# Patient Record
Sex: FEMALE | Race: WHITE | NOT HISPANIC OR LATINO | Employment: OTHER | URBAN - METROPOLITAN AREA
[De-identification: names, ages, dates, MRNs, and addresses within clinical notes are randomized per-mention and may not be internally consistent; named-entity substitution may affect disease eponyms.]

---

## 2017-06-26 ENCOUNTER — OFFICE VISIT (OUTPATIENT)
Dept: FAMILY MEDICINE | Facility: CLINIC | Age: 57
End: 2017-06-26
Payer: COMMERCIAL

## 2017-06-26 VITALS
WEIGHT: 166 LBS | DIASTOLIC BLOOD PRESSURE: 93 MMHG | HEIGHT: 68 IN | OXYGEN SATURATION: 99 % | BODY MASS INDEX: 25.16 KG/M2 | HEART RATE: 72 BPM | SYSTOLIC BLOOD PRESSURE: 130 MMHG

## 2017-06-26 DIAGNOSIS — R42 LIGHTHEADEDNESS: Primary | ICD-10-CM

## 2017-06-26 DIAGNOSIS — Z13.6 CARDIOVASCULAR SCREENING; LDL GOAL LESS THAN 130: ICD-10-CM

## 2017-06-26 DIAGNOSIS — Z23 NEED FOR VACCINATION: ICD-10-CM

## 2017-06-26 PROCEDURE — 99204 OFFICE O/P NEW MOD 45 MIN: CPT | Mod: 25 | Performed by: NURSE PRACTITIONER

## 2017-06-26 PROCEDURE — 90471 IMMUNIZATION ADMIN: CPT | Performed by: NURSE PRACTITIONER

## 2017-06-26 PROCEDURE — 90715 TDAP VACCINE 7 YRS/> IM: CPT | Performed by: NURSE PRACTITIONER

## 2017-06-26 NOTE — PATIENT INSTRUCTIONS
1. Schedule physical and fasting blood work.           Thank you for choosing [facility].  You may be receiving a survey in the mail from [employer] regarding your visit today.  Please take a few minutes to complete and return the survey to let us know how we are doing.      If you have questions or concerns, please contact us via [employer] or you can contact your care team at [phone].    Our Clinic hours are:  Monday 6:40 am  to 7:00 pm  Tuesday -Friday 6:40 am to 5:00 pm    The [place] outpatient lab hours are:  Monday - Friday 6:10 am to 4:45 pm  Saturdays 7:00 am to 11:00 am  Appointments are required, call [phone]    If you have clinical questions after hours or would like to schedule an appointment,  call the clinic at [phone].

## 2017-06-26 NOTE — PROGRESS NOTES
"  SUBJECTIVE:                                                    Sheila Schilling is a 56 year old female who presents to clinic today for the following health issues:    Patient would like to est. Care- medical chart was reviewed with patient.       ED/UC Followup:    Facility:  [facility]  Date of visit: 6/23/17  Reason for visit: light headed ? HTN  Current Status: stable, had an episode of lightheaded last night   Chart was reviewed via Care Everywhere.   Patient went to ER after feeling dizzy and lightheaded at a ball game- she got out and \"got some air\" and felt better.   Workup including CT brain, EKG, troponin,, chest x-ray , CBC and BMP normal except for HGB at 16.5 and Glucose at 139.   Patient was given IV fluids and discharge home.     Last night she was sitting watching TV and bad brief episode of feeling lightheaded that lasted 10 minutes. No palpitations, shortness of breath or chest pain.   No dizziness. No nausea or vomiting. No weakness in extremities. No memory changes, no vision changes.         -------------------------------------    Problem list and histories reviewed & adjusted, as indicated.  Additional history: as documented    Patient Active Problem List   Diagnosis     CARDIOVASCULAR SCREENING; LDL GOAL LESS THAN 160     Past Surgical History:   Procedure Laterality Date     COLONOSCOPY  7/28/2011    Procedure:COLONOSCOPY; Surgeon:IMKE LYLE; Location: OR     SURGICAL HISTORY OF -   07/15/2002    laparoscopic L salpingo-oophorectomy - ectopic     SURGICAL HISTORY OF -   7/15/2002    SAB       Social History   Substance Use Topics     Smoking status: Never Smoker     Smokeless tobacco: Not on file     Alcohol use Yes      Comment: rare     Family History   Problem Relation Age of Onset     C.A.D. Father      Hypertension Father      DIABETES Maternal Grandmother      C.A.D. Maternal Grandmother      C.A.D. Maternal Grandfather      CANCER Paternal Grandmother      " "liver     Hypertension Mother      Lipids Mother      Cancer - colorectal Other            Reviewed and updated as needed this visit by clinical staff       Reviewed and updated as needed this visit by Provider         ROS:  Constitutional, HEENT, cardiovascular, pulmonary, GI, , musculoskeletal, neuro, skin, endocrine and psych systems are negative, except as otherwise noted.    OBJECTIVE:     BP (!) 130/93 (BP Location: Left arm, Patient Position: Chair, Cuff Size: Adult Regular)  Pulse 72  Ht 5' 8\" (1.727 m)  Wt 166 lb (75.3 kg)  SpO2 99%  BMI 25.24 kg/m2  Body mass index is 25.24 kg/(m^2).  GENERAL: healthy, alert and no distress  NECK: no adenopathy, no asymmetry, masses, or scars and thyroid normal to palpation  RESP: lungs clear to auscultation - no rales, rhonchi or wheezes  CV: regular rate and rhythm, normal S1 S2, no S3 or S4, no murmur, click or rub, no peripheral edema and peripheral pulses strong  MS: no gross musculoskeletal defects noted, no edema  NEURO: Negative Romberg. Normal strength of upper and lower extremities. PEARLA.     Diagnostic Test Results:  none     ASSESSMENT/PLAN:       1. Lightheadedness  Unknown etiology-  Patient presented to [facility] ER- unremarkable workup   ? Dehydration vs cardiac  - if symptoms continue consider Holter monitor, ultrasound of Carotid arteries   - TSH with free T4 reflex; Future    2. CARDIOVASCULAR SCREENING; LDL GOAL LESS THAN 130    - Lipid Profile with reflex to direct LDL; Future    3. Need for vaccination    - TDAP VACCINE (ADACEL) [77454.002]  - 1st  Administration  [23186]      > 25 min spent in direct face to face time with this patient, greater than 50% in counseling and coordination of care in chart review and discussing above symptoms.         SINCERE Donovan [facility]  "

## 2017-06-26 NOTE — MR AVS SNAPSHOT
After Visit Summary   6/26/2017    Sheila Schilling    MRN: [id number]           Patient Information     Date Of Birth          1960        Visit Information        Provider Department      6/26/2017 8:20 AM Claudia Mohan APRN CNP [facility]        Today's Diagnoses     Lightheadedness    -  1    CARDIOVASCULAR SCREENING; LDL GOAL LESS THAN 130          Care Instructions    1. Schedule physical and fasting blood work.           Thank you for choosing [facility].  You may be receiving a survey in the mail from Jose Thakkar regarding your visit today.  Please take a few minutes to complete and return the survey to let us know how we are doing.      If you have questions or concerns, please contact us via [employer] or you can contact your care team at [phone].    Our Clinic hours are:  Monday 6:40 am  to 7:00 pm  Tuesday -Friday 6:40 am to 5:00 pm    The [place] outpatient lab hours are:  Monday - Friday 6:10 am to 4:45 pm  Saturdays 7:00 am to 11:00 am  Appointments are required, call [phone]    If you have clinical questions after hours or would like to schedule an appointment,  call the clinic at [phone].          Follow-ups after your visit        Future tests that were ordered for you today     Open Future Orders        Priority Expected Expires Ordered    Lipid Profile with reflex to direct LDL Routine 6/27/2017 6/26/2018 6/26/2017            Who to contact     If you have questions or need follow up information about today's clinic visit or your schedule please contact [facility] directly at [phone].  Normal or non-critical lab and imaging results will be communicated to you by MyChart, letter or phone within 4 business days after the clinic has received the results. If you do not hear from us within 7 days, please contact the clinic through [employer] or phone. If you have a critical or abnormal lab result, we will notify you by phone as  "soon as possible.  Submit refill requests through [employer] or call your pharmacy and they will forward the refill request to us. Please allow 3 business days for your refill to be completed.          Additional Information About Your Visit        [employer] Information     [employer] gives you secure access to your electronic health record. If you see a primary care provider, you can also send messages to your care team and make appointments. If you have questions, please call your primary care clinic.  If you do not have a primary care provider, please call [phone] and they will assist you.        Care EveryWhere ID     This is your Care EveryWhere ID. This could be used by other organizations to access your [place] medical records  [id number]        Your Vitals Were     Pulse Height Pulse Oximetry BMI (Body Mass Index)          72 5' 8\" (1.727 m) 99% 25.24 kg/m2         Blood Pressure from Last 3 Encounters:   06/26/17 (!) 130/93   07/28/11 137/93   04/13/11 117/72    Weight from Last 3 Encounters:   06/26/17 166 lb (75.3 kg)   07/28/11 145 lb (65.8 kg)   04/13/11 149 lb 6.4 oz (67.8 kg)               Primary Care Provider Office Phone # Fax #    SINCERE Donovan [facility] [phone]       [facility] [address] [address]        Equal Access to Services     LEAH MALHOTRA AH: Hadii aad ku hadasho Soomaali, waaxda luqadaha, qaybta kaalmada adeegyada, collins collins . So [facility] [phone].    ATENCIÓN: Si habla español, tiene a ferraro disposición servicios gratuitos de asistencia lingüística. Llame al [phone].    We comply with applicable federal civil rights laws and [place] laws. We do not discriminate on the basis of race, color, national origin, age, disability sex, sexual orientation or gender identity.            Thank you!     Thank you for choosing [facility]  for your care. Our goal is always to provide you with excellent care. " Hearing back from our patients is one way we can continue to improve our services. Please take a few minutes to complete the written survey that you may receive in the mail after your visit with us. Thank you!             Your Updated Medication List - Protect others around you: Learn how to safely use, store and throw away your medicines at www.disposemymeds.org.          This list is accurate as of: 6/26/17  8:48 AM.  Always use your most recent med list.                   Brand Name Dispense Instructions for use Diagnosis    CALCIUM + D PO      Take 1 tablet by mouth.        MULTI COMPLETE/IRON Tabs      Take 1 tablet by mouth.        NO ACTIVE MEDICATIONS      .        omega-3 fatty acids 1200 MG capsule      Take 1 capsule by mouth daily.

## 2017-06-26 NOTE — NURSING NOTE
Prior to injection verified patient identity using patient's name and date of birth.  Per orders of  R Marques, injection of Adacel given by Ameena Kulkarni. Patient instructed to remain in clinic for 20 minutes afterwards, and to report any adverse reaction to me immediately.

## 2017-06-26 NOTE — NURSING NOTE
"Initial BP (!) 130/93 (BP Location: Left arm, Patient Position: Chair, Cuff Size: Adult Regular)  Pulse 72  Ht 5' 8\" (1.727 m)  Wt 166 lb (75.3 kg)  SpO2 99%  BMI 25.24 kg/m2 Estimated body mass index is 25.24 kg/(m^2) as calculated from the following:    Height as of this encounter: 5' 8\" (1.727 m).    Weight as of this encounter: 166 lb (75.3 kg). .    Ameena Kulkarni    "

## 2017-07-01 ENCOUNTER — HEALTH MAINTENANCE LETTER (OUTPATIENT)
Age: 57
End: 2017-07-01

## 2017-07-20 ENCOUNTER — OFFICE VISIT (OUTPATIENT)
Dept: FAMILY MEDICINE | Facility: CLINIC | Age: 57
End: 2017-07-20
Payer: COMMERCIAL

## 2017-07-20 ENCOUNTER — HOSPITAL ENCOUNTER (OUTPATIENT)
Dept: CARDIOLOGY | Facility: CLINIC | Age: 57
Discharge: HOME | End: 2017-07-20
Attending: NURSE PRACTITIONER | Admitting: NURSE PRACTITIONER
Payer: COMMERCIAL

## 2017-07-20 VITALS
HEART RATE: 70 BPM | TEMPERATURE: 97.6 F | DIASTOLIC BLOOD PRESSURE: 78 MMHG | BODY MASS INDEX: 25.31 KG/M2 | HEIGHT: 68 IN | WEIGHT: 167 LBS | SYSTOLIC BLOOD PRESSURE: 123 MMHG

## 2017-07-20 DIAGNOSIS — R00.2 PALPITATIONS: ICD-10-CM

## 2017-07-20 DIAGNOSIS — Z13.6 CARDIOVASCULAR SCREENING; LDL GOAL LESS THAN 130: ICD-10-CM

## 2017-07-20 DIAGNOSIS — Z13.1 SCREENING FOR DIABETES MELLITUS: ICD-10-CM

## 2017-07-20 DIAGNOSIS — Z82.49 FAMILY HISTORY OF ISCHEMIC HEART DISEASE: ICD-10-CM

## 2017-07-20 DIAGNOSIS — Z01.419 PAP SMEAR, LOW-RISK: Primary | ICD-10-CM

## 2017-07-20 DIAGNOSIS — Z11.59 ENCOUNTER FOR HEPATITIS C SCREENING TEST FOR LOW RISK PATIENT: ICD-10-CM

## 2017-07-20 LAB
ANION GAP SERPL CALCULATED.3IONS-SCNC: 4 MMOL/L (ref 3–14)
BUN SERPL-MCNC: 13 MG/DL (ref 7–30)
CALCIUM SERPL-MCNC: 9.3 MG/DL (ref 8.5–10.1)
CHLORIDE SERPL-SCNC: 106 MMOL/L (ref 94–109)
CHOLEST SERPL-MCNC: 199 MG/DL
CO2 SERPL-SCNC: 30 MMOL/L (ref 20–32)
CREAT SERPL-MCNC: 0.72 MG/DL (ref 0.52–1.04)
GFR SERPL CREATININE-BSD FRML MDRD: 84 ML/MIN/1.7M2
GLUCOSE SERPL-MCNC: 89 MG/DL (ref 70–99)
HCV AB SERPL QL IA: NORMAL
HDLC SERPL-MCNC: 75 MG/DL
LDLC SERPL CALC-MCNC: 110 MG/DL
NONHDLC SERPL-MCNC: 124 MG/DL
POTASSIUM SERPL-SCNC: 4.4 MMOL/L (ref 3.4–5.3)
SODIUM SERPL-SCNC: 140 MMOL/L (ref 133–144)
TRIGL SERPL-MCNC: 70 MG/DL
TSH SERPL DL<=0.005 MIU/L-ACNC: 1.98 MU/L (ref 0.4–4)

## 2017-07-20 PROCEDURE — 0296T ZIO PATCH HOLTER: CPT

## 2017-07-20 PROCEDURE — 0298T ZZC EXT ECG > 48HR TO 21 DAY REVIEW AND INTERPRETATN: CPT | Performed by: INTERNAL MEDICINE

## 2017-07-20 PROCEDURE — 36415 COLL VENOUS BLD VENIPUNCTURE: CPT | Performed by: NURSE PRACTITIONER

## 2017-07-20 PROCEDURE — 99213 OFFICE O/P EST LOW 20 MIN: CPT | Mod: 25 | Performed by: NURSE PRACTITIONER

## 2017-07-20 PROCEDURE — 80048 BASIC METABOLIC PNL TOTAL CA: CPT | Performed by: NURSE PRACTITIONER

## 2017-07-20 PROCEDURE — 80061 LIPID PANEL: CPT | Performed by: NURSE PRACTITIONER

## 2017-07-20 PROCEDURE — G0145 SCR C/V CYTO,THINLAYER,RESCR: HCPCS | Performed by: NURSE PRACTITIONER

## 2017-07-20 PROCEDURE — 87624 HPV HI-RISK TYP POOLED RSLT: CPT | Performed by: NURSE PRACTITIONER

## 2017-07-20 PROCEDURE — 84443 ASSAY THYROID STIM HORMONE: CPT | Performed by: NURSE PRACTITIONER

## 2017-07-20 PROCEDURE — 99396 PREV VISIT EST AGE 40-64: CPT | Performed by: NURSE PRACTITIONER

## 2017-07-20 PROCEDURE — 86803 HEPATITIS C AB TEST: CPT | Performed by: NURSE PRACTITIONER

## 2017-07-20 NOTE — PATIENT INSTRUCTIONS
1. Labs today  2. Go to cardiac services to  your heart monitor- 2nd floor      Preventive Health Recommendations  Female Ages 50 - 64    Yearly exam: See your health care provider every year in order to  o Review health changes.   o Discuss preventive care.    o Review your medicines if your doctor has prescribed any.      Get a Pap test every three years (unless you have an abnormal result and your provider advises testing more often).    If you get Pap tests with HPV test, you only need to test every 5 years, unless you have an abnormal result.     You do not need a Pap test if your uterus was removed (hysterectomy) and you have not had cancer.    You should be tested each year for STDs (sexually transmitted diseases) if you're at risk.     Have a mammogram every 1 to 2 years.    Have a colonoscopy at age 50, or have a yearly FIT test (stool test). These exams screen for colon cancer.      Have a cholesterol test every 5 years, or more often if advised.    Have a diabetes test (fasting glucose) every three years. If you are at risk for diabetes, you should have this test more often.     If you are at risk for osteoporosis (brittle bone disease), think about having a bone density scan (DEXA).    Shots: Get a flu shot each year. Get a tetanus shot every 10 years.    Nutrition:     Eat at least 5 servings of fruits and vegetables each day.    Eat whole-grain bread, whole-wheat pasta and brown rice instead of white grains and rice.    Talk to your provider about Calcium and Vitamin D.     Lifestyle    Exercise at least 150 minutes a week (30 minutes a day, 5 days a week). This will help you control your weight and prevent disease.    Limit alcohol to one drink per day.    No smoking.     Wear sunscreen to prevent skin cancer.     See your dentist every six months for an exam and cleaning.    See your eye doctor every 1 to 2 years        Thank you for choosing [facility].  You may be receiving a survey in  the mail from [employer] regarding your visit today.  Please take a few minutes to complete and return the survey to let us know how we are doing.      If you have questions or concerns, please contact us via [employer] or you can contact your care team at [phone].    Our Clinic hours are:  Monday 6:40 am  to 7:00 pm  Tuesday -Friday 6:40 am to 5:00 pm    The [place] outpatient lab hours are:  Monday - Friday 6:10 am to 4:45 pm  Saturdays 7:00 am to 11:00 am  Appointments are required, call [phone]    If you have clinical questions after hours or would like to schedule an appointment,  call the clinic at [phone].        Thank you for choosing [facility].  You may be receiving a survey in the mail from [employer] regarding your visit today.  Please take a few minutes to complete and return the survey to let us know how we are doing.      If you have questions or concerns, please contact us via [employer] or you can contact your care team at [phone].    Our Clinic hours are:  Monday 6:40 am  to 7:00 pm  Tuesday -Friday 6:40 am to 5:00 pm    The [place] outpatient lab hours are:  Monday - Friday 6:10 am to 4:45 pm  Saturdays 7:00 am to 11:00 am  Appointments are required, call [phone]    If you have clinical questions after hours or would like to schedule an appointment,  call the clinic at [phone].

## 2017-07-20 NOTE — PROGRESS NOTES
SUBJECTIVE:   CC: Sheila Schilling is an 56 year old woman who presents for preventive health visit.     Healthy Habits:    Do you get at least three servings of calcium containing foods daily (dairy, green leafy vegetables, etc.)? no, taking calcium and/or vitamin D supplement: yes -     Amount of exercise or daily activities, outside of work: 5 day(s) per week    Problems taking medications regularly No    Medication side effects: No    Have you had an eye exam in the past two years? yes    Do you see a dentist twice per year? yes    Do you have sleep apnea, excessive snoring or daytime drowsiness?no          PROBLEMS TO ADD ON...    Palpitations:     patient previously went to [facility] ER on 6/23/17 for lightheadedness and dizzinesz and I saw patient for follow up on 6/26/17: workup in ER  including CT brain, EKG, troponin,, chest x-ray , CBC and BMP normal except for HGB at 16.5 and Glucose at 139.   Patient was given IV fluids and discharge home.    Today patient tells me that last week while out to dinner having a cocktail she experienced heart racing and felt very uncomfortable. Symptoms later resolved on own. No shortness of breath/chest pressure/lightheadeness or dizziness. Family history of atrial fibrillation.     Today's PHQ-2 Score: No flowsheet data found.      Abuse: Current or Past(Physical, Sexual or Emotional)- No  Do you feel safe in your environment - Yes  Social History   Substance Use Topics     Smoking status: Never Smoker     Smokeless tobacco: Not on file     Alcohol use Yes      Comment: rare     The patient does not drink >3 drinks per day nor >7 drinks per week.    Reviewed orders with patient.  Reviewed health maintenance and updated orders accordingly - Yes  Patient Active Problem List   Diagnosis     CARDIOVASCULAR SCREENING; LDL GOAL LESS THAN 160     Past Surgical History:   Procedure Laterality Date     COLONOSCOPY  7/28/2011    Procedure:COLONOSCOPY; Surgeon:MIKE LYLE  LYNDSEY; Location:[facility] OR     SURGICAL HISTORY OF -   07/15/2002    laparoscopic L salpingo-oophorectomy - ectopic     SURGICAL HISTORY OF -   7/15/2002    SAB       Social History   Substance Use Topics     Smoking status: Never Smoker     Smokeless tobacco: Never Used     Alcohol use Yes      Comment: rare     Family History   Problem Relation Age of Onset     C.A.D. Father      Hypertension Father      DIABETES Maternal Grandmother      C.A.D. Maternal Grandmother      C.A.D. Maternal Grandfather      CANCER Paternal Grandmother      liver     Hypertension Mother      Lipids Mother      Cancer - colorectal Other              Patient over age 50, mutual decision to screen reflected in health maintenance.      Pertinent mammograms are reviewed under the imaging tab.  History of abnormal Pap smear:   Last 3 Pap Results:   PAP (no units)   Date Value   04/07/2010 NIL   02/05/2008 NIL   11/22/2005 NIL       Reviewed and updated as needed this visit by clinical staff         Reviewed and updated as needed this visit by Provider              ROS:  C: NEGATIVE for fever, chills, change in weight  I: NEGATIVE for worrisome rashes, moles or lesions  E: NEGATIVE for vision changes or irritation  ENT: NEGATIVE for ear, mouth and throat problems  R: NEGATIVE for significant cough or SOB  B: NEGATIVE for masses, tenderness or discharge  CV: POSITIVE for palpitations  GI: NEGATIVE for nausea, abdominal pain, heartburn, or change in bowel habits  : NEGATIVE for unusual urinary or vaginal symptoms. No vaginal bleeding.  M: NEGATIVE for significant arthralgias or myalgia  N: NEGATIVE for weakness, dizziness or paresthesias  P: NEGATIVE for changes in mood or affect     OBJECTIVE:   There were no vitals taken for this visit.  EXAM:  GENERAL: healthy, alert and no distress  EYES: Eyes grossly normal to inspection, PERRL and conjunctivae and sclerae normal  HENT: ear canals and TM's normal, nose and mouth without ulcers or  lesions  NECK: no adenopathy, no asymmetry, masses, or scars and thyroid normal to palpation  RESP: lungs clear to auscultation - no rales, rhonchi or wheezes  BREAST: normal without masses, tenderness or nipple discharge and no palpable axillary masses or adenopathy  CV: regular rate and rhythm, normal S1 S2, no S3 or S4, no murmur, click or rub, no peripheral edema and peripheral pulses strong  ABDOMEN: soft, nontender, no hepatosplenomegaly, no masses and bowel sounds normal   (female): normal female external genitalia, normal urethral meatus, vaginal mucosa pink, moist, well rugated, and normal cervix/adnexa/uterus without masses or discharge  MS: no gross musculoskeletal defects noted, no edema  SKIN: no suspicious lesions or rashes  NEURO: Normal strength and tone, mentation intact and speech normal  PSYCH: mentation appears normal, affect normal/bright    ASSESSMENT/PLAN:   1. Pap smear, low-risk    - Pap imaged thin layer screen with HPV - recommended age 30 - 65  - HPV High Risk Types DNA Cervical    2. CARDIOVASCULAR SCREENING; LDL GOAL LESS THAN 130    - LIPID REFLEX TO DIRECT LDL PANEL    3. Family history of ischemic heart disease    - LIPID REFLEX TO DIRECT LDL PANEL    4. Screening for diabetes mellitus    - Basic metabolic panel    5. Encounter for hepatitis C screening test for low risk patient    - Hepatitis C antibody    6. Palpitations  ? Arrhythmia vs thyroid disorder  - previous BMP 1 month ago was normal- r/u electrolyte abnormality.   - Zio Patch Holter; Future  - TSH with free T4 reflex    - follow up to discuss results     COUNSELING:   Reviewed preventive health counseling, as reflected in patient instructions       Regular exercise       Healthy diet/nutrition       Osteoporosis Prevention/Bone Health       reports that she has never smoked. She does not have any smokeless tobacco history on file.    Estimated body mass index is 25.24 kg/(m^2) as calculated from the following:    Height  "as of 6/26/17: 5' 8\" (1.727 m).    Weight as of 6/26/17: 166 lb (75.3 kg).         Counseling Resources:  ATP IV Guidelines  Pooled Cohorts Equation Calculator  Breast Cancer Risk Calculator  FRAX Risk Assessment  ICSI Preventive Guidelines  Dietary Guidelines for Americans, 2010  USDA's MyPlate  ASA Prophylaxis  Lung CA Screening    SINCERE Donovan [facility]  "

## 2017-07-20 NOTE — MR AVS SNAPSHOT
After Visit Summary   7/20/2017    Sheila Schilling    MRN: [id number]           Patient Information     Date Of Birth          1960        Visit Information        Provider Department      7/20/2017 7:40 AM Claudia Mohan APRN [facility]        Today's Diagnoses     Pap smear, low-risk    -  1    CARDIOVASCULAR SCREENING; LDL GOAL LESS THAN 130        Family history of ischemic heart disease        Screening for diabetes mellitus        Encounter for hepatitis C screening test for low risk patient        Palpitations          Care Instructions    1. Labs today  2. Go to cardiac services to  your heart monitor- 2nd floor      Preventive Health Recommendations  Female Ages 50 - 64    Yearly exam: See your health care provider every year in order to  o Review health changes.   o Discuss preventive care.    o Review your medicines if your doctor has prescribed any.      Get a Pap test every three years (unless you have an abnormal result and your provider advises testing more often).    If you get Pap tests with HPV test, you only need to test every 5 years, unless you have an abnormal result.     You do not need a Pap test if your uterus was removed (hysterectomy) and you have not had cancer.    You should be tested each year for STDs (sexually transmitted diseases) if you're at risk.     Have a mammogram every 1 to 2 years.    Have a colonoscopy at age 50, or have a yearly FIT test (stool test). These exams screen for colon cancer.      Have a cholesterol test every 5 years, or more often if advised.    Have a diabetes test (fasting glucose) every three years. If you are at risk for diabetes, you should have this test more often.     If you are at risk for osteoporosis (brittle bone disease), think about having a bone density scan (DEXA).    Shots: Get a flu shot each year. Get a tetanus shot every 10 years.    Nutrition:     Eat at least 5 servings of fruits and  vegetables each day.    Eat whole-grain bread, whole-wheat pasta and brown rice instead of white grains and rice.    Talk to your provider about Calcium and Vitamin D.     Lifestyle    Exercise at least 150 minutes a week (30 minutes a day, 5 days a week). This will help you control your weight and prevent disease.    Limit alcohol to one drink per day.    No smoking.     Wear sunscreen to prevent skin cancer.     See your dentist every six months for an exam and cleaning.    See your eye doctor every 1 to 2 years        Thank you for choosing [facility].  You may be receiving a survey in the mail from [employer] regarding your visit today.  Please take a few minutes to complete and return the survey to let us know how we are doing.      If you have questions or concerns, please contact us via Cloudwords or you can contact your care team at [phone].    Our Clinic hours are:  Monday 6:40 am  to 7:00 pm  Tuesday -Friday 6:40 am to 5:00 pm    The [place] outpatient lab hours are:  Monday - Friday 6:10 am to 4:45 pm  Saturdays 7:00 am to 11:00 am  Appointments are required, call [phone]    If you have clinical questions after hours or would like to schedule an appointment,  call the clinic at [phone].        Thank you for choosing [facility].  You may be receiving a survey in the mail from [employer] regarding your visit today.  Please take a few minutes to complete and return the survey to let us know how we are doing.      If you have questions or concerns, please contact us via Cloudwords or you can contact your care team at [phone].    Our Clinic hours are:  Monday 6:40 am  to 7:00 pm  Tuesday -Friday 6:40 am to 5:00 pm    The [place] outpatient lab hours are:  Monday - Friday 6:10 am to 4:45 pm  Saturdays 7:00 am to 11:00 am  Appointments are required, call [phone]    If you have clinical questions after hours or would like to schedule an appointment,  call the clinic at  "[phone].            Follow-ups after your visit        Future tests that were ordered for you today     Open Future Orders        Priority Expected Expires Ordered    Zio Patch Holter Routine  9/3/2017 7/20/2017            Who to contact     If you have questions or need follow up information about today's clinic visit or your schedule please contact [facility] directly at [phone].  Normal or non-critical lab and imaging results will be communicated to you by [employer], letter or phone within 4 business days after the clinic has received the results. If you do not hear from us within 7 days, please contact the clinic through Andre Phillipet or phone. If you have a critical or abnormal lab result, we will notify you by phone as soon as possible.  Submit refill requests through [employer] or call your pharmacy and they will forward the refill request to us. Please allow 3 business days for your refill to be completed.          Additional Information About Your Visit        [employer] Information     [employer] gives you secure access to your electronic health record. If you see a primary care provider, you can also send messages to your care team and make appointments. If you have questions, please call your primary care clinic.  If you do not have a primary care provider, please call [phone] and they will assist you.        Care EveryWhere ID     This is your Care EveryWhere ID. This could be used by other organizations to access your [facility] medical records  [id number]        Your Vitals Were     Pulse Temperature Height Last Period BMI (Body Mass Index)       70 97.6  F (36.4  C) (Tympanic) 5' 8\" (1.727 m) 03/08/2011 25.39 kg/m2        Blood Pressure from Last 3 Encounters:   07/20/17 123/78   06/26/17 (!) 130/93   07/28/11 137/93    Weight from Last 3 Encounters:   07/20/17 167 lb (75.8 kg)   06/26/17 166 lb (75.3 kg)   07/28/11 145 lb (65.8 kg)              We Performed the Following     Basic " metabolic panel     Hepatitis C antibody     HPV High Risk Types DNA Cervical     LIPID REFLEX TO DIRECT LDL PANEL     Pap imaged thin layer screen with HPV - recommended age 30 - 65     TSH with free T4 reflex        Primary Care Provider Office Phone # Fax #    SINCERE Donovan [phone]       [facility] [address] [facility] [address]        Equal Access to Services     LEAH MALHOTRA : Hadii aad ku hadasho Soomaali, waaxda luqadaha, qaybta kaalmada adeegyada, waxay idiin hayaan adeeg kharash la'amber ah. So [facility] [phone].    ATENCIÓN: Si habla español, tiene a ferraro disposición servicios gratuitos de asistencia lingüística. Llame al [phone].    We comply with applicable federal civil rights laws and [place] laws. We do not discriminate on the basis of race, color, national origin, age, disability sex, sexual orientation or gender identity.            Thank you!     Thank you for choosing [facility]  for your care. Our goal is always to provide you with excellent care. Hearing back from our patients is one way we can continue to improve our services. Please take a few minutes to complete the written survey that you may receive in the mail after your visit with us. Thank you!             Your Updated Medication List - Protect others around you: Learn how to safely use, store and throw away your medicines at www.disposemymeds.org.          This list is accurate as of: 7/20/17  8:24 AM.  Always use your most recent med list.                   Brand Name Dispense Instructions for use Diagnosis    CALCIUM + D PO      Take 1 tablet by mouth.        MULTI COMPLETE/IRON Tabs      Take 1 tablet by mouth.        NO ACTIVE MEDICATIONS      .        omega-3 fatty acids 1200 MG capsule      Take 1 capsule by mouth daily.

## 2017-07-20 NOTE — NURSING NOTE
"Chief Complaint   Patient presents with     Physical     fasting       Initial /78 (BP Location: Left arm, Patient Position: Sitting, Cuff Size: Adult Regular)  Pulse 70  Temp 97.6  F (36.4  C) (Tympanic)  Ht 5' 8\" (1.727 m)  Wt 167 lb (75.8 kg)  LMP 03/08/2011  BMI 25.39 kg/m2 Estimated body mass index is 25.39 kg/(m^2) as calculated from the following:    Height as of this encounter: 5' 8\" (1.727 m).    Weight as of this encounter: 167 lb (75.8 kg).  Medication Reconciliation: complete    "

## 2017-07-24 LAB
COPATH REPORT: NORMAL
PAP: NORMAL

## 2017-07-25 LAB
FINAL DIAGNOSIS: NORMAL
HPV HR 12 DNA CVX QL NAA+PROBE: NEGATIVE
HPV16 DNA SPEC QL NAA+PROBE: NEGATIVE
HPV18 DNA SPEC QL NAA+PROBE: NEGATIVE
SPECIMEN DESCRIPTION: NORMAL

## 2017-08-16 DIAGNOSIS — I48.0 PAROXYSMAL ATRIAL FIBRILLATION (H): Primary | ICD-10-CM

## 2017-08-17 ENCOUNTER — HOSPITAL ENCOUNTER (OUTPATIENT)
Dept: CARDIOLOGY | Facility: CLINIC | Age: 57
Discharge: HOME | End: 2017-08-17
Attending: INTERNAL MEDICINE | Admitting: INTERNAL MEDICINE
Payer: COMMERCIAL

## 2017-08-17 ENCOUNTER — OFFICE VISIT (OUTPATIENT)
Dept: CARDIOLOGY | Facility: CLINIC | Age: 57
End: 2017-08-17
Payer: COMMERCIAL

## 2017-08-17 VITALS
WEIGHT: 165 LBS | BODY MASS INDEX: 25.01 KG/M2 | SYSTOLIC BLOOD PRESSURE: 122 MMHG | HEART RATE: 72 BPM | HEIGHT: 68 IN | DIASTOLIC BLOOD PRESSURE: 72 MMHG

## 2017-08-17 DIAGNOSIS — I48.0 PAROXYSMAL ATRIAL FIBRILLATION (H): ICD-10-CM

## 2017-08-17 DIAGNOSIS — I48.0 PAROXYSMAL ATRIAL FIBRILLATION (H): Primary | ICD-10-CM

## 2017-08-17 PROCEDURE — 99204 OFFICE O/P NEW MOD 45 MIN: CPT | Mod: 25 | Performed by: INTERNAL MEDICINE

## 2017-08-17 PROCEDURE — 93306 TTE W/DOPPLER COMPLETE: CPT

## 2017-08-17 PROCEDURE — 93306 TTE W/DOPPLER COMPLETE: CPT | Mod: 26 | Performed by: INTERNAL MEDICINE

## 2017-08-17 RX ORDER — FLECAINIDE ACETATE 100 MG/1
100 TABLET ORAL 2 TIMES DAILY
Qty: 60 TABLET | Refills: 11 | Status: SHIPPED | OUTPATIENT
Start: 2017-08-17 | End: 2017-11-14

## 2017-08-17 NOTE — PROGRESS NOTES
HPI and Plan:   See dictation    Orders Placed This Encounter   Procedures     Exercise Stress Test (Stress ECG)       Orders Placed This Encounter   Medications     flecainide (TAMBOCOR) 100 MG tablet     Sig: Take 1 tablet (100 mg) by mouth 2 times daily     Dispense:  60 tablet     Refill:  11       There are no discontinued medications.      Encounter Diagnosis   Name Primary?     Paroxysmal atrial fibrillation (H) Yes       CURRENT MEDICATIONS:  Current Outpatient Prescriptions   Medication Sig Dispense Refill     flecainide (TAMBOCOR) 100 MG tablet Take 1 tablet (100 mg) by mouth 2 times daily 60 tablet 11     Calcium-Vitamin D (CALCIUM + D PO) Take 1 tablet by mouth.       Omega-3 Fatty Acids (FISH OIL) 1200 MG capsule Take 1 capsule by mouth daily.       Multiple Vitamins-Minerals (MULTI COMPLETE/IRON) TABS Take 1 tablet by mouth.       NO ACTIVE MEDICATIONS .         ALLERGIES   No Known Allergies    PAST MEDICAL HISTORY:  Past Medical History:   Diagnosis Date     Atrial fibrillation (H)      Paroxysmal atrial flutter (H)        PAST SURGICAL HISTORY:  Past Surgical History:   Procedure Laterality Date     COLONOSCOPY  7/28/2011    Procedure:COLONOSCOPY; Surgeon:MIKE LYLE; Location:[facility] OR     SURGICAL HISTORY OF -   07/15/2002    laparoscopic L salpingo-oophorectomy - ectopic     SURGICAL HISTORY OF -   7/15/2002    SAB       FAMILY HISTORY:  Family History   Problem Relation Age of Onset     C.A.D. Father      Hypertension Father      DIABETES Maternal Grandmother      C.A.D. Maternal Grandmother      C.A.D. Maternal Grandfather      CANCER Paternal Grandmother      liver     Hypertension Mother      Lipids Mother      Cancer - colorectal Other        SOCIAL HISTORY:  Social History     Social History     Marital status:      Spouse name: N/A     Number of children: N/A     Years of education: N/A     Social History Main Topics     Smoking status: Never Smoker     Smokeless tobacco:  "Never Used     Alcohol use Yes      Comment: rare     Drug use: No     Sexual activity: Yes     Birth control/ protection: None     Other Topics Concern     Parent/Sibling W/ Cabg, Mi Or Angioplasty Before 65f 55m? No     Social History Narrative       Review of Systems:  Skin:  Negative       Eyes:  Positive for glasses    ENT:  Negative      Respiratory:  Negative       Cardiovascular:  Negative      Gastroenterology: Negative      Genitourinary:  Negative      Musculoskeletal:  Negative      Neurologic:  Negative      Psychiatric:  Negative      Heme/Lymph/Imm:  Negative      Endocrine:  Negative        Physical Exam:  Vitals: /72  Pulse 72  Ht 1.727 m (5' 8\")  Wt 74.8 kg (165 lb)  LMP 03/08/2011  BMI 25.09 kg/m2    Constitutional:  cooperative, alert and oriented, well developed, well nourished, in no acute distress        Skin:  warm and dry to the touch, no apparent skin lesions or masses noted        Head:  normocephalic, no masses or lesions        Eyes:  pupils equal and round, conjunctivae and lids unremarkable, sclera white, no xanthalasma, EOMS intact, no nystagmus        ENT:  no pallor or cyanosis, dentition good        Neck:  carotid pulses are full and equal bilaterally, JVP normal, no carotid bruit, no thyromegaly        Chest:  normal breath sounds, clear to auscultation, normal A-P diameter, normal symmetry, normal respiratory excursion, no use of accessory muscles          Cardiac: regular rhythm, normal S1/S2, no S3 or S4, apical impulse not displaced, no murmurs, gallops or rubs                  Abdomen:  abdomen soft, non-tender, BS normoactive, no mass, no HSM, no bruits        Vascular: pulses full and equal, no bruits auscultated                                        Extremities and Back:  no deformities, clubbing, cyanosis, erythema observed              Neurological:  affect appropriate, oriented to time, person and place              CC  No referring provider defined for this " encounter.

## 2017-08-17 NOTE — LETTER
8/17/2017    Claudia Mohan, APRN CNP  [address] [facility] [address]    RE: Sheilasukhwinder Schilling       Dear Colleague,    I had the pleasure of seeing Sheila Schilling in the [facility].    Thank you for allowing me to participate in the care of this very delightful patient.  As you know, Sheila is a 56-year-old female, previously healthy, who had been having some palpitations off and on now for these past few months including an episode associated with severe lightheadedness prompting ER visit.  By the time she went to the local ER, her symptoms had subsided.  She subsequently had to wear a ZIO Patch monitor, which demonstrated occasional episodes of atrial fibrillation or atrial flutter with rapid ventricular response lasting a minute or less.  There is also a sinus bradycardia at night when she is sleeping at a rate of about 30 beats per minute, which is not unexpected given her relatively young age and with expectant high vagal tone.  The patient does not seem to have any symptoms suggestive of sleep apnea.  Her thyroid function we checked recently shows appropriate function.      Sheila mentioned that the episodes of dizziness occurred only after with the palpitations, suggesting that that episode was quite longer than what we observed on ZIO Patch and they are likely to be atrial fibrillation as well.  She did have a normal cardiac structure and function on an echocardiogram that I requested prior to this visit.  The left atrial size was in normal range.      It is very likely that she has been having paroxysmal atrial tachyarrhythmias, including mostly atrial fibrillation and a hint of probable atrial flutter, causing her to have the severe bout of lightheadedness in the past after experiencing palpitations.  We discussed at length about the potential cause of atrial fibrillation.  In her case, it seems to be idiopathic.      We discussed potential complications including  tachycardia-induced cardiomyopathy and CVA, which is low for her given her CHADS-VASc score of 1 only, namely from being a female.      Lastly, we discussed treatment options including rate versus rhythm control strategy.  As the patient is very symptomatic with her atrial tachyarrhythmias, I would recommend a rhythm control strategy.  I would have her on flecainide at 100 mg twice a day given that she has normal cardiac structure and function in the absence of any coronary ischemic symptoms.  I went over the side effects of the drug including blurred vision and proarrhythmic potential.  After she is on it for 3-5 days, I would like to obtain a stress EKG.  Given that she had some sinus bradycardia, I am quite hesitant to add a beta blocker at this point in time to combat the potential of flecainide-induced atrial flutter with 1:1 AV conduction.  Should flecainide is ineffective or she could not tolerated it then I would have a low threshold in recommending an ablation. I will have the patient come back to see one of our advanced practice providers at [place] for her convenience in a couple months' time for reassessment, sooner should circumstances dictate it.     Again, thank you for allowing me to participate in the care of your patient.      Sincerely,    Kody Carreon MD     [facility]

## 2017-08-17 NOTE — MR AVS SNAPSHOT
After Visit Summary   8/17/2017    Sheila Schilling    MRN: [id number]           Patient Information     Date Of Birth          1960        Visit Information        Provider Department      8/17/2017 1:30 PM Kody Thornton MD [facility]        Today's Diagnoses     Paroxysmal atrial fibrillation (H)    -  1       Follow-ups after your visit        Your next 10 appointments already scheduled     Aug 17, 2017  1:30 PM CDT   EP NEW with Kody Carreon MD   [facility] ([facility])    [address] [place] [id number] [phone]              Future tests that were ordered for you today     Open Future Orders        Priority Expected Expires Ordered    Exercise Stress Test (Stress ECG) Routine 8/21/2017 8/17/2018 8/17/2017    Echocardiogram Routine 8/23/2017 8/16/2018 8/16/2017            Who to contact     If you have questions or need follow up information about today's clinic visit or your schedule please contact [facility] directly at [phone].  Normal or non-critical lab and imaging results will be communicated to you by [employer], letter or phone within 4 business days after the clinic has received the results. If you do not hear from us within 7 days, please contact the clinic through KarmaHiret or phone. If you have a critical or abnormal lab result, we will notify you by phone as soon as possible.  Submit refill requests through [employer] or call your pharmacy and they will forward the refill request to us. Please allow 3 business days for your refill to be completed.          Additional Information About Your Visit        [employer] Information     [employer] gives you secure access to your electronic health record. If you see a primary care provider, you can also send messages to your care team and make appointments. If you have questions,  "please call your primary care clinic.  If you do not have a primary care provider, please call [phone] and they will assist you.        Care EveryWhere ID     This is your Care EveryWhere ID. This could be used by other organizations to access your [facility] medical records  [id number]        Your Vitals Were     Pulse Height Last Period BMI (Body Mass Index)          72 1.727 m (5' 8\") 03/08/2011 25.09 kg/m2         Blood Pressure from Last 3 Encounters:   08/17/17 122/72   07/20/17 123/78   06/26/17 (!) 130/93    Weight from Last 3 Encounters:   08/17/17 74.8 kg (165 lb)   07/20/17 75.8 kg (167 lb)   06/26/17 75.3 kg (166 lb)                 Today's Medication Changes          These changes are accurate as of: 8/17/17  1:17 PM.  If you have any questions, ask your nurse or doctor.               Start taking these medicines.        Dose/Directions    flecainide 100 MG tablet   Commonly known as:  TAMBOCOR   Used for:  Paroxysmal atrial fibrillation (H)   Started by:  Kody Thornton MD        Dose:  100 mg   Take 1 tablet (100 mg) by mouth 2 times daily   Quantity:  60 tablet   Refills:  11            Where to get your medicines      These medications [id number] [place] - [address]  [address] [place] [address]     Phone:  [phone]     flecainide 100 MG tablet                Primary Care Provider Office Phone # Fax #    Claudia Viky Mohan, APRN [facility] [phone] [address] [facility] [address]        Equal Access to Services     ANURAG MALHOTRA AH: Hadii mateo Rodriguez, waaxda lumelony, qaybta kaalcollins mckinney. So [facility] [phone].    ATENCIÓN: Si habla español, tiene a ferraro disposición servicios gratuitos de asistencia lingüística. Maribel al [phone].    We comply with applicable federal civil rights laws and [place] laws. We do not discriminate on the basis of race, color, national origin, " age, disability sex, sexual orientation or gender identity.            Thank you!     Thank you for choosing [facility]  for your care. Our goal is always to provide you with excellent care. Hearing back from our patients is one way we can continue to improve our services. Please take a few minutes to complete the written survey that you may receive in the mail after your visit with us. Thank you!             Your Updated Medication List - Protect others around you: Learn how to safely use, store and throw away your medicines at www.disposemymeds.org.          This list is accurate as of: 8/17/17  1:17 PM.  Always use your most recent med list.                   Brand Name Dispense Instructions for use Diagnosis    CALCIUM + D PO      Take 1 tablet by mouth.        flecainide 100 MG tablet    TAMBOCOR    60 tablet    Take 1 tablet (100 mg) by mouth 2 times daily    Paroxysmal atrial fibrillation (H)       MULTI COMPLETE/IRON Tabs      Take 1 tablet by mouth.        NO ACTIVE MEDICATIONS      .        omega-3 fatty acids 1200 MG capsule      Take 1 capsule by mouth daily.

## 2017-08-18 NOTE — PROGRESS NOTES
HISTORY OF PRESENT ILLNESS:  Thank you for allowing me to participate in the care of this very delightful patient.  As you know, Sheila is a 56-year-old female, previously healthy, who had been having some palpitations off and on now for these past few months including an episode associated with severe lightheadedness prompting ER visit.  By the time she went to the local ER, her symptoms had subsided.  She subsequently had to wear a ZIO Patch monitor, which demonstrated occasional episodes of atrial fibrillation or atrial flutter with rapid ventricular response lasting a minute or less.  There is also a sinus bradycardia at night when she is sleeping at a rate of about 30 beats per minute, which is not unexpected given her relatively young age and with expectant high vagal tone.  The patient does not seem to have any symptoms suggestive of sleep apnea.  Her thyroid function we checked recently shows appropriate function.      Sheila mentioned that the episodes of dizziness occurred only after with the palpitations, suggesting that that episode was quite longer than what we observed on ZIO Patch and they are likely to be atrial fibrillation as well.  She did have a normal cardiac structure and function on an echocardiogram that I requested prior to this visit.  The left atrial size was in normal range.      It is very likely that she has been having paroxysmal atrial tachyarrhythmias, including mostly atrial fibrillation and a hint of probable atrial flutter, causing her to have the severe bout of lightheadedness in the past after experiencing palpitations.  We discussed at length about the potential cause of atrial fibrillation.  In her case, it seems to be idiopathic.      We discussed potential complications including tachycardia-induced cardiomyopathy and CVA, which is low for her given her CHADS-VASc score of 1 only, namely from being a female.      Lastly, we discussed treatment options including rate versus  rhythm control strategy.  As the patient is very symptomatic with her atrial tachyarrhythmias, I would recommend a rhythm control strategy.  I would have her on flecainide at 100 mg twice a day given that she has normal cardiac structure and function in the absence of any coronary ischemic symptoms.  I went over the side effects of the drug including blurred vision and proarrhythmic potential.  After she is on it for 3-5 days, I would like to obtain a stress EKG.  Given that she had some sinus bradycardia, I am quite hesitant to add a beta blocker at this point in time to combat the potential of flecainide-induced atrial flutter with 1:1 AV conduction.  Should flecainide is ineffective or she could not tolerated it then I would have a low threshold in recommending an ablation. I will have the patient come back to see one of our advanced practice providers at [place] for her convenience in a couple months' time for reassessment, sooner should circumstances dictate it.      cc:   [place] [address]         SNACHEZ BRADSHAW MD             D: 2017 14:47   T: 2017 16:06   MT: HOLDEN      Name:     HUMERA CORDOVA   MRN:      [id number]        Account:      [id number]   :      1960           Service Date: 2017      Document: [id number]

## 2017-08-25 ENCOUNTER — HOSPITAL ENCOUNTER (OUTPATIENT)
Dept: CARDIOLOGY | Facility: CLINIC | Age: 57
Discharge: HOME | End: 2017-08-25
Attending: INTERNAL MEDICINE | Admitting: INTERNAL MEDICINE
Payer: COMMERCIAL

## 2017-08-25 DIAGNOSIS — I48.0 PAROXYSMAL ATRIAL FIBRILLATION (H): ICD-10-CM

## 2017-08-25 PROCEDURE — 93018 CV STRESS TEST I&R ONLY: CPT | Performed by: INTERNAL MEDICINE

## 2017-08-25 PROCEDURE — 93017 CV STRESS TEST TRACING ONLY: CPT

## 2017-08-25 PROCEDURE — 93016 CV STRESS TEST SUPVJ ONLY: CPT | Performed by: INTERNAL MEDICINE

## 2017-08-25 NOTE — PROGRESS NOTES
Patient had a GXT for flecainide therapy. Tracings faxed to [facility] Cardiology Dr. Thornton's team. MIRTA Genao RN

## 2017-09-01 ENCOUNTER — TELEPHONE (OUTPATIENT)
Dept: CARDIOLOGY | Facility: CLINIC | Age: 57
End: 2017-09-01

## 2017-09-01 DIAGNOSIS — Z51.81 ENCOUNTER FOR MONITORING FLECAINIDE THERAPY: Primary | ICD-10-CM

## 2017-09-01 DIAGNOSIS — Z79.899 ENCOUNTER FOR MONITORING FLECAINIDE THERAPY: Primary | ICD-10-CM

## 2017-09-01 NOTE — TELEPHONE ENCOUNTER
Dr. Thornton reviewed patient's recent stress test:   Notes Recorded by Kody Thornton MD on 9/1/2017 at 9:19 AM  Normal stress ecg. Continue with flecainide    Called pt, gave her results and recommendation to continue flecainide. Pt reports she is feeling well.     Entered order in Epic for f/u OV with JACKIE in [place] in 2 months per Dr. Thornton's OV note.

## 2017-11-14 ENCOUNTER — OFFICE VISIT (OUTPATIENT)
Dept: CARDIOLOGY | Facility: CLINIC | Age: 57
End: 2017-11-14
Attending: INTERNAL MEDICINE
Payer: COMMERCIAL

## 2017-11-14 ENCOUNTER — HOSPITAL ENCOUNTER (OUTPATIENT)
Dept: CARDIOLOGY | Facility: CLINIC | Age: 57
Discharge: HOME | End: 2017-11-14
Attending: PHYSICIAN ASSISTANT | Admitting: PHYSICIAN ASSISTANT
Payer: COMMERCIAL

## 2017-11-14 ENCOUNTER — PRE VISIT (OUTPATIENT)
Dept: CARDIOLOGY | Facility: CLINIC | Age: 57
End: 2017-11-14

## 2017-11-14 VITALS
OXYGEN SATURATION: 99 % | SYSTOLIC BLOOD PRESSURE: 132 MMHG | HEART RATE: 69 BPM | WEIGHT: 170.2 LBS | BODY MASS INDEX: 25.88 KG/M2 | DIASTOLIC BLOOD PRESSURE: 79 MMHG

## 2017-11-14 DIAGNOSIS — Z79.899 ENCOUNTER FOR MONITORING FLECAINIDE THERAPY: ICD-10-CM

## 2017-11-14 DIAGNOSIS — Z51.81 ENCOUNTER FOR MONITORING FLECAINIDE THERAPY: ICD-10-CM

## 2017-11-14 DIAGNOSIS — I48.91 ATRIAL FIBRILLATION (H): Primary | ICD-10-CM

## 2017-11-14 DIAGNOSIS — I48.0 PAROXYSMAL ATRIAL FIBRILLATION (H): Primary | ICD-10-CM

## 2017-11-14 DIAGNOSIS — I48.91 ATRIAL FIBRILLATION (H): ICD-10-CM

## 2017-11-14 PROCEDURE — 99213 OFFICE O/P EST LOW 20 MIN: CPT | Performed by: PHYSICIAN ASSISTANT

## 2017-11-14 PROCEDURE — 93010 ELECTROCARDIOGRAM REPORT: CPT | Performed by: PHYSICIAN ASSISTANT

## 2017-11-14 PROCEDURE — 93005 ELECTROCARDIOGRAM TRACING: CPT

## 2017-11-14 RX ORDER — FLECAINIDE ACETATE 100 MG/1
100 TABLET ORAL 2 TIMES DAILY
Qty: 60 TABLET | Refills: 11 | Status: SHIPPED | OUTPATIENT
Start: 2017-11-14 | End: 2018-12-03

## 2017-11-14 NOTE — PROGRESS NOTES
HPI:   I met Sheila today when she came for routine follow-up. She is a pleasant 56-year-old who was diagnosed with paroxysmal atrial fibrillation and saw Dr. Thornton 8/2017. She also had evidence of high vagal tone, with sinus bradycardia noted while sleeping. She was started on flecainide 100 mg twice daily. It was recommended she take low-dose aspirin for her CHADSVASc score of 1 (sex). Follow-up exercise stress test 8/2017 was negative for proarrhythmia. Echocardiogram 8/2017 showed structurally normal heart.    She's done well since 8/2017. She does not think she's had any recurrent arrhythmias. She denies lightheadedness, dizziness or syncope. She continues to work out routinely and has had no issues.    EKG today, which I over read showed sinus rhythm with a heart rate is 72 bpm. QRS duration was 92 ms.    Assessment & Plan:    1. Paroxysmal AFib - well controlled on flecainide   * See Dr. Thornton 1 year with EKG to check QRS   * Contact us if increasing palpitations, dizziness or lightheadedness      Cherelle Romero PA-C, MSPAS      Orders Placed This Encounter   Procedures     Follow-Up with Electrophysiologist     EKG 12-LEAD CLINIC READ ([facility])- performed today     Orders Placed This Encounter   Medications     flecainide (TAMBOCOR) 100 MG tablet     Sig: Take 1 tablet (100 mg) by mouth 2 times daily     Dispense:  60 tablet     Refill:  11     No change - OK to switch to 90 day if insurance prefers     aspirin 81 MG EC tablet     Sig: Take 1 tablet (81 mg) by mouth daily     Medications Discontinued During This Encounter   Medication Reason     NO ACTIVE MEDICATIONS Erroneous Entry     flecainide (TAMBOCOR) 100 MG tablet Reorder         Encounter Diagnoses   Name Primary?     Encounter for monitoring flecainide therapy      Paroxysmal atrial fibrillation (H) Yes       CURRENT MEDICATIONS:  Current Outpatient Prescriptions   Medication Sig Dispense Refill     flecainide (TAMBOCOR) 100 MG tablet Take 1  tablet (100 mg) by mouth 2 times daily 60 tablet 11     aspirin 81 MG EC tablet Take 1 tablet (81 mg) by mouth daily       Calcium-Vitamin D (CALCIUM + D PO) Take 1 tablet by mouth.       Omega-3 Fatty Acids (FISH OIL) 1200 MG capsule Take 1 capsule by mouth daily.       Multiple Vitamins-Minerals (MULTI COMPLETE/IRON) TABS Take 1 tablet by mouth.       [DISCONTINUED] flecainide (TAMBOCOR) 100 MG tablet Take 1 tablet (100 mg) by mouth 2 times daily 60 tablet 11       ALLERGIES   No Known Allergies    PAST MEDICAL HISTORY:  Past Medical History:   Diagnosis Date     Atrial fibrillation (H)      Paroxysmal atrial flutter (H)        PAST SURGICAL HISTORY:  Past Surgical History:   Procedure Laterality Date     COLONOSCOPY  7/28/2011    Procedure:COLONOSCOPY; Surgeon:MIKE LYLE; Location:[facility] OR     SURGICAL HISTORY OF -   07/15/2002    laparoscopic L salpingo-oophorectomy - ectopic     SURGICAL HISTORY OF -   7/15/2002    SAB       FAMILY HISTORY:  Family History   Problem Relation Age of Onset     C.A.D. Father      Hypertension Father      DIABETES Maternal Grandmother      C.A.D. Maternal Grandmother      C.A.D. Maternal Grandfather      CANCER Paternal Grandmother      liver     Hypertension Mother      Lipids Mother      Cancer - colorectal Other        SOCIAL HISTORY:  Social History     Social History     Marital status:      Spouse name: N/A     Number of children: N/A     Years of education: N/A     Social History Main Topics     Smoking status: Never Smoker     Smokeless tobacco: Never Used     Alcohol use Yes      Comment: rare     Drug use: No     Sexual activity: Yes     Birth control/ protection: None     Other Topics Concern     Parent/Sibling W/ Cabg, Mi Or Angioplasty Before 65f 55m? No     Social History Narrative       Review of Systems:  Skin:  Negative     Eyes:  Positive for glasses  ENT:  Negative    Respiratory:  Negative    Cardiovascular:  Negative    Gastroenterology:  Negative    Genitourinary:  Negative    Musculoskeletal:  Negative    Neurologic:  Negative    Psychiatric:  Negative    Heme/Lymph/Imm:  Negative    Endocrine:  Negative      Physical Exam:  Vitals: /79 (BP Location: Right arm, Patient Position: Sitting, Cuff Size: Adult Regular)  Pulse 69  Wt 77.2 kg (170 lb 3.2 oz)  LMP 03/08/2011  SpO2 99%  BMI 25.88 kg/m2    Constitutional:  cooperative, alert and oriented, well developed, well nourished, in no acute distress        Skin:  warm and dry to the touch, no apparent skin lesions or masses noted        Head:  normocephalic, no masses or lesions        Eyes:  pupils equal and round, conjunctivae and lids unremarkable, sclera white, no xanthalasma, EOMS intact, no nystagmus        ENT:  no pallor or cyanosis, dentition good        Neck:  JVP normal;no carotid bruit        Chest:  normal breath sounds, clear to auscultation, normal A-P diameter, normal symmetry, normal respiratory excursion, no use of accessory muscles        Cardiac: regular rhythm, normal S1/S2, no S3 or S4, apical impulse not displaced, no murmurs, gallops or rubs                  Abdomen:  abdomen soft        Vascular: pulses full and equal                                      Extremities and Back:  no deformities, clubbing, cyanosis, erythema observed;no edema        Neurological:  no gross motor deficits          Recent Lab Results:  LIPID RESULTS:  Lab Results   Component Value Date    CHOL 199 07/20/2017    HDL 75 07/20/2017     (H) 07/20/2017    TRIG 70 07/20/2017    CHOLHDLRATIO 2.3 04/13/2011       LIVER ENZYME RESULTS:  No results found for: AST, ALT    CBC RESULTS:  Lab Results   Component Value Date    WBC 7.8 06/07/2006    RBC 5.23 (H) 06/07/2006    HGB 15.5 06/07/2006    HCT 49.0 (H) 06/07/2006    MCV 94 06/07/2006    MCH 29.6 06/07/2006    MCHC 31.6 (L) 06/07/2006    RDW 11.9 06/07/2006     06/07/2006       BMP RESULTS:  Lab Results   Component Value Date    NA  140 07/20/2017    POTASSIUM 4.4 07/20/2017    CHLORIDE 106 07/20/2017    CO2 30 07/20/2017    ANIONGAP 4 07/20/2017    GLC 89 07/20/2017    BUN 13 07/20/2017    CR 0.72 07/20/2017    GFRESTIMATED 84 07/20/2017    GFRESTBLACK >90   GFR Calc   07/20/2017    EDOUARD 9.3 07/20/2017        A1C RESULTS:  Lab Results   Component Value Date    A1C 5.3 11/23/2005       INR RESULTS:  No results found for: INR

## 2017-11-14 NOTE — MR AVS SNAPSHOT
After Visit Summary   11/14/2017    Sheila Schilling    MRN: [id number]           Patient Information     Date Of Birth          1960        Visit Information        Provider Department      11/14/2017 2:30 PM Malia Romero PA-C [facility]        Today's Diagnoses     Paroxysmal atrial fibrillation (H)    -  1    Encounter for monitoring flecainide therapy          Care Instructions    Thank you for your [facility] visit today with Cherelle Romero PA-C.  Your EKG today looked good - normal rhythm and good intervals on this dose of flecainide.    Medication Changes:  No changes today. Continue aspirin and flecainide.     Recommendations:  Call if increasing palpitations OR dizziness/lightheadess.      Follow-up:  See Dr. Thornton with EKG for cardiology follow up in 1 year. CALL if need to be seen sooner! [phone] (Katia/Catarina)    We kindly ask that you call cardiology scheduling at [phone] three months prior to requested revisit date to schedule future cardiology appointments.    Reminder:  1. Please bring in your current medication list or your medication, over the counter supplements and vitamin bottles as we will review these at each office visit.                 [facility]~[address]~[place], [place]~[address]  Questions about your visit today?  Call your Cardiology Clinic RN's-Katia Hardy and/or Catarina Wasserman at [phone].                Follow-ups after your visit        Additional Services     Follow-Up with Electrophysiologist                 Future tests that were ordered for you today     Open Future Orders        Priority Expected Expires Ordered    EKG 12-LEAD CLINIC READ [facility])- performed today Routine 11/14/2018 11/14/2018 11/14/2017    Follow-Up with Electrophysiologist Routine 11/14/2018 11/15/2018 11/14/2017    EKG 12[facility]LEAD [facility]IC [facility] ([facility])- to be scheduled Routine 11/14/2017 11/14/2018 11/14/2017            Who to contact     If you have questions or need follow up information about today's clinic visit or your schedule please contact [facility] directly at [phone].  Normal or non-critical lab and imaging results will be communicated to you by MyChart, letter or phone within 4 business days after the clinic has received the results. If you do not hear from us within 7 days, please contact the clinic through YouFoliohart or phone. If you have a critical or abnormal lab result, we will notify you by phone as soon as possible.  Submit refill requests through Tipser or call your pharmacy and they will forward the refill request to us. Please allow 3 business days for your refill to be completed.          Additional Information About Your Visit        MyChart Information     Tipser gives you secure access to your electronic health record. If you see a primary care provider, you can also send messages to your care team and make appointments. If you have questions, please call your primary care clinic.  If you do not have a primary care provider, please call [phone] and they will assist you.        Care EveryWhere ID     This is your Care EveryWhere ID. This could be used by other organizations to access your [facility] medical records  [id number]        Your Vitals Were     Pulse Last Period Pulse Oximetry BMI (Body Mass Index)          69 03/08/2011 99% 25.88 kg/m2         Blood Pressure from Last 3 Encounters:   11/14/17 132/79   08/17/17 122/72   07/20/17 123/78    Weight from Last 3 Encounters:   11/14/17 77.2 kg (170 lb 3.2 oz)   08/17/17 74.8 kg (165 lb)   07/20/17 75.8 kg (167 lb)              We Performed the Following     Follow-Up with Cardiac Advanced Practice Provider          Where to get your medicines      These medications were sent to [facility] [id number] [facility] [place] - [address]  [address], [place] [address]     Phone:  [phone]     flecainide 100 MG tablet          Primary Care Provider Office Phone # Fax #    [facility] [phone] [address] [address]        Equal Access to Services     LEAH MALHOTRA : Hadii aad ku hadasho Soomaali, waaxda luqadaha, qaybta kaalmada adeegyada, waxay idiin hayaan adeeg kharash la'aan . So [facility] [phone].    ATENCIÓN: Si habla español, tiene a ferraro disposición servicios gratuitos de asistencia lingüística. Llame al [phone].    We comply with applicable federal civil rights laws and [place] laws. We do not discriminate on the basis of race, color, national origin, age, disability, sex, sexual orientation, or gender identity.            Thank you!     Thank you for choosing [facility]  for your care. Our goal is always to provide you with excellent care. Hearing back from our patients is one way we can continue to improve our services. Please take a few minutes to complete the written survey that you may receive in the mail after your visit with us. Thank you!             Your Updated Medication List - Protect others around you: Learn how to safely use, store and throw away your medicines at www.disposemymeds.org.          This list is accurate as of: 11/14/17  3:03 PM.  Always use your most recent med list.                   Brand Name Dispense Instructions for use Diagnosis    CALCIUM + D PO      Take 1 tablet by mouth.        flecainide 100 MG tablet    TAMBOCOR    60 tablet    Take 1 tablet (100 mg) by mouth 2 times daily    Paroxysmal atrial fibrillation (H)       MULTI COMPLETE/IRON Tabs      Take 1 tablet by mouth.        omega-3 fatty acids 1200 MG capsule      Take 1 capsule by mouth daily.

## 2017-11-14 NOTE — LETTER
11/14/2017    Claudia Mohan, APRN CNP  [address] [address]    RE: Sheila Schilling       Dear Colleague,    I had the pleasure of seeing Sheila Schilling in the [facility].    HPI:   I met Sheila today when she came for routine follow-up. She is a pleasant 56-year-old who was diagnosed with paroxysmal atrial fibrillation and saw Dr. Thornton 8/2017. She also had evidence of high vagal tone, with sinus bradycardia noted while sleeping. She was started on flecainide 100 mg twice daily. It was recommended she take low-dose aspirin for her CHADSVASc score of 1 (sex). Follow-up exercise stress test 8/2017 was negative for proarrhythmia. Echocardiogram 8/2017 showed structurally normal heart.    She's done well since 8/2017. She does not think she's had any recurrent arrhythmias. She denies lightheadedness, dizziness or syncope. She continues to work out routinely and has had no issues.    EKG today, which I over read showed sinus rhythm with a heart rate is 72 bpm. QRS duration was 92 ms.    Assessment & Plan:    1. Paroxysmal AFib - well controlled on flecainide   * See Dr. Thornton 1 year with EKG to check QRS   * Contact us if increasing palpitations, dizziness or lightheadedness      Cherelle Romero PA-C, MSPAS      Orders Placed This Encounter   Procedures     Follow-Up with Electrophysiologist     EKG 12-LEAD CLINIC READ ([facility])- performed today     Orders Placed This Encounter   Medications     flecainide (TAMBOCOR) 100 MG tablet     Sig: Take 1 tablet (100 mg) by mouth 2 times daily     Dispense:  60 tablet     Refill:  11     No change - OK to switch to 90 day if insurance prefers     aspirin 81 MG EC tablet     Sig: Take 1 tablet (81 mg) by mouth daily     Medications Discontinued During This Encounter   Medication Reason     NO ACTIVE MEDICATIONS Erroneous Entry     flecainide (TAMBOCOR) 100 MG tablet Reorder         Encounter Diagnoses   Name Primary?      Encounter for monitoring flecainide therapy      Paroxysmal atrial fibrillation (H) Yes       CURRENT MEDICATIONS:  Current Outpatient Prescriptions   Medication Sig Dispense Refill     flecainide (TAMBOCOR) 100 MG tablet Take 1 tablet (100 mg) by mouth 2 times daily 60 tablet 11     aspirin 81 MG EC tablet Take 1 tablet (81 mg) by mouth daily       Calcium-Vitamin D (CALCIUM + D PO) Take 1 tablet by mouth.       Omega-3 Fatty Acids (FISH OIL) 1200 MG capsule Take 1 capsule by mouth daily.       Multiple Vitamins-Minerals (MULTI COMPLETE/IRON) TABS Take 1 tablet by mouth.       [DISCONTINUED] flecainide (TAMBOCOR) 100 MG tablet Take 1 tablet (100 mg) by mouth 2 times daily 60 tablet 11       ALLERGIES   No Known Allergies    PAST MEDICAL HISTORY:  Past Medical History:   Diagnosis Date     Atrial fibrillation (H)      Paroxysmal atrial flutter (H)        PAST SURGICAL HISTORY:  Past Surgical History:   Procedure Laterality Date     COLONOSCOPY  7/28/2011    Procedure:COLONOSCOPY; Surgeon:MIKE LYLE; Location:[facility]     SURGICAL HISTORY OF -   07/15/2002    laparoscopic L salpingo-oophorectomy - ectopic     SURGICAL HISTORY OF -   7/15/2002    SAB       FAMILY HISTORY:  Family History   Problem Relation Age of Onset     C.A.D. Father      Hypertension Father      DIABETES Maternal Grandmother      C.A.D. Maternal Grandmother      C.A.D. Maternal Grandfather      CANCER Paternal Grandmother      liver     Hypertension Mother      Lipids Mother      Cancer - colorectal Other        SOCIAL HISTORY:  Social History     Social History     Marital status:      Spouse name: N/A     Number of children: N/A     Years of education: N/A     Social History Main Topics     Smoking status: Never Smoker     Smokeless tobacco: Never Used     Alcohol use Yes      Comment: rare     Drug use: No     Sexual activity: Yes     Birth control/ protection: None     Other Topics Concern     Parent/Sibling W/ Cabg, Mi Or  Angioplasty Before 65f 55m? No     Social History Narrative       Review of Systems:  Skin:  Negative     Eyes:  Positive for glasses  ENT:  Negative    Respiratory:  Negative    Cardiovascular:  Negative    Gastroenterology: Negative    Genitourinary:  Negative    Musculoskeletal:  Negative    Neurologic:  Negative    Psychiatric:  Negative    Heme/Lymph/Imm:  Negative    Endocrine:  Negative      Physical Exam:  Vitals: /79 (BP Location: Right arm, Patient Position: Sitting, Cuff Size: Adult Regular)  Pulse 69  Wt 77.2 kg (170 lb 3.2 oz)  LMP 03/08/2011  SpO2 99%  BMI 25.88 kg/m2    Constitutional:  cooperative, alert and oriented, well developed, well nourished, in no acute distress        Skin:  warm and dry to the touch, no apparent skin lesions or masses noted        Head:  normocephalic, no masses or lesions        Eyes:  pupils equal and round, conjunctivae and lids unremarkable, sclera white, no xanthalasma, EOMS intact, no nystagmus        ENT:  no pallor or cyanosis, dentition good        Neck:  JVP normal;no carotid bruit        Chest:  normal breath sounds, clear to auscultation, normal A-P diameter, normal symmetry, normal respiratory excursion, no use of accessory muscles        Cardiac: regular rhythm, normal S1/S2, no S3 or S4, apical impulse not displaced, no murmurs, gallops or rubs                  Abdomen:  abdomen soft        Vascular: pulses full and equal                                      Extremities and Back:  no deformities, clubbing, cyanosis, erythema observed;no edema        Neurological:  no gross motor deficits          Recent Lab Results:  LIPID RESULTS:  Lab Results   Component Value Date    CHOL 199 07/20/2017    HDL 75 07/20/2017     (H) 07/20/2017    TRIG 70 07/20/2017    CHOLHDLRATIO 2.3 04/13/2011       LIVER ENZYME RESULTS:  No results found for: AST, ALT    CBC RESULTS:  Lab Results   Component Value Date    WBC 7.8 06/07/2006    RBC 5.23 (H) 06/07/2006     HGB 15.5 06/07/2006    HCT 49.0 (H) 06/07/2006    MCV 94 06/07/2006    MCH 29.6 06/07/2006    MCHC 31.6 (L) 06/07/2006    RDW 11.9 06/07/2006     06/07/2006       BMP RESULTS:  Lab Results   Component Value Date     07/20/2017    POTASSIUM 4.4 07/20/2017    CHLORIDE 106 07/20/2017    CO2 30 07/20/2017    ANIONGAP 4 07/20/2017    GLC 89 07/20/2017    BUN 13 07/20/2017    CR 0.72 07/20/2017    GFRESTIMATED 84 07/20/2017    GFRESTBLACK >90   GFR Calc   07/20/2017    EDOUARD 9.3 07/20/2017        A1C RESULTS:  Lab Results   Component Value Date    A1C 5.3 11/23/2005     INR RESULTS:  No results found for: INR    Thank you for allowing me to participate in the care of your patient.    Sincerely,     Malia Romero PA-C     [facility]

## 2017-11-14 NOTE — PATIENT INSTRUCTIONS
Thank you for your [facility] visit today with Cherelle Romero PA-C.  Your EKG today looked good - normal rhythm and good intervals on this dose of flecainide.    Medication Changes:  No changes today. Continue aspirin and flecainide.     Recommendations:  Call if increasing palpitations OR dizziness/lightheadess.      Follow-up:  See Dr. Thornton with EKG for cardiology follow up in 1 year. CALL if need to be seen sooner! [phone] (Katia/Catarina)    We kindly ask that you call cardiology scheduling at [phone] three months prior to requested revisit date to schedule future cardiology appointments.    Reminder:  1. Please bring in your current medication list or your medication, over the counter supplements and vitamin bottles as we will review these at each office visit.                 [facility]  [facility]~[address]~[place], [place]~[address]  Questions about your visit today?  Call your Cardiology Clinic RN's-Katia Hardy and/or Catarina Wasserman at [phone].

## 2018-10-22 ENCOUNTER — MYC MEDICAL ADVICE (OUTPATIENT)
Dept: FAMILY MEDICINE | Facility: CLINIC | Age: 58
End: 2018-10-22

## 2018-12-03 DIAGNOSIS — I48.0 PAROXYSMAL ATRIAL FIBRILLATION (H): ICD-10-CM

## 2018-12-03 RX ORDER — FLECAINIDE ACETATE 100 MG/1
100 TABLET ORAL 2 TIMES DAILY
Qty: 180 TABLET | Refills: 0 | Status: SHIPPED | OUTPATIENT
Start: 2018-12-03 | End: 2019-03-01

## 2019-01-08 ENCOUNTER — HOSPITAL ENCOUNTER (OUTPATIENT)
Dept: CARDIOLOGY | Facility: CLINIC | Age: 59
Discharge: HOME | End: 2019-01-08
Attending: PHYSICIAN ASSISTANT | Admitting: PHYSICIAN ASSISTANT
Payer: COMMERCIAL

## 2019-01-08 ENCOUNTER — OFFICE VISIT (OUTPATIENT)
Dept: CARDIOLOGY | Facility: CLINIC | Age: 59
End: 2019-01-08
Payer: COMMERCIAL

## 2019-01-08 VITALS
DIASTOLIC BLOOD PRESSURE: 83 MMHG | SYSTOLIC BLOOD PRESSURE: 124 MMHG | BODY MASS INDEX: 25.79 KG/M2 | OXYGEN SATURATION: 98 % | WEIGHT: 169.6 LBS | HEART RATE: 67 BPM

## 2019-01-08 DIAGNOSIS — Z79.899 ENCOUNTER FOR MONITORING FLECAINIDE THERAPY: ICD-10-CM

## 2019-01-08 DIAGNOSIS — Z51.81 ENCOUNTER FOR MONITORING FLECAINIDE THERAPY: ICD-10-CM

## 2019-01-08 DIAGNOSIS — I48.0 PAROXYSMAL ATRIAL FIBRILLATION (H): ICD-10-CM

## 2019-01-08 PROCEDURE — 93005 ELECTROCARDIOGRAM TRACING: CPT

## 2019-01-08 PROCEDURE — 93010 ELECTROCARDIOGRAM REPORT: CPT | Performed by: INTERNAL MEDICINE

## 2019-01-08 PROCEDURE — 99213 OFFICE O/P EST LOW 20 MIN: CPT | Performed by: INTERNAL MEDICINE

## 2019-01-08 NOTE — PROGRESS NOTES
HPI and Plan:   See dictation  [id number]  Orders Placed This Encounter   Procedures     Follow-Up with Cardiac Advanced Practice Provider       No orders of the defined types were placed in this encounter.      Medications Discontinued During This Encounter   Medication Reason     Multiple Vitamins-Minerals (MULTI COMPLETE/IRON) TABS Stopped by Patient     Omega-3 Fatty Acids (FISH OIL) 1200 MG capsule Stopped by Patient         Encounter Diagnosis   Name Primary?     Paroxysmal atrial fibrillation (H)        CURRENT MEDICATIONS:  Current Outpatient Medications   Medication Sig Dispense Refill     aspirin 81 MG EC tablet Take 1 tablet (81 mg) by mouth daily       Calcium-Vitamin D (CALCIUM + D PO) Take 1 tablet by mouth.       flecainide (TAMBOCOR) 100 MG tablet Take 1 tablet (100 mg) by mouth 2 times daily 180 tablet 0       ALLERGIES   No Known Allergies    PAST MEDICAL HISTORY:  Past Medical History:   Diagnosis Date     Atrial fibrillation (H)      Paroxysmal atrial flutter (H)        PAST SURGICAL HISTORY:  Past Surgical History:   Procedure Laterality Date     COLONOSCOPY  7/28/2011    Procedure:COLONOSCOPY; Surgeon:MIKE LYLE; Location:[facility]     SURGICAL HISTORY OF -   07/15/2002    laparoscopic L salpingo-oophorectomy - ectopic     SURGICAL HISTORY OF -   7/15/2002    SAB       FAMILY HISTORY:  Family History   Problem Relation Age of Onset     C.A.D. Father      Hypertension Father      Diabetes Maternal Grandmother      C.A.D. Maternal Grandmother      C.A.D. Maternal Grandfather      Cancer Paternal Grandmother         liver     Hypertension Mother      Lipids Mother      Cancer - colorectal Other        SOCIAL HISTORY:  Social History     Socioeconomic History     Marital status:      Spouse name: None     Number of children: None     Years of education: None     Highest education level: None   Social Needs     Financial resource strain: None     Food insecurity - worry: None     Food  insecurity - inability: None     Transportation needs - medical: None     Transportation needs - non-medical: None   Occupational History     None   Tobacco Use     Smoking status: Never Smoker     Smokeless tobacco: Never Used   Substance and Sexual Activity     Alcohol use: Yes     Comment: rare     Drug use: No     Sexual activity: Yes     Birth control/protection: None   Other Topics Concern     Parent/sibling w/ CABG, MI or angioplasty before 65F 55M? No   Social History Narrative     None       Review of Systems:  Skin:  Negative       Eyes:  Positive for glasses    ENT:  Negative      Respiratory:  Negative       Cardiovascular:  Negative      Gastroenterology: Negative      Genitourinary:  Negative      Musculoskeletal:  Negative      Neurologic:  Negative      Psychiatric:  Negative      Heme/Lymph/Imm:  Negative      Endocrine:  Negative        Physical Exam:  Vitals: /83 (BP Location: Right arm, Patient Position: Sitting, Cuff Size: Adult Regular)   Pulse 67   Wt 76.9 kg (169 lb 9.6 oz)   LMP 03/08/2011   SpO2 98%   BMI 25.79 kg/m      Constitutional:  cooperative, alert and oriented, well developed, well nourished, in no acute distress        Skin:  warm and dry to the touch, no apparent skin lesions or masses noted          Head:  normocephalic, no masses or lesions        Eyes:  pupils equal and round, conjunctivae and lids unremarkable, sclera white, no xanthalasma, EOMS intact, no nystagmus        Lymph:No Cervical lymphadenopathy present     ENT:  no pallor or cyanosis, dentition good        Neck:  carotid pulses are full and equal bilaterally, JVP normal, no carotid bruit        Respiratory:  normal breath sounds, clear to auscultation, normal A-P diameter, normal symmetry, normal respiratory excursion, no use of accessory muscles         Cardiac: regular rhythm, normal S1/S2, no S3 or S4, apical impulse not displaced, no murmurs, gallops or rubs                pulses full and equal, no  bruits auscultated                                        GI:  abdomen soft, non-tender, BS normoactive, no mass, no HSM, no bruits        Extremities and Muscular Skeletal:                 Neurological:  no gross motor deficits        Psych:           CC  Malia Romero, PA-C  [address]  [place] [address]

## 2019-01-08 NOTE — PROGRESS NOTES
Service Date: 2019      Claudia Mohan NP    [facility]   [address] [place] [address]      RE: Sheila Schilling   MRN: [id number]   : 1960      Dear Claudia:       Thank you for allowing me to participate in the care of your very delightful patient.  As you know, Sheila is a 58-year-old female with a history of symptomatic paroxysmal atrial fibrillation and at times noted to have sinus bradycardia as well.  Fortunately, her atrial fibrillation has been well controlled on flecainide at 100 mg twice a day.  She is here for routine annual visit.     Sheila is quite pleased with the result.  Occasionally, she would feel some fluttering but for most part, she is quite pleased by not having atrial fibrillation like she had before.  EKG today demonstrates sinus rhythm with a QRS width that measured 100 milliseconds.  She has been on baby aspirin only, given her CHADS-VASc score of 1.      I congratulated the patient on her mostly asymptomatic status.  I will continue her current dose of flecainide.  I am quite hesitant to give her an AV maycol blocking agent at this point in time, given her known sinus bradycardia in the past.  Will have the patient come back to see Cherelle Romero, one of our advanced practice providers who has seen her in the past, a year from now and see me every other year.      Sincerely,         SANCHEZ BRADSHAW MD             D: 2019   T: 2019   MT: RAD      Name:     SHEILA SCHILLING   MRN:      [id number]        Account:      [id number]   :      1960           Service Date: 2019      Document: [id number]

## 2019-01-08 NOTE — LETTER
1/8/2019    Claudiaandreas Oronat, APRN CNP  [address] [facility] [address]    RE: Sheila Schilling       Dear Colleague,    I had the pleasure of seeing Sheila Schilling in the [facility].    HPI and Plan:   See dictation  [id number]  Orders Placed This Encounter   Procedures     Follow-Up with Cardiac Advanced Practice Provider       No orders of the defined types were placed in this encounter.      Medications Discontinued During This Encounter   Medication Reason     Multiple Vitamins-Minerals (MULTI COMPLETE/IRON) TABS Stopped by Patient     Omega-3 Fatty Acids (FISH OIL) 1200 MG capsule Stopped by Patient         Encounter Diagnosis   Name Primary?     Paroxysmal atrial fibrillation (H)        CURRENT MEDICATIONS:  Current Outpatient Medications   Medication Sig Dispense Refill     aspirin 81 MG EC tablet Take 1 tablet (81 mg) by mouth daily       Calcium-Vitamin D (CALCIUM + D PO) Take 1 tablet by mouth.       flecainide (TAMBOCOR) 100 MG tablet Take 1 tablet (100 mg) by mouth 2 times daily 180 tablet 0       ALLERGIES   No Known Allergies    PAST MEDICAL HISTORY:  Past Medical History:   Diagnosis Date     Atrial fibrillation (H)      Paroxysmal atrial flutter (H)        PAST SURGICAL HISTORY:  Past Surgical History:   Procedure Laterality Date     COLONOSCOPY  7/28/2011    Procedure:COLONOSCOPY; Surgeon:MIKE LYLE; Location[facility]     SURGICAL HISTORY OF -   07/15/2002    laparoscopic L salpingo-oophorectomy - ectopic     SURGICAL HISTORY OF -   7/15/2002    SAB       FAMILY HISTORY:  Family History   Problem Relation Age of Onset     C.A.D. Father      Hypertension Father      Diabetes Maternal Grandmother      C.A.D. Maternal Grandmother      C.A.D. Maternal Grandfather      Cancer Paternal Grandmother         liver     Hypertension Mother      Lipids Mother      Cancer - colorectal Other        SOCIAL HISTORY:  Social History     Socioeconomic History     Marital  status:      Spouse name: None     Number of children: None     Years of education: None     Highest education level: None   Social Needs     Financial resource strain: None     Food insecurity - worry: None     Food insecurity - inability: None     Transportation needs - medical: None     Transportation needs - non-medical: None   Occupational History     None   Tobacco Use     Smoking status: Never Smoker     Smokeless tobacco: Never Used   Substance and Sexual Activity     Alcohol use: Yes     Comment: rare     Drug use: No     Sexual activity: Yes     Birth control/protection: None   Other Topics Concern     Parent/sibling w/ CABG, MI or angioplasty before 65F 55M? No   Social History Narrative     None       Review of Systems:  Skin:  Negative       Eyes:  Positive for glasses    ENT:  Negative      Respiratory:  Negative       Cardiovascular:  Negative      Gastroenterology: Negative      Genitourinary:  Negative      Musculoskeletal:  Negative      Neurologic:  Negative      Psychiatric:  Negative      Heme/Lymph/Imm:  Negative      Endocrine:  Negative        Physical Exam:  Vitals: /83 (BP Location: Right arm, Patient Position: Sitting, Cuff Size: Adult Regular)   Pulse 67   Wt 76.9 kg (169 lb 9.6 oz)   LMP 03/08/2011   SpO2 98%   BMI 25.79 kg/m       Constitutional:  cooperative, alert and oriented, well developed, well nourished, in no acute distress        Skin:  warm and dry to the touch, no apparent skin lesions or masses noted          Head:  normocephalic, no masses or lesions        Eyes:  pupils equal and round, conjunctivae and lids unremarkable, sclera white, no xanthalasma, EOMS intact, no nystagmus        Lymph:No Cervical lymphadenopathy present     ENT:  no pallor or cyanosis, dentition good        Neck:  carotid pulses are full and equal bilaterally, JVP normal, no carotid bruit        Respiratory:  normal breath sounds, clear to auscultation, normal A-P diameter, normal  symmetry, normal respiratory excursion, no use of accessory muscles         Cardiac: regular rhythm, normal S1/S2, no S3 or S4, apical impulse not displaced, no murmurs, gallops or rubs                pulses full and equal, no bruits auscultated                                        GI:  abdomen soft, non-tender, BS normoactive, no mass, no HSM, no bruits        Extremities and Muscular Skeletal:                 Neurological:  no gross motor deficits        Psych:           CC  Malia Romero PA-C  [address]  [place] [address]                Thank you for allowing me to participate in the care of your patient.      Sincerely,     Kody Carreon MD     [facility]    cc:   Malia Romero PA-C  [address]  [place] [address]

## 2019-01-08 NOTE — LETTER
2019      Claudia Mohan, APRN [facility]  [address] [facility] [address]      RE: Sheila Schilling       Dear Colleague,    I had the pleasure of seeing Sheila Schilling in the [facility].    Service Date: 2019      Claudia Mohan, LISA    [facility]   [address] [place] [address]      RE: Sheila Schilling   MRN: [id number]   : 1960      Dear Claudia:       Thank you for allowing me to participate in the care of your very delightful patient.  As you know, Sheila is a 58-year-old female with a history of symptomatic paroxysmal atrial fibrillation and at times noted to have sinus bradycardia as well.  Fortunately, her atrial fibrillation has been well controlled on flecainide at 100 mg twice a day.  She is here for routine annual visit.     Sheila is quite pleased with the result.  Occasionally, she would feel some fluttering but for most part, she is quite pleased by not having atrial fibrillation like she had before.  EKG today demonstrates sinus rhythm with a QRS width that measured 100 milliseconds.  She has been on baby aspirin only, given her CHADS-VASc score of 1.      I congratulated the patient on her mostly asymptomatic status.  I will continue her current dose of flecainide.  I am quite hesitant to give her an AV maycol blocking agent at this point in time, given her known sinus bradycardia in the past.  Will have the patient come back to see Cherelle Romero, one of our advanced practice providers who has seen her in the past, a year from now and see me every other year.      Sincerely,         SANCHEZ BRADSHAW MD             D: 2019   T: 2019   MT: RAD      Name:     SHEILA SCHILLING   MRN:      [id number]        Account:      [id number]   :      1960           Service Date: 2019      Document: [id number]           Outpatient Encounter Medications as of 2019   Medication Sig Dispense Refill     aspirin 81 MG EC tablet  Take 1 tablet (81 mg) by mouth daily       Calcium-Vitamin D (CALCIUM + D PO) Take 1 tablet by mouth.       flecainide (TAMBOCOR) 100 MG tablet Take 1 tablet (100 mg) by mouth 2 times daily 180 tablet 0     [DISCONTINUED] Multiple Vitamins-Minerals (MULTI COMPLETE/IRON) TABS Take 1 tablet by mouth.       [DISCONTINUED] Omega-3 Fatty Acids (FISH OIL) 1200 MG capsule Take 1 capsule by mouth daily.       No facility-administered encounter medications on file as of 1/8/2019.        Again, thank you for allowing me to participate in the care of your patient.      Sincerely,    Kody Carreon MD     [facility]

## 2019-03-01 DIAGNOSIS — I48.0 PAROXYSMAL ATRIAL FIBRILLATION (H): ICD-10-CM

## 2019-03-01 RX ORDER — FLECAINIDE ACETATE 100 MG/1
100 TABLET ORAL 2 TIMES DAILY
Qty: 180 TABLET | Refills: 3 | Status: SHIPPED | OUTPATIENT
Start: 2019-03-01 | End: 2020-02-28

## 2020-02-08 ENCOUNTER — HEALTH MAINTENANCE LETTER (OUTPATIENT)
Age: 60
End: 2020-02-08

## 2020-02-28 DIAGNOSIS — I48.0 PAROXYSMAL ATRIAL FIBRILLATION (H): ICD-10-CM

## 2020-02-28 RX ORDER — FLECAINIDE ACETATE 100 MG/1
100 TABLET ORAL 2 TIMES DAILY
Qty: 180 TABLET | Refills: 0 | Status: SHIPPED | OUTPATIENT
Start: 2020-02-28 | End: 2020-05-15

## 2020-05-15 DIAGNOSIS — I48.0 PAROXYSMAL ATRIAL FIBRILLATION (H): ICD-10-CM

## 2020-05-15 RX ORDER — FLECAINIDE ACETATE 100 MG/1
100 TABLET ORAL 2 TIMES DAILY
Qty: 180 TABLET | Refills: 0 | Status: SHIPPED | OUTPATIENT
Start: 2020-05-15 | End: 2020-08-11

## 2020-08-11 DIAGNOSIS — I48.0 PAROXYSMAL ATRIAL FIBRILLATION (H): ICD-10-CM

## 2020-08-11 RX ORDER — FLECAINIDE ACETATE 100 MG/1
100 TABLET ORAL 2 TIMES DAILY
Qty: 180 TABLET | Refills: 0 | Status: SHIPPED | OUTPATIENT
Start: 2020-08-11 | End: 2020-08-25

## 2020-08-23 ENCOUNTER — NURSE TRIAGE (OUTPATIENT)
Dept: NURSING | Facility: CLINIC | Age: 60
End: 2020-08-23

## 2020-08-23 DIAGNOSIS — Z20.822 EXPOSURE TO COVID-19 VIRUS: Primary | ICD-10-CM

## 2020-08-23 NOTE — TELEPHONE ENCOUNTER
Her  is calling to schedule a covid serology test due to him being covid positive. He just found out on 8/22/20 that he was positive. She is asymptomatic at this time.    Samantha Cohn RN/ [facility]      Additional Information    Health Information question, no triage required and triager able to answer question    Protocols used: INFORMATION ONLY CALL-A-AH

## 2020-08-24 NOTE — PROGRESS NOTES
"Sheila Schilling is a 59 year old female who is being evaluated via a billable video visit.      The patient has been notified of following:     \"This video visit will be conducted via a call between you and your physician/provider. We have found that certain health care needs can be provided without the need for an in-person physical exam.  This service lets us provide the care you need with a video conversation.  If a prescription is necessary we can send it directly to your pharmacy.  If lab work is needed we can place an order for that and you can then stop by our lab to have the test done at a later time.    Video visits are billed at different rates depending on your insurance coverage.  Please reach out to your insurance provider with any questions.    If during the course of the call the physician/provider feels a video visit is not appropriate, you will not be charged for this service.\"    Patient has given verbal consent for Video visit? Yes  How would you like to obtain your AVS? Mail a copy  If you are dropped from the video visit, the video invite should be resent to: Text to cell phone: [phone]  Will anyone else be joining your video visit? No      Janell Blankenship MA 8/25/2020 8:34 AM    CC:  Paroxysmal AFib    VITALS:  NONE taken    BRIEF HPI:  Sheila is a 59 year old yo F who sees Dr. Thornton for h/o:    1. Paroxysmal AFib with high vagal tone/sinus bradycardia - Well controlled on unopposed flecainide. Per Dr. Thornton, avoiding AVN blocking agents due to bradycardia  2. CHADSVASc 1 (sex) - On ASA    Sheila saw Dr. Thornton 1/2019 at which time she was doing really well on flecainide 100 mg BID. Annual follow-up was rec'd.     INTERVAL HISTORY:  From a cardiac perspective really feels like she's doing well. No issues or concerns with recurrent arrhythmias. Tolerating flecainide without issues. No dizziness, lightheadedness. Denies trouble with CP, SOB or edema.    Doesn't check BP/HR at " home    DIAGNOSTICS:  EKG 1/2019 with SR 71 bpm. QRS duration 100 ms  Exercise Stress Test 2/2017 with no proarrhythmia  Echo 8/2017 with EF 6-065%. Nl RV.     REVIEW OF SYSTEMS:  Negative except as noted above     PHYSICAL EXAM:  GEN: NAD. Upright. Normal body habitus  ENT/Mouth: Atraumatic and normocephalic  Eyes: No scleral icterus; normal conjunctivae  Chest/Lungs: No audible wheezing or cough; equal chest wall expansion. Non-labored breathing  Extremities: No cyanosis or clubbing observed  Skin: No visible rashes. Normal skin color  Neuro: Normal Arm motion bilaterally. No tremors. No visible evidence of focal defects  Psychiatric: A/O. Calm demeanor      ASSESSMENT/PLAN:    1. Paroxysmal AFib    Remains on flecainide 100 mg BID (unopposed, due to SB)     PLAN:    Continue meds    Annual follow-up with EKG\         CURRENT MEDICATIONS:  Current Outpatient Medications   Medication Sig Dispense Refill     aspirin 81 MG EC tablet Take 1 tablet (81 mg) by mouth daily       Calcium-Vitamin D (CALCIUM + D PO) Take 1 tablet by mouth.       flecainide (TAMBOCOR) 100 MG tablet Take 1 tablet (100 mg) by mouth 2 times daily 180 tablet 3         ORDERS PLACED:  Orders Placed This Encounter   Procedures     Follow-Up with Cardiac Advanced Practice Provider     EKG [facility] - [facility] Only     Orders Placed This Encounter   Medications     flecainide (TAMBOCOR) 100 MG tablet     Sig: Take 1 tablet (100 mg) by mouth 2 times daily     Dispense:  180 tablet     Refill:  3     Medications Discontinued During This Encounter   Medication Reason     flecainide (TAMBOCOR) 100 MG tablet Reorder         Encounter Diagnosis   Name Primary?     Paroxysmal atrial fibrillation (H)          ALLERGIES   No Known Allergies    PAST MEDICAL HISTORY:  Past Medical History:   Diagnosis Date     Atrial fibrillation (H)      Paroxysmal atrial flutter (H)        PAST SURGICAL HISTORY:  Past Surgical History:   Procedure Laterality Date      COLONOSCOPY  7/28/2011    Procedure:COLONOSCOPY; Surgeon:MIKE LYLE; Location:[facility]     SURGICAL HISTORY OF -   07/15/2002    laparoscopic L salpingo-oophorectomy - ectopic     SURGICAL HISTORY OF -   7/15/2002    SAB       FAMILY HISTORY:  Family History   Problem Relation Age of Onset     C.A.D. Father      Hypertension Father      Diabetes Maternal Grandmother      C.A.D. Maternal Grandmother      C.A.D. Maternal Grandfather      Cancer Paternal Grandmother         liver     Hypertension Mother      Lipids Mother      Cancer - colorectal Other        SOCIAL HISTORY:  Social History     Socioeconomic History     Marital status:      Spouse name: None     Number of children: None     Years of education: None     Highest education level: None   Occupational History     None   Social Needs     Financial resource strain: None     Food insecurity     Worry: None     Inability: None     Transportation needs     Medical: None     Non-medical: None   Tobacco Use     Smoking status: Never Smoker     Smokeless tobacco: Never Used   Substance and Sexual Activity     Alcohol use: Yes     Comment: rare     Drug use: No     Sexual activity: Yes     Birth control/protection: None   Lifestyle     Physical activity     Days per week: None     Minutes per session: None     Stress: None   Relationships     Social connections     Talks on phone: None     Gets together: None     Attends [employer] service: None     Active member of club or organization: None     Attends meetings of clubs or organizations: None     Relationship status: None     Intimate partner violence     Fear of current or ex partner: None     Emotionally abused: None     Physically abused: None     Forced sexual activity: None   Other Topics Concern     Parent/sibling w/ CABG, MI or angioplasty before 65F 55M? No   Social History Narrative     None       Video-Visit Details    Type of service:  Video Visit    Video Start Time: 1234  Video End  Time: 1242  Duration 8 minutes    Originating Location (pt. Location): Home    Distant Location (provider location):  [facility]     Platform used for Video Visit: Enrrique Romero PA-C

## 2020-08-25 ENCOUNTER — VIRTUAL VISIT (OUTPATIENT)
Dept: CARDIOLOGY | Facility: CLINIC | Age: 60
End: 2020-08-25
Payer: COMMERCIAL

## 2020-08-25 DIAGNOSIS — I48.0 PAROXYSMAL ATRIAL FIBRILLATION (H): ICD-10-CM

## 2020-08-25 PROCEDURE — 99212 OFFICE O/P EST SF 10 MIN: CPT | Mod: 95 | Performed by: PHYSICIAN ASSISTANT

## 2020-08-25 RX ORDER — FLECAINIDE ACETATE 100 MG/1
100 TABLET ORAL 2 TIMES DAILY
Qty: 180 TABLET | Refills: 3 | Status: SHIPPED | OUTPATIENT
Start: 2020-08-25 | End: 2021-08-17

## 2020-08-25 NOTE — PATIENT INSTRUCTIONS
Sheila - it was so nice to speak with you today! I'm glad you're doing so well.    1. Reviewed that heart-wise, you're having no issues    PLAN:  1. No changes. I sent Rx for flecainide x 3 months with enough refills for 1 year to the [facility] in [facility]  2. CALL if issues with abnl heart rhythms, dizziness or concerns about your heart. Otherwise, we'll see you in ~1 year with EKG!      [facility] in [phone]

## 2020-08-25 NOTE — LETTER
"8/25/2020    Claudia Mohan, APRN CNP  [address] [facility] [address]    RE: Sheilasukhwinder Schilling       Dear Colleague,    I had the pleasure of seeing Sheila Schilling in the [facility].    Sheila Schilling is a 59 year old female who is being evaluated via a billable video visit.      The patient has been notified of following:     \"This video visit will be conducted via a call between you and your physician/provider. We have found that certain health care needs can be provided without the need for an in-person physical exam.  This service lets us provide the care you need with a video conversation.  If a prescription is necessary we can send it directly to your pharmacy.  If lab work is needed we can place an order for that and you can then stop by our lab to have the test done at a later time.    Video visits are billed at different rates depending on your insurance coverage.  Please reach out to your insurance provider with any questions.    If during the course of the call the physician/provider feels a video visit is not appropriate, you will not be charged for this service.\"    Patient has given verbal consent for Video visit? Yes  How would you like to obtain your AVS? Mail a copy  If you are dropped from the video visit, the video invite should be resent to: Text to cell phone: [phone]  Will anyone else be joining your video visit? No      Janell Blankenship MA 8/25/2020 8:34 AM    CC:  Paroxysmal AFib    VITALS:  NONE taken    BRIEF HPI:  Sheila is a 59 year old yo F who sees Dr. Thornton for h/o:    1. Paroxysmal AFib with high vagal tone/sinus bradycardia - Well controlled on unopposed flecainide. Per Dr. Thornton, avoiding AVN blocking agents due to bradycardia  2. CHADSVASc 1 (sex) - On ASA    Sheila saw Dr. Thornton 1/2019 at which time she was doing really well on flecainide 100 mg BID. Annual follow-up was rec'd.     INTERVAL HISTORY:  From a cardiac perspective really " feels like she's doing well. No issues or concerns with recurrent arrhythmias. Tolerating flecainide without issues. No dizziness, lightheadedness. Denies trouble with CP, SOB or edema.    Doesn't check BP/HR at home    DIAGNOSTICS:  EKG 1/2019 with SR 71 bpm. QRS duration 100 ms  Exercise Stress Test 2/2017 with no proarrhythmia  Echo 8/2017 with EF 6-065%. Nl RV.     REVIEW OF SYSTEMS:  Negative except as noted above     PHYSICAL EXAM:  GEN: NAD. Upright. Normal body habitus  ENT/Mouth: Atraumatic and normocephalic  Eyes: No scleral icterus; normal conjunctivae  Chest/Lungs: No audible wheezing or cough; equal chest wall expansion. Non-labored breathing  Extremities: No cyanosis or clubbing observed  Skin: No visible rashes. Normal skin color  Neuro: Normal Arm motion bilaterally. No tremors. No visible evidence of focal defects  Psychiatric: A/O. Calm demeanor      ASSESSMENT/PLAN:    1. Paroxysmal AFib    Remains on flecainide 100 mg BID (unopposed, due to SB)     PLAN:    Continue meds    Annual follow-up with EKG\         CURRENT MEDICATIONS:  Current Outpatient Medications   Medication Sig Dispense Refill     aspirin 81 MG EC tablet Take 1 tablet (81 mg) by mouth daily       Calcium-Vitamin D (CALCIUM + D PO) Take 1 tablet by mouth.       flecainide (TAMBOCOR) 100 MG tablet Take 1 tablet (100 mg) by mouth 2 times daily 180 tablet 3         ORDERS PLACED:  Orders Placed This Encounter   Procedures     Follow-Up with Cardiac Advanced Practice Provider     EKG [facility] - [facility] Only     Orders Placed This Encounter   Medications     flecainide (TAMBOCOR) 100 MG tablet     Sig: Take 1 tablet (100 mg) by mouth 2 times daily     Dispense:  180 tablet     Refill:  3     Medications Discontinued During This Encounter   Medication Reason     flecainide (TAMBOCOR) 100 MG tablet Reorder         Encounter Diagnosis   Name Primary?     Paroxysmal atrial fibrillation (H)          ALLERGIES   No Known  Allergies    PAST MEDICAL HISTORY:  Past Medical History:   Diagnosis Date     Atrial fibrillation (H)      Paroxysmal atrial flutter (H)        PAST SURGICAL HISTORY:  Past Surgical History:   Procedure Laterality Date     COLONOSCOPY  7/28/2011    Procedure:COLONOSCOPY; Surgeon:MIKE LYLE; Location:[facility]     SURGICAL HISTORY OF -   07/15/2002    laparoscopic L salpingo-oophorectomy - ectopic     SURGICAL HISTORY OF -   7/15/2002    SAB       FAMILY HISTORY:  Family History   Problem Relation Age of Onset     C.A.D. Father      Hypertension Father      Diabetes Maternal Grandmother      C.A.D. Maternal Grandmother      C.A.D. Maternal Grandfather      Cancer Paternal Grandmother         liver     Hypertension Mother      Lipids Mother      Cancer - colorectal Other        SOCIAL HISTORY:  Social History     Socioeconomic History     Marital status:      Spouse name: None     Number of children: None     Years of education: None     Highest education level: None   Occupational History     None   Social Needs     Financial resource strain: None     Food insecurity     Worry: None     Inability: None     Transportation needs     Medical: None     Non-medical: None   Tobacco Use     Smoking status: Never Smoker     Smokeless tobacco: Never Used   Substance and Sexual Activity     Alcohol use: Yes     Comment: rare     Drug use: No     Sexual activity: Yes     Birth control/protection: None   Lifestyle     Physical activity     Days per week: None     Minutes per session: None     Stress: None   Relationships     Social connections     Talks on phone: None     Gets together: None     Attends [employer] service: None     Active member of club or organization: None     Attends meetings of clubs or organizations: None     Relationship status: None     Intimate partner violence     Fear of current or ex partner: None     Emotionally abused: None     Physically abused: None     Forced sexual activity: None    Other Topics Concern     Parent/sibling w/ CABG, MI or angioplasty before 65F 55M? No   Social History Narrative     None       Video-Visit Details    Type of service:  Video Visit    Video Start Time: 1234  Video End Time: 1242  Duration 8 minutes    Originating Location (pt. Location): Home    Distant Location (provider location):  [facility]     Platform used for Video Visit: Enrrique Romero PA-C      Thank you for allowing me to participate in the care of your patient.    Sincerely,     Malia Romero PA-C     [facility]

## 2020-08-26 DIAGNOSIS — Z20.822 EXPOSURE TO COVID-19 VIRUS: ICD-10-CM

## 2020-08-26 PROCEDURE — 36415 COLL VENOUS BLD VENIPUNCTURE: CPT | Performed by: EMERGENCY MEDICINE

## 2020-08-26 PROCEDURE — 86769 SARS-COV-2 COVID-19 ANTIBODY: CPT | Performed by: EMERGENCY MEDICINE

## 2020-08-27 LAB
COVID-19 SPIKE RBD ABY TITER: NORMAL
COVID-19 SPIKE RBD ABY: NEGATIVE

## 2020-11-07 ENCOUNTER — HEALTH MAINTENANCE LETTER (OUTPATIENT)
Age: 60
End: 2020-11-07

## 2021-03-27 ENCOUNTER — HEALTH MAINTENANCE LETTER (OUTPATIENT)
Age: 61
End: 2021-03-27

## 2021-03-29 ENCOUNTER — MYC MEDICAL ADVICE (OUTPATIENT)
Dept: FAMILY MEDICINE | Facility: CLINIC | Age: 61
End: 2021-03-29

## 2021-04-07 ENCOUNTER — IMMUNIZATION (OUTPATIENT)
Dept: NURSING | Facility: CLINIC | Age: 61
End: 2021-04-07
Payer: COMMERCIAL

## 2021-04-07 PROCEDURE — 0001A PR COVID VAC PFIZER DIL RECON 30 MCG/0.3 ML IM: CPT

## 2021-04-07 PROCEDURE — 91300 PR COVID VAC PFIZER DIL RECON 30 MCG/0.3 ML IM: CPT

## 2021-04-28 ENCOUNTER — IMMUNIZATION (OUTPATIENT)
Dept: NURSING | Facility: CLINIC | Age: 61
End: 2021-04-28
Attending: INTERNAL MEDICINE
Payer: COMMERCIAL

## 2021-04-28 PROCEDURE — 0002A PR COVID VAC PFIZER DIL RECON 30 MCG/0.3 ML IM: CPT

## 2021-04-28 PROCEDURE — 91300 PR COVID VAC PFIZER DIL RECON 30 MCG/0.3 ML IM: CPT

## 2021-08-17 DIAGNOSIS — I48.0 PAROXYSMAL ATRIAL FIBRILLATION (H): ICD-10-CM

## 2021-08-17 RX ORDER — FLECAINIDE ACETATE 100 MG/1
100 TABLET ORAL 2 TIMES DAILY
Qty: 180 TABLET | Refills: 0 | Status: SHIPPED | OUTPATIENT
Start: 2021-08-17 | End: 2021-11-17

## 2021-09-05 ENCOUNTER — HEALTH MAINTENANCE LETTER (OUTPATIENT)
Age: 61
End: 2021-09-05

## 2021-11-17 DIAGNOSIS — I48.0 PAROXYSMAL ATRIAL FIBRILLATION (H): ICD-10-CM

## 2021-11-17 RX ORDER — FLECAINIDE ACETATE 100 MG/1
100 TABLET ORAL 2 TIMES DAILY
Qty: 180 TABLET | Refills: 0 | Status: SHIPPED | OUTPATIENT
Start: 2021-11-17 | End: 2022-02-22

## 2022-01-11 ENCOUNTER — OFFICE VISIT (OUTPATIENT)
Dept: CARDIOLOGY | Facility: CLINIC | Age: 62
End: 2022-01-11
Payer: COMMERCIAL

## 2022-01-11 ENCOUNTER — HOSPITAL ENCOUNTER (OUTPATIENT)
Dept: CARDIOLOGY | Facility: CLINIC | Age: 62
End: 2022-01-11
Attending: PHYSICIAN ASSISTANT
Payer: COMMERCIAL

## 2022-01-11 ENCOUNTER — OFFICE VISIT (OUTPATIENT)
Dept: FAMILY MEDICINE | Facility: CLINIC | Age: 62
End: 2022-01-11
Payer: COMMERCIAL

## 2022-01-11 ENCOUNTER — HOSPITAL ENCOUNTER (OUTPATIENT)
Dept: MAMMOGRAPHY | Facility: CLINIC | Age: 62
End: 2022-01-11
Attending: NURSE PRACTITIONER
Payer: COMMERCIAL

## 2022-01-11 VITALS
WEIGHT: 173.9 LBS | DIASTOLIC BLOOD PRESSURE: 70 MMHG | BODY MASS INDEX: 26.44 KG/M2 | TEMPERATURE: 97.9 F | OXYGEN SATURATION: 98 % | HEART RATE: 64 BPM | SYSTOLIC BLOOD PRESSURE: 126 MMHG

## 2022-01-11 VITALS
HEART RATE: 64 BPM | SYSTOLIC BLOOD PRESSURE: 131 MMHG | DIASTOLIC BLOOD PRESSURE: 82 MMHG | BODY MASS INDEX: 26.3 KG/M2 | WEIGHT: 173 LBS

## 2022-01-11 DIAGNOSIS — Z01.419 SMEAR, VAGINAL, AS PART OF ROUTINE GYNECOLOGICAL EXAMINATION: ICD-10-CM

## 2022-01-11 DIAGNOSIS — I48.0 PAROXYSMAL ATRIAL FIBRILLATION (H): ICD-10-CM

## 2022-01-11 DIAGNOSIS — Z11.4 SCREENING FOR HIV (HUMAN IMMUNODEFICIENCY VIRUS): ICD-10-CM

## 2022-01-11 DIAGNOSIS — Z12.4 SCREENING FOR CERVICAL CANCER: ICD-10-CM

## 2022-01-11 DIAGNOSIS — Z12.11 SPECIAL SCREENING FOR MALIGNANT NEOPLASMS, COLON: ICD-10-CM

## 2022-01-11 DIAGNOSIS — Z12.72 SMEAR, VAGINAL, AS PART OF ROUTINE GYNECOLOGICAL EXAMINATION: ICD-10-CM

## 2022-01-11 DIAGNOSIS — Z12.31 VISIT FOR SCREENING MAMMOGRAM: ICD-10-CM

## 2022-01-11 DIAGNOSIS — Z13.1 SCREENING FOR DIABETES MELLITUS: ICD-10-CM

## 2022-01-11 DIAGNOSIS — Z13.6 SCREENING FOR CARDIOVASCULAR CONDITION: Primary | ICD-10-CM

## 2022-01-11 LAB
ANION GAP SERPL CALCULATED.3IONS-SCNC: 4 MMOL/L (ref 3–14)
BUN SERPL-MCNC: 14 MG/DL (ref 7–30)
CALCIUM SERPL-MCNC: 9.1 MG/DL (ref 8.5–10.1)
CHLORIDE BLD-SCNC: 105 MMOL/L (ref 94–109)
CHOLEST SERPL-MCNC: 226 MG/DL
CO2 SERPL-SCNC: 28 MMOL/L (ref 20–32)
CREAT SERPL-MCNC: 0.72 MG/DL (ref 0.52–1.04)
FASTING STATUS PATIENT QL REPORTED: YES
GFR SERPL CREATININE-BSD FRML MDRD: >90 ML/MIN/1.73M2
GLUCOSE BLD-MCNC: 87 MG/DL (ref 70–99)
HDLC SERPL-MCNC: 78 MG/DL
LDLC SERPL CALC-MCNC: 132 MG/DL
NONHDLC SERPL-MCNC: 148 MG/DL
POTASSIUM BLD-SCNC: 3.7 MMOL/L (ref 3.4–5.3)
SODIUM SERPL-SCNC: 137 MMOL/L (ref 133–144)
TRIGL SERPL-MCNC: 78 MG/DL

## 2022-01-11 PROCEDURE — 36415 COLL VENOUS BLD VENIPUNCTURE: CPT | Performed by: NURSE PRACTITIONER

## 2022-01-11 PROCEDURE — 80061 LIPID PANEL: CPT | Performed by: NURSE PRACTITIONER

## 2022-01-11 PROCEDURE — 93005 ELECTROCARDIOGRAM TRACING: CPT

## 2022-01-11 PROCEDURE — 87389 HIV-1 AG W/HIV-1&-2 AB AG IA: CPT | Performed by: NURSE PRACTITIONER

## 2022-01-11 PROCEDURE — 87624 HPV HI-RISK TYP POOLED RSLT: CPT | Performed by: NURSE PRACTITIONER

## 2022-01-11 PROCEDURE — 99386 PREV VISIT NEW AGE 40-64: CPT | Performed by: NURSE PRACTITIONER

## 2022-01-11 PROCEDURE — G0145 SCR C/V CYTO,THINLAYER,RESCR: HCPCS | Performed by: NURSE PRACTITIONER

## 2022-01-11 PROCEDURE — 80048 BASIC METABOLIC PNL TOTAL CA: CPT | Performed by: NURSE PRACTITIONER

## 2022-01-11 PROCEDURE — 93010 ELECTROCARDIOGRAM REPORT: CPT | Performed by: INTERNAL MEDICINE

## 2022-01-11 PROCEDURE — 99213 OFFICE O/P EST LOW 20 MIN: CPT | Performed by: INTERNAL MEDICINE

## 2022-01-11 PROCEDURE — 77067 SCR MAMMO BI INCL CAD: CPT

## 2022-01-11 ASSESSMENT — ENCOUNTER SYMPTOMS
BREAST MASS: 0
SHORTNESS OF BREATH: 0
ABDOMINAL PAIN: 0
HEARTBURN: 0
JOINT SWELLING: 0
PARESTHESIAS: 0
WEAKNESS: 0
HEMATOCHEZIA: 0
ARTHRALGIAS: 0
COUGH: 0
MYALGIAS: 0
DIZZINESS: 0
HEADACHES: 0
CONSTIPATION: 0
NERVOUS/ANXIOUS: 0
CHILLS: 0
SORE THROAT: 0
DIARRHEA: 0
HEMATURIA: 0
PALPITATIONS: 0
NAUSEA: 0
FREQUENCY: 0
FEVER: 0
EYE PAIN: 0
DYSURIA: 0

## 2022-01-11 ASSESSMENT — PAIN SCALES - GENERAL: PAINLEVEL: NO PAIN (0)

## 2022-01-11 NOTE — PROGRESS NOTES
HPI and Plan:   See dictation  [id number]  Today's clinic visit entailed:  The following tests were independently interpreted by me as noted in my documentation: ecg  15 minutes spent on the date of the encounter doing chart review   Provider  Link to MDM Help Grid     The level of medical decision making during this visit was of moderate complexity.      Orders Placed This Encounter   Procedures     Follow-Up with Cardiac Advanced Practice Provider       No orders of the defined types were placed in this encounter.      There are no discontinued medications.      Encounter Diagnosis   Name Primary?     Paroxysmal atrial fibrillation (H)        CURRENT MEDICATIONS:  Current Outpatient Medications   Medication Sig Dispense Refill     aspirin 81 MG EC tablet Take 1 tablet (81 mg) by mouth daily       Calcium-Vitamin D (CALCIUM + D PO) Take 1 tablet by mouth.       flecainide (TAMBOCOR) 100 MG tablet Take 1 tablet (100 mg) by mouth 2 times daily 180 tablet 0       ALLERGIES   No Known Allergies    PAST MEDICAL HISTORY:  Past Medical History:   Diagnosis Date     Atrial fibrillation (H)      Paroxysmal atrial flutter (H)        PAST SURGICAL HISTORY:  Past Surgical History:   Procedure Laterality Date     COLONOSCOPY  7/28/2011    Procedure:COLONOSCOPY; Surgeon:MIKE LYLE; Location:[facility]     SURGICAL HISTORY OF -   07/15/2002    laparoscopic L salpingo-oophorectomy - ectopic     SURGICAL HISTORY OF -   7/15/2002    SAB       FAMILY HISTORY:  Family History   Problem Relation Age of Onset     C.A.D. Father      Hypertension Father      Diabetes Maternal Grandmother      C.A.D. Maternal Grandmother      C.A.D. Maternal Grandfather      Cancer Paternal Grandmother         liver     Hypertension Mother      Lipids Mother      Cancer - colorectal Other        SOCIAL HISTORY:  Social History     Socioeconomic History     Marital status:      Spouse name: None     Number of children: None     Years of  education: None     Highest education level: None   Occupational History     None   Tobacco Use     Smoking status: Never Smoker     Smokeless tobacco: Never Used   Vaping Use     Vaping Use: Never used   Substance and Sexual Activity     Alcohol use: Yes     Comment: rare     Drug use: No     Sexual activity: Yes     Birth control/protection: None   Other Topics Concern     Parent/sibling w/ CABG, MI or angioplasty before 65F 55M? No   Social History Narrative     None     Social Determinants of Health     Financial Resource Strain: Not on file   Food Insecurity: Not on file   Transportation Needs: Not on file   Physical Activity: Not on file   Stress: Not on file   Social Connections: Not on file   Intimate Partner Violence: Not on file   Housing Stability: Not on file       Review of Systems:  Skin:  Negative       Eyes:  Positive for glasses    ENT:  Negative      Respiratory:  Negative       Cardiovascular:  Negative      Gastroenterology: Negative      Genitourinary:  Negative      Musculoskeletal:  Negative      Neurologic:  Negative      Psychiatric:  Negative      Heme/Lymph/Imm:  Negative      Endocrine:  Negative        Physical Exam:  Vitals: /82   Pulse 64   Wt 78.5 kg (173 lb)   LMP 03/08/2011   BMI 26.30 kg/m      Constitutional:  cooperative, alert and oriented, well developed, well nourished, in no acute distress        Skin:  warm and dry to the touch, no apparent skin lesions or masses noted          Head:  normocephalic, no masses or lesions        Eyes:  pupils equal and round, conjunctivae and lids unremarkable, sclera white, no xanthalasma, EOMS intact, no nystagmus        Lymph:No Cervical lymphadenopathy present     ENT:  no pallor or cyanosis        Neck:  carotid pulses are full and equal bilaterally, JVP normal, no carotid bruit        Respiratory:  normal breath sounds, clear to auscultation, normal A-P diameter, normal symmetry, normal respiratory excursion, no use of  accessory muscles tender to palpation       Cardiac: regular rhythm, normal S1/S2, no S3 or S4, apical impulse not displaced, no murmurs, gallops or rubs     no presence of murmur          pulses full and equal, no bruits auscultated                               right femoral bruit (+)        GI:  abdomen soft, non-tender, BS normoactive, no mass, no HSM, no bruits        Extremities and Muscular Skeletal:  no deformities, clubbing, cyanosis, erythema observed              Neurological:  no gross motor deficits        Psych:  Alert and Oriented x 3        CC  Malia Romero, PA-ELVIA  [address]  [place] [address]

## 2022-01-11 NOTE — LETTER
2022       RE: Sheila Schilling   [facility] [address]     Dear Colleague,    Thank you for referring your patient, Sheila Schilling, to the [facility] [place] at [facility]. Please see a copy of my visit note below.    Service Date: 2022    HISTORY OF PRESENT ILLNESS:  Thank you for allowing me to participate in the care of your delightful patient.  As you know, Sheila is a 61-year-old female with a history of symptomatic atrial fibrillation whom I first saw back in 2017.  At that time, I had her on flecainide, and since then she is doing quite well, free of any palpitation or dizziness from having atrial fibrillation.  She is here for routine annual visit.  My last visit with her was 3 years ago.  Over the interim period, the patient has been doing quite well.  Occasionally, she feels some fluttering, especially when she forgets to take her flecainide, but when she is on time with it, she never had any episodes of AFib.    EKG today demonstrates sinus rhythm with normal conduction intervals.    I congratulated the patient on her mostly asymptomatic status, and as long as the flecainide is working for her, we will continue with it.  She does have a CHADS-VASc score of 1, mainly from being a female, and has been on baby aspirin only.  The patient will continue with her flecainide.  I have recommended her to see Cherelle, who has seen her in the past, a year from now and see me a year after that.    Kody Thornton MD        D: 2022   T: 2022   MT: [place]    Name:     SHEILA SCHILLING  MRN:      [id number]        Account:      [id number]   :      1960           Service Date: 2022       Document: [id number]      HPI and Plan:   See dictation  [id number]  Today's clinic visit entailed:  The following tests were independently interpreted by me as noted in my documentation: ecg  15 minutes spent on the  date of the encounter doing chart review   Provider  Link to [facility] Help Grid     The level of medical decision making during this visit was of moderate complexity.      Orders Placed This Encounter   Procedures     Follow-Up with Cardiac Advanced Practice Provider       No orders of the defined types were placed in this encounter.      There are no discontinued medications.      Encounter Diagnosis   Name Primary?     Paroxysmal atrial fibrillation (H)        CURRENT MEDICATIONS:  Current Outpatient Medications   Medication Sig Dispense Refill     aspirin 81 MG EC tablet Take 1 tablet (81 mg) by mouth daily       Calcium-Vitamin D (CALCIUM + D PO) Take 1 tablet by mouth.       flecainide (TAMBOCOR) 100 MG tablet Take 1 tablet (100 mg) by mouth 2 times daily 180 tablet 0       ALLERGIES   No Known Allergies    PAST MEDICAL HISTORY:  Past Medical History:   Diagnosis Date     Atrial fibrillation (H)      Paroxysmal atrial flutter (H)        PAST SURGICAL HISTORY:  Past Surgical History:   Procedure Laterality Date     COLONOSCOPY  7/28/2011    Procedure:COLONOSCOPY; Surgeon:MIKE LYLE; Location:[facility]     SURGICAL HISTORY OF -   07/15/2002    laparoscopic L salpingo-oophorectomy - ectopic     SURGICAL HISTORY OF -   7/15/2002    SAB       FAMILY HISTORY:  Family History   Problem Relation Age of Onset     C.A.D. Father      Hypertension Father      Diabetes Maternal Grandmother      C.A.D. Maternal Grandmother      C.A.D. Maternal Grandfather      Cancer Paternal Grandmother         liver     Hypertension Mother      Lipids Mother      Cancer - colorectal Other        SOCIAL HISTORY:  Social History     Socioeconomic History     Marital status:      Spouse name: None     Number of children: None     Years of education: None     Highest education level: None   Occupational History     None   Tobacco Use     Smoking status: Never Smoker     Smokeless tobacco: Never Used   Vaping Use     Vaping Use:  Never used   Substance and Sexual Activity     Alcohol use: Yes     Comment: rare     Drug use: No     Sexual activity: Yes     Birth control/protection: None   Other Topics Concern     Parent/sibling w/ CABG, MI or angioplasty before 65F 55M? No   Social History Narrative     None     Social Determinants of Health     Financial Resource Strain: Not on file   Food Insecurity: Not on file   Transportation Needs: Not on file   Physical Activity: Not on file   Stress: Not on file   Social Connections: Not on file   Intimate Partner Violence: Not on file   Housing Stability: Not on file       Review of Systems:  Skin:  Negative       Eyes:  Positive for glasses    ENT:  Negative      Respiratory:  Negative       Cardiovascular:  Negative      Gastroenterology: Negative      Genitourinary:  Negative      Musculoskeletal:  Negative      Neurologic:  Negative      Psychiatric:  Negative      Heme/Lymph/Imm:  Negative      Endocrine:  Negative        Physical Exam:  Vitals: /82   Pulse 64   Wt 78.5 kg (173 lb)   LMP 03/08/2011   BMI 26.30 kg/m      Constitutional:  cooperative, alert and oriented, well developed, well nourished, in no acute distress        Skin:  warm and dry to the touch, no apparent skin lesions or masses noted          Head:  normocephalic, no masses or lesions        Eyes:  pupils equal and round, conjunctivae and lids unremarkable, sclera white, no xanthalasma, EOMS intact, no nystagmus        Lymph:No Cervical lymphadenopathy present     ENT:  no pallor or cyanosis        Neck:  carotid pulses are full and equal bilaterally, JVP normal, no carotid bruit        Respiratory:  normal breath sounds, clear to auscultation, normal A-P diameter, normal symmetry, normal respiratory excursion, no use of accessory muscles tender to palpation       Cardiac: regular rhythm, normal S1/S2, no S3 or S4, apical impulse not displaced, no murmurs, gallops or rubs     no presence of murmur          pulses full  and equal, no bruits auscultated                               right femoral bruit (+)        GI:  abdomen soft, non-tender, BS normoactive, no mass, no HSM, no bruits        Extremities and Muscular Skeletal:  no deformities, clubbing, cyanosis, erythema observed              Neurological:  no gross motor deficits        Psych:  Alert and Oriented x 3        CC  Malia Romero, PA-C  [address]  [place] [address]        Kody Carreon MD

## 2022-01-11 NOTE — PROGRESS NOTES
Service Date: 2022    HISTORY OF PRESENT ILLNESS:  Thank you for allowing me to participate in the care of your delightful patient.  As you know, Sheila is a 61-year-old female with a history of symptomatic atrial fibrillation whom I first saw back in 2017.  At that time, I had her on flecainide, and since then she is doing quite well, free of any palpitation or dizziness from having atrial fibrillation.  She is here for routine annual visit.  My last visit with her was 3 years ago.  Over the interim period, the patient has been doing quite well.  Occasionally, she feels some fluttering, especially when she forgets to take her flecainide, but when she is on time with it, she never had any episodes of AFib.    EKG today demonstrates sinus rhythm with normal conduction intervals.    I congratulated the patient on her mostly asymptomatic status, and as long as the flecainide is working for her, we will continue with it.  She does have a CHADS-VASc score of 1, mainly from being a female, and has been on baby aspirin only.  The patient will continue with her flecainide.  I have recommended her to see Cherelle, who has seen her in the past, a year from now and see me a year after that.    Kody Thornton MD        D: 2022   T: 2022   MT: [place]    Name:     SHEILA CORDOVA  MRN:      [id number]        Account:      [id number]   :      1960           Service Date: 2022       Document: [id number]

## 2022-01-11 NOTE — LETTER
January 14, 2022      Sheila Schilling  [address] [facility] [address]        Dear ,    We are writing to inform you of your test results.    Your HIV was negative.     Resulted Orders   Lipid panel reflex to direct LDL Fasting   Result Value Ref Range    Cholesterol 226 (H) <200 mg/dL    Triglycerides 78 <150 mg/dL    Direct Measure HDL 78 >=50 mg/dL    LDL Cholesterol Calculated 132 (H) <=100 mg/dL    Non HDL Cholesterol 148 (H) <130 mg/dL    Patient Fasting > 8hrs? Yes     Narrative    Cholesterol  Desirable:  <200 mg/dL    Triglycerides  Normal:  Less than 150 mg/dL  Borderline High:  150-199 mg/dL  High:  200-499 mg/dL  Very High:  Greater than or equal to 500 mg/dL    Direct Measure HDL  Female:  Greater than or equal to 50 mg/dL   Male:  Greater than or equal to 40 mg/dL    LDL Cholesterol  Desirable:  <100mg/dL  Above Desirable:  100-129 mg/dL   Borderline High:  130-159 mg/dL   High:  160-189 mg/dL   Very High:  >= 190 mg/dL    Non HDL Cholesterol  Desirable:  130 mg/dL  Above Desirable:  130-159 mg/dL  Borderline High:  160-189 mg/dL  High:  190-219 mg/dL  Very High:  Greater than or equal to 220 mg/dL   Basic metabolic panel  (Ca, Cl, CO2, Creat, Gluc, K, Na, BUN)   Result Value Ref Range    Sodium 137 133 - 144 mmol/L    Potassium 3.7 3.4 - 5.3 mmol/L    Chloride 105 94 - 109 mmol/L    Carbon Dioxide (CO2) 28 20 - 32 mmol/L    Anion Gap 4 3 - 14 mmol/L    Urea Nitrogen 14 7 - 30 mg/dL    Creatinine 0.72 0.52 - 1.04 mg/dL    Calcium 9.1 8.5 - 10.1 mg/dL    Glucose 87 70 - 99 mg/dL    GFR Estimate >90 >60 mL/min/1.73m2      Comment:      Effective December 21, 2021 eGFRcr in adults is calculated using the 2021 CKD-EPI creatinine equation which includes age and gender (Lm felix al., NEJM, DOI: 10.1056/CKZBwl7600596)   HIV Antigen Antibody Combo   Result Value Ref Range    HIV Antigen Antibody Combo Nonreactive Nonreactive      Comment:      HIV-1 p24 Ag & HIV-1/HIV-2 Ab Not Detected        If you have any questions or concerns, please call the clinic at the number listed above.       Sincerely,      SINCERE Donovan CNP

## 2022-01-11 NOTE — PROGRESS NOTES
SUBJECTIVE:   CC: Sheila Schilling is an 61 year old woman who presents for preventive health visit.     {  Patient has been advised of split billing requirements and indicates understanding: Yes  Healthy Habits:     Getting at least 3 servings of Calcium per day:  Yes    Bi-annual eye exam:  Yes    Dental care twice a year:  Yes    Sleep apnea or symptoms of sleep apnea:  None    Diet:  Regular (no restrictions)    Frequency of exercise:  4-5 days/week    Duration of exercise:  30-45 minutes    Taking medications regularly:  Yes    Barriers to taking medications:  None    Medication side effects:  None    PHQ-2 Total Score: 0    Additional concerns today:  No              Today's PHQ-2 Score:   PHQ-2 ( 1999 Pf[employer]) 1/11/2022   Q1: Little interest or pleasure in doing things 0   Q2: Feeling down, depressed or hopeless 0   PHQ-2 Score 0   Q1: Little interest or pleasure in doing things Not at all   Q2: Feeling down, depressed or hopeless Not at all   PHQ-2 Score 0       Abuse: Current or Past (Physical, Sexual or Emotional) - No  Do you feel safe in your environment? Yes    Have you ever done Advance Care Planning? (For example, a Health Directive, POLST, or a discussion with a medical provider or your loved ones about your wishes): Yes, patient states has an Advance Care Planning document and will bring a copy to the clinic.    Social History     Tobacco Use     Smoking status: Never Smoker     Smokeless tobacco: Never Used   Substance Use Topics     Alcohol use: Yes     Comment: rare     If you drink alcohol do you typically have >3 drinks per day or >7 drinks per week? No    Alcohol Use 1/11/2022   Prescreen: >3 drinks/day or >7 drinks/week? No   Prescreen: >3 drinks/day or >7 drinks/week? -       Reviewed orders with patient.  Reviewed health maintenance and updated orders accordingly - Yes  BP Readings from Last 3 Encounters:   01/11/22 126/70   01/08/19 124/83   11/14/17 132/79    Wt Readings from Last 3  Encounters:   01/11/22 78.9 kg (173 lb 14.4 oz)   01/08/19 76.9 kg (169 lb 9.6 oz)   11/14/17 77.2 kg (170 lb 3.2 oz)                    Breast Cancer Screening:    FHS-7:   Breast CA Risk Assessment (FHS-7) 1/11/2022 1/11/2022 1/11/2022   Did any of your first-degree relatives have breast or ovarian cancer? No No No   Did any of your relatives have bilateral breast cancer? - - No   Did any man in your family have breast cancer? - - No       Mammogram Screening: Recommended mammography every 1-2 years with patient discussion and risk factor consideration  Pertinent mammograms are reviewed under the imaging tab.    History of abnormal Pap smear:   Last 3 Pap and HPV Results:   PAP / HPV Latest Ref Rng & Units 7/20/2017 4/7/2010 2/5/2008   PAP (Historical) - NIL NIL NIL   HPV16 NEG Negative - -   HPV18 NEG Negative - -   HRHPV NEG Negative - -     PAP / HPV Latest Ref Rng & Units 7/20/2017 4/7/2010 2/5/2008   PAP (Historical) - NIL NIL NIL   HPV16 NEG Negative - -   HPV18 NEG Negative - -   HRHPV NEG Negative - -     Reviewed and updated as needed this visit by clinical staff                Reviewed and updated as needed this visit by Provider                   Review of Systems   Constitutional: Negative for chills and fever.   HENT: Negative for congestion, ear pain, hearing loss and sore throat.    Eyes: Negative for pain and visual disturbance.   Respiratory: Negative for cough and shortness of breath.    Cardiovascular: Negative for chest pain, palpitations and peripheral edema.   Gastrointestinal: Negative for abdominal pain, constipation, diarrhea, heartburn, hematochezia and nausea.   Breasts:  Negative for tenderness, breast mass and discharge.   Genitourinary: Negative for dysuria, frequency, genital sores, hematuria, pelvic pain, urgency, vaginal bleeding and vaginal discharge.   Musculoskeletal: Negative for arthralgias, joint swelling and myalgias.   Skin: Negative for rash.   Neurological: Negative for  dizziness, weakness, headaches and paresthesias.   Psychiatric/Behavioral: Negative for mood changes. The patient is not nervous/anxious.      CONSTITUTIONAL: NEGATIVE for fever, chills, change in weight  INTEGUMENTARY/SKIN: NEGATIVE for worrisome rashes, moles or lesions  EYES: NEGATIVE for vision changes or irritation  ENT: NEGATIVE for ear, mouth and throat problems  RESP: NEGATIVE for significant cough or SOB  BREAST: NEGATIVE for masses, tenderness or discharge  CV: NEGATIVE for chest pain, palpitations or peripheral edema  GI: NEGATIVE for nausea, abdominal pain, heartburn, or change in bowel habits  : NEGATIVE for unusual urinary or vaginal symptoms. No vaginal bleeding.  MUSCULOSKELETAL: NEGATIVE for significant arthralgias or myalgia  NEURO: NEGATIVE for weakness, dizziness or paresthesias  PSYCHIATRIC: NEGATIVE for changes in mood or affect      OBJECTIVE:   LMP 03/08/2011   Physical Exam  GENERAL APPEARANCE: healthy, alert and no distress  EYES: Eyes grossly normal to inspection, PERRL and conjunctivae and sclerae normal  HENT: ear canals and TM's normal, nose and mouth without ulcers or lesions, oropharynx clear and oral mucous membranes moist  NECK: no adenopathy, no asymmetry, masses, or scars and thyroid normal to palpation  RESP: lungs clear to auscultation - no rales, rhonchi or wheezes  BREAST: normal without masses, tenderness or nipple discharge and no palpable axillary masses or adenopathy  CV: regular rate and rhythm, normal S1 S2, no S3 or S4, no murmur, click or rub, no peripheral edema and peripheral pulses strong  ABDOMEN: soft, nontender, no hepatosplenomegaly, no masses and bowel sounds normal   (female): normal female external genitalia, normal urethral meatus, vaginal mucosal atrophy noted, normal cervix, adnexae, and uterus without masses or abnormal discharge  MS: no musculoskeletal defects are noted and gait is age appropriate without ataxia  SKIN: no suspicious lesions or  "rashes  NEURO: Normal strength and tone, sensory exam grossly normal, mentation intact and speech normal  PSYCH: mentation appears normal and affect normal/bright    Diagnostic Test Results:  Labs reviewed in Epic    ASSESSMENT/PLAN:   (Z13.6) Screening for cardiovascular condition  (primary encounter diagnosis)  Comment:   Plan: Lipid panel reflex to direct LDL Fasting            (Z01.419,  Z12.72) Smear, vaginal, as part of routine gynecological examination  Comment:   Plan:     (Z13.1) Screening for diabetes mellitus  Comment:   Plan: Basic metabolic panel  (Ca, Cl, CO2, Creat,         Gluc, K, Na, BUN)            (Z12.4) Screening for cervical cancer  Comment:   Plan: Pap screen with HPV - recommended age 30 - 65         years            (Z12.11) Special screening for malignant neoplasms, colon  Comment:   Plan: Adult Gastro Ref - Procedure Only            (Z11.4) Screening for HIV (human immunodeficiency virus)  Comment:   Plan: HIV Antigen Antibody Combo              Patient has been advised of split billing requirements and indicates understanding: Yes  COUNSELING:  Reviewed preventive health counseling, as reflected in patient instructions       Regular exercise       Healthy diet/nutrition       Osteoporosis prevention/bone health       Consider Hep C screening for all patients one time for ages 18-79 years       HIV screeninx in teen years, 1x in adult years, and at intervals if high risk    Estimated body mass index is 25.79 kg/m  as calculated from the following:    Height as of 17: 1.727 m (5' 8\").    Weight as of 19: 76.9 kg (169 lb 9.6 oz).        She reports that she has never smoked. She has never used smokeless tobacco.      Counseling Resources:  ATP IV Guidelines  Pooled Cohorts Equation Calculator  Breast Cancer Risk Calculator  BRCA-Related Cancer Risk Assessment: FHS-7 Tool  FRAX Risk Assessment  ICSI Preventive Guidelines  Dietary Guidelines for Americans, 2010  USDA's " MyPlate  ASA Prophylaxis  Lung CA Screening    SINCERE Donovan CNP  [facility]

## 2022-01-11 NOTE — LETTER
1/11/2022    Claudia Viky Oronat, APRN CNP  [address] [facility] [address]    RE: Sheila Schilling       Dear Colleague,     I had the pleasure of seeing Sheila Schilling in the [facility].  HPI and Plan:   See dictation  [id number]  Today's clinic visit entailed:  The following tests were independently interpreted by me as noted in my documentation: ecg  15 minutes spent on the date of the encounter doing chart review   Provider  Link to MDM Help Grid     The level of medical decision making during this visit was of moderate complexity.      Orders Placed This Encounter   Procedures     Follow-Up with Cardiac Advanced Practice Provider       No orders of the defined types were placed in this encounter.      There are no discontinued medications.      Encounter Diagnosis   Name Primary?     Paroxysmal atrial fibrillation (H)        CURRENT MEDICATIONS:  Current Outpatient Medications   Medication Sig Dispense Refill     aspirin 81 MG EC tablet Take 1 tablet (81 mg) by mouth daily       Calcium-Vitamin D (CALCIUM + D PO) Take 1 tablet by mouth.       flecainide (TAMBOCOR) 100 MG tablet Take 1 tablet (100 mg) by mouth 2 times daily 180 tablet 0       ALLERGIES   No Known Allergies    PAST MEDICAL HISTORY:  Past Medical History:   Diagnosis Date     Atrial fibrillation (H)      Paroxysmal atrial flutter (H)        PAST SURGICAL HISTORY:  Past Surgical History:   Procedure Laterality Date     COLONOSCOPY  7/28/2011    Procedure:COLONOSCOPY; Surgeon:MIKE LYLE; Location:[facility] OR     SURGICAL HISTORY OF -   07/15/2002    laparoscopic L salpingo-oophorectomy - ectopic     SURGICAL HISTORY OF -   7/15/2002    SAB       FAMILY HISTORY:  Family History   Problem Relation Age of Onset     C.A.D. Father      Hypertension Father      Diabetes Maternal Grandmother      C.A.D. Maternal Grandmother      C.A.D. Maternal Grandfather      Cancer Paternal Grandmother         liver     Hypertension Mother       Lipids Mother      Cancer - colorectal Other        SOCIAL HISTORY:  Social History     Socioeconomic History     Marital status:      Spouse name: None     Number of children: None     Years of education: None     Highest education level: None   Occupational History     None   Tobacco Use     Smoking status: Never Smoker     Smokeless tobacco: Never Used   Vaping Use     Vaping Use: Never used   Substance and Sexual Activity     Alcohol use: Yes     Comment: rare     Drug use: No     Sexual activity: Yes     Birth control/protection: None   Other Topics Concern     Parent/sibling w/ CABG, MI or angioplasty before 65F 55M? No   Social History Narrative     None     Social Determinants of Health     Financial Resource Strain: Not on file   Food Insecurity: Not on file   Transportation Needs: Not on file   Physical Activity: Not on file   Stress: Not on file   Social Connections: Not on file   Intimate Partner Violence: Not on file   Housing Stability: Not on file       Review of Systems:  Skin:  Negative       Eyes:  Positive for glasses    ENT:  Negative      Respiratory:  Negative       Cardiovascular:  Negative      Gastroenterology: Negative      Genitourinary:  Negative      Musculoskeletal:  Negative      Neurologic:  Negative      Psychiatric:  Negative      Heme/Lymph/Imm:  Negative      Endocrine:  Negative        Physical Exam:  Vitals: /82   Pulse 64   Wt 78.5 kg (173 lb)   LMP 03/08/2011   BMI 26.30 kg/m      Constitutional:  cooperative, alert and oriented, well developed, well nourished, in no acute distress        Skin:  warm and dry to the touch, no apparent skin lesions or masses noted          Head:  normocephalic, no masses or lesions        Eyes:  pupils equal and round, conjunctivae and lids unremarkable, sclera white, no xanthalasma, EOMS intact, no nystagmus        Lymph:No Cervical lymphadenopathy present     ENT:  no pallor or cyanosis        Neck:  carotid pulses are full  and equal bilaterally, JVP normal, no carotid bruit        Respiratory:  normal breath sounds, clear to auscultation, normal A-P diameter, normal symmetry, normal respiratory excursion, no use of accessory muscles tender to palpation       Cardiac: regular rhythm, normal S1/S2, no S3 or S4, apical impulse not displaced, no murmurs, gallops or rubs     no presence of murmur          pulses full and equal, no bruits auscultated                               right femoral bruit (+)        GI:  abdomen soft, non-tender, BS normoactive, no mass, no HSM, no bruits        Extremities and Muscular Skeletal:  no deformities, clubbing, cyanosis, erythema observed              Neurological:  no gross motor deficits        Psych:  Alert and Oriented x 3        CC  Malia Romero PA-C  [address]  [place] [id number]                  Thank you for allowing me to participate in the care of your patient.      Sincerely,     Kody Carreon MD     [facility]  cc:   Malia Romero PA-C  [address]  [place] [id number]

## 2022-01-12 LAB — HIV 1+2 AB+HIV1 P24 AG SERPL QL IA: NONREACTIVE

## 2022-01-14 LAB
BKR LAB AP GYN ADEQUACY: NORMAL
BKR LAB AP GYN INTERPRETATION: NORMAL
BKR LAB AP HPV REFLEX: NORMAL
BKR LAB AP PREVIOUS ABNORMAL: NORMAL
PATH REPORT.COMMENTS IMP SPEC: NORMAL
PATH REPORT.COMMENTS IMP SPEC: NORMAL
PATH REPORT.RELEVANT HX SPEC: NORMAL

## 2022-01-18 LAB
HUMAN PAPILLOMA VIRUS 16 DNA: NEGATIVE
HUMAN PAPILLOMA VIRUS 18 DNA: NEGATIVE
HUMAN PAPILLOMA VIRUS FINAL DIAGNOSIS: NORMAL
HUMAN PAPILLOMA VIRUS OTHER HR: NEGATIVE

## 2022-02-22 DIAGNOSIS — I48.0 PAROXYSMAL ATRIAL FIBRILLATION (H): ICD-10-CM

## 2022-02-22 RX ORDER — FLECAINIDE ACETATE 100 MG/1
100 TABLET ORAL 2 TIMES DAILY
Qty: 180 TABLET | Refills: 2 | Status: SHIPPED | OUTPATIENT
Start: 2022-02-22 | End: 2022-11-29

## 2022-04-26 ENCOUNTER — HOSPITAL ENCOUNTER (OUTPATIENT)
Facility: CLINIC | Age: 62
End: 2022-04-26
Attending: SURGERY | Admitting: SURGERY
Payer: COMMERCIAL

## 2022-05-26 DIAGNOSIS — Z11.59 ENCOUNTER FOR SCREENING FOR OTHER VIRAL DISEASES: Primary | ICD-10-CM

## 2022-05-31 ENCOUNTER — LAB (OUTPATIENT)
Dept: LAB | Facility: CLINIC | Age: 62
End: 2022-05-31
Payer: COMMERCIAL

## 2022-05-31 DIAGNOSIS — Z11.59 ENCOUNTER FOR SCREENING FOR OTHER VIRAL DISEASES: ICD-10-CM

## 2022-05-31 PROCEDURE — U0003 INFECTIOUS AGENT DETECTION BY NUCLEIC ACID (DNA OR RNA); SEVERE ACUTE RESPIRATORY SYNDROME CORONAVIRUS 2 (SARS-COV-2) (CORONAVIRUS DISEASE [COVID-19]), AMPLIFIED PROBE TECHNIQUE, MAKING USE OF HIGH THROUGHPUT TECHNOLOGIES AS DESCRIBED BY CMS-2020-01-R: HCPCS

## 2022-05-31 PROCEDURE — U0005 INFEC AGEN DETEC AMPLI PROBE: HCPCS

## 2022-06-01 LAB — SARS-COV-2 RNA RESP QL NAA+PROBE: POSITIVE

## 2022-06-01 RX ORDER — ONDANSETRON 2 MG/ML
4 INJECTION INTRAMUSCULAR; INTRAVENOUS
Status: CANCELLED | OUTPATIENT
Start: 2022-06-01

## 2022-06-01 RX ORDER — SODIUM CHLORIDE, SODIUM LACTATE, POTASSIUM CHLORIDE, CALCIUM CHLORIDE 600; 310; 30; 20 MG/100ML; MG/100ML; MG/100ML; MG/100ML
INJECTION, SOLUTION INTRAVENOUS CONTINUOUS
Status: CANCELLED | OUTPATIENT
Start: 2022-06-01

## 2022-06-01 RX ORDER — LIDOCAINE 40 MG/G
CREAM TOPICAL
Status: CANCELLED | OUTPATIENT
Start: 2022-06-01

## 2022-06-02 ENCOUNTER — TELEPHONE (OUTPATIENT)
Dept: NURSING | Facility: CLINIC | Age: 62
End: 2022-06-02
Payer: COMMERCIAL

## 2022-06-02 ENCOUNTER — ANESTHESIA EVENT (OUTPATIENT)
Dept: SURGERY | Facility: CLINIC | Age: 62
End: 2022-06-02
Payer: COMMERCIAL

## 2022-06-02 ASSESSMENT — ENCOUNTER SYMPTOMS: DYSRHYTHMIAS: 1

## 2022-06-02 NOTE — ANESTHESIA PREPROCEDURE EVALUATION
Anesthesia Pre-Procedure Evaluation    Patient: Sheila Schilling   MRN: [id number] : 1960        Procedure : Procedure(s):  COLONOSCOPY          Past Medical History:   Diagnosis Date     Atrial fibrillation (H)      Paroxysmal atrial flutter (H)       Past Surgical History:   Procedure Laterality Date     COLONOSCOPY  2011    Procedure:COLONOSCOPY; Surgeon:MIKE LYLE; Location:[facility]     SURGICAL HISTORY OF -   07/15/2002    laparoscopic L salpingo-oophorectomy - ectopic     SURGICAL HISTORY OF -   7/15/2002    SAB      No Known Allergies   Social History     Tobacco Use     Smoking status: Never Smoker     Smokeless tobacco: Never Used   Substance Use Topics     Alcohol use: Yes     Comment: rare      Wt Readings from Last 1 Encounters:   22 78.5 kg (173 lb)        Anesthesia Evaluation            ROS/MED HX  ENT/Pulmonary:       Neurologic:       Cardiovascular:     (+) -----dysrhythmias, a-fib and a-flutter, Irregular Heartbeat/Palpitations,     METS/Exercise Tolerance:     Hematologic:       Musculoskeletal:       GI/Hepatic:       Renal/Genitourinary:       Endo:       Psychiatric/Substance Use:       Infectious Disease:       Malignancy:       Other:               OUTSIDE LABS:  CBC:   Lab Results   Component Value Date    WBC 7.8 2006    WBC 7.3 2005    HGB 15.5 2006    HGB 14.6 2005    HCT 49.0 (H) 2006    HCT 43.8 2005     2006     2005     BMP:   Lab Results   Component Value Date     2022     2017    POTASSIUM 3.7 2022    POTASSIUM 4.4 2017    CHLORIDE 105 2022    CHLORIDE 106 2017    CO2 28 2022    CO2 30 2017    BUN 14 2022    BUN 13 2017    CR 0.72 2022    CR 0.72 2017    GLC 87 2022    GLC 89 2017     COAGS: No results found for: PTT, INR, FIBR  POC:   Lab Results   Component Value Date    HCG Negative 2006      HEPATIC: No results found for: ALBUMIN, PROTTOTAL, ALT, AST, GGT, ALKPHOS, BILITOTAL, BILIDIRECT, JUANCARLOS  OTHER:   Lab Results   Component Value Date    A1C 5.3 11/23/2005    EDOUARD 9.1 01/11/2022    TSH 1.98 07/20/2017               Alis Samson CRNA, APRN CRNA

## 2022-06-02 NOTE — TELEPHONE ENCOUNTER
Coronavirus (COVID-19) Notification    Caller Name (Patient, parent, daughter/son, grandparent, etc)  PATIENT    Reason for call  Notify of Positive Coronavirus (COVID-19) lab results, assess symptoms,  review [facility] recommendations    Lab Result    Lab test:  2019-nCoV rRt-PCR or SARS-CoV-2 PCR    Oropharyngeal AND/OR nasopharyngeal swabs is POSITIVE for 2019-nCoV RNA/SARS-COV-2 PCR (COVID-19 virus)      Gather patient reported symptoms   Assessment   Current Symptoms at time of phone call, reported by patient: (if no symptoms, document: No symptoms] CONGESTION   Date of symptom(s) onset (if applicable) PRECEDURE     If at time of call, Patients symptoms have worsened, the Patient should contact 911 or have someone drive them to Emergency Dept promptly:      If Patient calling 911, inform 911 personal that you have tested positive for the Coronavirus (COVID-19).  Place mask on and await 911 to arrive.    If Emergency Dept, If possible, please have another adult drive you to the Emergency Dept but you need to wear mask when in contact with other people.      Treatment Options:   Patient classified as COVID treatment eligible by Epic high risk algorithm:     Review information with Patient    Your result was positive. This means you have COVID-19 (coronavirus).    How can I protect others?    These guidelines are for isolating before returning to work, school or .    If you DO have symptoms    Stay home and away from others     For at least 5 days after your symptoms started, AND    You are fever free for 24 hours (with no medicine that reduces fever), AND    Your other symptoms are better    Wear a mask for 10 full days anytime you are around others    If you DON'T have symptoms    Stay home and away from others for at least 5 days after your positive test    Wear a mask for 10 full days anytime you are around others    There may be different guidelines for healthcare facilities.  Please check with  the specific sites before arriving.    If you have been told by a doctor that you were severely ill with COVID-19 or are immunocompromised, you should isolate for at least 10 days.    You should not go back to work until you meet the guidelines above for ending your home isolation. You don't need to be retested for COVID-19 before going back to work--studies show that you won't spread the virus if it's been at least 10 days since your symptoms started (or 20 days, if you have a weak immune system).    Employers, schools, and daycares: This is an official notice for this person's medical guidelines for returning in-person.  They must meet the above guidelines before going back to work, school or  in person.    You will receive a positive COVID-19 letter via [employer] or the mail soon with additional self-care information (exception, no letters will be sent to presurgical/preprocedure patients).    Would you like me to review some of that information with you now?  No    If you were tested for an upcoming procedure, please contact your provider for next steps.    Anabella Lea

## 2022-06-03 ENCOUNTER — ANESTHESIA (OUTPATIENT)
Dept: SURGERY | Facility: CLINIC | Age: 62
End: 2022-06-03
Payer: COMMERCIAL

## 2022-06-20 ENCOUNTER — OFFICE VISIT (OUTPATIENT)
Dept: DERMATOLOGY | Facility: CLINIC | Age: 62
End: 2022-06-20
Payer: COMMERCIAL

## 2022-06-20 DIAGNOSIS — H02.60 XANTHELASMA: Primary | ICD-10-CM

## 2022-06-20 PROCEDURE — 99203 OFFICE O/P NEW LOW 30 MIN: CPT | Performed by: PHYSICIAN ASSISTANT

## 2022-06-20 ASSESSMENT — PAIN SCALES - GENERAL: PAINLEVEL: NO PAIN (0)

## 2022-06-20 NOTE — PATIENT INSTRUCTIONS
This a xanthelasma or a cholesterol deposit    Schedule with Dr Rosenberg for excision if you want to get rid of it.

## 2022-06-20 NOTE — LETTER
6/20/2022         RE: Sheila Schilling  [address] [facility] [facility] [address]        Dear Colleague,    Thank you for referring your patient, Sheila Schilling, to the [facility]. Please see a copy of my visit note below.    HPI:   Chief complaints: Sheila Schilling is a pleasant 61 year old female who presents for evaluation of a spot beneath the left eyelid. It has been present for over 6 months. The area is bothersome.       PHYSICAL EXAM:    [facility] 03/08/2011   Skin exam performed as follows: Type 2 skin. Mood appropriate  Alert and Oriented X 3. Well developed, well nourished in no distress.  General appearance: Normal  Head including face: Normal  Eyes: conjunctiva and lids: Normal  Mouth: Lips, teeth, gums: Normal  Neck: Normal  Cardiovascular: Exam of peripheral vascular system by observation for swelling, varicosities, edema: Normal  Right upper extremity: Normal  Left upper extremity: Normal  Right lower extremity: Normal  Left lower extremity: Normal  Skin: Scalp and body hair: See below    Yellow papule on the left lower eyelid     ASSESSMENT/PLAN:     1. Xanthelasma   --Lipid panel was checked after lesion appeared - was a little high but was being managed with nutrition and exercise. She is interested in excision and will schedule with Dr Rosenberg for this       Follow-up: PRN  CC:   Scribed By: Megan Tolentino, MS, PAMARCELINA          Again, thank you for allowing me to participate in the care of your patient.        Sincerely,        Megan Tolentino PA-C

## 2022-06-20 NOTE — PROGRESS NOTES
HPI:   Chief complaints: Sheila Schilling is a pleasant 61 year old female who presents for evaluation of a spot beneath the left eyelid. It has been present for over 6 months. The area is bothersome.       PHYSICAL EXAM:    [facility] 03/08/2011   Skin exam performed as follows: Type 2 skin. Mood appropriate  Alert and Oriented X 3. Well developed, well nourished in no distress.  General appearance: Normal  Head including face: Normal  Eyes: conjunctiva and lids: Normal  Mouth: Lips, teeth, gums: Normal  Neck: Normal  Cardiovascular: Exam of peripheral vascular system by observation for swelling, varicosities, edema: Normal  Right upper extremity: Normal  Left upper extremity: Normal  Right lower extremity: Normal  Left lower extremity: Normal  Skin: Scalp and body hair: See below    Yellow papule on the left lower eyelid     ASSESSMENT/PLAN:     1. Xanthelasma   --Lipid panel was checked after lesion appeared - was a little high but was being managed with nutrition and exercise. She is interested in excision and will schedule with Dr Rosenberg for this       Follow-up: PRMARCELL  CC:   Scribed By: Megan Tolentino, MS, PA-C

## 2022-06-20 NOTE — H&P (VIEW-ONLY)
HPI:   Chief complaints: Sheila Schilling is a pleasant 61 year old female who presents for evaluation of a spot beneath the left eyelid. It has been present for over 6 months. The area is bothersome.       PHYSICAL EXAM:    [facility] 03/08/2011   Skin exam performed as follows: Type 2 skin. Mood appropriate  Alert and Oriented X 3. Well developed, well nourished in no distress.  General appearance: Normal  Head including face: Normal  Eyes: conjunctiva and lids: Normal  Mouth: Lips, teeth, gums: Normal  Neck: Normal  Cardiovascular: Exam of peripheral vascular system by observation for swelling, varicosities, edema: Normal  Right upper extremity: Normal  Left upper extremity: Normal  Right lower extremity: Normal  Left lower extremity: Normal  Skin: Scalp and body hair: See below    Yellow papule on the left lower eyelid     ASSESSMENT/PLAN:     1. Xanthelasma   --Lipid panel was checked after lesion appeared - was a little high but was being managed with nutrition and exercise. She is interested in excision and will schedule with Dr Rosenberg for this       Follow-up: PRMARCELL  CC:   Scribed By: Megan Tolentino, MS, PA-C

## 2022-06-22 ENCOUNTER — ANESTHESIA EVENT (OUTPATIENT)
Dept: GASTROENTEROLOGY | Facility: CLINIC | Age: 62
End: 2022-06-22
Payer: COMMERCIAL

## 2022-06-22 ASSESSMENT — ENCOUNTER SYMPTOMS: DYSRHYTHMIAS: 1

## 2022-06-22 NOTE — ANESTHESIA PREPROCEDURE EVALUATION
Anesthesia Pre-Procedure Evaluation    Patient: Sheila Schilling   MRN: [id number] : 1960        Procedure : Procedure(s):  COLONOSCOPY          Past Medical History:   Diagnosis Date     Atrial fibrillation (H)      Paroxysmal atrial flutter (H)       Past Surgical History:   Procedure Laterality Date     COLONOSCOPY  2011    Procedure:COLONOSCOPY; Surgeon:MIKE LYLE; Location:[facility]     SURGICAL HISTORY OF -   07/15/2002    laparoscopic L salpingo-oophorectomy - ectopic     SURGICAL HISTORY OF -   7/15/2002    SAB      No Known Allergies   Social History     Tobacco Use     Smoking status: Never Smoker     Smokeless tobacco: Never Used   Substance Use Topics     Alcohol use: Yes     Comment: rare      Wt Readings from Last 1 Encounters:   22 78.5 kg (173 lb)        Anesthesia Evaluation   Pt has had prior anesthetic. Type: General and MAC.        ROS/MED HX  ENT/Pulmonary:  - neg pulmonary ROS     Neurologic:  - neg neurologic ROS     Cardiovascular:     (+) Dyslipidemia -----dysrhythmias, a-fib and a-flutter, Previous cardiac testing   Echo: Date: Results:    Stress Test: Date: Results:    ECG Reviewed: Date: 22 Results:  Normal Sinus Rhythm  Negative precordial T-waves.     WITHIN NORMAL LIMITS  Cath: Date: Results:      METS/Exercise Tolerance:     Hematologic:  - neg hematologic  ROS     Musculoskeletal:       GI/Hepatic:  - neg GI/hepatic ROS     Renal/Genitourinary:  - neg Renal ROS     Endo:  - neg endo ROS     Psychiatric/Substance Use:  - neg psychiatric ROS     Infectious Disease:  - neg infectious disease ROS     Malignancy:  - neg malignancy ROS     Other:  - neg other ROS          Physical Exam    Airway        Mallampati: II   TM distance: > 3 FB   Neck ROM: full   Mouth opening: > 3 cm    Respiratory Devices and Support         Dental  no notable dental history         Cardiovascular   cardiovascular exam normal          Pulmonary   pulmonary exam normal                 OUTSIDE LABS:  CBC:   Lab Results   Component Value Date    WBC 7.8 06/07/2006    WBC 7.3 11/23/2005    HGB 15.5 06/07/2006    HGB 14.6 11/23/2005    HCT 49.0 (H) 06/07/2006    HCT 43.8 11/23/2005     06/07/2006     11/23/2005     BMP:   Lab Results   Component Value Date     01/11/2022     07/20/2017    POTASSIUM 3.7 01/11/2022    POTASSIUM 4.4 07/20/2017    CHLORIDE 105 01/11/2022    CHLORIDE 106 07/20/2017    CO2 28 01/11/2022    CO2 30 07/20/2017    BUN 14 01/11/2022    BUN 13 07/20/2017    CR 0.72 01/11/2022    CR 0.72 07/20/2017    GLC 87 01/11/2022    GLC 89 07/20/2017     COAGS: No results found for: PTT, INR, FIBR  POC:   Lab Results   Component Value Date    HCG Negative 06/07/2006     HEPATIC: No results found for: ALBUMIN, PROTTOTAL, ALT, AST, GGT, ALKPHOS, BILITOTAL, BILIDIRECT, JUANCARLOS  OTHER:   Lab Results   Component Value Date    A1C 5.3 11/23/2005    EDOUARD 9.1 01/11/2022    TSH 1.98 07/20/2017       Anesthesia Plan    ASA Status:  2      Anesthesia Type: General.   Induction: Intravenous.           Consents    Anesthesia Plan(s) and associated risks, benefits, and realistic alternatives discussed. Questions answered and patient/representative(s) expressed understanding.    - Discussed:     - Discussed with:  Patient         Postoperative Care    Pain management: IV analgesics, Oral pain medications.   PONV prophylaxis: Ondansetron (or other 5HT-3), Dexamethasone or Solumedrol     Comments:                SINCERE Ramos CRNA

## 2022-06-24 ENCOUNTER — HOSPITAL ENCOUNTER (OUTPATIENT)
Facility: CLINIC | Age: 62
Discharge: HOME | End: 2022-06-24
Attending: SURGERY | Admitting: SURGERY
Payer: COMMERCIAL

## 2022-06-24 ENCOUNTER — ANESTHESIA (OUTPATIENT)
Dept: GASTROENTEROLOGY | Facility: CLINIC | Age: 62
End: 2022-06-24
Payer: COMMERCIAL

## 2022-06-24 VITALS
DIASTOLIC BLOOD PRESSURE: 66 MMHG | SYSTOLIC BLOOD PRESSURE: 105 MMHG | BODY MASS INDEX: 26.22 KG/M2 | TEMPERATURE: 98 F | OXYGEN SATURATION: 98 % | HEIGHT: 68 IN | WEIGHT: 173 LBS | RESPIRATION RATE: 16 BRPM | HEART RATE: 67 BPM

## 2022-06-24 LAB — COLONOSCOPY: NORMAL

## 2022-06-24 PROCEDURE — 258N000003 HC RX IP 258 OP 636: Performed by: SURGERY

## 2022-06-24 PROCEDURE — G0121 COLON CA SCRN NOT HI RSK IND: HCPCS | Performed by: SURGERY

## 2022-06-24 PROCEDURE — 250N000011 HC RX IP 250 OP 636: Performed by: NURSE ANESTHETIST, CERTIFIED REGISTERED

## 2022-06-24 PROCEDURE — 45378 DIAGNOSTIC COLONOSCOPY: CPT | Performed by: SURGERY

## 2022-06-24 PROCEDURE — 370N000017 HC ANESTHESIA TECHNICAL FEE, PER MIN: Performed by: SURGERY

## 2022-06-24 PROCEDURE — 250N000009 HC RX 250: Performed by: NURSE ANESTHETIST, CERTIFIED REGISTERED

## 2022-06-24 PROCEDURE — 250N000009 HC RX 250: Performed by: SURGERY

## 2022-06-24 RX ORDER — PROPOFOL 10 MG/ML
INJECTION, EMULSION INTRAVENOUS PRN
Status: DISCONTINUED | OUTPATIENT
Start: 2022-06-24 | End: 2022-06-24

## 2022-06-24 RX ORDER — LIDOCAINE HYDROCHLORIDE 10 MG/ML
INJECTION, SOLUTION EPIDURAL; INFILTRATION; INTRACAUDAL; PERINEURAL PRN
Status: DISCONTINUED | OUTPATIENT
Start: 2022-06-24 | End: 2022-06-24

## 2022-06-24 RX ORDER — LIDOCAINE 40 MG/G
CREAM TOPICAL
Status: DISCONTINUED | OUTPATIENT
Start: 2022-06-24 | End: 2022-06-24 | Stop reason: HOSPADM

## 2022-06-24 RX ORDER — PROPOFOL 10 MG/ML
INJECTION, EMULSION INTRAVENOUS CONTINUOUS PRN
Status: DISCONTINUED | OUTPATIENT
Start: 2022-06-24 | End: 2022-06-24

## 2022-06-24 RX ORDER — SODIUM CHLORIDE, SODIUM LACTATE, POTASSIUM CHLORIDE, CALCIUM CHLORIDE 600; 310; 30; 20 MG/100ML; MG/100ML; MG/100ML; MG/100ML
INJECTION, SOLUTION INTRAVENOUS CONTINUOUS
Status: DISCONTINUED | OUTPATIENT
Start: 2022-06-24 | End: 2022-06-24 | Stop reason: HOSPADM

## 2022-06-24 RX ADMIN — SODIUM CHLORIDE, POTASSIUM CHLORIDE, SODIUM LACTATE AND CALCIUM CHLORIDE: 600; 310; 30; 20 INJECTION, SOLUTION INTRAVENOUS at 09:34

## 2022-06-24 RX ADMIN — PROPOFOL 200 MCG/KG/MIN: 10 INJECTION, EMULSION INTRAVENOUS at 10:25

## 2022-06-24 RX ADMIN — LIDOCAINE HYDROCHLORIDE 0.1 ML: 10 INJECTION, SOLUTION EPIDURAL; INFILTRATION; INTRACAUDAL; PERINEURAL at 09:34

## 2022-06-24 RX ADMIN — LIDOCAINE HYDROCHLORIDE 50 MG: 10 INJECTION, SOLUTION EPIDURAL; INFILTRATION; INTRACAUDAL; PERINEURAL at 10:25

## 2022-06-24 RX ADMIN — PROPOFOL 50 MG: 10 INJECTION, EMULSION INTRAVENOUS at 10:25

## 2022-06-24 NOTE — ANESTHESIA POSTPROCEDURE EVALUATION
Patient: Sheila Schilling    Procedure: Procedure(s):  COLONOSCOPY       Anesthesia Type:  General    Note:  Disposition: Outpatient   Postop Pain Control: Uneventful            Sign Out: Well controlled pain   PONV: No   Neuro/Psych: Uneventful            Sign Out: Acceptable/Baseline neuro status   Airway/Respiratory: Uneventful            Sign Out: Acceptable/Baseline resp. status   CV/Hemodynamics: Uneventful            Sign Out: Acceptable CV status; No obvious hypovolemia; No obvious fluid overload   Other NRE: NONE   DID A NON-ROUTINE EVENT OCCUR? No           Last vitals:  Vitals Value Taken Time   /66 06/24/22 1045   Temp     Pulse 66 06/24/22 1045   Resp     SpO2 98 % 06/24/22 1046   Vitals shown include unvalidated device data.    Electronically Signed By: SINCERE Jaime CRNA  June 24, 2022  10:47 AM

## 2022-06-24 NOTE — ANESTHESIA CARE TRANSFER NOTE
Patient: Sheila Schilling    Procedure: Procedure(s):  COLONOSCOPY       Diagnosis: Special screening for malignant neoplasm of colon [Z12.11]  Diagnosis Additional Information: No value filed.    Anesthesia Type:   General     Note:            Independent Airway: airway patency satisfactory and stable    Vital Signs Stable: post-procedure vital signs reviewed and stable  Report to RN Given: handoff report given  Patient transferred to: Phase II    Handoff Report: Identifed the Patient, Identified the Reponsible Provider, Reviewed the pertinent medical history, Discussed the surgical course, Reviewed Intra-OP anesthesia mangement and issues during anesthesia, Set expectations for post-procedure period and Allowed opportunity for questions and acknowledgement of understanding      Vitals:  Vitals Value Taken Time   BP     Temp     Pulse     Resp     SpO2         Electronically Signed By: SINCERE Jaime CRNA  June 24, 2022  10:43 AM

## 2022-06-24 NOTE — INTERVAL H&P NOTE
"I have reviewed the surgical (or preoperative) H&P that is linked to this encounter, and examined the patient. There are no significant changes    last colonoscopy 2011 (normal) - famhx of colon cancer in multiple distant relatives; not on blood thinner     Clinical Conditions Present on Arrival:  Clinically Significant Risk Factors Present on Admission                   # Overweight: Estimated body mass index is 26.3 kg/m  as calculated from the following:    Height as of this encounter: 1.727 m (5' 8\").    Weight as of this encounter: 78.5 kg (173 lb).       "

## 2022-08-31 ENCOUNTER — OFFICE VISIT (OUTPATIENT)
Dept: DERMATOLOGY | Facility: CLINIC | Age: 62
End: 2022-08-31
Payer: COMMERCIAL

## 2022-08-31 VITALS — DIASTOLIC BLOOD PRESSURE: 83 MMHG | HEART RATE: 58 BPM | OXYGEN SATURATION: 98 % | SYSTOLIC BLOOD PRESSURE: 130 MMHG

## 2022-08-31 DIAGNOSIS — H02.66 XANTHELASMA OF LEFT EYELID: Primary | ICD-10-CM

## 2022-08-31 PROCEDURE — 11442 EXC FACE-MM B9+MARG 1.1-2 CM: CPT | Performed by: DERMATOLOGY

## 2022-08-31 PROCEDURE — 13151 CMPLX RPR E/N/E/L 1.1-2.5 CM: CPT | Performed by: DERMATOLOGY

## 2022-08-31 PROCEDURE — 88305 TISSUE EXAM BY PATHOLOGIST: CPT | Performed by: DERMATOLOGY

## 2022-08-31 ASSESSMENT — PAIN SCALES - GENERAL: PAINLEVEL: NO PAIN (0)

## 2022-08-31 NOTE — NURSING NOTE
"Initial /83   Pulse 58   LMP 03/08/2011   SpO2 98%  Estimated body mass index is 26.3 kg/m  as calculated from the following:    Height as of 6/24/22: 1.727 m (5' 8\").    Weight as of 6/24/22: 78.5 kg (173 lb). .      "

## 2022-08-31 NOTE — PROGRESS NOTES
Sheila Schilling is an extremely pleasant 61 year old year old female patient here today for evaluation and managment of xanthelasma on eyelid.  Patient has no other skin complaints today.  Remainder of the HPI, Meds, PMH, Allergies, FH, and SH was reviewed in chart.      Past Medical History:   Diagnosis Date     Atrial fibrillation (H)      Paroxysmal atrial flutter (H)        Past Surgical History:   Procedure Laterality Date     COLONOSCOPY  7/28/2011    Procedure:COLONOSCOPY; Surgeon:MIKE LYLE; Location[facility]     COLONOSCOPY N/A 6/24/2022    Procedure: COLONOSCOPY;  Surgeon: Karime Jain MD;  Location: [facility]     SURGICAL HISTORY OF -   07/15/2002    laparoscopic L salpingo-oophorectomy - ectopic     SURGICAL HISTORY OF -   7/15/2002    SAB        Family History   Problem Relation Age of Onset     C.A.D. Father      Hypertension Father      Diabetes Maternal Grandmother      C.A.D. Maternal Grandmother      C.A.D. Maternal Grandfather      Cancer Paternal Grandmother         liver     Hypertension Mother      Lipids Mother      Cancer - colorectal Other        Social History     Socioeconomic History     Marital status:      Spouse name: Not on file     Number of children: Not on file     Years of education: Not on file     Highest education level: Not on file   Occupational History     Not on file   Tobacco Use     Smoking status: Never Smoker     Smokeless tobacco: Never Used   Vaping Use     Vaping Use: Never used   Substance and Sexual Activity     Alcohol use: Yes     Comment: rare     Drug use: No     Sexual activity: Yes     Birth control/protection: None   Other Topics Concern     Parent/sibling w/ CABG, MI or angioplasty before 65F 55M? No   Social History Narrative     Not on file     Social Determinants of Health     Financial Resource Strain: Not on file   Food Insecurity: Not on file   Transportation Needs: Not on file   Physical Activity: Not on file   Stress: Not on file    Social Connections: Not on file   Intimate Partner Violence: Not on file   Housing Stability: Not on file       Outpatient Encounter Medications as of 8/31/2022   Medication Sig Dispense Refill     aspirin 81 MG EC tablet Take 1 tablet (81 mg) by mouth daily       Calcium-Vitamin D (CALCIUM + D PO) Take 1 tablet by mouth.       flecainide (TAMBOCOR) 100 MG tablet Take 1 tablet (100 mg) by mouth 2 times daily 180 tablet 2     No facility-administered encounter medications on file as of 8/31/2022.             O:   NAD, WDWN, Alert & Oriented, Mood & Affect wnl, Vitals stable   Here today alone   /83   Pulse 58   LMP 03/08/2011   SpO2 98%    General appearance normal   Vitals stable   Alert, oriented and in no acute distress     L lower eyelid white plaque 1.1cm       Eyes: Conjunctivae/lids:Normal     ENT: Lips, buccal mucosa, tongue: normal    MSK:Normal    Cardiovascular: peripheral edema none    Pulm: Breathing Normal    Neuro/Psych: Orientation:Alert and Orientedx3 ; Mood/Affect:normal       A/P:  1. Xanthelasma   Scar and recurrence discussed with patient   EXCISION OF BENIGN LESION AND COMPLEX: After thorough discussion of PGACAC, consent obtained, anesthesia and prep, the margins of the lesion were identified and an incision was made encompassing the lesion. The incisions were made through the skin and down to and including the subcutaneous tissue. The lesion was removed en bloc and submitted for perm  pathologic review. The wound edges were widely undermined until adequate tissue mobility was obtained. hemostasis was achieved. The wound edges were then closed in a layered fashion with 6 Vicryl and 5.0 Fast gut , being careful not to leave any dead space. Postoperative length was 1.6 cm.   EBL minimal; complications none; wound care routine. The patient was discharged in good condition and will return in one week for wound evaluation.  It was a pleasure speaking to Sheila Schilling today.

## 2022-08-31 NOTE — PATIENT INSTRUCTIONS
Sutured Wound Care     [facility]: [phone]    [facility]: [phone]    Left lower eyelid  No strenuous activity for 48 hours. Resume moderate activity in 48 hours. No heavy exercising until you are seen for follow up in one week.     Take Tylenol as needed for discomfort.                         Do not drink alcoholic beverages for 48 hours.     Keep the pressure bandage in place for 24 hours. If the bandage becomes blood tinged or loose, reinforce it with gauze and tape.        (Refer to the reverse side of this page for management of bleeding).    Remove pressure bandage in 24 hours     Leave the flat bandage in place until your follow up appointment.    Keep the bandage dry. Wash around it carefully.    If the tape becomes soiled or starts to come off, reinforce it with additional paper tape.    Do not smoke for 3 weeks; smoking is detrimental to wound healing.    It is normal to have swelling and bruising around the surgical site. The bruising will fade in approximately 10-14 days. Elevate the area to reduce swelling.    Numbness, itchiness and sensitivity to temperature changes can occur after surgery and may take up to 18 months to normalize.      POSSIBLE COMPLICATIONS    BLEEDING:    Leave the bandage in place.  Use tightly rolled up gauze or a cloth to apply direct pressure over the bandage for 20   minutes.  Reapply pressure for an additional 20 minutes if necessary  Call the office or go to the nearest emergency room if pressure fails to stop the bleeding.  Use additional gauze and tape to maintain pressure once the bleeding has stopped.    PAIN:    Post operative pain should slowly get better, never worse.  A severe increase in pain may indicate a problem. Call the office if this occurs.    In case of emergency phone:Dr Rosenberg [phone]

## 2022-08-31 NOTE — LETTER
8/31/2022         RE: Sheila Schilling  [address] [address] [facility] [address]        Dear Colleague,    Thank you for referring your patient, Sheila Schilling, to the [facility]. Please see a copy of my visit note below.    Sheila Schilling is an extremely pleasant 61 year old year old female patient here today for evaluation and managment of xanthelasma on eyelid.  Patient has no other skin complaints today.  Remainder of the HPI, Meds, PMH, Allergies, FH, and SH was reviewed in chart.      Past Medical History:   Diagnosis Date     Atrial fibrillation (H)      Paroxysmal atrial flutter (H)        Past Surgical History:   Procedure Laterality Date     COLONOSCOPY  7/28/2011    Procedure:COLONOSCOPY; Surgeon:MIKE LYLE; Location: OR     COLONOSCOPY N/A 6/24/2022    Procedure: COLONOSCOPY;  Surgeon: Karime Jain MD;  Location: [facility]     SURGICAL HISTORY OF -   07/15/2002    laparoscopic L salpingo-oophorectomy - ectopic     SURGICAL HISTORY OF -   7/15/2002    SAB        Family History   Problem Relation Age of Onset     C.A.D. Father      Hypertension Father      Diabetes Maternal Grandmother      C.A.D. Maternal Grandmother      C.A.D. Maternal Grandfather      Cancer Paternal Grandmother         liver     Hypertension Mother      Lipids Mother      Cancer - colorectal Other        Social History     Socioeconomic History     Marital status:      Spouse name: Not on file     Number of children: Not on file     Years of education: Not on file     Highest education level: Not on file   Occupational History     Not on file   Tobacco Use     Smoking status: Never Smoker     Smokeless tobacco: Never Used   Vaping Use     Vaping Use: Never used   Substance and Sexual Activity     Alcohol use: Yes     Comment: rare     Drug use: No     Sexual activity: Yes     Birth control/protection: None   Other Topics Concern     Parent/sibling w/ CABG, MI or angioplasty before 65F  55M? No   Social History Narrative     Not on file     Social Determinants of Health     Financial Resource Strain: Not on file   Food Insecurity: Not on file   Transportation Needs: Not on file   Physical Activity: Not on file   Stress: Not on file   Social Connections: Not on file   Intimate Partner Violence: Not on file   Housing Stability: Not on file       Outpatient Encounter Medications as of 8/31/2022   Medication Sig Dispense Refill     aspirin 81 MG EC tablet Take 1 tablet (81 mg) by mouth daily       Calcium-Vitamin D (CALCIUM + D PO) Take 1 tablet by mouth.       flecainide (TAMBOCOR) 100 MG tablet Take 1 tablet (100 mg) by mouth 2 times daily 180 tablet 2     No facility-administered encounter medications on file as of 8/31/2022.             O:   NAD, WDWN, Alert & Oriented, Mood & Affect wnl, Vitals stable   Here today alone   /83   Pulse 58   LMP 03/08/2011   SpO2 98%    General appearance normal   Vitals stable   Alert, oriented and in no acute distress     L lower eyelid white plaque 1.1cm       Eyes: Conjunctivae/lids:Normal     ENT: Lips, buccal mucosa, tongue: normal    MSK:Normal    Cardiovascular: peripheral edema none    Pulm: Breathing Normal    Neuro/Psych: Orientation:Alert and Orientedx3 ; Mood/Affect:normal       A/P:  1. Xanthelasma   Scar and recurrence discussed with patient   EXCISION OF BENIGN LESION AND COMPLEX: After thorough discussion of PGACAC, consent obtained, anesthesia and prep, the margins of the lesion were identified and an incision was made encompassing the lesion. The incisions were made through the skin and down to and including the subcutaneous tissue. The lesion was removed en bloc and submitted for perm  pathologic review. The wound edges were widely undermined until adequate tissue mobility was obtained. hemostasis was achieved. The wound edges were then closed in a layered fashion with 6 Vicryl and 5.0 Fast gut , being careful not to leave any dead space.  Postoperative length was 1.6 cm.   EBL minimal; complications none; wound care routine. The patient was discharged in good condition and will return in one week for wound evaluation.  It was a pleasure speaking to Sheila Schilling today.        Again, thank you for allowing me to participate in the care of your patient.        Sincerely,        Adriel Rosenberg MD

## 2022-09-02 LAB
PATH REPORT.COMMENTS IMP SPEC: NORMAL
PATH REPORT.COMMENTS IMP SPEC: NORMAL
PATH REPORT.FINAL DX SPEC: NORMAL
PATH REPORT.GROSS SPEC: NORMAL
PATH REPORT.MICROSCOPIC SPEC OTHER STN: NORMAL
PATH REPORT.RELEVANT HX SPEC: NORMAL

## 2022-09-07 ENCOUNTER — ALLIED HEALTH/NURSE VISIT (OUTPATIENT)
Dept: DERMATOLOGY | Facility: CLINIC | Age: 62
End: 2022-09-07
Payer: COMMERCIAL

## 2022-09-07 DIAGNOSIS — Z48.01 ENCOUNTER FOR CHANGE OR REMOVAL OF SURGICAL WOUND DRESSING: Primary | ICD-10-CM

## 2022-09-07 PROCEDURE — 99207 PR NO CHARGE NURSE ONLY: CPT

## 2022-09-07 NOTE — PATIENT INSTRUCTIONS
WOUND CARE INSTRUCTIONS  for  ONE WEEK AFTER SURGERY          Leave flat bandage on your skin for one week after today s bandage change.  In one week when you remove the bandage, you may resume your regular skin care routine, including washing with mild soap and water, applying moisturizer, make-up and sunscreen.    If there are any open or bleeding areas at the incision/graft site you should begin to cover the area with a bandage daily as follows:    Clean and dry the area with plain tap water using a Q-tip or sterile gauze pad.  Apply Polysporin or Bacitracin ointment to the open area.  Cover the wound with a band-aid or a sterile non-stick gauze pad and micropore paper tape.         SIGNS OF INFECTION  - If you notice any of these signs of infection, call your doctor right away: expanding redness around the wound.  - Yellow or greenish-colored pus or cloudy wound drainage.    - Red streaking spreading from the wound.  - Increased swelling, tenderness, or pain around the wound.   - Fever.    Please remember that yellow and clear drainage from a wound can be normal and related to normal wound healing.  Isolated drainage from a wound without a combination of the above features does not indicate infection.       *Once the bandages are removed, the scar will be red and firm (especially in the lip/chin area). This is normal and will fade in time. It might take 6-12 months for this to happen.     *Massaging the area will help the scar soften and fade quicker. Begin to massage the area one month after the bandages have been removed. To massage apply pressure directly and firmly over the scar with the fingertips and move in a circular motion. Massage the area for a few minutes several times a day. Continue to massage the site for several months.    *Approximately 6-8 weeks after surgery it is not uncommon to see the formation of  tender pimple-like  bump along the scar. This is normal. As the scar continues to mature and  the stitches underneath the skin begin to dissolve, this might occur. Do not pick or squeeze, this will resolve on it s own. Should one break open producing a small amount of drainage, apply Polysporin or Bacitracin ointment a few times a day until the wound is completely healed.    *Numbness in the surgical area is expected. It might take 12-18 months for the feeling to return to normal. During this time sensations of itchiness, tingling and occasional sharp pains might be noted. These feelings are normal and will subside once the nerves have completely healed.         IN CASE OF EMERGENCY: Dr Rosenberg [phone]     If you were seen in [place] call: [phone]    If you were seen in [place] call: [phone]

## 2022-09-07 NOTE — PROGRESS NOTES
Pt returned to clinic for post surgery 1 week follow up bandage change. Pt has no complaints, denies pain. Bandage removed from left lower lid, area cleansed with normal saline. Site is healing and wound edges approximating well. Reapplied new steri strips and paper tape.    Advised to watch for signs/sx of infection; spreading redness, drainage, odor, fever. Call or report promptly to clinic. Pt given written instructions and informed to rtc as needed. Patient verbalized understanding.       Kellie KIRBY,  CMA

## 2022-10-23 ENCOUNTER — HEALTH MAINTENANCE LETTER (OUTPATIENT)
Age: 62
End: 2022-10-23

## 2022-11-29 DIAGNOSIS — I48.0 PAROXYSMAL ATRIAL FIBRILLATION (H): ICD-10-CM

## 2022-11-29 RX ORDER — FLECAINIDE ACETATE 100 MG/1
100 TABLET ORAL 2 TIMES DAILY
Qty: 180 TABLET | Refills: 0 | Status: SHIPPED | OUTPATIENT
Start: 2022-11-29 | End: 2023-02-28

## 2022-11-29 NOTE — TELEPHONE ENCOUNTER
Received refill request for:  Flecainide    [facility] Cardiology Refill Guideline reviewed.  Medication meets criteria for refill.    Mariajose Lambert RN, BSN  11/29/22 at 12:33 PM

## 2023-02-28 DIAGNOSIS — I48.0 PAROXYSMAL ATRIAL FIBRILLATION (H): ICD-10-CM

## 2023-02-28 RX ORDER — FLECAINIDE ACETATE 100 MG/1
100 TABLET ORAL 2 TIMES DAILY
Qty: 60 TABLET | Refills: 0 | Status: SHIPPED | OUTPATIENT
Start: 2023-02-28 | End: 2023-03-28

## 2023-03-28 DIAGNOSIS — I48.0 PAROXYSMAL ATRIAL FIBRILLATION (H): ICD-10-CM

## 2023-03-28 RX ORDER — FLECAINIDE ACETATE 100 MG/1
100 TABLET ORAL 2 TIMES DAILY
Qty: 60 TABLET | Refills: 0 | Status: SHIPPED | OUTPATIENT
Start: 2023-03-28 | End: 2023-05-04

## 2023-03-28 NOTE — TELEPHONE ENCOUNTER
[facility] Cardiology Refill Guideline reviewed.  Medication meets criteria for refill.      aZmzam Valdes, DIEGON, RN 11:52 AM 03/28/23

## 2023-04-02 ENCOUNTER — HEALTH MAINTENANCE LETTER (OUTPATIENT)
Age: 63
End: 2023-04-02

## 2023-04-07 ENCOUNTER — HOSPITAL ENCOUNTER (OUTPATIENT)
Dept: MAMMOGRAPHY | Facility: CLINIC | Age: 63
Discharge: HOME | End: 2023-04-07
Admitting: NURSE PRACTITIONER
Payer: COMMERCIAL

## 2023-04-07 DIAGNOSIS — Z12.31 VISIT FOR SCREENING MAMMOGRAM: ICD-10-CM

## 2023-04-07 PROCEDURE — 77067 SCR MAMMO BI INCL CAD: CPT

## 2023-05-04 ENCOUNTER — HOSPITAL ENCOUNTER (OUTPATIENT)
Dept: ULTRASOUND IMAGING | Facility: CLINIC | Age: 63
Discharge: HOME | End: 2023-05-04
Attending: FAMILY MEDICINE
Payer: COMMERCIAL

## 2023-05-04 ENCOUNTER — HOSPITAL ENCOUNTER (OUTPATIENT)
Dept: MAMMOGRAPHY | Facility: CLINIC | Age: 63
Discharge: HOME | End: 2023-05-04
Attending: FAMILY MEDICINE
Payer: COMMERCIAL

## 2023-05-04 DIAGNOSIS — I48.0 PAROXYSMAL ATRIAL FIBRILLATION (H): ICD-10-CM

## 2023-05-04 DIAGNOSIS — R92.8 ABNORMAL MAMMOGRAM: ICD-10-CM

## 2023-05-04 PROCEDURE — 77061 BREAST TOMOSYNTHESIS UNI: CPT | Mod: RT

## 2023-05-04 PROCEDURE — 76642 ULTRASOUND BREAST LIMITED: CPT | Mod: RT

## 2023-05-04 RX ORDER — FLECAINIDE ACETATE 100 MG/1
100 TABLET ORAL 2 TIMES DAILY
Qty: 180 TABLET | Refills: 0 | Status: SHIPPED | OUTPATIENT
Start: 2023-05-04 | End: 2023-06-05

## 2023-05-10 ENCOUNTER — HOSPITAL ENCOUNTER (OUTPATIENT)
Dept: ULTRASOUND IMAGING | Facility: CLINIC | Age: 63
Discharge: HOME | End: 2023-05-10
Attending: FAMILY MEDICINE
Payer: COMMERCIAL

## 2023-05-10 ENCOUNTER — HOSPITAL ENCOUNTER (OUTPATIENT)
Dept: MAMMOGRAPHY | Facility: CLINIC | Age: 63
Discharge: HOME | End: 2023-05-10
Attending: FAMILY MEDICINE
Payer: COMMERCIAL

## 2023-05-10 VITALS — SYSTOLIC BLOOD PRESSURE: 131 MMHG | DIASTOLIC BLOOD PRESSURE: 92 MMHG

## 2023-05-10 DIAGNOSIS — N63.0 BREAST MASS: ICD-10-CM

## 2023-05-10 PROCEDURE — A4648 IMPLANTABLE TISSUE MARKER: HCPCS

## 2023-05-10 PROCEDURE — 999N000065 MA POST PROCEDURE RIGHT

## 2023-05-10 PROCEDURE — 88342 IMHCHEM/IMCYTCHM 1ST ANTB: CPT | Mod: 26 | Performed by: STUDENT IN AN ORGANIZED HEALTH CARE EDUCATION/TRAINING PROGRAM

## 2023-05-10 PROCEDURE — 88305 TISSUE EXAM BY PATHOLOGIST: CPT | Mod: TC | Performed by: FAMILY MEDICINE

## 2023-05-10 PROCEDURE — 88305 TISSUE EXAM BY PATHOLOGIST: CPT | Mod: 26 | Performed by: STUDENT IN AN ORGANIZED HEALTH CARE EDUCATION/TRAINING PROGRAM

## 2023-05-10 PROCEDURE — 88360 TUMOR IMMUNOHISTOCHEM/MANUAL: CPT | Mod: 26 | Performed by: STUDENT IN AN ORGANIZED HEALTH CARE EDUCATION/TRAINING PROGRAM

## 2023-05-10 PROCEDURE — 250N000009 HC RX 250: Performed by: RADIOLOGY

## 2023-05-10 PROCEDURE — 272N000717 US BREAST BIOPSY CORE NEEDLE RIGHT

## 2023-05-10 RX ADMIN — LIDOCAINE HYDROCHLORIDE 5 ML: 10 INJECTION, SOLUTION EPIDURAL; INFILTRATION; INTRACAUDAL; PERINEURAL at 08:17

## 2023-05-11 DIAGNOSIS — C50.411 MALIGNANT NEOPLASM OF UPPER-OUTER QUADRANT OF RIGHT FEMALE BREAST, UNSPECIFIED ESTROGEN RECEPTOR STATUS (H): Primary | ICD-10-CM

## 2023-05-11 NOTE — PROGRESS NOTES
Malignant Path:  Pathology report reviewed with our breast radiologist Dr. Jericho Izaguirre, who confirmed the recent breast imaging is concordant with the final surgical pathology results below.    I phoned Ms. Sheila Schilling, confirmed her full name, date of birth, and notified patient of Ultrasound Guided Right Breast Biopsy results showing Invasive Lobular Carcinoma, grade 2.  Estrogen/ Progesterone Receptors  and HER2 are still pending.  Informed patient we will call her once results are available.    Patient states no problems with biopsy site.  Recommended follow up is Surgical Consult, and Medical Oncology Consult.  I will place order for referral to Surgery and Medical Oncology for Consultations and a Cancer Care Team  will be reaching out to patient to get her scheduled.  Patient has a contact phone number if needed.     Questions were answered and she has my phone number if she has further questions.  Patient verbalized understanding and agrees with the plan of care.  Ordering provider- Dr. Maribeth Coleman- Maryjo has been notified of the results, recommendations for follow up, and the plan to get patient scheduled for a medical oncology consultation and surgical consultation.  I will forward this note, along with the pathology results.   Rhiannon Molina RN, BSN  Breast Care Nurse Coordinator  [facility] [place]  [phone]      Sheila Schilling [id number]  F, 1960  Surgical Pathology Report (Final result) [id number]  Authorizing Provider: Maribeth Sibley MD Ordering Provider: Maribeth Sibley MD  Ordering Location: [facility]  Imaging  Collected: 05/10/2023 08:37 AM  Pathologist: Christine Nathan MD Received: 05/10/2023 09:01 AM  .  Specimens  A Breast, Right, 5 cores placed in 30 mL sodium chloride and 60 mL formalin by Dr. Yan, High suspicion  .  .  Final Diagnosis  A. Breast, 11:00, 9 cm from  nipple, core biopsy:  -INVASIVE LOBULAR CARCINOMA, grade 2, see comment.  -Lobular carcinoma in situ (LCIS).  -Breast ancillary testing: Pending, will be reported in an addendum.  Electronically signed by Christine Nathan MD on 5/11/2023 at 1:32 PM

## 2023-05-12 ENCOUNTER — PATIENT OUTREACH (OUTPATIENT)
Dept: ONCOLOGY | Facility: CLINIC | Age: 63
End: 2023-05-12
Payer: COMMERCIAL

## 2023-05-12 NOTE — PROGRESS NOTES
New Patient Oncology Nurse Navigator Note     Referring provider: Maribeth Sibley MD     Referring Clinic/Organization: [facility]     Referred to (specialty:) Medical Oncology and Cancer Surgery      Date Referral Received: May 11, 2023     Evaluation for:  C50.411 (ICD-10-CM) - Malignant neoplasm of upper-outer quadrant of right female breast, unspecified estrogen receptor status (H)    Clinical History (per Nurse review of records provided):      Sheila had bilateral screening mammogram on 4/7/23 and in the upper outer posterior aspect of the right breast  a possible architectural distortion was seen on the CC view that needs further mammographic imaging and possibly ultrasound for further evaluation.  No mammographic evidence for malignancy in the left breast. Diagnostic imaging followed on 5/4 and on right mammogram tomosynthesis, there is a persistent area of architectural distortion in the upper outer quadrant of the right breast, posterior third, best seen on mediolateral tomosynthesis imaging today. Ultrasound evaluation of the right breast was performed. At the 11:00 position, 9 cm from the nipple, there is an ill-defined subtle area of architectural distortion with posterior acoustic shadowing measuring 12 x 10 x 8 mm, corresponding to the location of the architectural distortion on mammography.    5/10/23 -   A. Breast, 11:00, 9 cm from nipple, right breast core biopsy:  -INVASIVE LOBULAR CARCINOMA, grade 2, see comment.  -Lobular carcinoma in situ (LCIS).  -Breast ancillary testing: Pending, will be reported in an addendum.    Markers subsequently reported as follows:  Estrogen Receptor (ER) Status Positive (greater than 10% of cells demonstrate nuclear  positivity)  Progesterone Receptor (PgR) Status Positive  Percentage of Cells with Nuclear Positivity %  HER2 by Immunohistochemistry Negative (Score 1+)    Records Location: See Bookmarked material     Patient resides  in [place], [place].  [place] location preferred.    5/12 10:38 - Telephoned and spoke with Sheila to discuss the sequence of treatment in early stage-breast cancer and assisting in scheduling appropriate consults. ER/IL are still pending on her 5/10 right breast biopsy. For now we will prioritize the surgery consult and will book next available med onc. Sheila verbalized understanding this may change depending upon the ER/IL and HER2 results. Writer received referral, reviewed for appropriate plan, and call transferred to New Patient Scheduling for completion.

## 2023-05-15 ENCOUNTER — TELEPHONE (OUTPATIENT)
Dept: MAMMOGRAPHY | Facility: CLINIC | Age: 63
End: 2023-05-15
Payer: COMMERCIAL

## 2023-05-15 LAB
PATH REPORT.ADDENDUM SPEC: ABNORMAL
PATH REPORT.COMMENTS IMP SPEC: ABNORMAL
PATH REPORT.COMMENTS IMP SPEC: YES
PATH REPORT.FINAL DX SPEC: ABNORMAL
PATH REPORT.GROSS SPEC: ABNORMAL
PATH REPORT.MICROSCOPIC SPEC OTHER STN: ABNORMAL
PATHOLOGY SYNOPTIC REPORT: ABNORMAL
PHOTO IMAGE: ABNORMAL

## 2023-05-18 ENCOUNTER — PATIENT OUTREACH (OUTPATIENT)
Dept: ONCOLOGY | Facility: CLINIC | Age: 63
End: 2023-05-18

## 2023-05-18 ENCOUNTER — LAB (OUTPATIENT)
Dept: LAB | Facility: CLINIC | Age: 63
End: 2023-05-18
Payer: COMMERCIAL

## 2023-05-18 ENCOUNTER — ONCOLOGY VISIT (OUTPATIENT)
Dept: RADIATION THERAPY | Facility: OUTPATIENT CENTER | Age: 63
End: 2023-05-18
Attending: FAMILY MEDICINE
Payer: COMMERCIAL

## 2023-05-18 VITALS
HEART RATE: 71 BPM | BODY MASS INDEX: 26.09 KG/M2 | DIASTOLIC BLOOD PRESSURE: 91 MMHG | RESPIRATION RATE: 16 BRPM | WEIGHT: 171.6 LBS | OXYGEN SATURATION: 100 % | SYSTOLIC BLOOD PRESSURE: 146 MMHG

## 2023-05-18 DIAGNOSIS — C50.411 MALIGNANT NEOPLASM OF UPPER-OUTER QUADRANT OF RIGHT FEMALE BREAST, UNSPECIFIED ESTROGEN RECEPTOR STATUS (H): ICD-10-CM

## 2023-05-18 DIAGNOSIS — C50.911 INVASIVE LOBULAR CARCINOMA OF RIGHT BREAST IN FEMALE (H): ICD-10-CM

## 2023-05-18 DIAGNOSIS — Z13.6 CARDIOVASCULAR SCREENING; LDL GOAL LESS THAN 160: Primary | ICD-10-CM

## 2023-05-18 PROBLEM — R00.2 PALPITATIONS: Status: ACTIVE | Noted: 2017-06-24

## 2023-05-18 LAB
INTERPRETATION: NORMAL
INTERPRETATION: NORMAL
LAB PDF RESULT: NORMAL
LAB PDF RESULT: NORMAL
SIGNIFICANT RESULTS: NORMAL
SIGNIFICANT RESULTS: NORMAL
SPECIMEN DESCRIPTION: NORMAL
SPECIMEN DESCRIPTION: NORMAL
TEST DETAILS, MDL: NORMAL
TEST DETAILS, MDL: NORMAL

## 2023-05-18 PROCEDURE — 36415 COLL VENOUS BLD VENIPUNCTURE: CPT

## 2023-05-18 ASSESSMENT — PAIN SCALES - GENERAL: PAINLEVEL: NO PAIN (0)

## 2023-05-18 NOTE — PROGRESS NOTES
[facility]: Surgical Oncology Cancer Care Short Note                                     Discussion with Patient:                                                          OUTBOUND CALL:      Spoke with Sheila following her visit with Dr. Mcdermott on 5/18/2023. Informed he we will begin looking for a date for her surgery. She is aware she will be scheduled for a contrast enhanced [facility]. She prefers a PAC visit, either in person or video. She prefers her post-op visit at [facility].  Informed Sheila our OR , Maria R, will call her within the next 3 business days to review her plan.     Encouraged Sheila to call with any further questions or concerns.     Lashell Bradley, RNCC  [facility]   Surgical Oncology

## 2023-05-18 NOTE — LETTER
5/18/2023         RE: Sheila Schilling  [address] [id number]        Dear Colleague,    Thank you for referring your patient, Sheila Schilling, to the [facility]. Please see a copy of my visit note below.    HISTORY OF PRESENT ILLNESS:  Sheila Schilling is a 62-year-old woman who presents with a new diagnosis of a right breast cancer.  Last month she underwent a screening mammogram which demonstrated a new asymmetry in the upper outer quadrant of her right breast.  On 05/05 she underwent a diagnostic mammogram and ultrasound.  The diagnostic mammogram demonstrated architectural distortion, and the ultrasound demonstrated a 12 mm mass.  The breasts were noted to be extremely dense.  On 05/10 she underwent a needle biopsy which demonstrated a right-sided invasive lobular cancer, grade 2, ER positive, VT positive, HER-2 negative.  She is now here to talk about treatment options.  She cannot palpate any mass.  She does not have any symptoms of metastatic disease.  She has had no prior history of any breast problems.    PAST MEDICAL HISTORY:  No major medical problems.    PHYSICAL EXAMINATION:  She has some ecchymosis in the upper outer quadrant of the right breast, there is a palpable lump there, but it could very well be a hematoma.  She has no axillary lymphadenopathy.    IMPRESSION:  T1 N0 M0 invasive lobular breast cancer.    PLAN:  I talked to her about the various treatment options including mastectomy with or without reconstruction versus a lumpectomy plus radiation therapy.  I did recommend a sentinel lymph node biopsy.  I told her that endocrine therapy would be recommended, but a decision regarding chemotherapy would not be made until after her surgery.  I told her it is unlikely that she would benefit from chemotherapy.  Because of her extremely dense breasts, I did recommend a contrast-enhanced mammography.  I have also recommended consideration of genetic  testing.  She would like to do that.  She would also like to have breast-conserving surgery.  We will tentatively schedule her for a seed-localized right lumpectomy and sentinel lymph node biopsy at the [place].  All of her questions were asked and answered.    Total time 45 minutes, which included reviewing her imaging, in-person visit and coordinating her care.        Again, thank you for allowing me to participate in the care of your patient.        Sincerely,        Sanjiv Mcdermott MD

## 2023-05-18 NOTE — NURSING NOTE
"Oncology Rooming Note    May 18, 2023 9:20 AM   Sheila Schilling is a 62 year old female who presents for:    Chief Complaint   Patient presents with     Oncology Clinic Visit     New Surgical Oncology Consult with Dr. Mcdermott- R breast invasive lobular ER/PA+ HER2-     Initial Vitals: BP (!) 146/91   Pulse 71   Resp 16   Wt 77.8 kg (171 lb 9.6 oz)   LMP 03/08/2011   SpO2 100%   BMI 26.09 kg/m   Estimated body mass index is 26.09 kg/m  as calculated from the following:    Height as of 6/24/22: 1.727 m (5' 8\").    Weight as of this encounter: 77.8 kg (171 lb 9.6 oz). Body surface area is 1.93 meters squared.  No Pain (0) Comment: Data Unavailable   Patient's last menstrual period was 03/08/2011.  Allergies reviewed: Yes  Medications reviewed: Yes    Medications: Medication refills not needed today.  Pharmacy name entered into [facility]:    [facility] [place], [place] - [address]  [id number] [place], [place] - [address]  [id number] [place], [place] - [address]    Clinical concerns: New Surgical Oncology Consult- R breast Invasive lobular ER/PA+ HER2- Dr. Mcdermott was notified.      Marisa Lund RN              "

## 2023-05-18 NOTE — PROGRESS NOTES
HISTORY OF PRESENT ILLNESS:  Sheila Schilling is a 62-year-old woman who presents with a new diagnosis of a right breast cancer.  Last month she underwent a screening mammogram which demonstrated a new asymmetry in the upper outer quadrant of her right breast.  On 05/05 she underwent a diagnostic mammogram and ultrasound.  The diagnostic mammogram demonstrated architectural distortion, and the ultrasound demonstrated a 12 mm mass.  The breasts were noted to be extremely dense.  On 05/10 she underwent a needle biopsy which demonstrated a right-sided invasive lobular cancer, grade 2, ER positive, [place] positive, HER-2 negative.  She is now here to talk about treatment options.  She cannot palpate any mass.  She does not have any symptoms of metastatic disease.  She has had no prior history of any breast problems.    PAST MEDICAL HISTORY:  No major medical problems.    PHYSICAL EXAMINATION:  She has some ecchymosis in the upper outer quadrant of the right breast, there is a palpable lump there, but it could very well be a hematoma.  She has no axillary lymphadenopathy.    IMPRESSION:  T1 N0 M0 invasive lobular breast cancer.    PLAN:  I talked to her about the various treatment options including mastectomy with or without reconstruction versus a lumpectomy plus radiation therapy.  I did recommend a sentinel lymph node biopsy.  I told her that endocrine therapy would be recommended, but a decision regarding chemotherapy would not be made until after her surgery.  I told her it is unlikely that she would benefit from chemotherapy.  Because of her extremely dense breasts, I did recommend a contrast-enhanced mammography.  I have also recommended consideration of genetic testing.  She would like to do that.  She would also like to have breast-conserving surgery.  We will tentatively schedule her for a seed-localized right lumpectomy and sentinel lymph node biopsy at the [place].  All of her questions were asked and  answered.    Total time 45 minutes, which included reviewing her imaging, in-person visit and coordinating her care.

## 2023-05-19 ENCOUNTER — TELEPHONE (OUTPATIENT)
Dept: GENERAL RADIOLOGY | Facility: CLINIC | Age: 63
End: 2023-05-19
Payer: COMMERCIAL

## 2023-05-19 ENCOUNTER — PREP FOR PROCEDURE (OUTPATIENT)
Dept: ONCOLOGY | Facility: CLINIC | Age: 63
End: 2023-05-19
Payer: COMMERCIAL

## 2023-05-19 DIAGNOSIS — C50.911 INVASIVE LOBULAR CARCINOMA OF RIGHT BREAST IN FEMALE (H): Primary | ICD-10-CM

## 2023-05-19 NOTE — PROGRESS NOTES
Noting patient is scheduled for lumpectomy with Dr. Mcdermott on 5/30 and medical oncology consult is not scheduled until 7/11, telephoned and left voice message for patient requesting call back to discuss moving this up.

## 2023-05-19 NOTE — TELEPHONE ENCOUNTER
Called pt and let her know that Dr. Mcdermott ordered a contrast mammogram and tag placement of right breast prior to her surgery on 5/30.  Explained contrast mammogram and tag placement to her.  Answered all questions.    She will be at [facility] ([address] [place] [address]) on Wed. May 24 at 1:00 (arrive at 12:45).  Explained parking ramp and check-in to her.

## 2023-05-22 ENCOUNTER — TELEPHONE (OUTPATIENT)
Dept: ONCOLOGY | Facility: CLINIC | Age: 63
End: 2023-05-22
Payer: COMMERCIAL

## 2023-05-22 LAB — INTERPRETATION: NORMAL

## 2023-05-22 NOTE — TELEPHONE ENCOUNTER
RN Care Coordinator: Lashell Bradley RN     Surgery is scheduled with Dr. Sanjiv Mcdermott  Date: 5/30/2023   [facility]  Scheduled per next available date    Nuc Med injection scheduled to be delivered to the OR for surgeon to inject.    H&P to be completed by PAC clinic on 5/24/2023 at 10:30am     COVID-19 test: n/a    Post-op: 6/15/2023 at 10:30am, in person visit    Patient will receive surgery arrival and start time from PAC.     Spoke with the patient and was able to confirm all scheduled information.     The surgery packet was provided by the CC    Surgery and appointment overview was sent via US mail and via [employer]    ______________    Maria R Coleman on 5/22/2023 at 11:21 AM  P: [phone]

## 2023-05-22 NOTE — TELEPHONE ENCOUNTER
FUTURE VISIT INFORMATION      SURGERY INFORMATION:    Date: 23    [facility]    Surgeon:  Sanjiv Mcdermott MD    Anesthesia Type:  general    Procedure: RIGHT BREAST LUMPECTOMY, LOCALIZED USING RADIOFREQUENCY IDENTIFICATION RIGHT SENTINEL LYMPH NODE BIOPSY    RECORDS REQUESTED FROM:       Primary Care Provider: [facility]    Pertinent Medical History: Palpitations    Most recent EKG+ Tracin23    Most recent ECHO: 17    Most recent Cardiac Stress Test: 82/17

## 2023-05-24 ENCOUNTER — ANCILLARY PROCEDURE (OUTPATIENT)
Dept: MAMMOGRAPHY | Facility: CLINIC | Age: 63
End: 2023-05-24
Attending: SURGERY
Payer: COMMERCIAL

## 2023-05-24 ENCOUNTER — OFFICE VISIT (OUTPATIENT)
Dept: SURGERY | Facility: CLINIC | Age: 63
End: 2023-05-24
Payer: COMMERCIAL

## 2023-05-24 ENCOUNTER — PRE VISIT (OUTPATIENT)
Dept: SURGERY | Facility: CLINIC | Age: 63
End: 2023-05-24

## 2023-05-24 ENCOUNTER — LAB (OUTPATIENT)
Dept: LAB | Facility: CLINIC | Age: 63
End: 2023-05-24
Payer: COMMERCIAL

## 2023-05-24 VITALS
RESPIRATION RATE: 16 BRPM | HEART RATE: 70 BPM | BODY MASS INDEX: 26.22 KG/M2 | TEMPERATURE: 98.1 F | WEIGHT: 173 LBS | DIASTOLIC BLOOD PRESSURE: 85 MMHG | OXYGEN SATURATION: 97 % | HEIGHT: 68 IN | SYSTOLIC BLOOD PRESSURE: 136 MMHG

## 2023-05-24 DIAGNOSIS — C50.911 MALIGNANT NEOPLASM OF RIGHT BREAST (H): ICD-10-CM

## 2023-05-24 DIAGNOSIS — Z01.818 PREOP EXAMINATION: ICD-10-CM

## 2023-05-24 DIAGNOSIS — C50.911 INVASIVE LOBULAR CARCINOMA OF RIGHT BREAST IN FEMALE (H): ICD-10-CM

## 2023-05-24 DIAGNOSIS — Z01.818 PREOP EXAMINATION: Primary | ICD-10-CM

## 2023-05-24 LAB
ERYTHROCYTE [DISTWIDTH] IN BLOOD BY AUTOMATED COUNT: 12.5 % (ref 10–15)
HCT VFR BLD AUTO: 46.4 % (ref 35–47)
HGB BLD-MCNC: 15.3 G/DL (ref 11.7–15.7)
MCH RBC QN AUTO: 30.7 PG (ref 26.5–33)
MCHC RBC AUTO-ENTMCNC: 33 G/DL (ref 31.5–36.5)
MCV RBC AUTO: 93 FL (ref 78–100)
PLATELET # BLD AUTO: 283 10E3/UL (ref 150–450)
RBC # BLD AUTO: 4.99 10E6/UL (ref 3.8–5.2)
WBC # BLD AUTO: 5.5 10E3/UL (ref 4–11)

## 2023-05-24 PROCEDURE — G0279 TOMOSYNTHESIS, MAMMO: HCPCS | Performed by: RADIOLOGY

## 2023-05-24 PROCEDURE — 77066 DX MAMMO INCL CAD BI: CPT | Performed by: RADIOLOGY

## 2023-05-24 PROCEDURE — 99203 OFFICE O/P NEW LOW 30 MIN: CPT

## 2023-05-24 PROCEDURE — 36415 COLL VENOUS BLD VENIPUNCTURE: CPT | Performed by: PATHOLOGY

## 2023-05-24 PROCEDURE — 85027 COMPLETE CBC AUTOMATED: CPT | Performed by: PATHOLOGY

## 2023-05-24 PROCEDURE — 76642 ULTRASOUND BREAST LIMITED: CPT | Mod: RT | Performed by: RADIOLOGY

## 2023-05-24 RX ORDER — CEFAZOLIN SODIUM 2 G/50ML
2 SOLUTION INTRAVENOUS
Status: CANCELLED | OUTPATIENT
Start: 2023-05-24

## 2023-05-24 RX ORDER — IOPAMIDOL 755 MG/ML
125 INJECTION, SOLUTION INTRAVASCULAR ONCE
Status: COMPLETED | OUTPATIENT
Start: 2023-05-24 | End: 2023-05-24

## 2023-05-24 RX ORDER — CEFAZOLIN SODIUM 2 G/50ML
2 SOLUTION INTRAVENOUS SEE ADMIN INSTRUCTIONS
Status: CANCELLED | OUTPATIENT
Start: 2023-05-24

## 2023-05-24 RX ADMIN — IOPAMIDOL 100 ML: 755 INJECTION, SOLUTION INTRAVASCULAR at 13:14

## 2023-05-24 ASSESSMENT — PAIN SCALES - GENERAL: PAINLEVEL: NO PAIN (0)

## 2023-05-24 ASSESSMENT — LIFESTYLE VARIABLES: TOBACCO_USE: 0

## 2023-05-24 ASSESSMENT — COPD QUESTIONNAIRES: COPD: 0

## 2023-05-24 ASSESSMENT — ENCOUNTER SYMPTOMS
DYSRHYTHMIAS: 1
ORTHOPNEA: 0

## 2023-05-24 NOTE — H&P
Pre-Operative H & P     CC:  Preoperative exam to assess for increased cardiopulmonary risk while undergoing surgery and anesthesia.    Date of Encounter: 5/24/2023  Primary Care Physician:  System, Provider Not In     Reason for visit:   Encounter Diagnoses   Name Primary?     Preop examination Yes     Invasive lobular carcinoma of right breast in female (H)        HPI  Sheila Schillign is a 62 year old female who presents for pre-operative H & P in preparation for  Procedure Information     Case: [id number] Date/Time: 05/30/23 1250    Procedures:       RIGHT BREAST LUMPECTOMY, LOCALIZED USING RADIOFREQUENCY IDENTIFICATION (Right: Breast)      RIGHT SENTINEL LYMPH NODE BIOPSY (Right: Shoulder)    Anesthesia type: General    Diagnosis: Invasive lobular carcinoma of right breast in female (H) [C50.911]    Pre-op diagnosis: Invasive lobular carcinoma of right breast in female (H) [C50.911]    Location: [place] [address] / [facility]    Providers: Sanjiv Mcdermott MD          Sheila Schilling is a 63 y/o female with a PMH significant for paroxysmal A-fib who has a new diagnosis of right breast cancer detected on a screening mammogram last month. On 5/10 she underwent a needle biopsy which demonstrated a right-sided invasive lobular cancer, grade 2, ER positive, SD positive, HER-2 negative.  She consulted with Dr. Mcdermott on 5/18/2023 and the above surgery has been recommended for further management.     History is obtained from the patient and chart review    Hx of abnormal bleeding or anti-platelet use: Asprin 81mg    Menstrual history: Patient's last menstrual period was 03/08/2011.:      Past Medical History  Past Medical History:   Diagnosis Date     Atrial fibrillation (H)      Palpitations 6/24/2017     Paroxysmal atrial flutter (H)        Past Surgical History  Past Surgical History:   Procedure Laterality Date     COLONOSCOPY  7/28/2011    Procedure:COLONOSCOPY;  Surgeon:MIKE LYLE; Location:[facility]     COLONOSCOPY N/A 6/24/2022    Procedure: COLONOSCOPY;  Surgeon: Karime Jain MD;  Location: [facility]     SURGICAL HISTORY OF -   07/15/2002    laparoscopic L salpingo-oophorectomy - ectopic     SURGICAL HISTORY OF -   7/15/2002    SAB       Prior to Admission Medications  Current Outpatient Medications   Medication Sig Dispense Refill     aspirin 81 MG EC tablet Take 1 tablet (81 mg) by mouth daily       Calcium-Vitamin D (CALCIUM + D PO) Take 1 tablet by mouth daily       flecainide (TAMBOCOR) 100 MG tablet Take 1 tablet (100 mg) by mouth 2 times daily 180 tablet 0       Allergies  No Known Allergies    Social History  Social History     Socioeconomic History     Marital status:      Spouse name: Not on file     Number of children: Not on file     Years of education: Not on file     Highest education level: Not on file   Occupational History     Not on file   Tobacco Use     Smoking status: Never     Smokeless tobacco: Never   Vaping Use     Vaping status: Never Used   Substance and Sexual Activity     Alcohol use: Not Currently     Drug use: Never     Sexual activity: Yes     Birth control/protection: None   Other Topics Concern     Parent/sibling w/ CABG, MI or angioplasty before 65F 55M? No   Social History Narrative     Not on file     Social Determinants of Health     Financial Resource Strain: Not on file   Food Insecurity: Not on file   Transportation Needs: Not on file   Physical Activity: Not on file   Stress: Not on file   Social Connections: Not on file   Intimate Partner Violence: Not on file   Housing Stability: Not on file       Family History  Family History   Problem Relation Age of Onset     Hypertension Mother      Lipids Mother      C.A.D. Father      Hypertension Father      Diabetes Maternal Grandmother      C.A.D. Maternal Grandmother      C.A.D. Maternal Grandfather      Cancer Paternal Grandmother         liver     Cancer - colorectal  "Other      Anesthesia Reaction No family hx of      Bleeding Disorder No family hx of      Venous thrombosis No family hx of        Review of Systems  The complete review of systems is negative other than noted in the HPI or here.   Anesthesia Evaluation   Pt has had prior anesthetic. Type: MAC.    No history of anesthetic complications       ROS/MED HX  ENT/Pulmonary:  - neg pulmonary ROS  (-) tobacco use, asthma, COPD, sleep apnea, MAYELIN risk factors and recent URI   Neurologic:  - neg neurologic ROS     Cardiovascular:     (+) -----dysrhythmias, a-fib and a-flutter, Previous cardiac testing   Echo: Date: 8/16/17 Results:  Interpretation Summary     The visual ejection fraction is estimated at 60-65%.  Normal left ventricular diastolic function  The right ventricle is normal in structure, function and size.  Stress Test: Date: 8/17/17 Results:  Noted to be normal per outside report  ECG Reviewed: Date: 1/11/22 Results:  NSR  Cath:  Date: Results:   (-) hypertension, CAD, orthopnea/PND and dyslipidemiaPulmonary hypertension: 8/16/17.   METS/Exercise Tolerance: >4 METS Comment: - Walking 2-3 miles per day without exertional symptoms  - Gardening    Hematologic:  - neg hematologic  ROS     Musculoskeletal:  - neg musculoskeletal ROS     GI/Hepatic:  - neg GI/hepatic ROS     Renal/Genitourinary:  - neg Renal ROS     Endo:  - neg endo ROS     Psychiatric/Substance Use:  - neg psychiatric ROS     Infectious Disease:  - neg infectious disease ROS     Malignancy:   (+) Malignancy, History of Breast.Breast CA Active status post.        Other:            /85 (BP Location: Right arm, Patient Position: Sitting, Cuff Size: Adult Regular)   Pulse 70   Temp 98.1  F (36.7  C) (Oral)   Resp 16   Ht 1.727 m (5' 8\")   Wt 78.5 kg (173 lb)   LMP 03/08/2011   SpO2 97%   Breastfeeding No   BMI 26.30 kg/m      Physical Exam   Constitutional: Awake, alert, cooperative, no apparent distress, and appears stated age.  Eyes: " Pupils equal, round and reactive to light, extra ocular muscles intact, sclera clear, conjunctiva normal.  HENT: Normocephalic, oral pharynx with moist mucus membranes, good dentition. No goiter appreciated.   Respiratory: Clear to auscultation bilaterally, no crackles or wheezing.  Cardiovascular: Regular rate and rhythm, normal S1 and S2, and no murmur noted.  Carotids +2, no bruits. No edema. Palpable pulses to radial  DP and PT arteries.   GI: Normal bowel sounds, soft, non-distended, non-tender, no masses palpated, no hepatosplenomegaly.   Lymph/Hematologic: No cervical lymphadenopathy and no supraclavicular lymphadenopathy.  Genitourinary:  Deferred.   Skin: Warm and dry.  No rashes at anticipated surgical site.   Musculoskeletal: Full ROM of neck. There is no redness, warmth, or swelling of the joints. Gross motor strength is normal.    Neurologic: Awake, alert, oriented to name, place and time. Cranial nerves II-XII are grossly intact. Gait is normal.   Neuropsychiatric: Calm, cooperative. Normal affect.     Prior Labs/Diagnostic Studies   All labs and imaging personally reviewed   Component      Latest Ref Rng 1/11/2022  2:01 PM   Sodium      133 - 144 mmol/L 137    Potassium      3.4 - 5.3 mmol/L 3.7    Chloride      94 - 109 mmol/L 105    Carbon Dioxide      20 - 32 mmol/L 28    Anion Gap      3 - 14 mmol/L 4    Urea Nitrogen      7 - 30 mg/dL 14    Creatinine      0.52 - 1.04 mg/dL 0.72    Calcium      8.5 - 10.1 mg/dL 9.1    Glucose      70 - 99 mg/dL 87    GFR Estimate      >60 mL/min/1.73m2 >90      Component      Latest Ref Rng 5/24/2023  11:18 AM   WBC      4.0 - 11.0 10e3/uL 5.5    RBC Count      3.80 - 5.20 10e6/uL 4.99    Hemoglobin      11.7 - 15.7 g/dL 15.3    Hematocrit      35.0 - 47.0 % 46.4    MCV      78 - 100 fL 93    MCH      26.5 - 33.0 pg 30.7    MCHC      31.5 - 36.5 g/dL 33.0    RDW      10.0 - 15.0 % 12.5    Platelet Count      150 - 450 10e3/uL 283          EKG/ stress test - if  "available please see in ROS above     The patient's records and results personally reviewed by this provider.     Outside records reviewed from: Care Everywhere    LAB/DIAGNOSTIC STUDIES TODAY:  CBC    Assessment      Sheila Schilling is a 62 year old female seen as a PAC referral for risk assessment and optimization for anesthesia.    Plan/Recommendations  Pt will be optimized for the proposed procedure.  See below for details on the assessment, risk, and preoperative recommendations    NEUROLOGY  - No history of TIA, CVA or seizure  -Post Op delirium risk factors:  No risk identified    ENT  - No current airway concerns.  Will need to be reassessed day of surgery.  Mallampati: I  TM: > 3    CARDIAC  - No history of CAD and Hypertension  - Paroxysmal Afib managed on flecainide and aspirin 81mg. Has had no recurrence of A. Fib since starting medication. Follows with cardiology, last on 1/11/22. Last EKG on 1/11/22 showing NSR.   Continue aspirin and flecainide on DOS per Dr. Das.   [id number] score:  1  - METS (Metabolic Equivalents) > 4.   Patient performs 4 or more METS exercise without symptoms            Total Score: 0      RCRI-Very low risk: Class 1 0.4% complication rate            Total Score: 0        PULMONARY  MAYELIN Low Risk            Total Score: 1    MAYELIN: Over 50 ys old      - Denies asthma or inhaler use  - Tobacco History      History   Smoking Status     Never   Smokeless Tobacco     Never       GI  PONV High Risk  Total Score: 3           1 AN PONV: Pt is Female    1 AN PONV: Patient is not a current smoker    1 AN PONV: Intended Post Op Opioids        /RENAL  - Baseline Creatinine  0.72    ENDOCRINE    - BMI: Estimated body mass index is 26.3 kg/m  as calculated from the following:    Height as of this encounter: 1.727 m (5' 8\").    Weight as of this encounter: 78.5 kg (173 lb).  Overweight (BMI 25.0-29.9)  - No history of Diabetes Mellitus    HEME  VTE Medium Risk 1.8%            Total " Score: 6    VTE: Greater than 59 yrs old    VTE: Current cancer      - Platelet disfunction second to Aspirin (Sherita, many others)      Different anesthesia methods/types have been discussed with the patient, but they are aware that the final plan will be decided by the assigned anesthesia provider on the date of service.    Patient was discussed with Dr. Das    The patient is optimized for their procedure. AVS with information on surgery time/arrival time, meds and NPO status given by nursing staff. No further diagnostic testing indicated.      On the day of service:     Prep time:  5 minutes  Visit time: 15 minutes  Documentation time: 15 minutes  ------------------------------------------  Total time: 35 minutes      SINCERE Don CNP  Preoperative Assessment Center  [facility]  Phone: [phone]  Fax: [phone]

## 2023-05-24 NOTE — PATIENT INSTRUCTIONS
Preparing for Your Surgery      Name:  Sheila Schilling   MRN:  [id number]   :  1960   Today's Date:  2023       Arriving for surgery:  Surgery date:  23  Arrival time:  11:20 am      Please come to:     [facility] [address] [address]     Parking is available in front of the Clinics and Surgery Center building from 5:30AM to 8:00PM.  -  Proceed to the 5th floor to check into the Ambulatory Surgery Center.              >> There will be patient concierges on the 1st and 5th floor, for assistance or an escort, if you would like.              >> Please call [phone] with any questions.    What can I eat or drink?  -  You may eat and drink normally up to 8 hours prior to arrival time.   -  You may have clear liquids until 2 hours prior to arrival time.     Examples of clear liquids:  Water  Clear broth  Juices (apple, white grape, white cranberry  and cider) without pulp  Noncarbonated, powder based beverages  (lemonade and Sandoval-Aid)  Sodas (Sprite, 7-Up, ginger ale and seltzer)  Coffee or tea (without milk or cream)  Gatorade    -  No Alcohol for at least 24 hours before surgery.     Which medicines can I take?    Hold Aspirin for 7 days before surgery.   Hold Multivitamins for 7 days before surgery.  Hold Supplements for 7 days before surgery.  Hold Ibuprofen (Advil, Motrin) for 1 day(s) before surgery--unless otherwise directed by surgeon.  Hold Naproxen (Aleve) for 4 days before surgery.    -  DO NOT take these medications the day of surgery:  Vitamin C + D.    -  PLEASE TAKE these medications the day of surgery:  Tylenol if needed; take tambocor.    How do I prepare myself?  - Please take 2 showers (one the night prior to surgery and one the morning of surgery) using Scrubcare or Hibiclens soap.    Use this soap only from the neck to your toes.     Leave the soap on your skin for one minute--then rinse  thoroughly.      You may use your own shampoo and conditioner. No other hair products.   - Please remove all jewelry and body piercings.  - No lotions, deodorants or fragrance.  - No makeup or fingernail polish.   - Bring your ID and insurance card.    -If you have a Deep Brain Stimulator, Spinal Cord Stimulator, or any Neuro Stimulator device---you must bring the remote control to the hospital.      ALL PATIENTS GOING HOME THE SAME DAY OF SURGERY ARE REQUIRED TO HAVE A RESPONSIBLE ADULT TO DRIVE AND BE IN ATTENDANCE WITH THEM FOR 24 HOURS FOLLOWING SURGERY.    Covid testing policy as of 12/06/2022  Your surgeon will notify and schedule you for a COVID test if one is needed before surgery--please direct any questions or COVID symptoms to your surgeon      Questions or Concerns:    - For any questions regarding the day of surgery or your hospital stay, please contact the Pre Admission Nursing Office at [phone].       - If you have health changes between today and your surgery, please call your surgeon.       - For questions after surgery, please call your surgeons office.           Current Visitor Guidelines     Visiting hours: 8 a.m. to 8:30 p.m.     Patients confirmed or suspected to have symptoms of COVID 19 or flu:     No visitors allowed for adult patients.   Children (under age 18) can have 1 named visitor.     People who are sick or showing symptoms of COVID 19 or flu:    Are not allowed to visit patients--we can only make exceptions in special situations.       Please follow these guidelines for your visit:          Please maintain social distance          Masking is optional--however at times you may be asked to wear a mask for the safety of yourself and others     Clean your hands with alcohol hand . Do this when you arrive at and leave the building and patient room,    And again after you touch your mask or anything in the room.     Go directly to and from the room you are visiting.     Stay  in the patient s room during your visit. Limit going to other places in the hospital as much as possible     Leave bags and jackets at home or in the car.     For everyone s health, please don t come and go during your visit. That includes for smoking   during your visit.

## 2023-05-25 ENCOUNTER — PATIENT OUTREACH (OUTPATIENT)
Dept: ONCOLOGY | Facility: CLINIC | Age: 63
End: 2023-05-25
Payer: COMMERCIAL

## 2023-05-25 DIAGNOSIS — C50.911 MALIGNANT NEOPLASM OF RIGHT BREAST (H): Primary | ICD-10-CM

## 2023-05-25 NOTE — PROGRESS NOTES
Surgical Oncology RN Care Coordination Note:     Received message from Dr. Mcdermott to cancel surgery scheduled for 5/30 and proceed with referral to plastic surgery to discuss reconstruction. New plan is for skin sparing right mastectomy with sentinel node biopsy with reconstruction. Message sent to scheduling teams to cancel procedure, NM delivery and arrange for consult with Dr. Galvan.     Shanita Coleman, RN, BSN  Care Coordinator

## 2023-05-25 NOTE — TELEPHONE ENCOUNTER
FUTURE VISIT INFORMATION      FUTURE VISIT INFORMATION:    Date: 5/31/2023    Time: Noon    Location: [facility]-PLASTIC  REFERRAL INFORMATION:    Referring provider: Dr. Sanjiv Mcdermott    Referring providers clinic: [facility]    Reason for visit/diagnosis: Consult for breast reconstruction    RECORDS REQUESTED FROM:       Clinic name Comments Records Status Imaging Status   [facility] 5/18/23 - ONC OV with Dr. Mcdermott [facility] - Imaging 5/24/23 - US Breast  5/24/23 - Mammogram Epic PACs

## 2023-05-30 ENCOUNTER — HOSPITAL ENCOUNTER (OUTPATIENT)
Facility: AMBULATORY SURGERY CENTER | Age: 63
Discharge: HOME | End: 2023-05-30
Attending: SURGERY
Payer: COMMERCIAL

## 2023-05-30 ENCOUNTER — PREP FOR PROCEDURE (OUTPATIENT)
Dept: ONCOLOGY | Facility: CLINIC | Age: 63
End: 2023-05-30
Payer: COMMERCIAL

## 2023-05-30 DIAGNOSIS — C50.911 INVASIVE LOBULAR CARCINOMA OF RIGHT BREAST IN FEMALE (H): Primary | ICD-10-CM

## 2023-05-30 DIAGNOSIS — C50.911 INVASIVE LOBULAR CARCINOMA OF RIGHT BREAST IN FEMALE (H): ICD-10-CM

## 2023-05-31 ENCOUNTER — OFFICE VISIT (OUTPATIENT)
Dept: PLASTIC SURGERY | Facility: CLINIC | Age: 63
End: 2023-05-31
Attending: SURGERY
Payer: COMMERCIAL

## 2023-05-31 ENCOUNTER — PRE VISIT (OUTPATIENT)
Dept: PLASTIC SURGERY | Facility: CLINIC | Age: 63
End: 2023-05-31

## 2023-05-31 VITALS
HEART RATE: 75 BPM | OXYGEN SATURATION: 98 % | HEIGHT: 68 IN | DIASTOLIC BLOOD PRESSURE: 81 MMHG | SYSTOLIC BLOOD PRESSURE: 152 MMHG | BODY MASS INDEX: 26.3 KG/M2

## 2023-05-31 DIAGNOSIS — Z90.10 ACQUIRED ABSENCE OF BREAST, UNSPECIFIED LATERALITY: Primary | ICD-10-CM

## 2023-05-31 PROCEDURE — 99205 OFFICE O/P NEW HI 60 MIN: CPT | Performed by: STUDENT IN AN ORGANIZED HEALTH CARE EDUCATION/TRAINING PROGRAM

## 2023-05-31 ASSESSMENT — PAIN SCALES - GENERAL: PAINLEVEL: NO PAIN (0)

## 2023-05-31 NOTE — PROGRESS NOTES
Plastic and Reconstructive Surgery Note  5/31/2023    HPI:  62 year old female who presents 3 weeks after diagnosis of right breast cancer. She had a screening mammogram that demonstrated upper outer quadrant mass, she had needle biopsy on 5/10 which demonstrated right invasive lobular cancer, grade 2, ER/NC positive. She cannot palpate the mass.   She has met with Dr. Mcdermott, who has referred her to Dr. Galvan for discussion of reconstruction.   At this time, she is motivated and interested in breast reconstruction.  No plans for chemo.  No known plans for radiation, but patient has follow up with Dr. Mcdermott to discuss this item.    She is awaiting her genetic results.   At this time, she is unsure if she will proceed with lumpectomy or mastectomy. However, she does want breast reconstruction if she under goes mastectomy.     Medications:  Current Outpatient Medications   Medication     aspirin 81 MG EC tablet     Calcium-Vitamin D (CALCIUM + D PO)     flecainide (TAMBOCOR) 100 MG tablet     No current facility-administered medications for this visit.     Facility-Administered Medications Ordered in Other Visits   Medication     lidocaine 1 % 10 mL    No blood thinning medications.     Allergies:   No Known Allergies    No antibiotic allergies    Medical History:   Past Medical History:   Diagnosis Date     Atrial fibrillation (H)      Palpitations 6/24/2017     Paroxysmal atrial flutter (H)      No History of DM  No history of bleeding or clotting problems     Surgical History:  Past Surgical History:   Procedure Laterality Date     COLONOSCOPY  7/28/2011    Procedure:COLONOSCOPY; Surgeon:MIKE LYLE; Location: OR     COLONOSCOPY N/A 6/24/2022    Procedure: COLONOSCOPY;  Surgeon: Karime Jain MD;  Location: [facility]     SURGICAL HISTORY OF -   07/15/2002    laparoscopic L salpingo-oophorectomy - ectopic     SURGICAL HISTORY OF -   7/15/2002    SAB     No prior breast or chest surgery  She did have  "biopsy related to diagnosis     Family History:  I have reviewed this patient's family history and updated it with pertinent information if needed.  Family History   Problem Relation Age of Onset     Hypertension Mother      Lipids Mother      C.A.D. Father      Hypertension Father      Diabetes Maternal Grandmother      C.A.D. Maternal Grandmother      C.A.D. Maternal Grandfather      Cancer Paternal Grandmother         liver     Cancer - colorectal Other      Anesthesia Reaction No family hx of      Bleeding Disorder No family hx of      Venous thrombosis No family hx of        No family history of breast cancer.     Social History:  Social History     Tobacco Use     Smoking status: Never     Smokeless tobacco: Never   Vaping Use     Vaping status: Never Used   Substance Use Topics     Alcohol use: Not Currently     Drug use: Never     Lives in [place] [facility]  and Daughter     ROS:  +right breast cancer    Physical Exam:   Vitals: BP (!) 152/81   Pulse 75   Ht 1.727 m (5' 8\")   LMP 03/08/2011   SpO2 98%   BMI 26.30 kg/m    Gen: well appearing, NAD  Chest: bilateral breasts with grade 2 ptosis. Dense, fibrous breast tissue to bilateral breasts without palpable masses. No nipple discharge. No axillary lymphadenopathy   BBW: 14.5-150cm to R and L brast   Abd: no palpable hernia or masses. Adipose tissue to lower abdomen.     Assessment:   62 year old female with right breast invasive lobular carinoma    Plan:  Discussed surgical options for reconstruction including oncoplastic reduction, mastectomy with tissue expander placement and then implant or autologous reconstruction.   Patient would like to review the options and determine what is best.  She is unsure if radiation will be needed, and she would like to discuss with Dr. Mcdermott, as this could dictate her decision making.   At this time, it is best to follow up with phone visit when radiation plans are better understood.     Tissue expansion " was explained including placement (pre pec vs. Sub pectoral), the process of expansion and then exchange for implant or autologous tissue.     Patient under stands process and that reconstruction will require 9-12 months.     We will have patient reach out to schedule phone visit following her discussion with Dr. Mcdermott.

## 2023-05-31 NOTE — LETTER
5/31/2023       RE: Sheila Schilling  [address] [facility] [address]       Dear Colleague,    Thank you for referring your patient, Sheila Schilling, to the [facility] PLASTIC AND RECONSTRUCTIVE SURGERY CLINIC [place] at [facility]. Please see a copy of my visit note below.    Plastic and Reconstructive Surgery Note  5/31/2023    HPI:  62 year old female who presents 3 weeks after diagnosis of right breast cancer. She had a screening mammogram that demonstrated upper outer quadrant mass, she had needle biopsy on 5/10 which demonstrated right invasive lobular cancer, grade 2, ER/SC positive. She cannot palpate the mass.   She has met with Dr. Mcdermott, who has referred her to Dr. Galvan for discussion of reconstruction.   At this time, she is motivated and interested in breast reconstruction.  No plans for chemo.  No known plans for radiation, but patient has follow up with Dr. Mcdermott to discuss this item.    She is awaiting her genetic results.   At this time, she is unsure if she will proceed with lumpectomy or mastectomy. However, she does want breast reconstruction if she under goes mastectomy.     Medications:  Current Outpatient Medications   Medication    aspirin 81 MG EC tablet    Calcium-Vitamin D (CALCIUM + D PO)    flecainide (TAMBOCOR) 100 MG tablet     No current facility-administered medications for this visit.     Facility-Administered Medications Ordered in Other Visits   Medication    lidocaine 1 % 10 mL    No blood thinning medications.     Allergies:   No Known Allergies    No antibiotic allergies    Medical History:   Past Medical History:   Diagnosis Date    Atrial fibrillation (H)     Palpitations 6/24/2017    Paroxysmal atrial flutter (H)      No History of DM  No history of bleeding or clotting problems     Surgical History:  Past Surgical History:   Procedure Laterality Date    COLONOSCOPY  7/28/2011     "Procedure:COLONOSCOPY; Surgeon:MIKE LYLE; Location:[facility]    COLONOSCOPY N/A 6/24/2022    Procedure: COLONOSCOPY;  Surgeon: Karime Jain MD;  Location: [facility]    SURGICAL HISTORY OF -   07/15/2002    laparoscopic L salpingo-oophorectomy - ectopic    SURGICAL HISTORY OF -   7/15/2002    SAB     No prior breast or chest surgery  She did have biopsy related to diagnosis     Family History:  I have reviewed this patient's family history and updated it with pertinent information if needed.  Family History   Problem Relation Age of Onset    Hypertension Mother     Lipids Mother     C.A.D. Father     Hypertension Father     Diabetes Maternal Grandmother     C.A.D. Maternal Grandmother     C.A.D. Maternal Grandfather     Cancer Paternal Grandmother         liver    Cancer - colorectal Other     Anesthesia Reaction No family hx of     Bleeding Disorder No family hx of     Venous thrombosis No family hx of        No family history of breast cancer.     Social History:  Social History     Tobacco Use    Smoking status: Never    Smokeless tobacco: Never   Vaping Use    Vaping status: Never Used   Substance Use Topics    Alcohol use: Not Currently    Drug use: Never     Lives in [facility]  and Daughter     ROS:  +right breast cancer    Physical Exam:   Vitals: BP (!) 152/81   Pulse 75   Ht 1.727 m (5' 8\")   LMP 03/08/2011   SpO2 98%   BMI 26.30 kg/m    Gen: well appearing, NAD  Chest: bilateral breasts with grade 2 ptosis. Dense, fibrous breast tissue to bilateral breasts without palpable masses. No nipple discharge. No axillary lymphadenopathy   BBW: 14.5-150cm to R and L brast   Abd: no palpable hernia or masses. Adipose tissue to lower abdomen.     Assessment:   62 year old female with right breast invasive lobular carinoma    Plan:  Discussed surgical options for reconstruction including oncoplastic reduction, mastectomy with tissue expander placement and then implant or autologous " reconstruction.   Patient would like to review the options and determine what is best.  She is unsure if radiation will be needed, and she would like to discuss with Dr. Mcdermott, as this could dictate her decision making.   At this time, it is best to follow up with phone visit when radiation plans are better understood.     Tissue expansion was explained including placement (pre pec vs. Sub pectoral), the process of expansion and then exchange for implant or autologous tissue.     Patient under stands process and that reconstruction will require 9-12 months.     We will have patient reach out to schedule phone visit following her discussion with Dr. Mcdermott.     Attestation signed by Qasim Galvan MD at 5/31/2023  2:16 PM:  Attending Attestation:    62F hx recent R breast cancer diagnosis with plans for either lumpectomy versus mastectomy/ies presenting for breast reconstruction consultation.  The plan has not been yet finalized regarding lumpectomy versus mastectomies.  There is no definitive plan for chemotherapy or radiation.  Patient is motivated for reconstruction.  Patient has no preference for implant versus autologous reconstruction at this stage.    On exam, bilateral grade 2 ptotic breasts with 14.5-15 cm base width.  Lower abdomen with skin excess and lipodystrophy that may be adequate for bilateral breast reconstruction.  No abdominal hernia or bulge.    Since patient is motivated for breast reconstruction, in the setting of no known plans for radiation, my recommendation would be immediate breast tissue expander placement with possible use of biologic mesh like AlloDerm and SPY angiography.  If patient develops a preference for implant versus autologous reconstruction, then letting me know is helpful because that may change the tissue expander pocket location.  Explained that for implant reconstruction my preference is partial subpectoral pocket, whereas for autologous or flap reconstruction my  preference is prepectoral.    In general, patient understands that breast reconstruction is completely optional.  Additionally, breast reconstruction is not completed usually in a single surgery and may be at least a 9-12-month process.  Patient understands the rationale for using biologic or other mesh to help recreate a breast footprint, angiography to assess the quality of the skin after mastectomy SPY, and the use of a tissue expander to help expand the native skin that will be then used for a completion reconstruction.      Once patient has a another discussion with Dr. Mcdermott and his team,  we will be able to better plan for the type of reconstruction that the patient is seeking.  All questions were answered.    A total of 60 minutes was devoted to review of chart, direct face-to-face patient counseling and documentation during this encounter, exclusive of any procedure performed.          Again, thank you for allowing me to participate in the care of your patient.      Sincerely,    Qasim Galvan MD

## 2023-06-01 ENCOUNTER — OFFICE VISIT (OUTPATIENT)
Dept: RADIATION THERAPY | Facility: OUTPATIENT CENTER | Age: 63
End: 2023-06-01
Payer: COMMERCIAL

## 2023-06-01 ENCOUNTER — PREP FOR PROCEDURE (OUTPATIENT)
Dept: PLASTIC SURGERY | Facility: CLINIC | Age: 63
End: 2023-06-01
Payer: COMMERCIAL

## 2023-06-01 VITALS
HEART RATE: 66 BPM | RESPIRATION RATE: 16 BRPM | OXYGEN SATURATION: 100 % | WEIGHT: 173 LBS | SYSTOLIC BLOOD PRESSURE: 159 MMHG | DIASTOLIC BLOOD PRESSURE: 91 MMHG | BODY MASS INDEX: 26.3 KG/M2

## 2023-06-01 DIAGNOSIS — C50.411 MALIGNANT NEOPLASM OF UPPER-OUTER QUADRANT OF RIGHT FEMALE BREAST, UNSPECIFIED ESTROGEN RECEPTOR STATUS (H): Primary | ICD-10-CM

## 2023-06-01 ASSESSMENT — PAIN SCALES - GENERAL: PAINLEVEL: NO PAIN (0)

## 2023-06-01 NOTE — PROGRESS NOTES
The patient is here for a follow-up visit.  Since I am seeing her last she underwent a contrast-enhanced mammogram.  This demonstrated non-mass-like enhancement measuring over 6 cm.  I discussed the findings with our breast radiologist who was confident that this was all breast cancer.  I called the patient last weekend and informed her of the results and had her see plastic surgery yesterday.  She is now here to talk to me about the details of the mastectomy and reconstruction.    I informed her that I would recommend a mastectomy and a sentinel lymph node biopsy.  If she does not have a lymph node metastasis, she will probably not need radiation therapy.  We discussed the risks and complications of mastectomy and reconstruction and she wishes to proceed    Total time: 20 minutes which included reviewing her imaging abdomen in person visit.

## 2023-06-01 NOTE — LETTER
6/1/2023         RE: Sheila Schilling  [address] [facility] [address]        Dear Colleague,    Thank you for referring your patient, Sheila Schilling, to the [facility] RADIATION THERAPY [facility]INIC. Please see a copy of my visit note below.    The patient is here for a follow-up visit.  Since I am seeing her last she underwent a contrast-enhanced mammogram.  This demonstrated non-mass-like enhancement measuring over 6 cm.  I discussed the findings with our breast radiologist who was confident that this was all breast cancer.  I called the patient last weekend and informed her of the results and had her see plastic surgery yesterday.  She is now here to talk to me about the details of the mastectomy and reconstruction.    I informed her that I would recommend a mastectomy and a sentinel lymph node biopsy.  If she does not have a lymph node metastasis, she will probably not need radiation therapy.  We discussed the risks and complications of mastectomy and reconstruction and she wishes to proceed    Total time: 20 minutes which included reviewing her imaging abdomen in person visit.      Again, thank you for allowing me to participate in the care of your patient.        Sincerely,        Sanjiv Mcdermott MD

## 2023-06-01 NOTE — NURSING NOTE
"Oncology Rooming Note    June 1, 2023 10:17 AM   Sheila Schilling is a 62 year old female who presents for:    Chief Complaint   Patient presents with     Oncology Clinic Visit     Return visit with Dr. Mcdermott- review surgery plan     Initial Vitals: BP (!) 159/91   Pulse 66   Resp 16   Wt 78.5 kg (173 lb)   LMP 03/08/2011   SpO2 100%   BMI 26.30 kg/m   Estimated body mass index is 26.3 kg/m  as calculated from the following:    Height as of 5/31/23: 1.727 m (5' 8\").    Weight as of this encounter: 78.5 kg (173 lb). Body surface area is 1.94 meters squared.  No Pain (0) Comment: Data Unavailable   Patient's last menstrual period was 03/08/2011.  Allergies reviewed: Yes  Medications reviewed: Yes    Medications: Medication refills not needed today.  Pharmacy name entered into [employer]:    [facility] - [place], [place] - [address] [id number] - [place], [place] - [address]    Clinical concerns: Return appointment to review surgery plan, ARUN drain teaching done today. Dr. Mcdermott was notified.      Marisa Lund RN              "

## 2023-06-03 NOTE — PROGRESS NOTES
"[facility]INIC    I had the pleasure of seeing Sheila when she came for follow up of atrial fibrillation.  This 62 year old sees Dr. Thornton for her history of:    1. Paroxysmal AFib with high vagal tone/sinus bradycardia - Well controlled on unopposed flecainide. Per Dr. Thornton, avoiding AVN blocking agents due to bradycardia  2. CHADSVASc 1 (sex) - On ASA  3. Breast cancer - diagnosed with invasive lobular carcinoma of breast noted on screening mammogram.        Our Last Visit:  I had a virtual visit with Sheila 8/2020 at which time she was doing well without recurrent atrial fibrillation on flecainide.  No changes were made.    Interval History:  She saw Dr. Thornton 1/2022 at which time she was doing well, only noting \"fluttering\" when she would forget her flecainide.  EKG looked good, and annual follow-up recommended.    She was diagnosed with R invasive lobular carcinoma of the breast on screening mammogram, and underwent lumpectomy 5/2023.  Plan is for R mastectomy with LN dissection on 6/19.    Today's Visit:  Sheila really feels good. She's only noted palpitations when she's rarely forgotten her medications but otherwise denies concerns.  No lightheadedness, dizziness.  No syncope.    She does not check her BP at home, but has been checking it frequently at doctors visits.  She notes that it is occasionally elevated, but this typically is related to when she is anxious about a diagnosis or upcoming procedure.  Indeed, I rechecked this, and BP looks much better.    Denies edema, orthopnea, PND.  No chest pain, pressure, tightness.    VITALS:  Vitals: /68 (BP Location: Left arm, Patient Position: Sitting, Cuff Size: Adult Regular)   Pulse 61   Resp 15   Wt 77.5 kg (170 lb 12.8 oz)   LMP 03/08/2011   SpO2 100%   BMI 25.97 kg/m      Diagnostic Testing:  EKG today, which I overread, showed SR 65 bpm.  QRS duration 102 ms on flecainide 100 mg BID  Exercise Stress Test 2/2017 with no " "proarrhythmia  Echo 8/2017 with EF 60-65%. Nl RV.       Plan:  1. See us back in 1 year with EKG    Assessment/Plan:    1. Paroxysmal AFib    Remains on flecainide therapy.  Proarrhythmia stress test done 2017 and was normal.    EKG today continues with NSR, and she does not think she has had any recurrent atrial fibrillation, with only \"fluttering\" noted when she occasionally forgets the flecainide dose.  This is rare.    Note QRS duration is at the upper limits of normal based on her baseline      Last echo 2017 with normal LVEF 60 to 65%    [id number] 1 (sex) and remains on ASA only    PLAN:    Continue current medications.  Flecainide refilled.  She plans to take this them throughout her perioperative.  But understands she may have recurrent atrial fibrillation     Annual follow-up with EKG        Cherelle Romero PA-C, MSPAS      Orders Placed This Encounter   Procedures     Follow-Up with Cardiology JACKIE     EKG 12-lead complete w/read - Clinics (performed today)     Orders Placed This Encounter   Medications     flecainide (TAMBOCOR) 100 MG tablet     Sig: Take 1 tablet (100 mg) by mouth 2 times daily     Dispense:  180 tablet     Refill:  3     Keep on file until pt is due for refills     Medications Discontinued During This Encounter   Medication Reason     flecainide (TAMBOCOR) 100 MG tablet Reorder (No AVS / No eCancel)         Encounter Diagnosis   Name Primary?     Paroxysmal atrial fibrillation (H) Yes       CURRENT MEDICATIONS:  Current Outpatient Medications   Medication Sig Dispense Refill     aspirin 81 MG EC tablet Take 1 tablet (81 mg) by mouth daily       Calcium-Vitamin D (CALCIUM + D PO) Take 1 tablet by mouth daily       flecainide (TAMBOCOR) 100 MG tablet Take 1 tablet (100 mg) by mouth 2 times daily 180 tablet 3       ALLERGIES   No Known Allergies      Review of Systems:  Skin:        Eyes:       ENT:       Respiratory:  Negative    Cardiovascular:    palpitations  Gastroenterology:    "   Genitourinary:       Musculoskeletal:       Neurologic:       Psychiatric:       Heme/Lymph/Imm:       Endocrine:         Physical Exam:  Vitals: /68 (BP Location: Left arm, Patient Position: Sitting, Cuff Size: Adult Regular)   Pulse 61   Resp 15   Wt 77.5 kg (170 lb 12.8 oz)   LMP 03/08/2011   SpO2 100%   BMI 25.97 kg/m      Constitutional:  cooperative, alert and oriented, well developed, well nourished, in no acute distress        Skin:  warm and dry to the touch, no apparent skin lesions or masses noted        Head:  normocephalic, no masses or lesions        Eyes:  pupils equal and round;conjunctivae and lids unremarkable;sclera white        ENT:  no pallor or cyanosis        Neck:  no carotid bruit;JVP normal        Chest:  normal breath sounds, clear to auscultation, normal A-P diameter, normal symmetry, normal respiratory excursion, no use of accessory muscles tender to palpation      Cardiac: regular rhythm, normal S1/S2, no S3 or S4, apical impulse not displaced, no murmurs, gallops or rubs     no presence of murmur            Abdomen:  abdomen soft, non-tender, BS normoactive, no mass, no HSM, no bruits        Vascular: pulses full and equal, no bruits auscultated                               right femoral bruit (+)      Extremities and Back:  no deformities, clubbing, cyanosis, erythema observed        Neurological:  no gross motor deficits            PAST MEDICAL HISTORY:  Past Medical History:   Diagnosis Date     Atrial fibrillation (H)      Palpitations 6/24/2017     Paroxysmal atrial flutter (H)        PAST SURGICAL HISTORY:  Past Surgical History:   Procedure Laterality Date     COLONOSCOPY  7/28/2011    Procedure:COLONOSCOPY; Surgeon:MIKE LYLE; Location: OR     COLONOSCOPY N/A 6/24/2022    Procedure: COLONOSCOPY;  Surgeon: Karime Jain MD;  Location: [facility]     SURGICAL HISTORY OF -   07/15/2002    laparoscopic L salpingo-oophorectomy - ectopic     SURGICAL HISTORY  OF -   7/15/2002    SAB       FAMILY HISTORY:  Family History   Problem Relation Age of Onset     Hypertension Mother      Lipids Mother      C.A.D. Father      Hypertension Father      Diabetes Maternal Grandmother      ML Maternal Grandmother      ML Maternal Grandfather      Cancer Paternal Grandmother         liver     Cancer - colorectal Other      Anesthesia Reaction No family hx of      Bleeding Disorder No family hx of      Venous thrombosis No family hx of        SOCIAL HISTORY:  Social History     Socioeconomic History     Marital status:      Spouse name: None     Number of children: None     Years of education: None     Highest education level: None   Tobacco Use     Smoking status: Never     Smokeless tobacco: Never   Vaping Use     Vaping status: Never Used   Substance and Sexual Activity     Alcohol use: Not Currently     Drug use: Never     Sexual activity: Yes     Birth control/protection: None   Other Topics Concern     Parent/sibling w/ CABG, MI or angioplasty before 65F 55M? No

## 2023-06-05 ENCOUNTER — OFFICE VISIT (OUTPATIENT)
Dept: CARDIOLOGY | Facility: CLINIC | Age: 63
End: 2023-06-05
Payer: COMMERCIAL

## 2023-06-05 VITALS
BODY MASS INDEX: 25.97 KG/M2 | SYSTOLIC BLOOD PRESSURE: 128 MMHG | RESPIRATION RATE: 15 BRPM | WEIGHT: 170.8 LBS | HEART RATE: 61 BPM | OXYGEN SATURATION: 100 % | DIASTOLIC BLOOD PRESSURE: 68 MMHG

## 2023-06-05 DIAGNOSIS — I48.0 PAROXYSMAL ATRIAL FIBRILLATION (H): Primary | ICD-10-CM

## 2023-06-05 PROCEDURE — 99214 OFFICE O/P EST MOD 30 MIN: CPT | Performed by: PHYSICIAN ASSISTANT

## 2023-06-05 PROCEDURE — 93000 ELECTROCARDIOGRAM COMPLETE: CPT | Performed by: PHYSICIAN ASSISTANT

## 2023-06-05 RX ORDER — FLECAINIDE ACETATE 100 MG/1
100 TABLET ORAL 2 TIMES DAILY
Qty: 180 TABLET | Refills: 3 | Status: SHIPPED | OUTPATIENT
Start: 2023-06-05 | End: 2023-08-21

## 2023-06-05 NOTE — LETTER
"6/5/2023    Provider Not In System       RE: Sheila HOOD Shakir       Dear Colleague,     I had the pleasure of seeing Sheila Schilling in the [facility].  [facility]    I had the pleasure of seeing Sheila when she came for follow up of atrial fibrillation.  This 62 year old sees Dr. Thornton for her history of:    1. Paroxysmal AFib with high vagal tone/sinus bradycardia - Well controlled on unopposed flecainide. Per Dr. Thornton, avoiding AVN blocking agents due to bradycardia  2. CHADSVASc 1 (sex) - On ASA  3. Breast cancer - diagnosed with invasive lobular carcinoma of breast noted on screening mammogram.        Our Last Visit:  I had a virtual visit with Sheila 8/2020 at which time she was doing well without recurrent atrial fibrillation on flecainide.  No changes were made.    Interval History:  She saw Dr. Thornton 1/2022 at which time she was doing well, only noting \"fluttering\" when she would forget her flecainide.  EKG looked good, and annual follow-up recommended.    She was diagnosed with R invasive lobular carcinoma of the breast on screening mammogram, and underwent lumpectomy 5/2023.  Plan is for R mastectomy with LN dissection on 6/19.    Today's Visit:  Sheila really feels good. She's only noted palpitations when she's rarely forgotten her medications but otherwise denies concerns.  No lightheadedness, dizziness.  No syncope.    She does not check her BP at home, but has been checking it frequently at doctors visits.  She notes that it is occasionally elevated, but this typically is related to when she is anxious about a diagnosis or upcoming procedure.  Indeed, I rechecked this, and BP looks much better.    Denies edema, orthopnea, PND.  No chest pain, pressure, tightness.    VITALS:  Vitals: /68 (BP Location: Left arm, Patient Position: Sitting, Cuff Size: Adult Regular)   Pulse 61   Resp 15   Wt 77.5 kg (170 lb 12.8 oz)   LMP 03/08/2011   SpO2 100%   BMI 25.97 " "kg/m      Diagnostic Testing:  EKG today, which I overread, showed SR 65 bpm.  QRS duration 102 ms on flecainide 100 mg BID  Exercise Stress Test 2/2017 with no proarrhythmia  Echo 8/2017 with EF 60-65%. Nl RV.       Plan:  1. See us back in 1 year with EKG    Assessment/Plan:    Paroxysmal AFib  Remains on flecainide therapy.  Proarrhythmia stress test done 2017 and was normal.  EKG today continues with NSR, and she does not think she has had any recurrent atrial fibrillation, with only \"fluttering\" noted when she occasionally forgets the flecainide dose.  This is rare.  Note QRS duration is at the upper limits of normal based on her baseline    Last echo 2017 with normal LVEF 60 to 65%  [id number] 1 (sex) and remains on ASA only    PLAN:  Continue current medications.  Flecainide refilled.  She plans to take this them throughout her perioperative.  But understands she may have recurrent atrial fibrillation   Annual follow-up with EKG        Cherelle Romero PA-C, MSPAS      Orders Placed This Encounter   Procedures    Follow-Up with Cardiology JACKIE    EKG 12-lead complete w/read - Clinics (performed today)     Orders Placed This Encounter   Medications    flecainide (TAMBOCOR) 100 MG tablet     Sig: Take 1 tablet (100 mg) by mouth 2 times daily     Dispense:  180 tablet     Refill:  3     Keep on file until pt is due for refills     Medications Discontinued During This Encounter   Medication Reason    flecainide (TAMBOCOR) 100 MG tablet Reorder (No AVS / No eCancel)         Encounter Diagnosis   Name Primary?    Paroxysmal atrial fibrillation (H) Yes       CURRENT MEDICATIONS:  Current Outpatient Medications   Medication Sig Dispense Refill    aspirin 81 MG EC tablet Take 1 tablet (81 mg) by mouth daily      Calcium-Vitamin D (CALCIUM + D PO) Take 1 tablet by mouth daily      flecainide (TAMBOCOR) 100 MG tablet Take 1 tablet (100 mg) by mouth 2 times daily 180 tablet 3       ALLERGIES   No Known Allergies      Review " of Systems:  Skin:        Eyes:       ENT:       Respiratory:  Negative    Cardiovascular:    palpitations  Gastroenterology:      Genitourinary:       Musculoskeletal:       Neurologic:       Psychiatric:       Heme/Lymph/Imm:       Endocrine:         Physical Exam:  Vitals: /68 (BP Location: Left arm, Patient Position: Sitting, Cuff Size: Adult Regular)   Pulse 61   Resp 15   Wt 77.5 kg (170 lb 12.8 oz)   LMP 03/08/2011   SpO2 100%   BMI 25.97 kg/m      Constitutional:  cooperative, alert and oriented, well developed, well nourished, in no acute distress        Skin:  warm and dry to the touch, no apparent skin lesions or masses noted        Head:  normocephalic, no masses or lesions        Eyes:  pupils equal and round;conjunctivae and lids unremarkable;sclera white        ENT:  no pallor or cyanosis        Neck:  no carotid bruit;JVP normal        Chest:  normal breath sounds, clear to auscultation, normal A-P diameter, normal symmetry, normal respiratory excursion, no use of accessory muscles tender to palpation      Cardiac: regular rhythm, normal S1/S2, no S3 or S4, apical impulse not displaced, no murmurs, gallops or rubs     no presence of murmur            Abdomen:  abdomen soft, non-tender, BS normoactive, no mass, no HSM, no bruits        Vascular: pulses full and equal, no bruits auscultated                               right femoral bruit (+)      Extremities and Back:  no deformities, clubbing, cyanosis, erythema observed        Neurological:  no gross motor deficits           PAST MEDICAL HISTORY:  Past Medical History:   Diagnosis Date    Atrial fibrillation (H)     Palpitations 6/24/2017    Paroxysmal atrial flutter (H)        PAST SURGICAL HISTORY:  Past Surgical History:   Procedure Laterality Date    COLONOSCOPY  7/28/2011    Procedure:COLONOSCOPY; Surgeon:MIKE LYLE; Location: OR    COLONOSCOPY N/A 6/24/2022    Procedure: COLONOSCOPY;  Surgeon: Karime Jain MD;   Location: [facility]    SURGICAL HISTORY OF -   07/15/2002    laparoscopic L salpingo-oophorectomy - ectopic    SURGICAL HISTORY OF -   7/15/2002    SAB       FAMILY HISTORY:  Family History   Problem Relation Age of Onset    Hypertension Mother     Lipids Mother     C.A.D. Father     Hypertension Father     Diabetes Maternal Grandmother     C.A.D. Maternal Grandmother     C.A.D. Maternal Grandfather     Cancer Paternal Grandmother         liver    Cancer - colorectal Other     Anesthesia Reaction No family hx of     Bleeding Disorder No family hx of     Venous thrombosis No family hx of        SOCIAL HISTORY:  Social History     Socioeconomic History    Marital status:      Spouse name: None    Number of children: None    Years of education: None    Highest education level: None   Tobacco Use    Smoking status: Never    Smokeless tobacco: Never   Vaping Use    Vaping status: Never Used   Substance and Sexual Activity    Alcohol use: Not Currently    Drug use: Never    Sexual activity: Yes     Birth control/protection: None   Other Topics Concern    Parent/sibling w/ CABG, MI or angioplasty before 65F 55M? No       Thank you for allowing me to participate in the care of your patient.    Sincerely,   GENTRY Faust [facility]

## 2023-06-05 NOTE — PATIENT INSTRUCTIONS
Sheila - it was nice to see you today!     At your visit today we reviewed:    EKG today looked OK!  Normal rhythm and acceptable QRS interval on flecainide   BPs understandably have been elevated     Medication Changes:    No changes needed    Recommendations:    Start checking BP to make sure BPs come down    Follow-up:    See us for cardiology follow up in 1 year but CALL Cardiology nurses Kathy & Catarina @ [phone] for any issues, questions or concerns in the interim.      To schedule a future appointment, we kindly ask that you call cardiology scheduling at [phone] three months prior to requested visit date.    Important Phone Numbers for [facility]):    [facility] Kathy Elmore: [phone]  Cardiology [phone]  [facility] [phone]  [facility] [phone]  [facility]: [phone]

## 2023-06-06 ENCOUNTER — TELEPHONE (OUTPATIENT)
Dept: ONCOLOGY | Facility: CLINIC | Age: 63
End: 2023-06-06
Payer: COMMERCIAL

## 2023-06-06 NOTE — TELEPHONE ENCOUNTER
RN Care Coordinator: Lashell Bradley RN     Surgery is scheduled with Dr. Sanjiv Mcdermott & Dr. Qasim Galvan  Date: 6/19/2023   Location: [facility].  Scheduled per next available date    Nuc Med injection scheduled to be delivered to the OR for surgeon to inject.    H&P to be completed by already done per case request     COVID-19 test: n/a    Post-op: 6/29/2023 at 10:30am , in person visit   Post-op: 6/30/2023 at 10:40am with Plastics PA    Patient will receive a phone call from pre-admission nurses 1-2 days prior to surgery with arrival and start time.     Spoke with the patient and was able to confirm all scheduled information.     The surgery packet was provided by the CC    Surgery and appointment overview was sent via US mail and via [employer]    ______________    Maria R Coleman on 6/6/2023 at 10:48 AM  P: [phone]

## 2023-06-07 ENCOUNTER — VIRTUAL VISIT (OUTPATIENT)
Dept: ONCOLOGY | Facility: CLINIC | Age: 63
End: 2023-06-07
Attending: SURGERY
Payer: COMMERCIAL

## 2023-06-07 DIAGNOSIS — C50.911 INVASIVE LOBULAR CARCINOMA OF RIGHT BREAST IN FEMALE (H): Primary | ICD-10-CM

## 2023-06-07 PROCEDURE — 96040 HC GENETIC COUNSELING, EACH 30 MINUTES: CPT | Mod: GT,95 | Performed by: GENETIC COUNSELOR, MS

## 2023-06-07 NOTE — NURSING NOTE
Is the patient currently in the state of [place]? YES    Visit mode:VIDEO    If the visit is dropped, the patient can be reconnected by: VIDEO VISIT: Text to cell phone: [phone]    Will anyone else be joining the visit? NO      How would you like to obtain your AVS? MyChart    Are changes needed to the allergy or medication list? NO    Reason for visit: Video Visit    NERI MONTERROSO

## 2023-06-07 NOTE — PROGRESS NOTES
Virtual Visit Details    Type of service:  Video Visit   Joined the call at 6/7/2023, 1:06:28 pm.  Left the call at 6/7/2023, 1:36:27 pm.    Originating Location (pt. Location): Home  Distant Location (provider location):  Off-site  Platform used for Video Visit: [employer]       6/7/2023    Referring Provider: Sanjiv Mcdermott MD    Presenting Information:   I met with Sheila Schilling today for genetic counseling at the [facility] (virtual visit) to discuss her recent breast cancer diagnosis.  She is here today to review this history, cancer screening recommendations, and available genetic testing options.    Personal History:  Sheila is a 62 year old female. She was diagnosed with invasive lobular breast cancer at age 62.  Surgery is scheduled 6/19/2023.    Genetic testing has been ordered by her physician as part of treatment, including the 11 gene Breast Actionable Panel through the [facility].  Results are pending insurance approval.  Genes selected: DAVIN, BRCA1, BRCA2, CDH1, CHEK2, NBN, NF1, PALB2, PTEN, STK11, TP53    Family History: (Please see scanned pedigree for detailed family history information)    Sheila has a 21 year old daughter    She has a identical twin sister who has had skin cancer, and a 58 year old sister who has not had cancer.    One maternal aunt was diagnosed with colon cancer in her 50's-60's and is now in her 70's.      She reports no additional known family history of cancer on either side of her family    Her ethnicity is [place] and [place]. There is no known Ashkenazi Roman Catholic ancestry on either side of her family.     Discussion:    Sheila was recently diagnosed with breast cancer, and has surgery scheduled 6/19/2023.  According to the American Society of Breast Surgeons Consensus Guideline on Genetic Testing for Hereditary Breast Cancer, genetic testing should be available to all patients who have been diagnosed with breast cancer.  The guidelines  state that testing should include BRCA1, BRCA2, and PALB2, and that additional testing may be warranted based on personal and/or family history.     We reviewed the features of sporadic, familial, and hereditary cancers. Individuals may develop breast cancer due to environmental factors (such as exposures and lifestyle), aging, genetic predisposition, or a combination of these factors. The vast majority of breast cancer diagnoses are considered sporadic, and not primarily due to an inherited risk factor. Approximately 5-10% of cancer diagnoses are thought to be caused by inherited risk factors.       Genetic testing has been ordered by her physician as part of treatment, including the 11 gene Breast Actionable Panel through the [facility].  Results are pending insurance approval.  Genes selected: DAVIN, BRCA1, BRCA2, CDH1, CHEK2, NBN, NF1, PALB2, PTEN, STK11, TP53    A detailed handout regarding hereditary breast cancer and the information we discussed can be found in the after visit summary. Topics included: inheritance pattern, cancer risks, cancer screening recommendations, and also risks, benefits and limitations of testing.    We discussed that there are additional genes that could cause increased risk for breast, colorectal, and other cancers. As many of these genes present with overlapping features in a family and accurate cancer risk cannot always be established based upon the pedigree analysis alone, it would be reasonable for Sheila to consider panel genetic testing to analyze multiple genes at once.    Sheila declined expanded genetic testing today.  Results from the Breast Actionable Panel will be reported ASAP to Dr. Mcdermott.  These results may help to guide treatment of Sheila's breast cancer (including surgery), as well as increased surveillance for Sheila and her family.      Plan:  Sheila will proceed with BRCA1/2 testing and the 11 gene Breast Actionable Panel through the  [facility].  She is encouraged to reach out in the future to revisit expanded testing options related to the cancers in her family.      Vonda Akins MS, [facility]  Licensed, Certified Genetic Counselor  [facility]  Phone: [phone]

## 2023-06-07 NOTE — LETTER
6/7/2023         RE: Sheila Schilling  [address] [facility] [address]        Dear Colleague,    Thank you for referring your patient, Sheila Schilling, to the [facility]. Please see a copy of my visit note below.    Virtual Visit Details    Type of service:  Video Visit   Joined the call at 6/7/2023, 1:06:28 pm.  Left the call at 6/7/2023, 1:36:27 pm.    Originating Location (pt. Location): Home  Distant Location (provider location):  Off-site  Platform used for Video Visit: [employer]       6/7/2023    Referring Provider: Sanjiv Mcdermott MD    Presenting Information:   I met with Sheila Schilling today for genetic counseling at the Cancer [facility] Program (virtual visit) to discuss her recent breast cancer diagnosis.  She is here today to review this history, cancer screening recommendations, and available genetic testing options.    Personal History:  Sheila is a 62 year old female. She was diagnosed with invasive lobular breast cancer at age 62.  Surgery is scheduled 6/19/2023.    Genetic testing has been ordered by her physician as part of treatment, including the 11 gene Breast Actionable Panel through the [facility].  Results are pending insurance approval.  Genes selected: DAVIN, BRCA1, BRCA2, CDH1, CHEK2, NBN, NF1, PALB2, PTEN, STK11, TP53    Family History: (Please see scanned pedigree for detailed family history information)  Sheila has a 21 year old daughter  She has a identical twin sister who has had skin cancer, and a 58 year old sister who has not had cancer.  One maternal aunt was diagnosed with colon cancer in her 50's-60's and is now in her 70's.    She reports no additional known family history of cancer on either side of her family  Her ethnicity is [place] and [place]. There is no known Ashkenazi Denominational ancestry on either side of her family.     Discussion:  hSeila was recently diagnosed with breast cancer, and has surgery scheduled  6/19/2023.  According to the American Society of Breast Surgeons Consensus Guideline on Genetic Testing for Hereditary Breast Cancer, genetic testing should be available to all patients who have been diagnosed with breast cancer.  The guidelines state that testing should include BRCA1, BRCA2, and PALB2, and that additional testing may be warranted based on personal and/or family history.   We reviewed the features of sporadic, familial, and hereditary cancers. Individuals may develop breast cancer due to environmental factors (such as exposures and lifestyle), aging, genetic predisposition, or a combination of these factors. The vast majority of breast cancer diagnoses are considered sporadic, and not primarily due to an inherited risk factor. Approximately 5-10% of cancer diagnoses are thought to be caused by inherited risk factors.     Genetic testing has been ordered by her physician as part of treatment, including the 11 gene Breast Actionable Panel through the [facility].  Results are pending insurance approval.  Genes selected: DAVIN, BRCA1, BRCA2, CDH1, CHEK2, NBN, NF1, PALB2, PTEN, STK11, TP53  A detailed handout regarding hereditary breast cancer and the information we discussed can be found in the after visit summary. Topics included: inheritance pattern, cancer risks, cancer screening recommendations, and also risks, benefits and limitations of testing.  We discussed that there are additional genes that could cause increased risk for breast, colorectal, and other cancers. As many of these genes present with overlapping features in a family and accurate cancer risk cannot always be established based upon the pedigree analysis alone, it would be reasonable for Sheila to consider panel genetic testing to analyze multiple genes at once.  Sheila declined expanded genetic testing today.  Results from the Breast Actionable Panel will be reported ASAP to Dr. Mcdermott.  These results may help to  guide treatment of Sheila's breast cancer (including surgery), as well as increased surveillance for Sheila and her family.      Plan:  Sheila will proceed with BRCA1/2 testing and the 11 gene Breast Actionable Panel through the [facility].  She is encouraged to reach out in the future to revisit expanded testing options related to the cancers in her family.      Vonda Akins MS, [facility] [place]  Licensed, Certified Genetic Counselor  [facility]  Phone: [phone]

## 2023-06-08 NOTE — PATIENT INSTRUCTIONS
BREAST ACTIONABLE PANEL    The Breast Actionable cancer panel is a genetic test which analyzes 11 genes known to be associated with an increased lifetime risk of breast and other cancers. Published medical guidelines exist for detection, prevention, risk reduction, and/or treatment for individuals with mutations in these genes.     Of note, the Breast Actionable cancer panel is not considered to be comprehensive. Individuals with a family history of other cancers should be seen by a genetic counselor to discuss the family history and the possibility of expanded genetic testing.     GENES (11) ASSOCIATED CANCER(S) ASSOCIATED CANCER SYNDROME(S)   DAVIN breast, pancreas    BARD1 breast    BRCA1 breast, ovary, pancreas, melanoma, prostate, male breast Hereditary breast /ovarian cancer syndrome (HBOC)   BRCA2 breast, ovary, pancreas, melanoma, prostate, male breast Hereditary breast /ovarian cancer syndrome (HBOC)   CDH1 breast, stomach, colon Hereditary diffuse gastric cancer   CHEK2 breast, colon, prostate    NF1 breast, brain, peripheral nervous system Neurofibromatosis 1   PALB2 breast, pancreas    PTEN breast, thyroid, uterus, colon, kidney Cowden syndrome, Hzrbfemk-Xsxzj-Cjjssqtwa syndrome (BRRS), PTEN-related Proteus syndrome (PS), Proteus-like syndrome   STK11 breast, ovary, colon, pancreas, stomach, uterus, cervix Peutz-Jeghers syndrome   TP53 breast, bone, brain, soft tissues, adrenal cortex Li-Fraumeni syndrome     Meeting with a Genetic Counselor  After you receive the results of the Breast Actionable Panel testing, providers will often refer you to see a genetic counselor. This is because patients can have a family history of cancer other than breast cancer and may benefit from a discussion about comprehensive, expanded genetic testing.     Additionally, you and the genetic counselor will discuss the impact of genetic testing on you and your family members, go over your family health history, and talk  about potential screening and interventions.     Assessing Cancer Risk  Only about 5-10% of cancers are thought to be due to an inherited cancer susceptibility gene.  These families often have:  Several people with the same or related types of cancer  Cancers diagnosed at a young age (before age 50)  Individuals with more than one primary cancer  Multiple generations of the family affected with cancer    Some people may be candidates for genetic testing of more than one gene.  For these families, genetic testing using a multi-gene cancer panel may be offered.  These panels may test genes known to increase the risk for breast (and other) cancers: DAVIN, BRCA1, BRCA2, CDH1, CHEK2, PALB2, PTEN, and TP53.  The purpose of this handout is to serve as a brief summary of the high/moderate-risk breast cancer genes which have published clinical management guidelines for individuals who are found to carry a mutation.    BRCA1 and BRCA2-Hereditary Breast and Ovarian Cancer Syndrome (HBOC)  A single mutation in one of the copies of BRCA1 or BRCA2 increases the risk for breast and ovarian cancer, among others.  The risk for pancreatic cancer and melanoma may also be slightly increased in some families.  The tables below list the chance that someone with a BRCA mutation would develop cancer in his or her lifetime1,2,3,4.          Women   Men     General Population BRCA+    General Population BRCA+   Breast  12% 40-80%  Breast <1% ~7%   Ovarian  1-2% 10-40%  Prostate 16% 20%       A person s ethnic background is also important to consider, as individuals of Ashkenazi Voodoo ancestry have a higher chance of having a BRCA gene mutation.  There are three BRCA mutations that occur more frequently in this population.     Individuals who inherit two BRCA2 mutations--one from their mother and one from their father--have a condition called Fanconi Anemia.  This rare autosomal recessive condition is associated with short stature,  developmental delay, bone marrow failure, and increased risk for childhood cancers.    TP53-Li-Fraumeni Syndrome (LFS)  LFS is a cancer predisposition syndrome. Individuals with LFS are at an increased risk for developing cancer at a young age. The general lifetime risk for development of cancer is 50% by age 30 and 90% by age 60.  LFS is caused by a mutation in the TP53 gene.  A single mutation in one of the copies of TP53 increases the risk for multiple cancers.     Core Cancers: Sarcomas, Breast, Brain, Lung, Leukemias/Lymphomas, Adrenocortical carcinomas  Other Cancers: Gastrointestinal, Thyroid, Skin, Genitourinary    PTEN-Cowden Syndrome  Cowden syndrome is a hereditary condition that increases the risk for breast, thyroid, endometrial, and kidney cancer.  Cowden syndrome is caused by a mutation in the PTEN gene.  A single mutation in one of the copies of PTEN causes Cowden syndrome and increases cancer risk.  The table below shows the chance that someone with a PTEN mutation would develop cancer in their lifetime5,6.  Other benign features seen in some individuals with Cowden syndrome include benign skin lesions (facial papules, keratoses, lipomas), learning disability, autism, thyroid nodules, colon polyps, and larger head size.      Lifetime Cancer Risk   Cancer Type General Population Cowden Syndrome   Breast  12% 25-50%*   Thyroid  1% 35%   Renal 1-2% 35%   Endometrial  2-3% 28%   Colon 5% 9%   Melanoma 2-3% >5%     *One recent study found breast cancer risk to be increased to 85%    CDH1-Hereditary Diffuse Gastric Cancer (HDGC)  Currently, one gene is known to cause hereditary diffuse gastric cancer: CDH1.  Individuals with HDGC are at increased risk for diffuse gastric cancer and lobular breast cancer. Of people diagnosed with HDGC, 30-50% have a mutation in the CDH1 gene.  This suggests there are likely other genes that may cause HDGC that have not been identified yet.      Lifetime Cancer Risks     General Pop HDGC    Diffuse Gastric  <1% ~80%   Breast 12% 39-52%       Additional Genes Associated with Increased Breast Cancer Risk  PALB2  Mutations in PALB2 have been shown to increase the risk of breast cancer up to 33-58% in some families; where individuals fall within this risk range is dependent upon family history.9 PALB2 mutations have also been associated with increased risk for pancreatic cancer, although this risk has not been quantified yet.  Individuals who inherit two PALB2 mutations--one from their mother and one from their father--have a condition called Fanconi Anemia.  This rare autosomal recessive condition is associated with short stature, developmental delay, bone marrow failure, and increased risk for childhood cancers.    DAVIN  DAVIN is a moderate-risk breast cancer gene. Women who have a mutation in DAVIN can have between a 2-4 fold increased risk for breast cancer compared to the general population.10 DAVIN mutations have also been associated with increased risk for pancreatic cancer, however an estimate of this cancer risk is not well understood.11  Individuals who inherit two DAVIN mutations have a condition called ataxia-telangiectasia (AT).  This rare autosomal recessive condition affects the nervous system and immune system, and is associated with progressive cerebellar ataxia beginning in childhood.  Individuals with ataxia-telangiectasia often have a weakened immune system and have an increased risk for childhood cancers.         CHEK2   CHEK2 is a moderate-risk breast cancer gene.  Women who have a mutation in CHEK2 have around a 2-fold increased risk for breast cancer compared to the general population, and this risk may be higher depending upon family history.12,13,14 Mutations in CHEK2 have also been shown to increase the risk of a number of other cancers, including colon and prostate, however these cancer risks are currently not well understood.      Inheritance   All of the genes  reviewed above are inherited in an autosomal dominant pattern. This means that if a parent has a mutation, each of his or her children will have a 50% chance of inheriting that same mutation.  Therefore, each child--male or female--would have a 50% chance of being at increased risk for developing cancer.        Image obtained from Genetics Home Reference, 2013     In rare cases, there can be mutations in both copies of these genes (one from each parent), leading to different autosomal recessive conditions.  Mutations in some genes can occur de karena, which means that a person s mutation occurred for the first time in them and was not inherited from a parent.  Now that they have the mutation, however, it can be passed on to future generations.     Genetic Testing  Genetic testing involves a blood test and will look for any harmful mutations within genes that are associated with increased cancer risk.  If possible, it is recommended that the person(s) who has had cancer be tested before other family members.  That person will give us the most useful information about whether or not a specific gene is associated with the cancer in the family.    Results  There are three possible results of genetic testing:  Positive--a harmful mutation was identified in one or more of the genes  Negative--no mutation was identified in any of the genes on this panel  Variant of unknown significance--a variation in one of the genes was identified, but it is unclear how this impacts cancer risk in the family    Advantages and Disadvantages  There are advantages and disadvantages to genetic testing.  Advantages  May clarify your cancer risk  Can help you make medical decisions  May explain the cancers in your family  May give useful information to your family members (if you share your results)    Disadvantages  Possible negative emotional impact of learning about inherited cancer risk  Uncertainty in interpreting a negative test result in  some situations  Possible genetic discrimination concerns (see below)    Genetic Information Nondiscrimination Act (LAURYN)  LAURYN is a federal law that protects individuals from health insurance or employment discrimination based on a genetic test result alone.  Although rare, there are currently no legal discrimination protections in terms of life insurance, long term care, or disability insurances. Visit the National Human Genome Research [place] genome.gov/86107831 to learn more.    Reducing Cancer Risk  Each of the genes listed within this handout have nationally recognized cancer screening guidelines that would be recommended for individuals who test positive.  In addition to increased cancer screening, surgeries may be offered or recommended to reduce cancer risk.  Recommendations are based upon an individual s genetic test result as well as their personal and family history of cancer.    Questions to Think About Regarding Genetic Testing  What effect will the test result have on me and my relationship with my family members if I have an inherited gene mutation?  If I don t have a gene mutation?  Should I share my test results, and how will my family react to this news, which may also affect them?  Are my children ready to learn new information that may one day affect their own health?    Resources  FORCE: Facing Our Risk of Cancer Empowered facingourrisk.org   Bright Pink bebrightpink.org   Li-Fraumeni Syndrome Association lfsassociation.org   PTEN World [employer].Catalyst Mobile   No stomach for cancer, Inc. nostomachforcancer.org   Stomach cancer relief network scrnet.org   Collaborative Group of the Americas on Inherited Colorectal Cancer (CGA) cgaicc.com    Cancer Care cancercare.org   American Cancer Society (ACS) cancer.org   National Cancer [place] (NCI) cancer.gov     Cancer Risk Management Program [phone] ([phone])  Colt Lopes, MS [facility] [place]  [phone]  Cici Flowers, MS, [facility] [place] [phone]  Kamila  Destiny, MS, [facility]  [phone]  Vonda Akins, MS, [facility]  [phone]  Janet Holden, MS, [facility]  [phone]  Lora Kim, MS, [facility]  [phone]  Esperanza Mead, MS, [facility]  [phone]    References  Nidia JOSHI, Macario PDP, Narod S, Riseyad KUNZ, Dontae JE, Eddie JL, Maria L N, Stew H, Lalo O, Leta A, Chandra B, Radilito P, Mannemesio S, Carrie DM, Coleman N, Shailesh E, Glenna H, Pedro E, Ana J, Lorena J, Sarah B, Swati H, Kenn S, Eerola H, Angel H, Beti K, Homa OP. Average risks of breast and ovarian cancer associated with BRCA1 or BRCA2 mutations detected in case series unselected for family history: a combined analysis of 222 studies. Am J Hum Ekta. 2003;72:1117-30.  Amilcar N, Megan M, Christine G.  BRCA1 and BRCA2 Hereditary Breast and Ovarian Cancer. Gene Reviews online. 2013.  Bryan YC, Brisa S, Aftab G, Bautista S. Breast cancer risk among male BRCA1 and BRCA2 mutation carriers. J Natl Cancer Inst. 2007;99:1811-4.  Tino DG, Kristin I, Deangelo J, Peri E, Ivelisse ER, Lakerrie F. Risk of breast cancer in male BRCA2 carriers. J Med Ekta. 2010;47:710-1.  Theodore MH, Destin J, Monika J, Jamilah ANGEL, Tien MS, Eng C. Lifetime cancer risks in individuals with germline PTEN mutations. Clin Cancer Res. 2012;18:400-7.  Pilakbar BRUNSON. Cowden Syndrome: A Critical Review of the Clinical Literature. J Ekta . 2009:18:13-27.  National Comprehensive Cancer Network. Clinical practice guidelines in oncology, colorectal cancer screening. Available online (registration required). 2013.  National Cancer [place]. SEER Cancer Stat Fact Sheets.  December 2013.  Nidia ROBLES, et al. Breast-Cancer Risk in Families with Mutations in PALB2. NE. 2014; 371(6):497-506.  Eze A, Lebron D, Rosibel S, Selene P, Robert T, Vandana M, Javi B, Ion H, Carol R, Niraj K, Roge L, Tino DG, Carrie D, Vlad DF, Ginny MR, The Breast Cancer Susceptibility Collaboration (UK) & Jozef FAITH. DAVIN  mutations that cause ataxia-telangiectasia are breast cancer susceptibility alleles. Nature Genetics. 2006;38:873-875  Mele N , Rafy Y, Una J, Kathi L, Michele GM , Familia ML, Adan S, Regan AG, Fadi S, Giovanni ML, Pham J , Bright R, Kimi SZ, Raquel JR, Saulo VE, Brian M, Voxochitlstein B, Calvin N, Kamilah RH, Jase KW, and Mireya AP. DAVIN mutations in patients with hereditary pancreatic cancer. Cancer Discover. 2012;2:41-46  CHEK2 Breast Cancer Case-Control Consortium. CHEK2*1100delC and susceptibility to breast cancer: A collaborative analysis involving 10,860 breast cancer cases and 9,065 controls from 10 studies. Am J Hum Ekta, 74 (2004), pp. 0518-9875  Chu T, Edilma S, Naty K, et al. Spectrum of Mutations in BRCA1, BRCA2, CHEK2, and TP53 in Families at High Risk of Breast Cancer. CINTHYA, 2006;295(12):3605-0718.   Kary C, Mariaelena D, Virginia A, et al. Risk of breast cancer in women with a CHEK2 mutation with and without a family history of breast cancer. JClin Oncol. 2011;29:5718-6020.      TURNAROUND TIME  4 - 6 weeks    INSURANCE COVERAGE   A prior authorization will be submitted when your provider submits the order for this panel. Testing will be held until prior authorization is obtained. You will be contacted once prior authorization is completed. For details regarding coverage and out-of-pocket estimates, please contact a  at the [facility] at [phone].    About the [facility]), [facility]  In collaborative effort with the [facility] and the [place] Are You a Human, the [facility] offers a full range of diagnostic testing services, with a strong focus on quality, accuracy, and timeliness.

## 2023-06-15 ENCOUNTER — VIRTUAL VISIT (OUTPATIENT)
Dept: ONCOLOGY | Facility: CLINIC | Age: 63
End: 2023-06-15
Attending: INTERNAL MEDICINE
Payer: COMMERCIAL

## 2023-06-15 ENCOUNTER — LAB (OUTPATIENT)
Dept: LAB | Facility: CLINIC | Age: 63
End: 2023-06-15
Attending: INTERNAL MEDICINE
Payer: COMMERCIAL

## 2023-06-15 DIAGNOSIS — C50.911 INVASIVE LOBULAR CARCINOMA OF RIGHT BREAST IN FEMALE (H): Primary | ICD-10-CM

## 2023-06-15 DIAGNOSIS — C50.411 MALIGNANT NEOPLASM OF UPPER-OUTER QUADRANT OF RIGHT FEMALE BREAST, UNSPECIFIED ESTROGEN RECEPTOR STATUS (H): Primary | ICD-10-CM

## 2023-06-15 PROCEDURE — 81162 BRCA1&2 GEN FULL SEQ DUP/DEL: CPT | Performed by: SURGERY

## 2023-06-15 PROCEDURE — G0452 MOLECULAR PATHOLOGY INTERPR: HCPCS | Mod: 26 | Performed by: PATHOLOGY

## 2023-06-15 PROCEDURE — 99215 OFFICE O/P EST HI 40 MIN: CPT | Mod: 95 | Performed by: INTERNAL MEDICINE

## 2023-06-15 NOTE — PROGRESS NOTES
Virtual Visit Details    Type of service:  Video Visit     Originating Location (pt. Location): Home    Distant Location (provider location):  Off-site  Platform used for Video Visit: [employer]        Video-Visit Details    Type of service:  Video Visit    Video Start Time: 10:58AM  Video End Time: 11:08AM    Originating Location (pt. Location): Home in [place]    Distant Location (provider location):  [place]    Platform used for Video Visit: [facility] Hematology and Oncology Consult Note    Patient: Sheila Schilling  MRN: [id number]  Date of Service: 06/15/2023      Reason for Visit    No chief complaint on file.        Assessment/Plan    Problem List Items Addressed This Visit    None    Invasive lobular carcinoma of the right breast, hormone receptor positive and HER2 negative  Viewed her mammogram, ultrasound and contrast enhanced mammogram reports along with needle biopsy reports in detail.  Screening mammogram picked up an abnormality in the right breast.  Ultrasound initially showed a 12 mm mass.  Biopsy came back showing for grade 2 invasive lobular carcinoma which was strongly ER and [place] positive.  HER2 negative.  Due to very dense breast she also underwent contrast-enhanced mammography which is showing an extensive enhancement in the right breast extending from 11:00 to 8 o'clock position consistent with malignancy.  She is due to undergo mastectomy with sentinel lymph node biopsy next week.  I reviewed systemic therapy for invasive lobular carcinoma in detail today.  Depending upon the path report we will decide whether she needs systemic chemotherapy or not.  Most likely not.  But will need Oncotype DX testing.  Definitely need adjuvant endocrine therapy.  Since she is postmenopausal would recommend aromatase inhibitors.  Total duration of treatment will be at least 5 years.  Due to potential side effects and complications associated with endocrine therapy in detail today.  We will  see her back after her surgery to discuss path report and make further arrangements at the time.  She will also need a DEXA scan.    ECOG Performance    0 - Independent    Problem List    Patient Active Problem List   Diagnosis     CARDIOVASCULAR SCREENING; LDL GOAL LESS THAN 160     Palpitations     Invasive lobular carcinoma of right breast in female (H)     ______________________________________________________________________________    Staging History    Cancer Staging   No matching staging information was found for the patient.      History of presenting illness:  Sheila Schilling is a 62-year-old female with a new diagnosis of right-sided breast cancer who is seen in the oncology clinic as a video visit to discuss systemic therapy for the same.    A screening mammogram done recently showed asymmetry in the upper outer quadrant of her right breast.  Diagnostic mammogram and ultrasound done in early May demonstrated architectural distortion in the right breast and a 12 x 10 x 8 mm ill-defined mass with acoustic shadowing respectively.  This was corresponding to the abnormality seen on the screening mammography.  Needle biopsy of the mass came back showing invasive lobular carcinoma.  Grade 2.  Strongly ER and [place] positive and HER2 negative.  She had very dense breasts a contrast-enhanced mammogram was ordered by surgery.  It is showing a 7.8 x 6.7 x 5.3 cm enhancement in the right breast extending from 11:00 to 8 o'clock position consistent with malignancy.  She is scheduled to undergo mastectomy with sentinel lymph node biopsy next week.    Smoker.  No significant family history of breast cancer.  Genetic testing has been ordered.  She does have history of atrial fibrillation.      Past History    Past Medical History:   Diagnosis Date     Atrial fibrillation (H)      Palpitations 6/24/2017     Paroxysmal atrial flutter (H)     Family History   Problem Relation Age of Onset     Hypertension Mother      Lipids  Mother      C.A.D. Father      Hypertension Father      Diabetes Maternal Grandmother      C.A.D. Maternal Grandmother      C.A.D. Maternal Grandfather      Cancer Paternal Grandmother         liver     Cancer - colorectal Other      Anesthesia Reaction No family hx of      Bleeding Disorder No family hx of      Venous thrombosis No family hx of       Past Surgical History:   Procedure Laterality Date     COLONOSCOPY  7/28/2011    Procedure:COLONOSCOPY; Surgeon:MIKE LYLE; Location:[facility]     COLONOSCOPY N/A 6/24/2022    Procedure: COLONOSCOPY;  Surgeon: Karime Jain MD;  Location: [facility]     SURGICAL HISTORY OF -   07/15/2002    laparoscopic L salpingo-oophorectomy - ectopic     SURGICAL HISTORY OF -   7/15/2002    SAB    Social History     Socioeconomic History     Marital status:      Spouse name: Not on file     Number of children: Not on file     Years of education: Not on file     Highest education level: Not on file   Occupational History     Not on file   Tobacco Use     Smoking status: Never     Smokeless tobacco: Never   Vaping Use     Vaping status: Never Used   Substance and Sexual Activity     Alcohol use: Not Currently     Drug use: Never     Sexual activity: Yes     Birth control/protection: None   Other Topics Concern     Parent/sibling w/ CABG, MI or angioplasty before 65F 55M? No   Social History Narrative     Not on file     Social Determinants of Health     Financial Resource Strain: Not on file   Food Insecurity: Not on file   Transportation Needs: Not on file   Physical Activity: Not on file   Stress: Not on file   Social Connections: Not on file   Intimate Partner Violence: Not on file   Housing Stability: Not on file        Allergies    No Known Allergies    Review of Systems    Pertinent items are noted in HPI.      Physical Exam        6/5/2023     8:46 AM   Oncology Vitals   /68       General: alert and cooperative        Lab Results    No results found for this or  any previous visit (from the past 168 hour(s)).    Imaging Results    US Breast Right    Result Date: 5/26/2023  EXAMINATION: MA CONTRAST ENHANCED DIGITAL MAMMOGRAM W REINA, US BREAST RIGHT LIMITED 1-3 QUADRANTS, 5/24/2023 1:30 PM HISTORY/FAMILY HISTORY: Invasive lobular carcinoma of the right breast, surgical planning COMPARISON: 5/10/2023, 5/4/2023, 4/7/2023, 1/11/2022, 10/6/2016 TECHNIQUE: Following the administration of intravenous contrast, dual-energy mammography of both breasts is performed.  Contrast: 125 mL Isovue-370. FINDINGS: BREAST DENSITY: Extremely dense. Conventional mammographic views demonstrate a biopsy marker within the right breast 11:00 position in the area of architectural distortion, similar to prior. Contrast mammographic views demonstrates nonmass enhancement throughout the outer right breast breast extending from the 11:00 position to the 8:00 position. More avid focal enhancement is noted at the 11:00 position at the site of prior biopsy. The entire area of enhancement measures approximately 7.8 x 6.7 x 5.3 cm. There is no suspicious enhancement within the left breast. ULTRASOUND: Grayscale images acquired by both the technologist and radiologist: Redemonstration of irregular hypoechoic area mass at the 11:00 position at the site of prior biopsy. Biopsy clip is seen adjacent to the mass. There are several vague shadowing areas throughout the right upper outer quadrant which likely correspond to the area of enhancement.     IMPRESSION: BI-RADS CATEGORY: 6 - Known Biopsy-Proven Malignancy. RECOMMENDED FOLLOW-UP: Clinical Follow-up. If the patient is a candidate for breast conservation therapy and additional tissue sampling is desired, would recommend contrast enhanced biopsy of the tissue at the 8:00 to 9:00 position. Findings and recommendations were discussed with Dr. Mcdermott by Dr. Mast at 2:00 PM. No tag or localizer was placed during today's examination.  Discussion for breast  conservation versus mastectomy will be discussed with the patient by Dr. Mcdermott. I have personally reviewed the examination and initial interpretation and I agree with the findings. SHIMA ORO MD   SYSTEM ID:  [id number]     MA Contrast Enhanced Mammogram w Reina    Result Date: 5/24/2023  EXAMINATION: MA CONTRAST ENHANCED DIGITAL MAMMOGRAM W REINA, US BREAST RIGHT LIMITED 1-3 QUADRANTS, 5/24/2023 1:30 PM HISTORY/FAMILY HISTORY: Invasive lobular carcinoma of the right breast, surgical planning COMPARISON: 5/10/2023, 5/4/2023, 4/7/2023, 1/11/2022, 10/6/2016 TECHNIQUE: Following the administration of intravenous contrast, dual-energy mammography of both breasts is performed.  Contrast: 125 mL Isovue-370. FINDINGS: BREAST DENSITY: Extremely dense. Conventional mammographic views demonstrate a biopsy marker within the right breast 11:00 position in the area of architectural distortion, similar to prior. Contrast mammographic views demonstrates nonmass enhancement throughout the outer right breast breast extending from the 11:00 position to the 8:00 position. More avid focal enhancement is noted at the 11:00 position at the site of prior biopsy. The entire area of enhancement measures approximately 7.8 x 6.7 x 5.3 cm. There is no suspicious enhancement within the left breast. ULTRASOUND: Grayscale images acquired by both the technologist and radiologist: Redemonstration of irregular hypoechoic area mass at the 11:00 position at the site of prior biopsy. Biopsy clip is seen adjacent to the mass. There are several vague shadowing areas throughout the right upper outer quadrant which likely correspond to the area of enhancement.     IMPRESSION: BI-RADS CATEGORY: 6 - Known Biopsy-Proven Malignancy. RECOMMENDED FOLLOW-UP: Clinical Follow-up. If the patient is a candidate for breast conservation therapy and additional tissue sampling is desired, would recommend contrast enhanced biopsy of the tissue at the 8:00 to 9:00  position. Findings and recommendations were discussed with Dr. Mcdermott by Dr. Mast at 2:00 PM. No tag or localizer was placed during today's examination.  Discussion for breast conservation versus mastectomy will be discussed with the patient by Dr. Mcdermott. I have personally reviewed the examination and initial interpretation and I agree with the findings. SHIMA MAST MD   SYSTEM ID:  [id number]       A total of 40 minutes was spent today on this visit including face to face conversation with the patient, EMR review (labs, imaging studies, pathology reports and outside records), counseling and care co-ordination and documentation.    Signed by: Merna Dooley MD

## 2023-06-15 NOTE — LETTER
6/15/2023         RE: Sheila Schilling  [address] [facility] [address]        Dear Colleague,    Thank you for referring your patient, Sheila Schilling, to the [facility]. Please see a copy of my visit note below.    Virtual Visit Details    Type of service:  Video Visit     Originating Location (pt. Location): Home    Distant Location (provider location):  Off-site  Platform used for Video Visit: [facility]        Video-Visit Details    Type of service:  Video Visit    Video Start Time: 10:58AM  Video End Time: 11:08AM    Originating Location (pt. Location): Home in [place]    Distant Location (provider location):  [place]    Platform used for Video Visit: [facility] Hematology and Oncology Consult Note    Patient: Sheila Schilling  MRN: [id number]  Date of Service: 06/15/2023      Reason for Visit    No chief complaint on file.        Assessment/Plan    Problem List Items Addressed This Visit    None    Invasive lobular carcinoma of the right breast, hormone receptor positive and HER2 negative  Viewed her mammogram, ultrasound and contrast enhanced mammogram reports along with needle biopsy reports in detail.  Screening mammogram picked up an abnormality in the right breast.  Ultrasound initially showed a 12 mm mass.  Biopsy came back showing for grade 2 invasive lobular carcinoma which was strongly ER and [place] positive.  HER2 negative.  Due to very dense breast she also underwent contrast-enhanced mammography which is showing an extensive enhancement in the right breast extending from 11:00 to 8 o'clock position consistent with malignancy.  She is due to undergo mastectomy with sentinel lymph node biopsy next week.  I reviewed systemic therapy for invasive lobular carcinoma in detail today.  Depending upon the path report we will decide whether she needs systemic chemotherapy or not.  Most likely not.  But will need Oncotype DX testing.  Definitely need  adjuvant endocrine therapy.  Since she is postmenopausal would recommend aromatase inhibitors.  Total duration of treatment will be at least 5 years.  Due to potential side effects and complications associated with endocrine therapy in detail today.  We will see her back after her surgery to discuss path report and make further arrangements at the time.  She will also need a DEXA scan.    ECOG Performance    0 - Independent    Problem List    Patient Active Problem List   Diagnosis     CARDIOVASCULAR SCREENING; LDL GOAL LESS THAN 160     Palpitations     Invasive lobular carcinoma of right breast in female (H)     ______________________________________________________________________________    Staging History    Cancer Staging   No matching staging information was found for the patient.      History of presenting illness:  Sheila Schilling is a 62-year-old female with a new diagnosis of right-sided breast cancer who is seen in the oncology clinic as a video visit to discuss systemic therapy for the same.    A screening mammogram done recently showed asymmetry in the upper outer quadrant of her right breast.  Diagnostic mammogram and ultrasound done in early May demonstrated architectural distortion in the right breast and a 12 x 10 x 8 mm ill-defined mass with acoustic shadowing respectively.  This was corresponding to the abnormality seen on the screening mammography.  Needle biopsy of the mass came back showing invasive lobular carcinoma.  Grade 2.  Strongly ER and [place] positive and HER2 negative.  She had very dense breasts a contrast-enhanced mammogram was ordered by surgery.  It is showing a 7.8 x 6.7 x 5.3 cm enhancement in the right breast extending from 11:00 to 8 o'clock position consistent with malignancy.  She is scheduled to undergo mastectomy with sentinel lymph node biopsy next week.    Smoker.  No significant family history of breast cancer.  Genetic testing has been ordered.  She does have history of  atrial fibrillation.      Past History    Past Medical History:   Diagnosis Date     Atrial fibrillation (H)      Palpitations 6/24/2017     Paroxysmal atrial flutter (H)     Family History   Problem Relation Age of Onset     Hypertension Mother      Lipids Mother      C.A.D. Father      Hypertension Father      Diabetes Maternal Grandmother      C.A.D. Maternal Grandmother      C.A.D. Maternal Grandfather      Cancer Paternal Grandmother         liver     Cancer - colorectal Other      Anesthesia Reaction No family hx of      Bleeding Disorder No family hx of      Venous thrombosis No family hx of       Past Surgical History:   Procedure Laterality Date     COLONOSCOPY  7/28/2011    Procedure:COLONOSCOPY; Surgeon:MIKE LYLE; Location:[facility]     COLONOSCOPY N/A 6/24/2022    Procedure: COLONOSCOPY;  Surgeon: Karime Jain MD;  Location: [facility]     SURGICAL HISTORY OF -   07/15/2002    laparoscopic L salpingo-oophorectomy - ectopic     SURGICAL HISTORY OF -   7/15/2002    SAB    Social History     Socioeconomic History     Marital status:      Spouse name: Not on file     Number of children: Not on file     Years of education: Not on file     Highest education level: Not on file   Occupational History     Not on file   Tobacco Use     Smoking status: Never     Smokeless tobacco: Never   Vaping Use     Vaping status: Never Used   Substance and Sexual Activity     Alcohol use: Not Currently     Drug use: Never     Sexual activity: Yes     Birth control/protection: None   Other Topics Concern     Parent/sibling w/ CABG, MI or angioplasty before 65F 55M? No   Social History Narrative     Not on file     Social Determinants of Health     Financial Resource Strain: Not on file   Food Insecurity: Not on file   Transportation Needs: Not on file   Physical Activity: Not on file   Stress: Not on file   Social Connections: Not on file   Intimate Partner Violence: Not on file   Housing Stability: Not on file         Allergies    No Known Allergies    Review of Systems    Pertinent items are noted in HPI.      Physical Exam        6/5/2023     8:46 AM   Oncology Vitals   /68       General: alert and cooperative        Lab Results    No results found for this or any previous visit (from the past 168 hour(s)).    Imaging Results    US Breast Right    Result Date: 5/26/2023  EXAMINATION: MA CONTRAST ENHANCED DIGITAL MAMMOGRAM W REINA, US BREAST RIGHT LIMITED 1-3 QUADRANTS, 5/24/2023 1:30 PM HISTORY/FAMILY HISTORY: Invasive lobular carcinoma of the right breast, surgical planning COMPARISON: 5/10/2023, 5/4/2023, 4/7/2023, 1/11/2022, 10/6/2016 TECHNIQUE: Following the administration of intravenous contrast, dual-energy mammography of both breasts is performed.  Contrast: 125 mL Isovue-370. FINDINGS: BREAST DENSITY: Extremely dense. Conventional mammographic views demonstrate a biopsy marker within the right breast 11:00 position in the area of architectural distortion, similar to prior. Contrast mammographic views demonstrates nonmass enhancement throughout the outer right breast breast extending from the 11:00 position to the 8:00 position. More avid focal enhancement is noted at the 11:00 position at the site of prior biopsy. The entire area of enhancement measures approximately 7.8 x 6.7 x 5.3 cm. There is no suspicious enhancement within the left breast. ULTRASOUND: Grayscale images acquired by both the technologist and radiologist: Redemonstration of irregular hypoechoic area mass at the 11:00 position at the site of prior biopsy. Biopsy clip is seen adjacent to the mass. There are several vague shadowing areas throughout the right upper outer quadrant which likely correspond to the area of enhancement.     IMPRESSION: BI-RADS CATEGORY: 6 - Known Biopsy-Proven Malignancy. RECOMMENDED FOLLOW-UP: Clinical Follow-up. If the patient is a candidate for breast conservation therapy and additional tissue sampling is desired,  would recommend contrast enhanced biopsy of the tissue at the 8:00 to 9:00 position. Findings and recommendations were discussed with Dr. Mcdermott by Dr. Mast at 2:00 PM. No tag or localizer was placed during today's examination.  Discussion for breast conservation versus mastectomy will be discussed with the patient by Dr. Mcdermott. I have personally reviewed the examination and initial interpretation and I agree with the findings. SHIMA MAST MD   SYSTEM ID:  [id number]     MA Contrast Enhanced Mammogram w Reina    Result Date: 5/24/2023  EXAMINATION: MA CONTRAST ENHANCED DIGITAL MAMMOGRAM W REINA, US BREAST RIGHT LIMITED 1-3 QUADRANTS, 5/24/2023 1:30 PM HISTORY/FAMILY HISTORY: Invasive lobular carcinoma of the right breast, surgical planning COMPARISON: 5/10/2023, 5/4/2023, 4/7/2023, 1/11/2022, 10/6/2016 TECHNIQUE: Following the administration of intravenous contrast, dual-energy mammography of both breasts is performed.  Contrast: 125 mL Isovue-370. FINDINGS: BREAST DENSITY: Extremely dense. Conventional mammographic views demonstrate a biopsy marker within the right breast 11:00 position in the area of architectural distortion, similar to prior. Contrast mammographic views demonstrates nonmass enhancement throughout the outer right breast breast extending from the 11:00 position to the 8:00 position. More avid focal enhancement is noted at the 11:00 position at the site of prior biopsy. The entire area of enhancement measures approximately 7.8 x 6.7 x 5.3 cm. There is no suspicious enhancement within the left breast. ULTRASOUND: Grayscale images acquired by both the technologist and radiologist: Redemonstration of irregular hypoechoic area mass at the 11:00 position at the site of prior biopsy. Biopsy clip is seen adjacent to the mass. There are several vague shadowing areas throughout the right upper outer quadrant which likely correspond to the area of enhancement.     IMPRESSION: BI-RADS CATEGORY: 6 - Known  Biopsy-Proven Malignancy. RECOMMENDED FOLLOW-UP: Clinical Follow-up. If the patient is a candidate for breast conservation therapy and additional tissue sampling is desired, would recommend contrast enhanced biopsy of the tissue at the 8:00 to 9:00 position. Findings and recommendations were discussed with Dr. Mcdermott by Dr. Mast at 2:00 PM. No tag or localizer was placed during today's examination.  Discussion for breast conservation versus mastectomy will be discussed with the patient by Dr. Mcdermott. I have personally reviewed the examination and initial interpretation and I agree with the findings. SHIMA MAST MD   SYSTEM ID:  [id number]       A total of 40 minutes was spent today on this visit including face to face conversation with the patient, EMR review (labs, imaging studies, pathology reports and outside records), counseling and care co-ordination and documentation.    Signed by: Merna Dooley MD        Again, thank you for allowing me to participate in the care of your patient.        Sincerely,        Merna Dooley MD

## 2023-06-15 NOTE — NURSING NOTE
Is the patient currently in the state of [place]? YES    Visit mode:VIDEO    If the visit is dropped, the patient can be reconnected by: VIDEO VISIT: Text to cell phone: [phone]    Will anyone else be joining the visit? Yes, Pt's (Stephen) is currently with the pt and will be joining the visit per pt        How would you like to obtain your AVS? MyChart    Are changes needed to the allergy or medication list? NO    Reason for visit: Consult      No other vitals to report per pt    Ashley Hinson VF

## 2023-06-15 NOTE — LETTER
6/15/2023         RE: Sheila Schilling  [address] [facility] [address]        Dear Colleague,    Thank you for referring your patient, Sheila Schilling, to the [facility] [place]. Please see a copy of my visit note below.    Virtual Visit Details    Type of service:  Video Visit     Originating Location (pt. Location): Home    Distant Location (provider location):  Off-site  Platform used for Video Visit: [facility]        Video-Visit Details    Type of service:  Video Visit    Video Start Time: 10:58AM  Video End Time: 11:08AM    Originating Location (pt. Location): Home in [place]    Distant Location (provider location):  [place]    Platform used for Video Visit: [facility]ology [facility] Oncology Consult Note    Patient: Sheila Schilling  MRN: [id number]  Date of Service: 06/15/2023      Reason for Visit    No chief complaint on file.        Assessment/Plan    Problem List Items Addressed This Visit    None    Invasive lobular carcinoma of the right breast, hormone receptor positive and HER2 negative  Viewed her mammogram, ultrasound and contrast enhanced mammogram reports along with needle biopsy reports in detail.  Screening mammogram picked up an abnormality in the right breast.  Ultrasound initially showed a 12 mm mass.  Biopsy came back showing for grade 2 invasive lobular carcinoma which was strongly ER and OK positive.  HER2 negative.  Due to very dense breast she also underwent contrast-enhanced mammography which is showing an extensive enhancement in the right breast extending from 11:00 to 8 o'clock position consistent with malignancy.  She is due to undergo mastectomy with sentinel lymph node biopsy next week.  I reviewed systemic therapy for invasive lobular carcinoma in detail today.  Depending upon the path report we will decide whether she needs systemic chemotherapy or not.  Most likely not.  But will need Oncotype DX testing.  Definitely need  adjuvant endocrine therapy.  Since she is postmenopausal would recommend aromatase inhibitors.  Total duration of treatment will be at least 5 years.  Due to potential side effects and complications associated with endocrine therapy in detail today.  We will see her back after her surgery to discuss path report and make further arrangements at the time.  She will also need a DEXA scan.    ECOG Performance    0 - Independent    Problem List    Patient Active Problem List   Diagnosis     CARDIOVASCULAR SCREENING; LDL GOAL LESS THAN 160     Palpitations     Invasive lobular carcinoma of right breast in female (H)     ______________________________________________________________________________    Staging History    Cancer Staging   No matching staging information was found for the patient.      History of presenting illness:  Sheila Schilling is a 62-year-old female with a new diagnosis of right-sided breast cancer who is seen in the oncology clinic as a video visit to discuss systemic therapy for the same.    A screening mammogram done recently showed asymmetry in the upper outer quadrant of her right breast.  Diagnostic mammogram and ultrasound done in early May demonstrated architectural distortion in the right breast and a 12 x 10 x 8 mm ill-defined mass with acoustic shadowing respectively.  This was corresponding to the abnormality seen on the screening mammography.  Needle biopsy of the mass came back showing invasive lobular carcinoma.  Grade 2.  Strongly ER and CA positive and HER2 negative.  She had very dense breasts a contrast-enhanced mammogram was ordered by surgery.  It is showing a 7.8 x 6.7 x 5.3 cm enhancement in the right breast extending from 11:00 to 8 o'clock position consistent with malignancy.  She is scheduled to undergo mastectomy with sentinel lymph node biopsy next week.    Smoker.  No significant family history of breast cancer.  Genetic testing has been ordered.  She does have history of  atrial fibrillation.      Past History    Past Medical History:   Diagnosis Date     Atrial fibrillation (H)      Palpitations 6/24/2017     Paroxysmal atrial flutter (H)     Family History   Problem Relation Age of Onset     Hypertension Mother      Lipids Mother      C.A.D. Father      Hypertension Father      Diabetes Maternal Grandmother      C.A.D. Maternal Grandmother      C.A.D. Maternal Grandfather      Cancer Paternal Grandmother         liver     Cancer - colorectal Other      Anesthesia Reaction No family hx of      Bleeding Disorder No family hx of      Venous thrombosis No family hx of       Past Surgical History:   Procedure Laterality Date     COLONOSCOPY  7/28/2011    Procedure:COLONOSCOPY; Surgeon:MIKE LYLE; Location:[facility]     COLONOSCOPY N/A 6/24/2022    Procedure: COLONOSCOPY;  Surgeon: Karime Jain MD;  Location: [facility]     SURGICAL HISTORY OF -   07/15/2002    laparoscopic L salpingo-oophorectomy - ectopic     SURGICAL HISTORY OF -   7/15/2002    SAB    Social History     Socioeconomic History     Marital status:      Spouse name: Not on file     Number of children: Not on file     Years of education: Not on file     Highest education level: Not on file   Occupational History     Not on file   Tobacco Use     Smoking status: Never     Smokeless tobacco: Never   Vaping Use     Vaping status: Never Used   Substance and Sexual Activity     Alcohol use: Not Currently     Drug use: Never     Sexual activity: Yes     Birth control/protection: None   Other Topics Concern     Parent/sibling w/ CABG, MI or angioplasty before 65F 55M? No   Social History Narrative     Not on file     Social Determinants of Health     Financial Resource Strain: Not on file   Food Insecurity: Not on file   Transportation Needs: Not on file   Physical Activity: Not on file   Stress: Not on file   Social Connections: Not on file   Intimate Partner Violence: Not on file   Housing Stability: Not on file         Allergies    No Known Allergies    Review of Systems    Pertinent items are noted in HPI.      Physical Exam        6/5/2023     8:46 AM   Oncology Vitals   /68       General: alert and cooperative        Lab Results    No results found for this or any previous visit (from the past 168 hour(s)).    Imaging Results    US Breast Right    Result Date: 5/26/2023  EXAMINATION: MA CONTRAST ENHANCED DIGITAL MAMMOGRAM W REINA, US BREAST RIGHT LIMITED 1-3 QUADRANTS, 5/24/2023 1:30 PM HISTORY/FAMILY HISTORY: Invasive lobular carcinoma of the right breast, surgical planning COMPARISON: 5/10/2023, 5/4/2023, 4/7/2023, 1/11/2022, 10/6/2016 TECHNIQUE: Following the administration of intravenous contrast, dual-energy mammography of both breasts is performed.  Contrast: 125 mL Isovue-370. FINDINGS: BREAST DENSITY: Extremely dense. Conventional mammographic views demonstrate a biopsy marker within the right breast 11:00 position in the area of architectural distortion, similar to prior. Contrast mammographic views demonstrates nonmass enhancement throughout the outer right breast breast extending from the 11:00 position to the 8:00 position. More avid focal enhancement is noted at the 11:00 position at the site of prior biopsy. The entire area of enhancement measures approximately 7.8 x 6.7 x 5.3 cm. There is no suspicious enhancement within the left breast. ULTRASOUND: Grayscale images acquired by both the technologist and radiologist: Redemonstration of irregular hypoechoic area mass at the 11:00 position at the site of prior biopsy. Biopsy clip is seen adjacent to the mass. There are several vague shadowing areas throughout the right upper outer quadrant which likely correspond to the area of enhancement.     IMPRESSION: BI-RADS CATEGORY: 6 - Known Biopsy-Proven Malignancy. RECOMMENDED FOLLOW-UP: Clinical Follow-up. If the patient is a candidate for breast conservation therapy and additional tissue sampling is desired,  would recommend contrast enhanced biopsy of the tissue at the 8:00 to 9:00 position. Findings and recommendations were discussed with Dr. Mcdermott by Dr. Mast at 2:00 PM. No tag or localizer was placed during today's examination.  Discussion for breast conservation versus mastectomy will be discussed with the patient by Dr. Mcdermott. I have personally reviewed the examination and initial interpretation and I agree with the findings. SHIMA MAST MD   SYSTEM ID:  [id number]     MA Contrast Enhanced Mammogram w Reina    Result Date: 5/24/2023  EXAMINATION: MA CONTRAST ENHANCED DIGITAL MAMMOGRAM W REINA, US BREAST RIGHT LIMITED 1-3 QUADRANTS, 5/24/2023 1:30 PM HISTORY/FAMILY HISTORY: Invasive lobular carcinoma of the right breast, surgical planning COMPARISON: 5/10/2023, 5/4/2023, 4/7/2023, 1/11/2022, 10/6/2016 TECHNIQUE: Following the administration of intravenous contrast, dual-energy mammography of both breasts is performed.  Contrast: 125 mL Isovue-370. FINDINGS: BREAST DENSITY: Extremely dense. Conventional mammographic views demonstrate a biopsy marker within the right breast 11:00 position in the area of architectural distortion, similar to prior. Contrast mammographic views demonstrates nonmass enhancement throughout the outer right breast breast extending from the 11:00 position to the 8:00 position. More avid focal enhancement is noted at the 11:00 position at the site of prior biopsy. The entire area of enhancement measures approximately 7.8 x 6.7 x 5.3 cm. There is no suspicious enhancement within the left breast. ULTRASOUND: Grayscale images acquired by both the technologist and radiologist: Redemonstration of irregular hypoechoic area mass at the 11:00 position at the site of prior biopsy. Biopsy clip is seen adjacent to the mass. There are several vague shadowing areas throughout the right upper outer quadrant which likely correspond to the area of enhancement.     IMPRESSION: BI-RADS CATEGORY: 6 - Known  Biopsy-Proven Malignancy. RECOMMENDED FOLLOW-UP: Clinical Follow-up. If the patient is a candidate for breast conservation therapy and additional tissue sampling is desired, would recommend contrast enhanced biopsy of the tissue at the 8:00 to 9:00 position. Findings and recommendations were discussed with Dr. Mcdermott by Dr. Mast at 2:00 PM. No tag or localizer was placed during today's examination.  Discussion for breast conservation versus mastectomy will be discussed with the patient by Dr. Mcdermott. I have personally reviewed the examination and initial interpretation and I agree with the findings. SHIMA MAST MD   SYSTEM ID:  [id number]       A total of 40 minutes was spent today on this visit including face to face conversation with the patient, EMR review (labs, imaging studies, pathology reports and outside records), counseling and care co-ordination and documentation.    Signed by: Merna Dooley MD        Again, thank you for allowing me to participate in the care of your patient.        Sincerely,        Merna Dooley MD

## 2023-06-19 ENCOUNTER — HOSPITAL ENCOUNTER (OUTPATIENT)
Facility: CLINIC | Age: 63
Discharge: HOME | End: 2023-06-19
Attending: SURGERY | Admitting: SURGERY
Payer: COMMERCIAL

## 2023-06-19 ENCOUNTER — ANESTHESIA (OUTPATIENT)
Dept: SURGERY | Facility: CLINIC | Age: 63
End: 2023-06-19
Payer: COMMERCIAL

## 2023-06-19 ENCOUNTER — ANESTHESIA EVENT (OUTPATIENT)
Dept: SURGERY | Facility: CLINIC | Age: 63
End: 2023-06-19
Payer: COMMERCIAL

## 2023-06-19 ENCOUNTER — HOSPITAL ENCOUNTER (OUTPATIENT)
Dept: NUCLEAR MEDICINE | Facility: CLINIC | Age: 63
Setting detail: NUCLEAR MEDICINE
Discharge: HOME | End: 2023-06-19
Attending: SURGERY | Admitting: SURGERY
Payer: COMMERCIAL

## 2023-06-19 VITALS
WEIGHT: 168.43 LBS | SYSTOLIC BLOOD PRESSURE: 120 MMHG | BODY MASS INDEX: 25.53 KG/M2 | HEART RATE: 80 BPM | HEIGHT: 68 IN | DIASTOLIC BLOOD PRESSURE: 78 MMHG | OXYGEN SATURATION: 95 % | RESPIRATION RATE: 18 BRPM | TEMPERATURE: 97.8 F

## 2023-06-19 DIAGNOSIS — C50.911 INVASIVE LOBULAR CARCINOMA OF RIGHT BREAST IN FEMALE (H): ICD-10-CM

## 2023-06-19 DIAGNOSIS — C50.911 INVASIVE LOBULAR CARCINOMA OF RIGHT BREAST IN FEMALE (H): Primary | ICD-10-CM

## 2023-06-19 PROCEDURE — 258N000003 HC RX IP 258 OP 636: Performed by: NURSE ANESTHETIST, CERTIFIED REGISTERED

## 2023-06-19 PROCEDURE — 710N000012 HC RECOVERY PHASE 2, PER MINUTE: Performed by: SURGERY

## 2023-06-19 PROCEDURE — 88377 M/PHMTRC ALYS ISHQUANT/SEMIQ: CPT | Performed by: SURGERY

## 2023-06-19 PROCEDURE — 88305 TISSUE EXAM BY PATHOLOGIST: CPT | Mod: 26 | Performed by: PATHOLOGY

## 2023-06-19 PROCEDURE — 360N000076 HC SURGERY LEVEL 3, PER MIN: Performed by: SURGERY

## 2023-06-19 PROCEDURE — 38792 RA TRACER ID OF SENTINL NODE: CPT

## 2023-06-19 PROCEDURE — 99207 NM LYMPHOSCINTIGRAPHY INJECTION ONLY: CPT | Performed by: RADIOLOGY

## 2023-06-19 PROCEDURE — 19357 TISS XPNDR PLMT BRST RCNSTJ: CPT | Mod: RT | Performed by: STUDENT IN AN ORGANIZED HEALTH CARE EDUCATION/TRAINING PROGRAM

## 2023-06-19 PROCEDURE — 88377 M/PHMTRC ALYS ISHQUANT/SEMIQ: CPT | Mod: 26 | Performed by: MEDICAL GENETICS

## 2023-06-19 PROCEDURE — 250N000011 HC RX IP 250 OP 636: Performed by: ANESTHESIOLOGY

## 2023-06-19 PROCEDURE — 88360 TUMOR IMMUNOHISTOCHEM/MANUAL: CPT | Mod: 26 | Performed by: PATHOLOGY

## 2023-06-19 PROCEDURE — 250N000011 HC RX IP 250 OP 636: Performed by: SURGERY

## 2023-06-19 PROCEDURE — A9520 TC99 TILMANOCEPT DIAG 0.5MCI: HCPCS | Performed by: SURGERY

## 2023-06-19 PROCEDURE — 250N000013 HC RX MED GY IP 250 OP 250 PS 637: Performed by: STUDENT IN AN ORGANIZED HEALTH CARE EDUCATION/TRAINING PROGRAM

## 2023-06-19 PROCEDURE — 250N000009 HC RX 250: Performed by: ANESTHESIOLOGY

## 2023-06-19 PROCEDURE — 250N000009 HC RX 250: Performed by: NURSE ANESTHETIST, CERTIFIED REGISTERED

## 2023-06-19 PROCEDURE — 37799 UNLISTED PX VASCULAR SURGERY: CPT | Performed by: STUDENT IN AN ORGANIZED HEALTH CARE EDUCATION/TRAINING PROGRAM

## 2023-06-19 PROCEDURE — 343N000001 HC RX 343: Performed by: SURGERY

## 2023-06-19 PROCEDURE — 38525 BIOPSY/REMOVAL LYMPH NODES: CPT | Mod: RT | Performed by: SURGERY

## 2023-06-19 PROCEDURE — 250N000013 HC RX MED GY IP 250 OP 250 PS 637: Performed by: SURGERY

## 2023-06-19 PROCEDURE — 258N000001 HC RX 258: Performed by: SURGERY

## 2023-06-19 PROCEDURE — 370N000017 HC ANESTHESIA TECHNICAL FEE, PER MIN: Performed by: SURGERY

## 2023-06-19 PROCEDURE — 999N000141 HC STATISTIC PRE-PROCEDURE NURSING ASSESSMENT: Performed by: SURGERY

## 2023-06-19 PROCEDURE — 88307 TISSUE EXAM BY PATHOLOGIST: CPT | Mod: 26 | Performed by: PATHOLOGY

## 2023-06-19 PROCEDURE — 250N000011 HC RX IP 250 OP 636: Performed by: NURSE ANESTHETIST, CERTIFIED REGISTERED

## 2023-06-19 PROCEDURE — 38900 IO MAP OF SENT LYMPH NODE: CPT | Mod: RT | Performed by: SURGERY

## 2023-06-19 PROCEDURE — 15777 ACELLULAR DERM MATRIX IMPLT: CPT | Mod: RT | Performed by: STUDENT IN AN ORGANIZED HEALTH CARE EDUCATION/TRAINING PROGRAM

## 2023-06-19 PROCEDURE — 272N000001 HC OR GENERAL SUPPLY STERILE: Performed by: SURGERY

## 2023-06-19 PROCEDURE — 710N000010 HC RECOVERY PHASE 1, LEVEL 2, PER MIN: Performed by: SURGERY

## 2023-06-19 PROCEDURE — 250N000009 HC RX 250: Performed by: STUDENT IN AN ORGANIZED HEALTH CARE EDUCATION/TRAINING PROGRAM

## 2023-06-19 PROCEDURE — C1789 PROSTHESIS, BREAST, IMP: HCPCS | Performed by: SURGERY

## 2023-06-19 PROCEDURE — 88305 TISSUE EXAM BY PATHOLOGIST: CPT | Mod: TC | Performed by: SURGERY

## 2023-06-19 PROCEDURE — 19303 MAST SIMPLE COMPLETE: CPT | Mod: RT | Performed by: SURGERY

## 2023-06-19 PROCEDURE — 250N000009 HC RX 250: Performed by: SURGERY

## 2023-06-19 DEVICE — GRAFT ALLODERM 9.6X19.3CM CM1518P CHG PER SQ CM= 132 UNITS: Type: IMPLANTABLE DEVICE | Site: BREAST | Status: FUNCTIONAL

## 2023-06-19 DEVICE — IMPLANTABLE DEVICE: Type: IMPLANTABLE DEVICE | Site: BREAST | Status: FUNCTIONAL

## 2023-06-19 RX ORDER — FENTANYL CITRATE 50 UG/ML
INJECTION, SOLUTION INTRAMUSCULAR; INTRAVENOUS PRN
Status: DISCONTINUED | OUTPATIENT
Start: 2023-06-19 | End: 2023-06-19

## 2023-06-19 RX ORDER — AMOXICILLIN 250 MG
1-2 CAPSULE ORAL 2 TIMES DAILY
Qty: 30 TABLET | Refills: 0 | Status: SHIPPED | OUTPATIENT
Start: 2023-06-19

## 2023-06-19 RX ORDER — ONDANSETRON 2 MG/ML
4 INJECTION INTRAMUSCULAR; INTRAVENOUS EVERY 30 MIN PRN
Status: DISCONTINUED | OUTPATIENT
Start: 2023-06-19 | End: 2023-06-19 | Stop reason: HOSPADM

## 2023-06-19 RX ORDER — METHOCARBAMOL 500 MG/1
500 TABLET, FILM COATED ORAL 4 TIMES DAILY
Qty: 12 TABLET | Refills: 0 | Status: SHIPPED | OUTPATIENT
Start: 2023-06-19 | End: 2024-07-23

## 2023-06-19 RX ORDER — HYDROXYZINE HYDROCHLORIDE 25 MG/1
25 TABLET, FILM COATED ORAL
Status: DISCONTINUED | OUTPATIENT
Start: 2023-06-19 | End: 2023-06-19 | Stop reason: HOSPADM

## 2023-06-19 RX ORDER — INDOCYANINE GREEN AND WATER 25 MG
KIT INJECTION PRN
Status: DISCONTINUED | OUTPATIENT
Start: 2023-06-19 | End: 2023-06-19 | Stop reason: HOSPADM

## 2023-06-19 RX ORDER — HYDROMORPHONE HCL IN WATER/PF 6 MG/30 ML
0.2 PATIENT CONTROLLED ANALGESIA SYRINGE INTRAVENOUS EVERY 5 MIN PRN
Status: DISCONTINUED | OUTPATIENT
Start: 2023-06-19 | End: 2023-06-19 | Stop reason: HOSPADM

## 2023-06-19 RX ORDER — ACETAMINOPHEN 650 MG
TABLET, EXTENDED RELEASE ORAL PRN
Status: DISCONTINUED | OUTPATIENT
Start: 2023-06-19 | End: 2023-06-19 | Stop reason: HOSPADM

## 2023-06-19 RX ORDER — SODIUM CHLORIDE, SODIUM LACTATE, POTASSIUM CHLORIDE, CALCIUM CHLORIDE 600; 310; 30; 20 MG/100ML; MG/100ML; MG/100ML; MG/100ML
INJECTION, SOLUTION INTRAVENOUS CONTINUOUS
Status: DISCONTINUED | OUTPATIENT
Start: 2023-06-19 | End: 2023-06-19 | Stop reason: HOSPADM

## 2023-06-19 RX ORDER — PROPOFOL 10 MG/ML
INJECTION, EMULSION INTRAVENOUS CONTINUOUS PRN
Status: DISCONTINUED | OUTPATIENT
Start: 2023-06-19 | End: 2023-06-19

## 2023-06-19 RX ORDER — ONDANSETRON 4 MG/1
4 TABLET, ORALLY DISINTEGRATING ORAL EVERY 30 MIN PRN
Status: DISCONTINUED | OUTPATIENT
Start: 2023-06-19 | End: 2023-06-19 | Stop reason: HOSPADM

## 2023-06-19 RX ORDER — FENTANYL CITRATE 50 UG/ML
25 INJECTION, SOLUTION INTRAMUSCULAR; INTRAVENOUS EVERY 5 MIN PRN
Status: DISCONTINUED | OUTPATIENT
Start: 2023-06-19 | End: 2023-06-19 | Stop reason: HOSPADM

## 2023-06-19 RX ORDER — PROPOFOL 10 MG/ML
INJECTION, EMULSION INTRAVENOUS PRN
Status: DISCONTINUED | OUTPATIENT
Start: 2023-06-19 | End: 2023-06-19

## 2023-06-19 RX ORDER — ONDANSETRON 4 MG/1
4 TABLET, ORALLY DISINTEGRATING ORAL EVERY 8 HOURS PRN
Qty: 4 TABLET | Refills: 0 | Status: SHIPPED | OUTPATIENT
Start: 2023-06-19 | End: 2024-07-23

## 2023-06-19 RX ORDER — CEFAZOLIN SODIUM/WATER 2 G/20 ML
2 SYRINGE (ML) INTRAVENOUS SEE ADMIN INSTRUCTIONS
Status: DISCONTINUED | OUTPATIENT
Start: 2023-06-19 | End: 2023-06-19 | Stop reason: HOSPADM

## 2023-06-19 RX ORDER — BUPIVACAINE HYDROCHLORIDE 2.5 MG/ML
INJECTION, SOLUTION INFILTRATION; PERINEURAL PRN
Status: DISCONTINUED | OUTPATIENT
Start: 2023-06-19 | End: 2023-06-19 | Stop reason: HOSPADM

## 2023-06-19 RX ORDER — OXYCODONE HYDROCHLORIDE 10 MG/1
10 TABLET ORAL
Status: DISCONTINUED | OUTPATIENT
Start: 2023-06-19 | End: 2023-06-19 | Stop reason: HOSPADM

## 2023-06-19 RX ORDER — CEFAZOLIN SODIUM/WATER 2 G/20 ML
2 SYRINGE (ML) INTRAVENOUS
Status: COMPLETED | OUTPATIENT
Start: 2023-06-19 | End: 2023-06-19

## 2023-06-19 RX ORDER — CEPHALEXIN 500 MG/1
500 CAPSULE ORAL 4 TIMES DAILY
Qty: 56 CAPSULE | Refills: 0 | Status: SHIPPED | OUTPATIENT
Start: 2023-06-19 | End: 2024-01-30

## 2023-06-19 RX ORDER — LIDOCAINE HYDROCHLORIDE 20 MG/ML
INJECTION, SOLUTION INFILTRATION; PERINEURAL PRN
Status: DISCONTINUED | OUTPATIENT
Start: 2023-06-19 | End: 2023-06-19

## 2023-06-19 RX ORDER — SODIUM CHLORIDE, SODIUM LACTATE, POTASSIUM CHLORIDE, CALCIUM CHLORIDE 600; 310; 30; 20 MG/100ML; MG/100ML; MG/100ML; MG/100ML
INJECTION, SOLUTION INTRAVENOUS CONTINUOUS PRN
Status: DISCONTINUED | OUTPATIENT
Start: 2023-06-19 | End: 2023-06-19

## 2023-06-19 RX ORDER — OXYCODONE HYDROCHLORIDE 5 MG/1
5 TABLET ORAL EVERY 6 HOURS PRN
Qty: 20 TABLET | Refills: 0 | Status: SHIPPED | OUTPATIENT
Start: 2023-06-19 | End: 2023-06-22

## 2023-06-19 RX ORDER — DEXAMETHASONE SODIUM PHOSPHATE 4 MG/ML
INJECTION, SOLUTION INTRA-ARTICULAR; INTRALESIONAL; INTRAMUSCULAR; INTRAVENOUS; SOFT TISSUE PRN
Status: DISCONTINUED | OUTPATIENT
Start: 2023-06-19 | End: 2023-06-19

## 2023-06-19 RX ORDER — ENOXAPARIN SODIUM 100 MG/ML
40 INJECTION SUBCUTANEOUS
Status: COMPLETED | OUTPATIENT
Start: 2023-06-19 | End: 2023-06-19

## 2023-06-19 RX ORDER — KETAMINE HYDROCHLORIDE 10 MG/ML
INJECTION INTRAMUSCULAR; INTRAVENOUS PRN
Status: DISCONTINUED | OUTPATIENT
Start: 2023-06-19 | End: 2023-06-19

## 2023-06-19 RX ORDER — ONDANSETRON 2 MG/ML
INJECTION INTRAMUSCULAR; INTRAVENOUS PRN
Status: DISCONTINUED | OUTPATIENT
Start: 2023-06-19 | End: 2023-06-19

## 2023-06-19 RX ORDER — ONDANSETRON 4 MG/1
4 TABLET, ORALLY DISINTEGRATING ORAL
Status: DISCONTINUED | OUTPATIENT
Start: 2023-06-19 | End: 2023-06-19 | Stop reason: HOSPADM

## 2023-06-19 RX ORDER — BACITRACIN ZINC 500 [USP'U]/G
OINTMENT TOPICAL PRN
Status: DISCONTINUED | OUTPATIENT
Start: 2023-06-19 | End: 2023-06-19 | Stop reason: HOSPADM

## 2023-06-19 RX ORDER — OXYCODONE HYDROCHLORIDE 5 MG/1
5 TABLET ORAL
Status: DISCONTINUED | OUTPATIENT
Start: 2023-06-19 | End: 2023-06-19 | Stop reason: HOSPADM

## 2023-06-19 RX ORDER — ACETAMINOPHEN 325 MG/1
650 TABLET ORAL
Status: DISCONTINUED | OUTPATIENT
Start: 2023-06-19 | End: 2023-06-19 | Stop reason: HOSPADM

## 2023-06-19 RX ORDER — OXYCODONE HYDROCHLORIDE 10 MG/1
10 TABLET ORAL
Status: COMPLETED | OUTPATIENT
Start: 2023-06-19 | End: 2023-06-19

## 2023-06-19 RX ADMIN — SODIUM CHLORIDE, POTASSIUM CHLORIDE, SODIUM LACTATE AND CALCIUM CHLORIDE: 600; 310; 30; 20 INJECTION, SOLUTION INTRAVENOUS at 17:13

## 2023-06-19 RX ADMIN — FENTANYL CITRATE 25 MCG: 50 INJECTION, SOLUTION INTRAMUSCULAR; INTRAVENOUS at 18:19

## 2023-06-19 RX ADMIN — Medication 2 G: at 13:45

## 2023-06-19 RX ADMIN — Medication 20 MG: at 14:15

## 2023-06-19 RX ADMIN — ACETAMINOPHEN 650 MG: 325 TABLET ORAL at 19:21

## 2023-06-19 RX ADMIN — Medication 10 MG: at 15:58

## 2023-06-19 RX ADMIN — ONDANSETRON 4 MG: 2 INJECTION INTRAMUSCULAR; INTRAVENOUS at 17:10

## 2023-06-19 RX ADMIN — Medication 20 MG: at 16:52

## 2023-06-19 RX ADMIN — Medication 10 MG: at 14:54

## 2023-06-19 RX ADMIN — INDOCYANINE GREEN AND WATER 12.5 MG: KIT at 16:04

## 2023-06-19 RX ADMIN — ENOXAPARIN SODIUM 40 MG: 40 INJECTION SUBCUTANEOUS at 12:14

## 2023-06-19 RX ADMIN — LIDOCAINE HYDROCHLORIDE 100 MG: 20 INJECTION, SOLUTION INFILTRATION; PERINEURAL at 13:38

## 2023-06-19 RX ADMIN — PROPOFOL 200 MCG/KG/MIN: 10 INJECTION, EMULSION INTRAVENOUS at 13:36

## 2023-06-19 RX ADMIN — INDOCYANINE GREEN AND WATER 12.5 MG: KIT at 17:23

## 2023-06-19 RX ADMIN — FENTANYL CITRATE 50 MCG: 50 INJECTION, SOLUTION INTRAMUSCULAR; INTRAVENOUS at 15:02

## 2023-06-19 RX ADMIN — Medication 20 MG: at 13:58

## 2023-06-19 RX ADMIN — Medication 20 MG: at 16:21

## 2023-06-19 RX ADMIN — SUGAMMADEX 200 MG: 100 INJECTION, SOLUTION INTRAVENOUS at 17:15

## 2023-06-19 RX ADMIN — DEXAMETHASONE SODIUM PHOSPHATE 4 MG: 4 INJECTION, SOLUTION INTRA-ARTICULAR; INTRALESIONAL; INTRAMUSCULAR; INTRAVENOUS; SOFT TISSUE at 13:51

## 2023-06-19 RX ADMIN — Medication 20 MG: at 15:30

## 2023-06-19 RX ADMIN — FENTANYL CITRATE 25 MCG: 50 INJECTION, SOLUTION INTRAMUSCULAR; INTRAVENOUS at 18:12

## 2023-06-19 RX ADMIN — SODIUM CHLORIDE, POTASSIUM CHLORIDE, SODIUM LACTATE AND CALCIUM CHLORIDE: 600; 310; 30; 20 INJECTION, SOLUTION INTRAVENOUS at 13:29

## 2023-06-19 RX ADMIN — FENTANYL CITRATE 50 MCG: 50 INJECTION, SOLUTION INTRAMUSCULAR; INTRAVENOUS at 14:15

## 2023-06-19 RX ADMIN — PROPOFOL 30 MG: 10 INJECTION, EMULSION INTRAVENOUS at 17:16

## 2023-06-19 RX ADMIN — Medication 10 MG: at 15:02

## 2023-06-19 RX ADMIN — Medication 10 MG: at 17:02

## 2023-06-19 RX ADMIN — Medication 20 MG: at 15:06

## 2023-06-19 RX ADMIN — OXYCODONE HYDROCHLORIDE 10 MG: 10 TABLET ORAL at 18:56

## 2023-06-19 RX ADMIN — PROPOFOL 200 MG: 10 INJECTION, EMULSION INTRAVENOUS at 13:38

## 2023-06-19 RX ADMIN — Medication 30 MG: at 13:38

## 2023-06-19 RX ADMIN — Medication 20 MG: at 14:27

## 2023-06-19 RX ADMIN — Medication 50 MG: at 13:39

## 2023-06-19 ASSESSMENT — LIFESTYLE VARIABLES: TOBACCO_USE: 0

## 2023-06-19 ASSESSMENT — ENCOUNTER SYMPTOMS
DYSRHYTHMIAS: 1
ORTHOPNEA: 0

## 2023-06-19 ASSESSMENT — COPD QUESTIONNAIRES: COPD: 0

## 2023-06-19 ASSESSMENT — ACTIVITIES OF DAILY LIVING (ADL)
ADLS_ACUITY_SCORE: 35

## 2023-06-19 NOTE — BRIEF OP NOTE
[facility]    Brief Operative Note    Pre-operative diagnosis: Invasive lobular carcinoma of right breast in female (H) [C50.911]  Post-operative diagnosis Same as pre-operative diagnosis    Procedure: Procedure(s):  Right skin sparing mastectomy  sentinel node biopsy  RIGHT IMMEDIATE BREAST RECONSTRUCTION WITH USE OF TISSUE EXPANDER, ALLODERM AND SPY ANGIOGRAPHY  Surgeon: Surgeon(s) and Role:  Panel 1:     * Sanjiv Mcdermott MD - Primary     * Jevon Harley MD - Resident - Assisting  Panel 2:     * Qasim Galvan MD - Primary  Anesthesia: General   Estimated Blood Loss: 10mL     Specimens:   ID Type Source Tests Collected by Time Destination   1 : Right Breast  Tissue Breast, Right SURGICAL PATHOLOGY EXAM Sanjiv Mcdermott MD 6/19/2023  3:11 PM    2 : Right Axillary Sentinal Lymph Node #1 Tissue Lymph Node(s), [place] SURGICAL PATHOLOGY EXAM Sanjiv Mcdermott MD 6/19/2023  3:33 PM    3 : Right Axillary Sentinal Lymph Node #2 Tissue Lymph Node(s), [place] SURGICAL PATHOLOGY EXAM Sanjiv Mcdermott MD 6/19/2023  3:35 PM    4 : Additional right breast skin  Tissue Breast, Right SURGICAL PATHOLOGY EXAM Sanjiv Mcdermott MD 6/19/2023  5:06 PM      Findings:   None.  Complications: None.  Implants:   Implant Name Type Inv. Item Serial No.  Lot No. LRB No. Used Action   GRAFT ALLODERM 9.6X19.3CM [id number] CHG PER SQ CM= 132 UNITS - [id number] Bone/Tissue/Biologic GRAFT ALLODERM 9.6X19.3CM [id number] CHG PER SQ CM= 132 UNITS  [employer] [id number] Right 1 Implanted   TISSUE EXPANDER NATRELLE MAGNA 600CC 14X14.5CM [id number] - [id number] Breast Implant/Tissue Expander TISSUE EXPANDER NATRELLE MAGNA 600CC 14X14.5CM [id number] [employer]  Right 1 Implanted         Right breast tissue expander placed without issue  discharge home when PACU criteria are met    Ritchie Veras MD - PGY 7  Plastic Surgery Resident  Pager [phone]

## 2023-06-19 NOTE — ANESTHESIA PROCEDURE NOTES
Airway       Patient location during procedure: OR       Procedure Start/Stop Times: 6/19/2023 1:42 PM  Staff -        CRNA: Yuly Gar APRN CRNA       Performed By: CRNA  Consent for Airway        Urgency: elective  Indications and Patient Condition       Indications for airway management: maria e-procedural       Induction type:intravenous      Final Airway Details       Final airway type: endotracheal airway       Successful airway: ETT - single  Endotracheal Airway Details        ETT size (mm): 7.0       Cuffed: yes       Successful intubation technique: direct laryngoscopy       DL Blade Type: MAC 4       Grade View of Cords: 1       Adjucts: stylet       Position: Right       Measured from: gums/teeth       Secured at (cm): 21       Bite block used: None    Post intubation assessment        Placement verified by: capnometry        Secured with: pink tape       Ease of procedure: easy       Dentition: Intact and Unchanged    Medication(s) Administered   Medication Administration Time: 6/19/2023 1:42 PM

## 2023-06-19 NOTE — PROGRESS NOTES
PRS    OR today for RIGHT mastectomy with immediate tissue expander reconstruction with use of AlloDerm and/or vicryl mesh, SPY angiography    Discussed the risks of surgery, including but not limited to: infection, bleeding, pain, poor scarring, need for revision surgery, implant rupture, implant malposition, implant extrusion, implant infection, need for IV antibiotics or hospitalization, wound healing issues, implant loss, DVT/PE, death. Despite these risks, patient consents to: RIGHT mastectomy with immediate RIGHT breast reconstruction with tissue expander and use of AlloDerm and/or vicryl mesh, SPY angiography    Discharge from PACU. Clinic in 1 week for drain care.     Qasim Galvan MD, PhD

## 2023-06-19 NOTE — ANESTHESIA PREPROCEDURE EVALUATION
Anesthesia Pre-Procedure Evaluation    Patient: Sheila Schilling   MRN: [id number] : 1960        Procedure : Procedure(s):  Right skin sparing mastectomy  sentinel node biopsy  RIGHT IMMEDIATE BREAST RECONSTRUCTION WITH USE OF TISSUE EXPANDER, ALLODERM AND SPY ANGIOGRAPHY          Past Medical History:   Diagnosis Date     Atrial fibrillation (H)      Palpitations 2017     Paroxysmal atrial flutter (H)       Past Surgical History:   Procedure Laterality Date     COLONOSCOPY  2011    Procedure:COLONOSCOPY; Surgeon:MIKE LYLE; Location:[facility]     COLONOSCOPY N/A 2022    Procedure: COLONOSCOPY;  Surgeon: Karime Jain MD;  Location: [facility]     SURGICAL HISTORY OF -   07/15/2002    laparoscopic L salpingo-oophorectomy - ectopic     SURGICAL HISTORY OF -   7/15/2002    SAB      No Known Allergies   Social History     Tobacco Use     Smoking status: Never     Smokeless tobacco: Never   Vaping Use     Vaping status: Never Used   Substance Use Topics     Alcohol use: Not Currently      Wt Readings from Last 1 Encounters:   23 77.5 kg (170 lb 12.8 oz)        Anesthesia Evaluation   Pt has had prior anesthetic. Type: General and MAC.        ROS/MED HX  ENT/Pulmonary:  - neg pulmonary ROS  (-) tobacco use, asthma, COPD, sleep apnea, MAYELIN risk factors and recent URI   Neurologic:  - neg neurologic ROS     Cardiovascular:     (+) -----dysrhythmias, a-fib and a-flutter, Previous cardiac testing   Echo: Date: 17 Results:  Interpretation Summary     The visual ejection fraction is estimated at 60-65%.  Normal left ventricular diastolic function  The right ventricle is normal in structure, function and size.  Stress Test: Date: 17 Results:  Noted to be normal per outside report  ECG Reviewed: Date: 22 Results:  NSR  Cath:  Date: Results:   (-) hypertension, CAD, orthopnea/PND and dyslipidemiaPulmonary hypertension: 17.   METS/Exercise Tolerance: >4 METS Comment: - Walking  2-3 miles per day without exertional symptoms  - Gardening    Hematologic:  - neg hematologic  ROS     Musculoskeletal:  - neg musculoskeletal ROS     GI/Hepatic:  - neg GI/hepatic ROS     Renal/Genitourinary:  - neg Renal ROS     Endo:  - neg endo ROS     Psychiatric/Substance Use:  - neg psychiatric ROS     Infectious Disease:  - neg infectious disease ROS     Malignancy:  - neg malignancy ROS (+) Malignancy, History of Breast.Breast CA Active status post.        Other:  - neg other ROS          Physical Exam    Airway        Mallampati: II   TM distance: > 3 FB   Neck ROM: full   Mouth opening: > 3 cm    Respiratory Devices and Support         Dental           Cardiovascular   cardiovascular exam normal          Pulmonary   pulmonary exam normal                OUTSIDE LABS:  CBC:   Lab Results   Component Value Date    WBC 5.5 05/24/2023    WBC 7.8 06/07/2006    HGB 15.3 05/24/2023    HGB 15.5 06/07/2006    HCT 46.4 05/24/2023    HCT 49.0 (H) 06/07/2006     05/24/2023     06/07/2006     BMP:   Lab Results   Component Value Date     01/11/2022     07/20/2017    POTASSIUM 3.7 01/11/2022    POTASSIUM 4.4 07/20/2017    CHLORIDE 105 01/11/2022    CHLORIDE 106 07/20/2017    CO2 28 01/11/2022    CO2 30 07/20/2017    BUN 14 01/11/2022    BUN 13 07/20/2017    CR 0.72 01/11/2022    CR 0.72 07/20/2017    GLC 87 01/11/2022    GLC 89 07/20/2017     COAGS: No results found for: PTT, INR, FIBR  POC:   Lab Results   Component Value Date    HCG Negative 06/07/2006     HEPATIC: No results found for: ALBUMIN, PROTTOTAL, ALT, AST, GGT, ALKPHOS, BILITOTAL, BILIDIRECT, JUANCARLOS  OTHER:   Lab Results   Component Value Date    A1C 5.3 11/23/2005    EDOUARD 9.1 01/11/2022    TSH 1.98 07/20/2017       Anesthesia Plan    ASA Status:  2      Anesthesia Type: General.     - Airway: ETT   Induction: Intravenous, Propofol.   Maintenance: Balanced.   Techniques and Equipment:     - Airway: Video-Laryngoscope     -  Lines/Monitors: 2nd IV     Consents    Anesthesia Plan(s) and associated risks, benefits, and realistic alternatives discussed. Questions answered and patient/representative(s) expressed understanding.     - Discussed: Risks, Benefits and Alternatives for the PROCEDURE were discussed     - Discussed with:  Patient      - Extended Intubation/Ventilatory Support Discussed: No.      - Patient is DNR/DNI Status: No    Use of blood products discussed: Yes.     - Discussed with: Patient.     - Consented: consented to blood products            Reason for refusal: other.     Postoperative Care    Pain management: IV analgesics, Oral pain medications, Multi-modal analgesia.   PONV prophylaxis: Ondansetron (or other 5HT-3), Dexamethasone or Solumedrol     Comments:                Huan Simms MD

## 2023-06-19 NOTE — DISCHARGE INSTRUCTIONS
TISSUE EXPANSION FOR BREAST RECONSTRUCTION  POST-OPERATIVE INSTRUCTIONS     Instructions  ?  You need someone to drive you to your appointment for the first 3 weeks     post-operatively or as long as you requiring narcotic pain medicine.     Activities  ?  You cannot perform house hold chores or heavy lifting greater than 5 to     10 pounds for 6 weeks post-operatively.  ?  No hot tubs, swimming in lakes until fully healed.     Be Aware:  ?  You cannot have an MRI if you have tissue expanders in place.  ?  The tissue expanders may be detected via the security scanners at the      airport. You will need a note from your Doctor if you plan to travel      indicating you have tissue expanders in place with metal.     Incision Care  ?  You can shower 48 hours after surgery. You need to wrap the drains with plastic to prevent any leakage onto the drains. If you cannot wrap then sponge bath only.   ?  Avoid sun exposure to scars for at least 12 months after your last     surgery.  ?  If sun exposure is unavoidable then always use a sun block with 50 SPF     or higher.  ?  Shower regularly to keep the incisions clean and inspect for signs of      Infection.  ?  You can use moisturizer on the incisions and surrounding skin starting 3      Weeks.     What to Expect  ?  You may experience minor discomfort for the following 12 to 24 hours      after each tissue expansion. This discomfort usually subsides 2 to 3 days      after each tissue expansion.  ?  The tissue expander may shift after the 1st or 2 nd expansion.  ?  You may experience more discomfort on one side than the other if you      have bilateral tissue expanders placed.  ?  Your posture may change causing you to have some upper and/or mid      back pain as you re expanded.  ?  Your shoulder range of motion may become stiff requiring continuing to     perform the shoulder exercises during the tissue expansion process to     prevent shoulder stiffness and a frozen  shoulder.  ?  You may find it difficult to sleep because you feel tight across the chest      and shoulders.  ?  You may find it difficult to fit into certain types of tight fitting clothing.      You may need to wear oversized shirts until the final exchange of the      tissue expander (s).  ?  You may feel chest persist tightness or heaviness secondary to being     expanded. This usually subsides with time.  ?  The tissue expanders will remain in place for a minimum of 8 weeks after     completing the last tissue expansion. This will allow for the soft tissues     (i.e. skin and muscle) to heal and recover from being stretched before the     second surgery takes place for the exchange of the tissue expander for a     permanent breast implant.  ?  You ll see your surgeon when we think you have achieve your desired     breast size to determine if you need additional expansions and for     surgical planning.     How can I treat discomfort?  ?  Ibuprofen (or other NSAIDs) 600 mg by mouth every 6 to 8 hours with     food starting the morning of each tissue expansion and continue to take     around the clock for 3 to 5 days as needed for discomfort. You can start     this 2 weeks after surgery.  ?  If you need additional pain control at night, you may take the prescribed      Narcotics you were prescribed for pain immediately after surgery.  ?  You can take the ibuprofen with the Vicodin or Norco if additional pain      relief is required.     Will I achieve my desired breast size?  ?  This is determined on an individual basis.  ?  There is no scientific way to determine the exact breast size. It will be     determined by a number of different factors i.e.  ?  How well you tolerate each tissue expansion.  ?  How well your skin reacts to the expansions.  ?  The size of the other breast (if a unilateral tissue expander).  ?  Previous surgeries or amount of scarring  ?  We recommend trying on a desired bra size as you  approach your desired      breast size to see how well the bra size fits.     Appearance  ?  While the tissue is being expanded, a bulge will be created. Depending     upon the location of the tissue expander, the bulge may be considered     desirable or unsightly.  ?  Following the exchange of the tissue expander, a more normal and     desirable look should be restored.  ?  After the exchange for permanent implant, the breasts will feel softer     with less fullness in your axilla.     When to Call:  ?  If you have increasing swelling or bruising.  ?  If swelling and redness persist after a few days.  ?  If you have increased redness along the incision.  ?  If you have severe or increased pain not relieved by medication.  ?  If you have any side effects to mediations; such as, rash, nauseas,      headache, vomiting, etc.  ?  If you have an oral temperature of 100.5 degrees or higher.  ?  If you have any yellow or greenish drainage from the incisions or notice a      foul smell.  ?  If you have bleeding from the incisions that is difficult to control with      light pressure.  ?  If you have loss of feeling or motion.     For Medical Questions, Please Call:  ?  [phone], Monday - Friday, 8 a.m. - 4:30 p.m.  ?  After hours and on weekends, call Hospital Paging at [phone] and     ask for the Plastic Surgeon on call.     Please note my office will call you 1-2 business days after your procedure to check up on how you're doing and to schedule your post-operative appointment.        From Dr Mcdermott:    1. Patient to follow up with appointment in 2 weeks with Dr. Mcdermott. Follow up with Dr. Kim in 1 week.   2. Do not drive while taking narcotic pain medication.   3. May take plain Tylenol as needed for pain.   4. Avoid non-steroidal anti-inflammatory medications (Advil, Ibuprofen, Naproxen, aspirin, etc) for 5-7 days or until approved by the Plastic Surgery team.   5. Caution with putting ice on the incision as  "it will be numb you will not realize it if you leave the ice for too long and can damage your skin.  6. If you develop any fever/chills, worsening pain, redness, swelling, or drainage from your wound please call the clinic (Monday through Friday 8:00am-5:00pm [phone]) or on-call surgical oncology resident (nights and weekends [phone] and ask \"I would like to page the Surgical Oncology Resident on call.\")   7. No lifting over 5-10 lbs and no strenuous physical activity for 6 weeks.   8. Keep any dressings clean and dry. Please refer to Dr Kim s wound care instructions.   9. Monitor the drain output. Record the drain output per 24 hours in mL   10. Keep chest wrapped with ace wrap. Remove ace wrap daily and reapply to comfort.     Drain care:  Please keep drain site clean and dry.   Okay to shower with drain sites covered per Plastic Surgery.   Please call the clinic (see phone numbers above) if:  Your drain falls out.  The stitch that is holding the drain in place at the skin is coming loose or missing.  The drainage fluid is very thick and/or has a bad odor.  The squeeze bulb does not stay collapsed.  The drainage suddenly stops or dramatically decreases when the drain has been having steady amounts of drainage.  Please keep a log of the drainage amounts every time you empty the drain.    Please  strip  the drain 3 times per day:  Wash your hands with soap and water and dry.  Gently squeeze the tubing if you see a clot to loosen it up.   and pinch the drain where it comes out of the skin with one hand, to steady it.  With the other hand,  and pinch the drain and slide your fingers down the tubing, towards the drainage bulb.  Then release your  on the end where the tube comes out of your body, followed by releasing your  at the end of the drainage bulb.    Repeat several times.  It may be easier to  strip  the drain with an alcohol swab or with hand cleanser on the hand that is moving " along the tube.  Wash your hands again.       [facility], [place]  Same-Day Surgery   Adult Discharge Orders & Instructions     For 24 hours after surgery    Get plenty of rest.  A responsible adult must stay with you for at least 24 hours after you leave the hospital.   Do not drive or use heavy equipment.  If you have weakness or tingling, don't drive or use heavy equipment until this feeling goes away.  Do not drink alcohol.  Avoid strenuous or risky activities.  Ask for help when climbing stairs.   You may feel lightheaded.  IF so, sit for a few minutes before standing.  Have someone help you get up.   If you have nausea (feel sick to your stomach): Drink only clear liquids such as apple juice, ginger ale, broth or 7-Up.  Rest may also help.  Be sure to drink enough fluids.  Move to a regular diet as you feel able.  You may have a slight fever. Call the doctor if your fever is over 100.5 F (37.7 C) (taken under the tongue) or lasts longer than 24 hours.  You may have a dry mouth, a sore throat, muscle aches or trouble sleeping.  These should go away after 24 hours.  Do not make important or legal decisions.   Call your doctor for any of the followin.  Signs of infection (fever, growing tenderness at the surgery site, a large amount of drainage or bleeding, severe pain, foul-smelling drainage, redness, swelling).    2. It has been over 8 to 10 hours since surgery and you are still not able to urinate (pass water).    3.  Headache for over 24 hours.    To contact a doctor, call Dr Mcdermott's office at [phone] ([facility]) or:    '   [phone] and ask for the resident on call for General Surgery (answered 24 hours a day)  '   Emergency Department:    [facility]: [phone]       (TTY for hearing impaired: [phone])

## 2023-06-19 NOTE — BRIEF OP NOTE
[facility]    Brief Operative Note    Pre-operative diagnosis: Invasive lobular carcinoma of right breast in female (H) [C50.911]  Post-operative diagnosis Same as pre-operative diagnosis    Procedure: Procedure(s):  Right skin sparing mastectomy  sentinel node biopsy  RIGHT IMMEDIATE BREAST RECONSTRUCTION WITH USE OF TISSUE EXPANDER, ALLODERM AND SPY ANGIOGRAPHY  Surgeon: Surgeon(s) and Role:  Panel 1:     * Sanjiv Mcdermott MD - Primary     * Jevon Harley MD - Resident - Assisting  Panel 2:     * Qasim Galvan MD - Primary  Anesthesia: General   Estimated Blood Loss: Minimal    Drains: ARUN  Specimens:   ID Type Source Tests Collected by Time Destination   1 : Right Breast  Tissue Breast, Right SURGICAL PATHOLOGY EXAM Sanjiv Mcdermott MD 6/19/2023  3:11 PM    2 : Right Axillary Sentinal Lymph Node #1 Tissue Lymph Node(s), [place] SURGICAL PATHOLOGY EXAM Sanjiv Mcdermott MD 6/19/2023  3:33 PM    3 : Right Axillary Sentinal Lymph Node #2 Tissue Lymph Node(s), [place] SURGICAL PATHOLOGY EXAM Sanjiv Mcdermott MD 6/19/2023  3:35 PM      Findings:   None.  Complications: None.  Implants: * No implants in log *

## 2023-06-19 NOTE — ANESTHESIA CARE TRANSFER NOTE
Patient: Sheila Schilling    Procedure: Procedure(s):  Right skin sparing mastectomy  sentinel node biopsy  RIGHT IMMEDIATE BREAST RECONSTRUCTION WITH USE OF TISSUE EXPANDER, ALLODERM AND SPY ANGIOGRAPHY       Diagnosis: Invasive lobular carcinoma of right breast in female (H) [C50.911]  Diagnosis Additional Information: No value filed.    Anesthesia Type:   General     Note:    Oropharynx: oropharynx clear of all foreign objects and spontaneously breathing  Level of Consciousness: awake  Oxygen Supplementation: face mask  Level of Supplemental Oxygen (L/min / FiO2): 6  Independent Airway: airway patency satisfactory and stable  Dentition: dentition unchanged  Vital Signs Stable: post-procedure vital signs reviewed and stable  Report to RN Given: handoff report given  Patient transferred to: PACU    Handoff Report: Identifed the Patient, Identified the Reponsible Provider, Reviewed the pertinent medical history, Discussed the surgical course, Reviewed Intra-OP anesthesia mangement and issues during anesthesia, Set expectations for post-procedure period and Allowed opportunity for questions and acknowledgement of understanding      Vitals:  Vitals Value Taken Time   /79 06/19/23 1737   Temp 37.5    Pulse 78 06/19/23 1739   Resp 13 06/19/23 1739   SpO2 97 % 06/19/23 1739   Vitals shown include unvalidated device data.    Electronically Signed By: SINCERE OTT CRNA  June 19, 2023  5:40 PM

## 2023-06-20 ENCOUNTER — HOSPITAL ENCOUNTER (EMERGENCY)
Facility: CLINIC | Age: 63
Discharge: HOME | End: 2023-06-20
Attending: EMERGENCY MEDICINE | Admitting: EMERGENCY MEDICINE
Payer: COMMERCIAL

## 2023-06-20 ENCOUNTER — NURSE TRIAGE (OUTPATIENT)
Dept: ONCOLOGY | Facility: CLINIC | Age: 63
End: 2023-06-20
Payer: COMMERCIAL

## 2023-06-20 ENCOUNTER — NURSE TRIAGE (OUTPATIENT)
Dept: NURSING | Facility: CLINIC | Age: 63
End: 2023-06-20
Payer: COMMERCIAL

## 2023-06-20 VITALS
RESPIRATION RATE: 20 BRPM | WEIGHT: 168 LBS | OXYGEN SATURATION: 97 % | HEART RATE: 71 BPM | TEMPERATURE: 99.3 F | HEIGHT: 68 IN | SYSTOLIC BLOOD PRESSURE: 120 MMHG | BODY MASS INDEX: 25.46 KG/M2 | DIASTOLIC BLOOD PRESSURE: 79 MMHG

## 2023-06-20 DIAGNOSIS — Z09 POSTOP CHECK: ICD-10-CM

## 2023-06-20 PROCEDURE — 99283 EMERGENCY DEPT VISIT LOW MDM: CPT | Performed by: EMERGENCY MEDICINE

## 2023-06-20 PROCEDURE — 99282 EMERGENCY DEPT VISIT SF MDM: CPT | Performed by: EMERGENCY MEDICINE

## 2023-06-20 ASSESSMENT — ACTIVITIES OF DAILY LIVING (ADL): ADLS_ACUITY_SCORE: 35

## 2023-06-20 NOTE — OP NOTE
PLASTIC SURGERY OPERATIVE REPORT     Date of Surgery: 6/20/2023  Surgical Service: Plastic Surgery     Preoperative Diagnosis:   1. Right breast cancer   2. Acquired absence of the right breast     Postoperative Diagnosis: Same as preoperative diagnoses     Procedures Performed:  1. Right immediate breast reconstruction with use of smooth breast tissue expander and perforated AlloDerm  2. Assessment of right mastectomy skin flap after intraoperative tissue expansion using SPY indocyanine green angiography     Attending:  GABINO Galvan MD, PhD  Assistant: None     Complications: None apparent  Specimens: Additional right breast skin for permanent     Implants:   1. Allergan Natrelle 133S FV-14-T 600 mL smooth breast tissue expander (SN [id number])  2. AlloDerm Select Tissue Matrix Contoured Perforated (LOT [id number])     Estimated blood loss: 10 mL  Wound classification: Clean  Anesthesia: General     Indications for Procedure: 62 year-old presenting with right breast cancer presenting for right skin-sparing mastectomy and immediate tissue expander-based reconstruction. Patient understood the risks, benefits and alternatives including no reconstruction. Patient understood the risks of immediate breast tissue expander placement including but not limited to: infection, bleeding, hematoma, pain, poor scarring, asymmetries, need for revision surgery, extruded implant, need for explant surgery, adverse reaction to the AlloDerm, DVT/PE or even death. Despite these risks, patient consents to RIGHT breast tissue expander placement with use of AlloDerm and SPY angiography.      Intraoperative findings: Skin flaps were viable. Hemostasis was done with no bleeding at the end of the case. After closing the pocket, the expander was inflated to 250 ml. SPY angiography confirmed well-perfused mastectomy skin flaps, except at the margins of the skin incision, which were excised. Nitrobid ointment was applied along the incisions a  single time.      Description of Procedure: Patient was seen in the preoperative holding area.  Consent was verified.  The right breast was marked including midline, inframammary fold, and breast footprint.  All additional questions were answered.  The risks of the surgery were reiterated, including (but not limited to): infection, bleeding, hematoma, seroma, poor scarring, pain, asymmetries, need for revision surgery, extruded implant, need for explant surgery, adverse reaction to the AlloDerm, DVT/PE or even death.  Following discussion of all these risks, the patient again agreed to proceed with surgery.  Patient was then transferred to the operating room and placed supine on the operating table.  All pressure points were padded.  Sequential compression devices were placed on bilateral lower extremities and verified to be operational.  IV antibiotic prophylaxis was given.  Preinduction timeout was performed. General anesthesia was induced. The chest, both breasts, neck, and bilateral shoulders were prepped and draped in usual sterile fashion. The surgical oncologic portion of the case was then started and completed.      Once done, the plastic surgery portion of the case was started. Only the right breast reconstruction was done today. First, the midline, the right inframammary fold and right breast footprint was remarked and checked to be as symmetric as possible. First, the mastectomy skin flaps were examined and found to be of adequate thickness and clinically viable. SPY angiography was done to confirm the viability as well. Next, the mastectomy defect was rechecked for bleeding and thorough hemostasis was done. Next, the pectoralis major muscle was elevated over the chest wall and released inferiorly up to the medial insertions. The inframammary fold was transposed with a marking pen onto the chest wall. Perforated AlloDerm Select was then hydrated in antibiotic solution for a few minutes and then sewn into  "the breast pocket to create the inferolateral sling for the implant. Care was taken to have the basement membrane portion of the acellular matrix up against the skin. This was done with a combination of interrupted simple and running 2-0 Vicryl suture. The AlloDerm was also sewn to the pectoralis major muscle and its lateral edge with 2-0 Vicryl suture, leaving a \"window\" for implant placement. Next, the breast base width was measured to be 13.5 cm and her chest was noted to be narrow and laterally sharply down-sloping. Consequently, the 600 mL smooth breast tissue expander was chosen. The breast implant pocket was irrigated with antibiotic-containing sterile irrigation solution. Next, the pocket and the mastectomy skin was re-prepped with Betadine solution. Sterile gloves were changed. The right breast was re-draped. The implant was placed into the newly-created partial subpectoral breast pocket and the first suture tab was sewn in as medially and inferiorly as possible at the approximately 2 o'clock position using 2-0 Prolene suture. Next, the 6 and 4 o'clock tabs were secured just below the inframammary fold using 2-0 Prolene interrupted figure-of-eight suture down to the chest wall/rib periosteum. At this point, the AlloDerm-pectoralis major muscle interface was closed with running 2-0 Vicryl suture and the pocket was closed. Next, a 15-Maur[place]ian round drain was placed and curled over the pectoralis and along the dependent portion of the axilla. Since the Alloderm was perforated, only one drain per side was used. Next, the skin was provisionally stapled to close the pocket. Next, the expander was inflated to 250 ml, which opposed the AlloDerm against the underside of the skin flaps without placing undue tension on the skin. At this point, SPY angiography was done again with 12.5 mg injected intravenously in 5 mL of diluent. This showed that the right mastectomy skin flap was well-perfused, except a few small " maria e-incisional areas. These areas were marked and excised sharply with facelift scissors. Next, the skin was closed with 2-0 Vicryl in the deep subcutaneous layer, 3-0 Monocryl for the deep dermis and for the running intracuticular closure. The incision was dressed with Xeroform and bacitracin. The skin around the incision was additionally treated with topical Nitrobid ointment. The drain bulb was placed and the drain sites dressed. The incisions were then covered with ABD pad and gentle taping with care taken not to compress the mastectomy skin. Patient was then extubated and transferred to the post-anesthesia care unit with no events.      Postoperative plan: Discharge home, pain control, drain teaching. Continue antibiotics and muscle relaxants. Start additional expansion at likely 3-4 weeks.      Qasim Galvan MD, PhD

## 2023-06-20 NOTE — TELEPHONE ENCOUNTER
and patient are calling to report numbness in right arm and hand post op.    Patient had a right mastectomy yesterday.   About 10 mins ago, she started to experience right hand and arm numbness.  She has been taking her pain meds as ordered.    Disposition: Call 911.   verbalized understanding and will call 911 now.    Janet Birch, RN, BSN Nurse Triage Advisor 6/20/2023 5:54 AM       Reason for Disposition    [1] Numbness (i.e., loss of sensation) of the face, arm / hand, or leg / foot on one side of the body AND [2] sudden onset AND [3] present now    Protocols used: NEUROLOGIC DEFICIT-A-AH

## 2023-06-20 NOTE — TELEPHONE ENCOUNTER
, Stephen, calling to report pt was seen this morning in the ED for  numbness in right arm and hand post op.     Patient had a right mastectomy yesterday.   Early this morning, she got up to use the restroom and  she started to experience right hand and arm numbness.    Pt was evaluated for stroke in the ED and then discharged. Stephen states numbness has resolved. She no longer is experiencing it. She is feeling better and resting.     Pt and  just wanted to update team.

## 2023-06-20 NOTE — ED PROVIDER NOTES
History     Chief Complaint   Patient presents with     Numbness     HPI  Sheila Schilling is a 62 year old female who presents by EMS secondary to right hand numbness and mild pain lasting approximately half an hour.  Got up to use the restroom earlier this morning, went back and while she was resting in bed noticed some right hand numbness and pain sensation.  Denies axillary pain or changes in postoperative right chest pain.  ARUN drain in with bloody discharge.  Denies associated cold hand, pale hand or blue hand.  Denies associated weakness of the hand or arm.  No fever.  Postoperative day 1 right mastectomy and tissue expander reconstruction.  Operative note from yesterday reviewed in epic at time of presentation.  Current medications reviewed with patient.  She denies fever.    Allergies:  No Known Allergies    Problem List:    Patient Active Problem List    Diagnosis Date Noted     Invasive lobular carcinoma of right breast in female (H) 05/19/2023     Priority: Medium     Added automatically from request for surgery [id number]       Palpitations 06/24/2017     Priority: Medium     CARDIOVASCULAR SCREENING; LDL GOAL LESS THAN 160 10/31/2010     Priority: Medium        Past Medical History:    Past Medical History:   Diagnosis Date     Atrial fibrillation (H)      Palpitations 6/24/2017     Paroxysmal atrial flutter (H)        Past Surgical History:    Past Surgical History:   Procedure Laterality Date     COLONOSCOPY  7/28/2011    Procedure:COLONOSCOPY; Surgeon:MIKE LYLE; Location: OR     COLONOSCOPY N/A 6/24/2022    Procedure: COLONOSCOPY;  Surgeon: Karime Jain MD;  Location: [facility]     SURGICAL HISTORY OF -   07/15/2002    laparoscopic L salpingo-oophorectomy - ectopic     SURGICAL HISTORY OF -   7/15/2002    SAB       Family History:    Family History   Problem Relation Age of Onset     Hypertension Mother      Lipids Mother      C.A.D. Father      Hypertension Father      Diabetes Maternal  "Grandmother      C.A.D. Maternal Grandmother      C.A.D. Maternal Grandfather      Cancer Paternal Grandmother         liver     Cancer - colorectal Other      Anesthesia Reaction No family hx of      Bleeding Disorder No family hx of      Venous thrombosis No family hx of        Social History:  Marital Status:   [2]  Social History     Tobacco Use     Smoking status: Never     Smokeless tobacco: Never   Vaping Use     Vaping status: Never Used   Substance Use Topics     Alcohol use: Not Currently     Drug use: Never        Medications:    aspirin 81 MG EC tablet  Calcium-Vitamin D (CALCIUM + D PO)  cephALEXin (KEFLEX) 500 MG capsule  flecainide (TAMBOCOR) 100 MG tablet  methocarbamol (ROBAXIN) 500 MG tablet  ondansetron (ZOFRAN ODT) 4 MG ODT tab  oxyCODONE (ROXICODONE) 5 MG tablet  senna-docusate (SENOKOT-S/PERICOLACE) 8.6-50 MG tablet          Review of Systems    Physical Exam   BP: 120/79  Pulse: 71  Temp: 99.3  F (37.4  C)  Resp: 20  Height: 172.7 cm (5' 8\")  Weight: 76.2 kg (168 lb)  SpO2: 97 %      Physical Exam  Nontoxic-appearing no respiratory distress alert and oriented  Skin Pink warm and dry  Examination right upper extremity shows sensation intact throughout the hand and arm, well perfused radial pulse is strong, strength is intact throughout the right upper extremity, there is no significant axillary swelling redness induration or tenderness, she has compression wrap around her chest which was left in place, no significant upper chest or supraclavicular swelling induration redness or tenderness.  ARUN drain bloody drainage.  ED Course                 Procedures                Results for orders placed or performed during the hospital encounter of 06/19/23 (from the past 24 hour(s))   NM Lymphoscintigraphy Injection only    Narrative    Examination:  NM LYMPHOSCINTIGRAPHY INJECTION ONLY      Date: 6/19/2023 1:28 PM     Indication:  breast cancer, planned surgery with sentinel node biopsy,  to be " delivered to OR.; Invasive lobular carcinoma of right breast in  female (H) .    Additional Information: none    Technique:    500  uci of Tc-99m Lymphoseek (r)  was delivered to the [facility] for  operative injection.     Images were not obtained as part of the localization procedure.    I have personally reviewed the examination and initial interpretation  and I agree with the findings.    CALDERON BERUMEN MD         SYSTEM ID:  [id number]       Medications - No data to display    Assessments & Plan (with Medical Decision Making)  Right hand numbness and discomfort postop day 1 right mastectomy.  Differential includes but not limited to brachial plexus compression, bleeding, infection, vascular issue.  Exam reassuring, vital signs within normal limits, no indication for further evaluation, asymptomatic at this time.  Encouraged her to follow-up with surgery today.  Return criteria reviewed.     I have reviewed the nursing notes.    I have reviewed the findings, diagnosis, plan and need for follow up with the patient.          New Prescriptions    No medications on file       Final diagnoses:   Postop check       6/20/2023   [facility] EMERGENCY DEPT     Eric Coleman MD  06/20/23 0731

## 2023-06-20 NOTE — ANESTHESIA POSTPROCEDURE EVALUATION
Patient: Sheila Schilling    Procedure: Procedure(s):  Right skin sparing mastectomy  sentinel node biopsy  RIGHT IMMEDIATE BREAST RECONSTRUCTION WITH USE OF TISSUE EXPANDER, ALLODERM AND SPY ANGIOGRAPHY       Anesthesia Type:  General    Note:  Disposition: Outpatient   Postop Pain Control: Uneventful            Sign Out: Well controlled pain   PONV: No   Neuro/Psych: Uneventful            Sign Out: Acceptable/Baseline neuro status   Airway/Respiratory: Uneventful            Sign Out: Acceptable/Baseline resp. status   CV/Hemodynamics: Uneventful            Sign Out: Acceptable CV status; No obvious hypovolemia; No obvious fluid overload   Other NRE: NONE   DID A NON-ROUTINE EVENT OCCUR? No           Last vitals:  Vitals Value Taken Time   /76 06/19/23 1915   Temp 36.9  C (98.5  F) 06/19/23 1915   Pulse 75 06/19/23 1923   Resp 17 06/19/23 1923   SpO2 94 % 06/19/23 1922   Vitals shown include unvalidated device data.    Electronically Signed By: Huan Simms MD  June 20, 2023  3:54 PM

## 2023-06-20 NOTE — TELEPHONE ENCOUNTER
POST-OP CALL  Jun 20, 2023    Sheila Schilling is a 62 year old female s/p right mastectomy and right axillary sentinel lymph node biopsy x2 with Dr. Mcdermott and expander reconstruction with Dr. Galvan yesterday 6/19/23.     She went to the ER this morning for right arm numbness. No concerns for stroke and she was discharged.     She reports the numbness has resolved. Her pain is controlled with acetaminophen, robaxin, and oxycodone 5 mg q6 hours. Recommended she take oxycodone as needed a not scheduled. Drain output is 80 ml of thin red fluid. She is overall doing well.     She will keep us updated on her progress.     Follow up appointment scheduled on 6/29/23 with Dr. Mcdermott.     Heavenly Tong PA-C

## 2023-06-20 NOTE — OP NOTE
Preoperative diagnosis: Right breast cancer    Postoperative diagnosis: Same    Procedure: Right skin sparing mastectomy, lymphatic mapping, and axillary sentinel lymph node biopsy x2    Surgeons: Jevon Aaron    Indications for surgery: The patient was diagnosed with a large right-sided invasive breast cancer.  She has elected to undergo mastectomy and sentinel node biopsy with immediate reconstruction.    Procedure in detail: The patient is brought to the operating room given a general anesthetic.  I injected technetium sulfur colloid and ICG into her right breast.  She was prepped and draped in the usual fashion.  I made a small vertical incision around the nipple complex.  I raised sharply with a Bovie cautery.  A superior skin flap was raised to the clavicle.  A medial skin flap was raised to the lateral border of the sternum.  An inferior skin flap was raised to the inframammary fold.  Strict hemostasis was obtained and surgical close and with the Bovie cautery.  The breast was removed from the pectoralis muscle from medial to lateral and from superior to inferior.  After the specimen was dissected off the lateral border of the pectoralis major muscle, it was divided, oriented, and sent to pathology.  We identified 2 radioactive and fluorescent lymph nodes in the axilla deep to the fascia.  The ex vivo counts of sentinel node #1 were 290 counts per second.  The ex vivo counts of sentinel node 28 counts per second.  After removal of the second lymph node there were no additional preoperative or fluorescent lymph nodes.  The wound was temporarily closed with a laparotomy pad in preparation for immediate reconstruction by plastic surgery.  There will be detailed in a separate dictation.    Dictation on drag    [place] Node Biopsy for Breast Cancer - Right  Operation performed with curative intent Yes   Tracer(s) used to identify sentinel nodes in the upfront surgery (non-neoadjuvant) setting  Dye   Tracer(s) used to identify sentinel nodes in the neoadjuvant setting Dye   All nodes (colored or non-colored) present at the end of a dye-filled lymphatic channel were removed Yes   All significantly radioactive nodes were removed Yes   All palpably suspicious nodes were removed Yes   Biopsy-proven positive nodes marked with clips prior to chemotherapy were identified and removed Yes

## 2023-06-20 NOTE — DISCHARGE INSTRUCTIONS
Follow-up with your surgeon today    Return here for fever, progressive swelling, pain, numbness or any other concern

## 2023-06-29 ENCOUNTER — ONCOLOGY VISIT (OUTPATIENT)
Dept: RADIATION THERAPY | Facility: OUTPATIENT CENTER | Age: 63
End: 2023-06-29
Payer: COMMERCIAL

## 2023-06-29 ENCOUNTER — PATIENT OUTREACH (OUTPATIENT)
Dept: ONCOLOGY | Facility: CLINIC | Age: 63
End: 2023-06-29
Payer: COMMERCIAL

## 2023-06-29 VITALS
SYSTOLIC BLOOD PRESSURE: 145 MMHG | RESPIRATION RATE: 16 BRPM | HEART RATE: 62 BPM | OXYGEN SATURATION: 99 % | BODY MASS INDEX: 26.06 KG/M2 | WEIGHT: 171.4 LBS | TEMPERATURE: 98.4 F | DIASTOLIC BLOOD PRESSURE: 91 MMHG

## 2023-06-29 DIAGNOSIS — C50.911 INVASIVE LOBULAR CARCINOMA OF RIGHT BREAST IN FEMALE (H): Primary | ICD-10-CM

## 2023-06-29 LAB
PATH REPORT.COMMENTS IMP SPEC: ABNORMAL
PATH REPORT.COMMENTS IMP SPEC: YES
PATH REPORT.FINAL DX SPEC: ABNORMAL
PATH REPORT.GROSS SPEC: ABNORMAL
PATH REPORT.MICROSCOPIC SPEC OTHER STN: ABNORMAL
PATH REPORT.RELEVANT HX SPEC: ABNORMAL
PATHOLOGY SYNOPTIC REPORT: ABNORMAL
PHOTO IMAGE: ABNORMAL

## 2023-06-29 ASSESSMENT — PAIN SCALES - GENERAL: PAINLEVEL: NO PAIN (0)

## 2023-06-29 NOTE — NURSING NOTE
"Oncology Rooming Note    June 29, 2023 10:55 AM   Sheila Schilling is a 62 year old female who presents for:    Chief Complaint   Patient presents with     Surgical Followup     Post op appointment with Dr. Mcdermott     Initial Vitals: BP (!) 145/91   Pulse 62   Temp 98.4  F (36.9  C) (Oral)   Resp 16   Wt 77.7 kg (171 lb 6.4 oz)   LMP 03/08/2011   SpO2 99%   BMI 26.06 kg/m   Estimated body mass index is 26.06 kg/m  as calculated from the following:    Height as of 6/20/23: 1.727 m (5' 8\").    Weight as of this encounter: 77.7 kg (171 lb 6.4 oz). Body surface area is 1.93 meters squared.  No Pain (0) Comment: Data Unavailable   Patient's last menstrual period was 03/08/2011.  Allergies reviewed: Yes  Medications reviewed: Yes    Medications: Medication refills not needed today.  Pharmacy name entered into [facility]:    [facility] - [place], [place] - [address] [id number] - [place], [place] - [address]    Clinical concerns: Post op appointment Dr. Mcdermott was notified.      Marisa Lund RN              "

## 2023-06-29 NOTE — LETTER
6/29/2023         RE: Sheila Schilling  [address] [facility] [address]        Dear Colleague,    Thank you for referring your patient, Sheila Schilling, to the [facility] [place] RAD[facility]APY [facility]. Please see a copy of my visit note below.    The patient is here for postoperative visit after undergoing a right skin sparing mastectomy on June 19.  She has done well since her surgery with no postoperative complications.  Her drain output has been less than 30 cc/day.  On physical examination her skin is healing well with no evidence of necrosis or cellulitis.  I removed her drain without any difficulties.  Her pathology report demonstrated a 15 cm invasive lobular cancer and her sentinel node was positive.    Impression: Postop check    : Plan I talked to her about the pathology.  I told her that radiation therapy would likely be recommended.  She has an Oncotype Dx that is pending.  I will see in the future if any problems arise.      Again, thank you for allowing me to participate in the care of your patient.        Sincerely,        Sanjiv Mcdermott MD

## 2023-06-29 NOTE — PROGRESS NOTES
The patient is here for postoperative visit after undergoing a right skin sparing mastectomy on June 19.  She has done well since her surgery with no postoperative complications.  Her drain output has been less than 30 cc/day.  On physical examination her skin is healing well with no evidence of necrosis or cellulitis.  I removed her drain without any difficulties.  Her pathology report demonstrated a 15 cm invasive lobular cancer and her sentinel node was positive.    Impression: Postop check    : Plan I talked to her about the pathology.  I told her that radiation therapy would likely be recommended.  She has an Oncotype Dx that is pending.  I will see in the future if any problems arise.

## 2023-06-30 ENCOUNTER — OFFICE VISIT (OUTPATIENT)
Dept: PLASTIC SURGERY | Facility: CLINIC | Age: 63
End: 2023-06-30
Payer: COMMERCIAL

## 2023-06-30 VITALS
WEIGHT: 171 LBS | BODY MASS INDEX: 25.91 KG/M2 | SYSTOLIC BLOOD PRESSURE: 134 MMHG | OXYGEN SATURATION: 99 % | HEART RATE: 64 BPM | DIASTOLIC BLOOD PRESSURE: 86 MMHG | HEIGHT: 68 IN

## 2023-06-30 DIAGNOSIS — Z90.10 ACQUIRED ABSENCE OF BREAST, UNSPECIFIED LATERALITY: Primary | ICD-10-CM

## 2023-06-30 PROCEDURE — 99024 POSTOP FOLLOW-UP VISIT: CPT | Performed by: PHYSICIAN ASSISTANT

## 2023-06-30 ASSESSMENT — PAIN SCALES - GENERAL: PAINLEVEL: NO PAIN (0)

## 2023-06-30 NOTE — PROGRESS NOTES
"Plastic and Reconstructive Surgery Note  6/30/2023  Attending: Mandy     S: Doing well. Drain removed by Dr. Mcdermott yesterday. No issues.     O:  /86 (BP Location: Left arm, Patient Position: Chair, Cuff Size: Adult Regular)   Pulse 64   Ht 1.727 m (5' 8\")   Wt 77.6 kg (171 lb)   LMP 03/08/2011   SpO2 99%   BMI 26.00 kg/m    Gen: well appearing, NAD  Chest: incision c/d/i. No redness, warmth or swelling. Incision healing well, without dehiscence. Nontender. Slight maria e-incisional bruising.     Assessment: 62 year old female s/p right breast tissue expander placement     Plan:   Apply antibiotic ointment daily to incision  No pressure to incision  No chest exercises  No heavy lifting  At 3 weeks, we will being expansion  Patient will follow up with Dr Galvan when her radiation plans are finalized     "

## 2023-06-30 NOTE — NURSING NOTE
"Chief Complaint   Patient presents with     RIP Sosa, is being seen today for a 1 week post-op DOS 6/19/23 with Dr. Mcdermott.       Vitals:    06/30/23 1042   BP: 134/86   BP Location: Left arm   Patient Position: Chair   Cuff Size: Adult Regular   Pulse: 64   SpO2: 99%   Weight: 77.6 kg (171 lb)   Height: 1.727 m (5' 8\")       Body mass index is 26 kg/m .      Cherri Trevizo LPN    "

## 2023-06-30 NOTE — LETTER
"6/30/2023       RE: Sheila Schilling  [address] [facility] [address]     Dear Colleague,    Thank you for referring your patient, Sheila Schilling, to the [facility] PLASTIC AND RECONSTRUCTIVE SURGERY CLINIC [place] [facility]. Please see a copy of my visit note below.    Plastic and Reconstructive Surgery Note  6/30/2023  Attending: Mandy     S: Doing well. Drain removed by Dr. Mcdermott yesterday. No issues.     O:  /86 (BP Location: Left arm, Patient Position: Chair, Cuff Size: Adult Regular)   Pulse 64   Ht 1.727 m (5' 8\")   Wt 77.6 kg (171 lb)   LMP 03/08/2011   SpO2 99%   BMI 26.00 kg/m    Gen: well appearing, NAD  Chest: incision c/d/i. No redness, warmth or swelling. Incision healing well, without dehiscence. Nontender. Slight maria e-incisional bruising.     Assessment: 62 year old female s/p right breast tissue expander placement     Plan:   Apply antibiotic ointment daily to incision  No pressure to incision  No chest exercises  No heavy lifting  At 3 weeks, we will being expansion  Patient will follow up with Dr Galvan when her radiation plans are finalized               Again, thank you for allowing me to participate in the care of your patient.      Sincerely,    Taylor Centeno PA-C      "

## 2023-07-04 LAB — INTERPRETATION: NORMAL

## 2023-07-10 LAB — SPECIMEN STATUS: NORMAL

## 2023-07-13 ENCOUNTER — ALLIED HEALTH/NURSE VISIT (OUTPATIENT)
Dept: SURGERY | Facility: CLINIC | Age: 63
End: 2023-07-13
Payer: COMMERCIAL

## 2023-07-13 DIAGNOSIS — Z98.890 S/P BREAST RECONSTRUCTION, RIGHT: Primary | ICD-10-CM

## 2023-07-13 PROCEDURE — 99024 POSTOP FOLLOW-UP VISIT: CPT

## 2023-07-13 NOTE — PATIENT INSTRUCTIONS
It was great seeing you today for your first Tissue Expansion visit. Please review the information below. If you have any additional questions please do not hesitate to call Anahy RN or Scarlett RN at [phone]        What to expect with Tissue Expansion     *You may experience minor discomfort for the following 12 to 24 hours      after each tissue expansion. This discomfort usually subsides 2 to 3 days      after each tissue expansion.    *The tissue expander may shift after the 1st or 2nd expansion.    *You may experience more discomfort on one side than the other if you      have bilateral tissue expanders placed.    * Your posture may change causing you to have some upper and/or mid      back pain as you're expanded.    * Your shoulder range of motion may become stiff requiring you to continue to     perform the shoulder exercises during the tissue expansion process to     prevent shoulder stiffness and a frozen shoulder.    * You may find it difficult to sleep because you feel tight across the chest      and shoulders.    * You may find it difficult to fit into certain types of tight fitting clothing.      You may need to wear oversized shirts until the final exchange of the      tissue expander (s).    * You may feel chest persist tightness or heaviness secondary to being     expanded. This usually subsides with time.    * The tissue expanders will remain in place for a minimum of 8 weeks after     completing the last tissue expansion. This will allow for the soft tissues     (i.e. skin and muscle) to heal and recover from being stretched before the     second surgery takes place for the exchange of the tissue expander.     *You'll see your surgeon when we think you have achieve your desired     breast size to determine if you need additional expansions and for     surgical planning.    How can I treat discomfort?   * Ibuprofen (or other NSAIDs) 600 mg by mouth every 6 to 8 hours with     Food  starting the morning of each tissue expansion and continue to take     around the clock for 3 to 5 days as needed for discomfort. You can start     this 2 weeks after surgery. If you cannot take Ibuprofen okay to take Tylenol as     directed   * Cool compresses wrapped in a towel  can be applied for 20 min every 2 hours if      Needed    How well the desired breast size is determined by a few things:      *On an individual basis.    *There is no scientific way to determine the exact breast size. It will be     determined by a number of different factors i.e.         -How well you tolerate each tissue expansion.         -How well your skin reacts to the expansions.         -The size of the other breast (if a unilateral tissue expander).         -Previous surgeries or amount of scarring         -We recommend trying on a desired bra size as you approach your desired      breast size to see how well the bra size fits.      When to Call:  *If you have increasing swelling or bruising.  *If swelling and redness persist after a few days.  *If you have increased redness along the incision.  *If you have severe or increased pain not relieved by medication.  *If you have any side effects to medications; such as, rash, nauseas,      headache, vomiting, etc.  *f you have an oral temperature of 100.5 degrees or higher.  *If you have any yellow or greenish drainage from the incisions or notice a      foul smell.  *If you have bleeding from the incisions that is difficult to control with      light pressure.

## 2023-07-13 NOTE — NURSING NOTE
Sheila Schilling comes into clinic today at the request of Dr. Galvan Ordering Provider for tissue expansion on the right breast.    Per Mandy  RN to fill right breast expander(s). 150cc of NS filled into right breast expander  Total fill volume for right breast expander is 150cc.  Bacitracin and a bandaid applied to the injection sites. Pt tolerated the breast fill without any issues. Pt advised to monitor the injection sites for any increased redness, drainage, pain or swelling and call the clinic back if any of these issues develop. Pt to follow up in one week (s)for further expansion.    This service provided today was under the supervising provider of the day Dr. Lauren, who was available if needed.    Anahy Hoffman RN

## 2023-07-21 ENCOUNTER — ALLIED HEALTH/NURSE VISIT (OUTPATIENT)
Dept: SURGERY | Facility: CLINIC | Age: 63
End: 2023-07-21
Payer: COMMERCIAL

## 2023-07-21 DIAGNOSIS — Z98.890 S/P BREAST RECONSTRUCTION, RIGHT: Primary | ICD-10-CM

## 2023-07-21 PROCEDURE — 99024 POSTOP FOLLOW-UP VISIT: CPT

## 2023-07-21 NOTE — NURSING NOTE
Sheila Schilling comes into clinic today at the request of   Ordering Provider for Breast fill .    150cc of NS filled into right breast expanderTotal fill volume for right breast expander is 300cc.   Bacitracin and a bandaid applied to the injection sites. Pt tolerated the breast fill without any issues. Pt advised to monitor the injection sites for any increased redness, drainage, pain or swelling and call the clinic back if any of these issues develop. Pt to follow up with her  this week and pt advised to update the team on radiation plans.  This service provided today was under the supervising provider of the day , who was available if needed.    Anahy Hoffman RN

## 2023-07-26 ENCOUNTER — VIRTUAL VISIT (OUTPATIENT)
Dept: ONCOLOGY | Facility: HOSPITAL | Age: 63
End: 2023-07-26
Attending: INTERNAL MEDICINE
Payer: COMMERCIAL

## 2023-07-26 DIAGNOSIS — C50.911 INVASIVE LOBULAR CARCINOMA OF RIGHT BREAST IN FEMALE (H): Primary | ICD-10-CM

## 2023-07-26 PROCEDURE — 99214 OFFICE O/P EST MOD 30 MIN: CPT | Mod: VID | Performed by: INTERNAL MEDICINE

## 2023-07-26 ASSESSMENT — PAIN SCALES - GENERAL: PAINLEVEL: NO PAIN (0)

## 2023-07-26 NOTE — PROGRESS NOTES
Video-Visit Details    Type of service:  Video Visit    Video Start Time: 9:05 AM  Video End Time:  9:20 AM    Originating Location (pt. Location): Home in [place]    Distant Location (provider location): [facility]/[place]    Platform used for Video Visit: Deep [facility] Hematology and Oncology Progress Note    Patient: Sheila Schilling  MRN: [id number]  7/26/23        Reason for Visit    Chief Complaint   Patient presents with    Oncology Clinic Visit     Invasive lobular carcinoma of right breast in female (H)         Problem List Items Addressed This Visit          Other    Invasive lobular carcinoma of right breast in female (H) - Primary    Relevant Orders    Radiation Therapy Referral         Assessment and Plan  Invasive lobular carcinoma of the right breast, status postmastectomy and sentinel lymph node biopsy  pT3, pN1a  Strongly ER and SC positive, HER2 negative by IHC and FISH  Reviewed her surgical path in detail today.  It is showing a 15 cm invasive lobular carcinoma, grade 2 with background LCIS.  All the margins are negative with the closest margin being posterior margin at 4 mm.  1 out of 2 lymph node was positive for malignancy.  Metastatic lesion measured 4 mm in the greatest dimension without any evidence of extranodal extension.  There was presence of lymphovascular invasion and the primary tumor.  Oncotype DX score came back at 13 which predicts no added benefit from systemic chemotherapy in the adjuvant setting.  Her risk of distant recurrence with endocrine therapy alone is about 13% at 9 years.  I reviewed these results with her in detail and the implications.    In this setting I reviewed further management.  No need for adjuvant chemotherapy.  However due to the positive lymph nodes she will need adjuvant radiation therapy.  I reviewed the rationale behind this.  We will make a referral to the radiation oncology.  Since she is strongly hormone  scepter positive she will need adjuvant endocrine therapy.  This will be started after she finishes radiation.  Options include AI versus tamoxifen but considering her postmenopausal status would prefer an AI.  I reviewed potential side effects and complications associated with aromatase inhibitors.  Her tumor measures more than 5 cm and with 1 positive lymph node, so potentially she could be a candidate for addition of CDK 4/6 inhibitors to endocrine therapy in the adjuvant setting.  Technically she has high risk disease with tumor measuring more than 5 cm and a positive lymph node, but I will also check her Ki-67 level which could further support our reasoning to add abemaciclib in the adjuvant setting.    I will see her after she finishes radiation.    She is in agreement with the plan.     Cancer Staging   No matching staging information was found for the patient.    ECOG Performance    0 - Independent         Problem List    Patient Active Problem List   Diagnosis    CARDIOVASCULAR SCREENING; LDL GOAL LESS THAN 160    Palpitations    Invasive lobular carcinoma of right breast in female (H)        Oncology history  A screening mammogram done in May 2023 showed asymmetry in the upper outer quadrant of her right breast. Diagnostic mammogram and ultrasound done in early May demonstrated architectural distortion in the right breast and a 12 x 10 x 8 mm ill-defined mass with acoustic shadowing respectively. This was corresponding to the abnormality seen on the screening mammography. Needle biopsy of the mass came back showing invasive lobular carcinoma. Grade 2. Strongly ER and MO positive and HER2 negative. She had very dense breasts a contrast-enhanced mammogram was ordered by surgery. It is showing a 7.8 x 6.7 x 5.3 cm enhancement in the right breast extending from 11:00 to 8 o'clock position consistent with malignancy.     Interval History   Sheila Schilling is a 62 year old with right-sided hormone receptor  positive and HER2 negative breast cancer status postmastectomy and sentinel lymph node biopsy who is seen as a revisit to discuss adjuvant therapy for the same.    She underwent right mastectomy with sentinel node biopsy on June 19.  Surgical path came back showing a 15 cm invasive lobular carcinoma with 1 positive sentinel lymph node.  The metastatic site of the lymph node measured 4 mm without any evidence of extranodal extension.  Today she is seen as a video visit to discuss adjuvant therapy.  She has recovered well from surgery.  She has not met with radiation oncology yet.        Review of Systems  A comprehensive review of systems was negative except for what is noted in the interval history    Current Outpatient Medications   Medication    aspirin 81 MG EC tablet    Calcium-Vitamin D (CALCIUM + D PO)    flecainide (TAMBOCOR) 100 MG tablet    cephALEXin (KEFLEX) 500 MG capsule    methocarbamol (ROBAXIN) 500 MG tablet    ondansetron (ZOFRAN ODT) 4 MG ODT tab    senna-docusate (SENOKOT-S/PERICOLACE) 8.6-50 MG tablet     No current facility-administered medications for this visit.     Facility-Administered Medications Ordered in Other Visits   Medication    lidocaine 1 % 10 mL        Physical Exam        General: alert and cooperative      Lab Results    No results found for this or any previous visit (from the past 168 hour(s)).    Imaging    No results found.    A total of 30 min were spent today on this visit which included face to face conversation with the patient, EMR review, counseling and co-ordination of care and medical documentation.    Signed by: Merna Dooley MD

## 2023-07-26 NOTE — PROGRESS NOTES
Virtual Visit Details    Type of service:  Video Visit     Originating Location (pt. Location): Home    Distant Location (provider location):  On-site  Platform used for Video Visit: Other: Lico White LPN on 7/26/2023 at 9:10 AM    Right arm;

## 2023-07-28 NOTE — TELEPHONE ENCOUNTER
RECORDS STATUS - BREAST    RECORDS REQUESTED FROM: Epic   DATE REQUESTED: 7/28   NOTES DETAILS STATUS   OFFICE NOTE from referring provider Epic Dr. Merna Dooley   OFFICE NOTE from medical oncologist [facility] Dr. Basurto: 7/26/23   OFFICE NOTE from surgeon [facility] Dr. Sanjiv Mcdermott: 6/29/23   DISCHARGE SUMMARY from hospital [facility] 6/19/23   DISCHARGE REPORT from the ER [facility] 6/20/23   OPERATIVE REPORT Epic 6/19/23, 5/30/23: Lumpectomy   MEDICATION LIST [facility] 6/19/23   LABS     PATHOLOGY REPORTS  (Tissue diagnosis, Stage, ER/MS percentage positive and intensity of staining, HER2 IHC, FISH, and all biopsies from breast and any distant metastasis)                 Epic 6/19/23, 5/10/23: Surg Path   GENONOMIC TESTING     TYPE:   (Next Generation Sequencing, including Foundation One testing, and Oncotype score) Epic 6/19/23: Oncotype    6/15/23: BRCA/NGS    5/18/23: He[employer]     IMAGING (NEED IMAGES & REPORT)     CT SCANS PACS Epic   MAMMO PACS Epic   ULTRASOUND PACS Epic

## 2023-08-04 ENCOUNTER — OFFICE VISIT (OUTPATIENT)
Dept: RADIATION THERAPY | Facility: OUTPATIENT CENTER | Age: 63
End: 2023-08-04
Attending: INTERNAL MEDICINE
Payer: COMMERCIAL

## 2023-08-04 ENCOUNTER — TELEPHONE (OUTPATIENT)
Dept: SURGERY | Facility: CLINIC | Age: 63
End: 2023-08-04
Payer: COMMERCIAL

## 2023-08-04 ENCOUNTER — PRE VISIT (OUTPATIENT)
Dept: RADIATION THERAPY | Facility: OUTPATIENT CENTER | Age: 63
End: 2023-08-04

## 2023-08-04 VITALS
SYSTOLIC BLOOD PRESSURE: 142 MMHG | OXYGEN SATURATION: 97 % | HEART RATE: 67 BPM | RESPIRATION RATE: 20 BRPM | DIASTOLIC BLOOD PRESSURE: 90 MMHG | BODY MASS INDEX: 26.3 KG/M2 | WEIGHT: 173 LBS

## 2023-08-04 DIAGNOSIS — C50.911 INVASIVE LOBULAR CARCINOMA OF RIGHT BREAST IN FEMALE (H): ICD-10-CM

## 2023-08-04 DIAGNOSIS — C50.911 MALIGNANT NEOPLASM OF RIGHT BREAST (H): Primary | ICD-10-CM

## 2023-08-04 RX ORDER — MOMETASONE FUROATE 1 MG/G
CREAM TOPICAL DAILY
Qty: 50 G | Refills: 1 | Status: SHIPPED | OUTPATIENT
Start: 2023-08-04 | End: 2024-07-23

## 2023-08-04 NOTE — NURSING NOTE
"REASON FOR APPOINTMENT   Type of Cancer: ILC   ER+OH+ Her2-  Location: R breast  Date of Symptom Onset: Found on routine screening. No other symptoms.    TREATMENT TO-DATE FOR THIS CANCER  Surgery ? Dr. Mcdermott and Dr. Galvan  - 6/19/23  Chemotherapy ? Dr. Dooley, oncotype 13 - no chemo indicated. Will follow up after radiation for AI therapy   Other Treatments for this Cancer ? Discussion for radiation therapy today. Will need fluid removed (100 ml) from expander, then can do simulation.    PERSONAL HISTORY OF CANCER   Previous Cancer ? no   Prior Radiation ? no   Prior Chemotherapy ? no   Prior Hormonal Therapy ? no     RECENT IMAGING STUDIES  Mammogram US    REFERRALS NEEDED  None at this time    VITALS  BP (!) 142/90 (BP Location: Left arm, Patient Position: Sitting, Cuff Size: Adult Regular)   Pulse 67   Resp 20   Wt 78.5 kg (173 lb)   LMP 03/08/2011   SpO2 97%   BMI 26.30 kg/m      PACEMAKER/IMPLANTED CARDIAC DEVICE no    PAIN  Denies    PSYCHOSOCIAL  Marital Status:   Patient lives in [place]  with spouse.  Number of children: 1  Working status:   Do you feel safe in your home? Yes    REVIEW OF SYSTEMS  Skin: per pt report surgical site healing well with no pain, no drainage, no s/s of infection  Eyes: glasses  Ears/Nose/Throat: negative  Respiratory: No shortness of breath, dyspnea on exertion, cough, or hemoptysis  Cardiovascular: managed Afib/Aflutter  Gastrointestinal: negative  Genitourinary: negative  Musculoskeletal: negative  Neurologic: negative  Psychiatric: negative  Hematologic/Lymphatic/Immunologic: negative  Endocrine: negative    WOMEN ONLY  Any chance you may be pregnant: No  Age at first period: 13  Date of last period: 50  Number of pregnancies: 2, [id number]  Birth Control pills: Yes  If yes, for how long: off and on for 10-15 yrs  HRT: no  Breast  feeding: 3 months    Radiation Oncology Patient Teaching    Current Concern: \" I feel like I healed well. I have no " "pain. The expander feels fine.\"    Person involved with teaching: Patient  Patient asked Questions: Yes  Patient was cooperative: Yes  Patient was receptive (willing to accept information given): Yes    Education Assessment  Comprehension ability: Medium  Knowledge level: Medium  Factors affecting teaching: None    Education Materials Given  Radiation Therapy and You  Caring for Your Skin During Radiation ...    Educational Topics Discussed  Side effects, Medications, Activity, Nutrition, Adjustment to illness, and When to call MD/RN    Response To Teaching  More review necessary    Do you have an advanced directive or living will? No  Are you DNR/DNI? No          "

## 2023-08-04 NOTE — LETTER
8/4/2023         RE: Sheila Schilling  [address] [facility] [place] [address]        Dear Colleague,    Thank you for referring your patient, Sheila Schilling, to the [facility] RAD[facility]ATION [facility]ER[facility]. Please see a copy of my visit note below.    Dear Colleagues,  Today Sheila Schilling was seen in consultation.  IDENTIFICATION: This is a 62 year old woman with right (overlapping sites) breast cancer, status post mastectomy and SLNBx, revealing G2 ILCA, [id number] ER+/ID+/H2N- disease referred for adjuvant radiation therapy.  Her Oncotype score was 13 and she will not be receiving chemotherapy.  HISTORY OF PRESENT ILLNESS: Sheila Schilling was in her usual state of health this past year.  On April 7, 2023 bilateral screening mammogram showed within the right upper outer breast possible architectural distortion.  There is no mammographic evidence for malignancy in the left breast.  On May 4, 2023, right diagnostic mammogram showed persistence architectural distortion in the upper outer quadrant.  By ultrasound it measured 1.2 cm in greatest dimension.  On May 10, 2023 right ultrasound-guided biopsy was consistent with grade 2 invasive lobular carcinoma with LCIS.  Contrast-enhanced mammogram on May 24, 2023 confirmed the right breast architectural distortion and non-mass enhancement throughout the outer right breast and the entire area of enhancement spanned about 7.8 cm in greatest dimension.  There was no suspicious enhancement within the left breast.  By ultrasound biopsy clip is seen adjacent to the mass.  On June 19, 2023 Dr. Mcdermott took her to the OR and she had a right breast mastectomy with sentinel lymph node biopsy.   Pathology revealed 15.0 cm G2 ILCA with classic type LCIS.  There was no DCIS.  Positive LVSI.  There were negative invasive margins with the closest margin being posterior at 4mm.  There was 1 out 2 involved lymph nodes given her a pathologic stage of [id number], ER/ID positive HER2/salo  negative.  She had immediate reconstruction with tissue expander placement.  Her postoperative course has been unenventful.  She was recently seen by Dr. Dooley.  Her Oncotype score returned back as 13. The benefit of treatment did not outweigh the risks and no systemic chemotherapy is recommended.  She is to receive adjuvant endocrine therapy after XRT.    Since her surgery, she has continued to heal well and is currently fully expanded.  She has good ROM.  She denies any other new masses, skin changes, shortness of breath, chest or bony pain, or new neurologic symptoms. She is being seen here today for consideration of postoperative radiotherapy.  REVIEW OF SYSTEMS: As per HPI, a 14-point review of system is otherwise negative.  PAST RADIATION THERAPY:  Denies.  PAST CTD/PACEMAKER: Denies  BREAST RISK FACTORS:  No history of prior breast biopsy. No family history of breast cancer. Genetic testing has been ordered.  She started her menses at age 13.   with her first delivery at age 41. She  for a total of 3 months.  Menopause age 50. No history of HRT. OCP use off and on for 15 years.    Past Medical History:   Diagnosis Date     Atrial fibrillation (H)      Palpitations 2017     Paroxysmal atrial flutter (H)      Past Surgical History:   Procedure Laterality Date     BIOPSY NODE SENTINEL Right 2023    Procedure: sentinel node biopsy;  Surgeon: Sanjiv Mcdermott MD;  [facility]     COLONOSCOPY  2011    Procedure:COLONOSCOPY; Surgeon:MIKE LYLE; Location: OR     COLONOSCOPY N/A 2022    Procedure: COLONOSCOPY;  Surgeon: Karime Jain MD;  Location: [facility]     MASTECTOMY SIMPLE Right 2023    Procedure: Right skin sparing mastectomy;  Surgeon: Sanjiv Mcdermott MD;  [facility]: [facility]     RECONSTRUCT BREAST, INSERT TISSUE EXPANDER, COMBINED Bilateral 2023    Procedure: RIGHT IMMEDIATE BREAST RECONSTRUCTION WITH USE OF TISSUE EXPANDER, ALLODERM AND SPY ANGIOGRAPHY;   Surgeon: Qasim Galvan MD;  Location: [facility]     SURGICAL HISTORY OF -   07/15/2002    laparoscopic L salpingo-oophorectomy - ectopic     SURGICAL HISTORY OF -   7/15/2002    SAB     Family History   Problem Relation Age of Onset     Hypertension Mother      Lipids Mother      C.A.D. Father      Hypertension Father      Diabetes Maternal Grandmother      C.A.D. Maternal Grandmother      C.A.D. Maternal Grandfather      Cancer Paternal Grandmother         liver     Cancer - colorectal Other      Anesthesia Reaction No family hx of      Bleeding Disorder No family hx of      Venous thrombosis No family hx of      Social History     Tobacco Use     Smoking status: Never     Smokeless tobacco: Never   Substance Use Topics     Alcohol use: Not Currently     Current Outpatient Medications   Medication     aspirin 81 MG EC tablet     Calcium-Vitamin D (CALCIUM + D PO)     cephALEXin (KEFLEX) 500 MG capsule     flecainide (TAMBOCOR) 100 MG tablet     methocarbamol (ROBAXIN) 500 MG tablet     ondansetron (ZOFRAN ODT) 4 MG ODT tab     senna-docusate (SENOKOT-S/PERICOLACE) 8.6-50 MG tablet     No current facility-administered medications for this visit.     Facility-Administered Medications Ordered in Other Visits   Medication     lidocaine 1 % 10 mL     No Known Allergies  PHYSICAL EXAMINATION:  BP (!) 142/90 (BP Location: Left arm, Patient Position: Sitting, Cuff Size: Adult Regular)   Pulse 67   Resp 20   Wt 78.5 kg (173 lb)   LMP 03/08/2011   SpO2 97%   BMI 26.30 kg/m    GENERAL Well-appearing woman in no acute distress.  HEENT Normocephalic, atraumatic.  Sclerae anicteric.  CVR  Regular rate and rhythm.  No murmurs, rubs, or gallops.  LUNGS Clear to auscultation bilaterally.  BREASTS Breasts are examined in the supine and upright position. The right breast is absent.  The left breast is unremarkable, as there is no erythema, ulceration or suspicious nodularity within it.  There is no erythema, retraction,  desquamation or discharge appreciated within the left nipple areolar complex.    CHEST right reconstructed chest wall shows well-healed scar and firm breast mound  LYMPH No supraclavicular, infraclavicular, or axillary lymphadenopathy appreciated bilaterally.  ABDOMEN Soft.    EXT  No clubbing or cyanosis or edema.    NEURO No focal deficits.  MSK  Good ROM.   SKIN  Warm and well perfused.    PSYCH  Alert and oriented x 3    ECOG PERFORMANCE STATUS: 0    IMPRESSION/PLAN: Sheila Schilling is a 62 year old woman with right (overlapping sites) breast cancer, status post mastectomy and SLNBx, revealing G2 ILCA, [id number] ER+/[place]+/H2N- disease referred for adjuvant radiation therapy.  Her Oncotype score was 13 and she will not be receiving chemotherapy.  I recommend adjuvant PMRT to improve local control and overall survival. Chest wall and regional nodes will be treated in order to improve disease free survival, breast cancer mortality and decrease the risk of local-regional recurrence based on EORTC 33665 (76% had BCT), and EBCTCG meta-analysis (PMRT only)  The risks, benefits, treatment rationale and regimen of radiation therapy to the reconstructed right chest wall were discussed in great detail today with the patient.  Risks include but are not limited to skin erythema, desquamation, hyperpigmentation, lymphedema, fibrosis, telengectasia, pneumonitis, cardiac toxicity, rib fracture and secondary malignancy. Given that she has an expander in place risk of complications of her expander from radiation are roughly 20% and include capsular contracture and infection necessitating surgical removal of the expander. At this time she has discomfort from her expander as it feels quite tight in the superior pole.  I've asked for 100cc to be removed before proceeding with sim, in order to decrease the chance of more internal pain developing during XRT.  The patient consented to therapy and will be scheduled for a CT simulation  after fluid removal. Treatment will start within 1-2 weeks.    Additional problem list to be addressed in the following manner:  Systemic/hormonal treatment : No systemic chemo recommended.  Will f/u with Med Onc 1-2 weeks after XRT completed to discuss adjuvant endocrine therapy.   Referred to PT for lymphedema consultation given 12% risk of lymphedema  There was ample time for questions and all were answered to the patient's satisfaction. Thank you for allowing me to participate in the care of this pleasant patient. If you have any questions, please do not hesitate to contact my office.    Sincerely,  Ambar Bear MD

## 2023-08-04 NOTE — TELEPHONE ENCOUNTER
Spoke with patient regarding message below and pt states due to her recent move to a different location the [place] location is better for her. RN notified pt that RN would reach out to the [facility] team to look into this and either myself or the [facility] will follow up with her...Anahy Hoffman RN          rom: Monalisa Saavedra RN   Sent: 8/4/2023  12:08 PM CDT   To: FRANCESCO Rodriguez!     My nbame is Monalisa. I am an RN in radiation oncology. Thank you for your great care of Sheila. She said you had counseled her that we may need fluid taken out prior to radiation.     Sheila met with Dr. Ambar Bear today.  We do need your help to remove 100cc of fluid.  Could I please ask you to call Sheila to schedule a visit for that?     Once that is done I will call her to get her scheduled for radiation simulation/planning visit back here at [facility].       Please let me know if there is any other info I can help with.     Have a great day   Monalisa

## 2023-08-04 NOTE — PROGRESS NOTES
Dear Colleagues,  Today Sheila Schilling was seen in consultation.  IDENTIFICATION: This is a 62 year old woman with right (overlapping sites) breast cancer, status post mastectomy and SLNBx, revealing G2 ILCA, qE1R0yH6 ER+/KS+/H2N- disease referred for adjuvant radiation therapy.  Her Oncotype score was 13 and she will not be receiving chemotherapy.  HISTORY OF PRESENT ILLNESS: Sheila Schilling was in her usual state of health this past year.  On April 7, 2023 bilateral screening mammogram showed within the right upper outer breast possible architectural distortion.  There is no mammographic evidence for malignancy in the left breast.  On May 4, 2023, right diagnostic mammogram showed persistence architectural distortion in the upper outer quadrant.  By ultrasound it measured 1.2 cm in greatest dimension.  On May 10, 2023 right ultrasound-guided biopsy was consistent with grade 2 invasive lobular carcinoma with LCIS.  Contrast-enhanced mammogram on May 24, 2023 confirmed the right breast architectural distortion and non-mass enhancement throughout the outer right breast and the entire area of enhancement spanned about 7.8 cm in greatest dimension.  There was no suspicious enhancement within the left breast.  By ultrasound biopsy clip is seen adjacent to the mass.  On June 19, 2023 Dr. Mcdermott took her to the OR and she had a right breast mastectomy with sentinel lymph node biopsy.   Pathology revealed 15.0 cm G2 ILCA with classic type LCIS.  There was no DCIS.  Positive LVSI.  There were negative invasive margins with the closest margin being posterior at 4mm.  There was 1 out 2 involved lymph nodes given her a pathologic stage of [id number], ER/KS positive HER2/salo negative.  She had immediate reconstruction with tissue expander placement.  Her postoperative course has been unenventful.  She was recently seen by Dr. Dooley.  Her Oncotype score returned back as 13. The benefit of treatment did not outweigh the risks  and no systemic chemotherapy is recommended.  She is to receive adjuvant endocrine therapy after XRT.    Since her surgery, she has continued to heal well and is currently fully expanded.  She has good ROM.  She denies any other new masses, skin changes, shortness of breath, chest or bony pain, or new neurologic symptoms. She is being seen here today for consideration of postoperative radiotherapy.  REVIEW OF SYSTEMS: As per HPI, a 14-point review of system is otherwise negative.  PAST RADIATION THERAPY:  Denies.  PAST CTD/PACEMAKER: Denies  BREAST RISK FACTORS:  No history of prior breast biopsy. No family history of breast cancer. Genetic testing has been ordered.  She started her menses at age 13.   with her first delivery at age 41. She  for a total of 3 months.  Menopause age 50. No history of HRT. OCP use off and on for 15 years.    Past Medical History:   Diagnosis Date    Atrial fibrillation (H)     Palpitations 2017    Paroxysmal atrial flutter (H)      Past Surgical History:   Procedure Laterality Date    BIOPSY NODE SENTINEL Right 2023    Procedure: sentinel node biopsy;  Surgeon: Sanjiv Mcdermott MD;  Location: [facility]    COLONOSCOPY  2011    Procedure:COLONOSCOPY; Surgeon:MIKE LYLE; Location:[facility]    COLONOSCOPY N/A 2022    Procedure: COLONOSCOPY;  Surgeon: Karime Jain MD;  Location: [facility]    MASTECTOMY SIMPLE Right 2023    Procedure: Right skin sparing mastectomy;  Surgeon: Sanjiv Mcdermott MD;  Location: [facility]    RECONSTRUCT BREAST, INSERT TISSUE EXPANDER, COMBINED Bilateral 2023    Procedure: RIGHT IMMEDIATE BREAST RECONSTRUCTION WITH USE OF TISSUE EXPANDER, ALLODERM AND SPY ANGIOGRAPHY;  Surgeon: Qasim Galvan MD;  Location: [facility]    SURGICAL HISTORY OF -   07/15/2002    laparoscopic L salpingo-oophorectomy - ectopic    SURGICAL HISTORY OF -   7/15/2002    SAB     Family History   Problem Relation Age of Onset    Hypertension Mother      Lipids Mother     C.A.D. Father     Hypertension Father     Diabetes Maternal Grandmother     C.A.D. Maternal Grandmother     C.A.D. Maternal Grandfather     Cancer Paternal Grandmother         liver    Cancer - colorectal Other     Anesthesia Reaction No family hx of     Bleeding Disorder No family hx of     Venous thrombosis No family hx of      Social History     Tobacco Use    Smoking status: Never    Smokeless tobacco: Never   Substance Use Topics    Alcohol use: Not Currently     Current Outpatient Medications   Medication    aspirin 81 MG EC tablet    Calcium-Vitamin D (CALCIUM + D PO)    cephALEXin (KEFLEX) 500 MG capsule    flecainide (TAMBOCOR) 100 MG tablet    methocarbamol (ROBAXIN) 500 MG tablet    ondansetron (ZOFRAN ODT) 4 MG ODT tab    senna-docusate (SENOKOT-S/PERICOLACE) 8.6-50 MG tablet     No current facility-administered medications for this visit.     Facility-Administered Medications Ordered in Other Visits   Medication    lidocaine 1 % 10 mL     No Known Allergies  PHYSICAL EXAMINATION:  BP (!) 142/90 (BP Location: Left arm, Patient Position: Sitting, Cuff Size: Adult Regular)   Pulse 67   Resp 20   Wt 78.5 kg (173 lb)   LMP 03/08/2011   SpO2 97%   BMI 26.30 kg/m    GENERAL Well-appearing woman in no acute distress.  HEENT Normocephalic, atraumatic.  Sclerae anicteric.  CVR  Regular rate and rhythm.  No murmurs, rubs, or gallops.  LUNGS Clear to auscultation bilaterally.  BREASTS Breasts are examined in the supine and upright position. The right breast is absent.  The left breast is unremarkable, as there is no erythema, ulceration or suspicious nodularity within it.  There is no erythema, retraction, desquamation or discharge appreciated within the left nipple areolar complex.    CHEST right reconstructed chest wall shows well-healed scar and firm breast mound  LYMPH No supraclavicular, infraclavicular, or axillary lymphadenopathy appreciated bilaterally.  ABDOMEN Soft.    EXT  No  clubbing or cyanosis or edema.    NEURO No focal deficits.  MSK  Good ROM.   SKIN  Warm and well perfused.    PSYCH  Alert and oriented x 3    ECOG PERFORMANCE STATUS: 0    IMPRESSION/PLAN: Sheila Schilling is a 62 year old woman with right (overlapping sites) breast cancer, status post mastectomy and SLNBx, revealing G2 ILCA, [id number] ER+/AR+/H2N- disease referred for adjuvant radiation therapy.  Her Oncotype score was 13 and she will not be receiving chemotherapy.  I recommend adjuvant PMRT to improve local control and overall survival. Chest wall and regional nodes will be treated in order to improve disease free survival, breast cancer mortality and decrease the risk of local-regional recurrence based on EORTC 49216 (76% had BCT), and EBCTCG meta-analysis (PMRT only)  The risks, benefits, treatment rationale and regimen of radiation therapy to the reconstructed right chest wall were discussed in great detail today with the patient.  Risks include but are not limited to skin erythema, desquamation, hyperpigmentation, lymphedema, fibrosis, telengectasia, pneumonitis, cardiac toxicity, rib fracture and secondary malignancy. Given that she has an expander in place risk of complications of her expander from radiation are roughly 20% and include capsular contracture and infection necessitating surgical removal of the expander. At this time she has discomfort from her expander as it feels quite tight in the superior pole.  I've asked for 100cc to be removed before proceeding with sim, in order to decrease the chance of more internal pain developing during XRT.  The patient consented to therapy and will be scheduled for a CT simulation after fluid removal. Treatment will start within 1-2 weeks.    Additional problem list to be addressed in the following manner:  Systemic/hormonal treatment : No systemic chemo recommended.  Will f/u with Med Onc 1-2 weeks after XRT completed to discuss adjuvant endocrine therapy.    Referred to PT for lymphedema consultation given 12% risk of lymphedema  There was ample time for questions and all were answered to the patient's satisfaction. Thank you for allowing me to participate in the care of this pleasant patient. If you have any questions, please do not hesitate to contact my office.    Sincerely,  Ambar Bear MD

## 2023-08-07 ENCOUNTER — ALLIED HEALTH/NURSE VISIT (OUTPATIENT)
Dept: PLASTIC SURGERY | Facility: CLINIC | Age: 63
End: 2023-08-07
Payer: COMMERCIAL

## 2023-08-07 DIAGNOSIS — Z90.10 ACQUIRED ABSENCE OF BREAST, UNSPECIFIED LATERALITY: Primary | ICD-10-CM

## 2023-08-07 PROCEDURE — 99024 POSTOP FOLLOW-UP VISIT: CPT

## 2023-08-07 NOTE — PROGRESS NOTES
Pt. comes into clinic today at the request of Dr. Qasim Galvan.     This service provided today was under the supervising provider of the day CJ Reyes who was available if needed.     Reason for visit: Right breast tissue expander deflation 100 ml in preparation for radiation treatments.     Under sterile conditions 100 ml saline was removed from right expander.     Totals: Right: 200 mL total of saline has been injected in right expander. She tolerated the procedure well.        Arley Mccoy, RN, BSN

## 2023-08-08 ENCOUNTER — OFFICE VISIT (OUTPATIENT)
Dept: RADIATION THERAPY | Facility: OUTPATIENT CENTER | Age: 63
End: 2023-08-08
Payer: COMMERCIAL

## 2023-08-08 DIAGNOSIS — C50.911 INVASIVE LOBULAR CARCINOMA OF RIGHT BREAST IN FEMALE (H): Primary | ICD-10-CM

## 2023-08-08 NOTE — TELEPHONE ENCOUNTER
Arley from the [facility] replied back that pt was scheduled on 8/7/23 at the [facility] to have fluid removed. Closing encounter..Anahy Hoffman RN

## 2023-08-08 NOTE — PROGRESS NOTES
Patient underwent CT simulation.     Ehsan Bravo M.D.  Department of Radiation Oncology  [facility]

## 2023-08-08 NOTE — LETTER
8/8/2023         RE: Sheila Schilling  [address] [place] [address]        Dear Colleague,    Thank you for referring your patient, Sheila Schilling, to the [facility]. Please see a copy of my visit note below.    Patient underwent CT simulation.     Ehsan Bravo M.D.  Department of Radiation Oncology  [facility]         Again, thank you for allowing me to participate in the care of your patient.        Sincerely,        Ehsan Bravo MD

## 2023-08-21 ENCOUNTER — APPOINTMENT (OUTPATIENT)
Dept: RADIATION THERAPY | Facility: OUTPATIENT CENTER | Age: 63
End: 2023-08-21
Payer: COMMERCIAL

## 2023-08-21 DIAGNOSIS — I48.0 PAROXYSMAL ATRIAL FIBRILLATION (H): ICD-10-CM

## 2023-08-21 RX ORDER — FLECAINIDE ACETATE 100 MG/1
100 TABLET ORAL 2 TIMES DAILY
Qty: 180 TABLET | Refills: 3 | Status: SHIPPED | OUTPATIENT
Start: 2023-08-21 | End: 2024-05-24

## 2023-08-21 NOTE — TELEPHONE ENCOUNTER
Last Office Visit: 06/05/23 BLAIR Romero  Next Office Visit: TBD  Last Fill Date: 05/23/23  Janell Blankenship MA Cardiology   8/21/2023 8:28 AM3

## 2023-08-21 NOTE — TELEPHONE ENCOUNTER
[facility] Cardiology Refill Guideline reviewed.  Medication meets criteria for refill. Refills sent. Catarina Wasserman RN Cardiology August 21, 2023, 8:31 AM

## 2023-08-22 ENCOUNTER — APPOINTMENT (OUTPATIENT)
Dept: RADIATION THERAPY | Facility: OUTPATIENT CENTER | Age: 63
End: 2023-08-22
Payer: COMMERCIAL

## 2023-08-23 ENCOUNTER — APPOINTMENT (OUTPATIENT)
Dept: RADIATION THERAPY | Facility: OUTPATIENT CENTER | Age: 63
End: 2023-08-23
Payer: COMMERCIAL

## 2023-08-23 ENCOUNTER — OFFICE VISIT (OUTPATIENT)
Dept: RADIATION THERAPY | Facility: OUTPATIENT CENTER | Age: 63
End: 2023-08-23
Payer: COMMERCIAL

## 2023-08-23 VITALS
WEIGHT: 170 LBS | SYSTOLIC BLOOD PRESSURE: 131 MMHG | DIASTOLIC BLOOD PRESSURE: 85 MMHG | HEART RATE: 63 BPM | BODY MASS INDEX: 25.85 KG/M2

## 2023-08-23 DIAGNOSIS — C50.911 INVASIVE LOBULAR CARCINOMA OF RIGHT BREAST IN FEMALE (H): Primary | ICD-10-CM

## 2023-08-23 NOTE — PROGRESS NOTES
[facility]  SPECIALIZING IN BREAKTHROUGHS  Radiation Oncology    On Treatment Visit Note      Sheila Schilling      Date: 2023   MRN: [id number]   : 1960  Diagnosis: ILC ER+ [place] + Her2-      Reason for Visit:  On Radiation Treatment Visit     Treatment Summary to Date  Treatment Site: R chest wall and nodes Current Dose: 600/5000 cGy Fractions:       Chemotherapy  Chemo concurrent with radx?: No    Subjective:   Doing well.  No pain.  No pruritus.  Energy is okay.  Discussed skin care.    Nursing ROS:      Skin  Skin Reaction: 0 - No changes  Skin Note: reviewed skin care of mometasone every am and aquaphor 2x/day        Cardiovascular  Respiratory effort: 1 - Normal - without distress           Pain Assessment  0-10 Pain Scale: 0      Objective:   /85   Pulse 63   Wt 77.1 kg (170 lb)   LMP 2011   BMI 25.85 kg/m    No apparent distress  Skin without erythema or desquamation    Labs:  CBC RESULTS:   Recent Labs   Lab Test 23  1118   WBC 5.5   RBC 4.99   HGB 15.3   HCT 46.4   MCV 93   MCH 30.7   MCHC 33.0   RDW 12.5        ELECTROLYTES:  Recent Labs   Lab Test 22  1401      POTASSIUM 3.7   CHLORIDE 105   EDOUARD 9.1   CO2 28   BUN 14   CR 0.72   GLC 87       Assessment:  This is a 62 year old woman with right (overlapping sites) breast cancer, status post mastectomy and SLNBx, revealing G2 ILCA, [id number] ER+/NH+/H2N- disease referred for adjuvant radiation therapy.  Her Oncotype score was 13 and she will not be receiving chemotherapy.  She is undergoing adjuvant radiation therapy.    Tolerating radiation therapy well.  All questions and concerns addressed.    Plan:   Continue current therapy.    Continue skin care.      Mosaiq chart and setup information reviewed  Ports checked    Medication Review  Med list reviewed with patient?: Yes    Educational Topic Discussed  Education Instructions: skin care reviewed      Ehsan Bravo MD         Winlevi Counseling:  I discussed with the patient the risks of topical clascoterone including but not limited to erythema, scaling, itching, and stinging. Patient voiced their understanding.

## 2023-08-23 NOTE — LETTER
2023         RE: Sheila Schilling  [address] [place] [facility] [address]        Dear Colleague,    Thank you for referring your patient, Sheila Schilling, to the  [facility]. Please see a copy of my visit note below.    [facility]  SPECIALIZING IN BREAKTHROUGHS  Radiation Oncology    On Treatment Visit Note      Sheila Schilling      Date: 2023   MRN: [id number]   : 1960  Diagnosis: ILC ER+ [place] + Her2-      Reason for Visit:  On Radiation Treatment Visit     Treatment Summary to Date  Treatment Site: R chest wall and nodes Current Dose: 600/5000 cGy Fractions:       Chemotherapy  Chemo concurrent with radx?: No    Subjective:   Doing well.  No pain.  No pruritus.  Energy is okay.  Discussed skin care.    Nursing ROS:      Skin  Skin Reaction: 0 - No changes  Skin Note: reviewed skin care of mometasone every am and aquaphor 2x/day        Cardiovascular  Respiratory effort: 1 - Normal - without distress           Pain Assessment  0-10 Pain Scale: 0      Objective:   /85   Pulse 63   Wt 77.1 kg (170 lb)   LMP 2011   BMI 25.85 kg/m    No apparent distress  Skin without erythema or desquamation    Labs:  CBC RESULTS:   Recent Labs   Lab Test 23  1118   WBC 5.5   RBC 4.99   HGB 15.3   HCT 46.4   MCV 93   MCH 30.7   MCHC 33.0   RDW 12.5        ELECTROLYTES:  Recent Labs   Lab Test 22  1401      POTASSIUM 3.7   CHLORIDE 105   EDOUARD 9.1   CO2 28   BUN 14   CR 0.72   GLC 87       Assessment:  This is a 62 year old woman with right (overlapping sites) breast cancer, status post mastectomy and SLNBx, revealing G2 ILCA, [id number] ER+/IA+/H2N- disease referred for adjuvant radiation therapy.  Her Oncotype score was 13 and she will not be receiving chemotherapy.  She is undergoing adjuvant radiation therapy.    Tolerating radiation therapy well.  All questions and concerns addressed.    Plan:   Continue current therapy.    Continue skin  care.      Mosaiq chart and setup information reviewed  Ports checked    Medication Review  Med list reviewed with patient?: Yes    Educational Topic Discussed  Education Instructions: skin care reviewed      Ehsan Bravo MD          Again, thank you for allowing me to participate in the care of your patient.        Sincerely,        Ehsan Bravo MD

## 2023-08-24 ENCOUNTER — APPOINTMENT (OUTPATIENT)
Dept: RADIATION THERAPY | Facility: OUTPATIENT CENTER | Age: 63
End: 2023-08-24
Payer: COMMERCIAL

## 2023-08-25 ENCOUNTER — APPOINTMENT (OUTPATIENT)
Dept: RADIATION THERAPY | Facility: OUTPATIENT CENTER | Age: 63
End: 2023-08-25
Payer: COMMERCIAL

## 2023-08-28 ENCOUNTER — APPOINTMENT (OUTPATIENT)
Dept: RADIATION THERAPY | Facility: OUTPATIENT CENTER | Age: 63
End: 2023-08-28
Payer: COMMERCIAL

## 2023-08-29 ENCOUNTER — APPOINTMENT (OUTPATIENT)
Dept: RADIATION THERAPY | Facility: OUTPATIENT CENTER | Age: 63
End: 2023-08-29
Payer: COMMERCIAL

## 2023-08-30 ENCOUNTER — APPOINTMENT (OUTPATIENT)
Dept: RADIATION THERAPY | Facility: OUTPATIENT CENTER | Age: 63
End: 2023-08-30
Payer: COMMERCIAL

## 2023-08-30 ENCOUNTER — OFFICE VISIT (OUTPATIENT)
Dept: RADIATION THERAPY | Facility: OUTPATIENT CENTER | Age: 63
End: 2023-08-30
Payer: COMMERCIAL

## 2023-08-30 VITALS
OXYGEN SATURATION: 95 % | BODY MASS INDEX: 25.7 KG/M2 | SYSTOLIC BLOOD PRESSURE: 115 MMHG | HEART RATE: 66 BPM | RESPIRATION RATE: 18 BRPM | DIASTOLIC BLOOD PRESSURE: 82 MMHG | WEIGHT: 169 LBS

## 2023-08-30 DIAGNOSIS — C50.911 INVASIVE LOBULAR CARCINOMA OF RIGHT BREAST IN FEMALE (H): Primary | ICD-10-CM

## 2023-08-30 ASSESSMENT — PAIN SCALES - GENERAL: PAINLEVEL: NO PAIN (0)

## 2023-08-30 NOTE — PROGRESS NOTES
[facility]  SPECIALIZING IN BREAKTHROUGHS  Radiation Oncology    On Treatment Visit Note      Sheila Schilling      Date: 2023   MRN: [id number]   : 1960  Diagnosis: ILC ER+ [place] + Her2-      Reason for Visit:  On Radiation Treatment Visit     Treatment Summary to Date  Treatment Site: R chest wall and nodes Current Dose: 1400/5000 cGy Fractions:       Chemotherapy  Chemo concurrent with radx?: No    Subjective:   Doing well.  No pain.  No pruritus.  Energy is okay.      Nursing ROS:      Skin  Skin Reaction: 0 - No changes  Skin Note: reviewed skin care of mometasone every am and aquaphor 2x/day        Cardiovascular  Respiratory effort: 1 - Normal - without distress           Pain Assessment  0-10 Pain Scale: 0      Objective:   /82   Pulse 66   Resp 18   Wt 76.7 kg (169 lb)   LMP 2011   SpO2 95%   BMI 25.70 kg/m    No apparent distress  Skin with mild erythema without desquamation    Labs:  CBC RESULTS:   Recent Labs   Lab Test 23  1118   WBC 5.5   RBC 4.99   HGB 15.3   HCT 46.4   MCV 93   MCH 30.7   MCHC 33.0   RDW 12.5        ELECTROLYTES:  Recent Labs   Lab Test 22  1401      POTASSIUM 3.7   CHLORIDE 105   EDOUARD 9.1   CO2 28   BUN 14   CR 0.72   GLC 87       Assessment:  This is a 62 year old woman with right (overlapping sites) breast cancer, status post mastectomy and SLNBx, revealing G2 ILCA, [id number] ER+/TN+/H2N- disease referred for adjuvant radiation therapy.  Her Oncotype score was 13 and she will not be receiving chemotherapy.  She is undergoing adjuvant radiation therapy.    Tolerating radiation therapy well.  All questions and concerns addressed.    Plan:   Continue current therapy.    Continue skin care.      Mosaiq chart and setup information reviewed  Ports checked    Medication Review  Med list reviewed with patient?: Yes    Educational Topic Discussed  Education Instructions: skin care reviewed      Ehsan Bravo MD

## 2023-08-30 NOTE — LETTER
2023         RE: Sheila Schilling  [address] [place] [facility] [address]        Dear Colleague,    Thank you for referring your patient, Sheila Schilling, to the  [facility]. Please see a copy of my visit note below.    [facility]  SPECIALIZING IN BREAKTHROUGHS  Radiation Oncology    On Treatment Visit Note      Sheila Schilling      Date: 2023   MRN: [id number]   : 1960  Diagnosis: ILC ER+ AR + Her2-      Reason for Visit:  On Radiation Treatment Visit     Treatment Summary to Date  Treatment Site: R chest wall and nodes Current Dose: 1400/5000 cGy Fractions:       Chemotherapy  Chemo concurrent with radx?: No    Subjective:   Doing well.  No pain.  No pruritus.  Energy is okay.      Nursing ROS:      Skin  Skin Reaction: 0 - No changes  Skin Note: reviewed skin care of mometasone every am and aquaphor 2x/day        Cardiovascular  Respiratory effort: 1 - Normal - without distress           Pain Assessment  0-10 Pain Scale: 0      Objective:   /82   Pulse 66   Resp 18   Wt 76.7 kg (169 lb)   LMP 2011   SpO2 95%   BMI 25.70 kg/m    No apparent distress  Skin with mild erythema without desquamation    Labs:  CBC RESULTS:   Recent Labs   Lab Test 23  1118   WBC 5.5   RBC 4.99   HGB 15.3   HCT 46.4   MCV 93   MCH 30.7   MCHC 33.0   RDW 12.5        ELECTROLYTES:  Recent Labs   Lab Test 22  1401      POTASSIUM 3.7   CHLORIDE 105   EDOUARD 9.1   CO2 28   BUN 14   CR 0.72   GLC 87       Assessment:  This is a 62 year old woman with right (overlapping sites) breast cancer, status post mastectomy and SLNBx, revealing G2 ILCA, [id number] ER+/AR+/H2N- disease referred for adjuvant radiation therapy.  Her Oncotype score was 13 and she will not be receiving chemotherapy.  She is undergoing adjuvant radiation therapy.    Tolerating radiation therapy well.  All questions and concerns addressed.    Plan:   Continue current therapy.     Continue skin care.      Mosaiq chart and setup information reviewed  Ports checked    Medication Review  Med list reviewed with patient?: Yes    Educational Topic Discussed  Education Instructions: skin care reviewed      Ehsan Bravo MD

## 2023-08-31 ENCOUNTER — APPOINTMENT (OUTPATIENT)
Dept: RADIATION THERAPY | Facility: OUTPATIENT CENTER | Age: 63
End: 2023-08-31
Payer: COMMERCIAL

## 2023-09-01 ENCOUNTER — APPOINTMENT (OUTPATIENT)
Dept: RADIATION THERAPY | Facility: OUTPATIENT CENTER | Age: 63
End: 2023-09-01
Payer: COMMERCIAL

## 2023-09-05 ENCOUNTER — THERAPY VISIT (OUTPATIENT)
Dept: PHYSICAL THERAPY | Facility: CLINIC | Age: 63
End: 2023-09-05
Attending: RADIOLOGY
Payer: COMMERCIAL

## 2023-09-05 ENCOUNTER — APPOINTMENT (OUTPATIENT)
Dept: RADIATION THERAPY | Facility: OUTPATIENT CENTER | Age: 63
End: 2023-09-05
Payer: COMMERCIAL

## 2023-09-05 DIAGNOSIS — C50.911 MALIGNANT NEOPLASM OF RIGHT BREAST (H): ICD-10-CM

## 2023-09-05 PROCEDURE — 97140 MANUAL THERAPY 1/> REGIONS: CPT | Mod: GP | Performed by: PHYSICAL THERAPIST

## 2023-09-05 PROCEDURE — 97161 PT EVAL LOW COMPLEX 20 MIN: CPT | Mod: GP | Performed by: PHYSICAL THERAPIST

## 2023-09-05 NOTE — PROGRESS NOTES
PHYSICAL THERAPY EVALUATION  Type of Visit: Evaluation    See electronic medical record for Abuse and Falls Screening details.    Subjective       Presenting condition or subjective complaint: at risk of lympehdema of R arm/chest/UQ  Date of onset: 08/04/23 (date of referral)    Dates & types of surgery: see below     Prior therapy history for the same diagnosis, illness or injury: No      Prior Level of Function  Transfers: Independent  Ambulation: Independent    Living Environment  Social support: With a significant other or spouse ()   Help at home: None  Equipment owned: -- (none needed)     Employment: No retired    Patient goals for therapy: to learn about lymphedema    Pain assessment: Pain denied     Objective       EDEMA EVALUATION  Additional history:  Body part affected by edema: RUE/RUQ at risk of lymphedema  If cancer related, treatment: mastectomy, radiation  Distance able to walk:    Time able to stand:    Sensation problems in hands/feet: No    Edema etiology: Cancer with lymph node dissection, Radiation, Patient s/p R mastectomy with SLNBx on 6/19/23, negative invasive margins, pt had immediate reconstruction with tissue expander placement - pt is currently fully expanded.  Radiation started on 8/22/23 with a plan for 25 total treatments, today is 10/25 treatments; oncotype 13 so no chemo indicated. No mammographic evidence for malignancy in the left breast.      FUNCTIONAL SCALES  LLIS = 2    Cognitive Status Examination  Orientation: Oriented to person, place and time   Level of Consciousness: Alert  Follows Commands and Answers Questions: 100% of the time  Personal Safety and Judgement: Intact  Memory: Intact    EDEMA  Skin Condition: WNL  Scar: Yes; R mastectomy - fully healed, intact and no tightness  Capillary Refill: Symmetrical  Radial Pulse: Symmetrical  Stemmer Sign: -  Ulceration: No    GIRTH MEASUREMENTS: Refer to separate girth measurement flowsheet.     VOLUME UE  Right UE (mL)  "1474.8    Left UE (mL) 1412.04    UE Volume Comparison RUE volume greater than LUE volume   % Difference 4.2%     RANGE OF MOTION: UE ROM WNL/ full, does report mild tightness at end range at R-pec during overhead flexion  STRENGTH: LE Strength WNL  POSTURE: WNL  PALPATION: denies any hypersensitivities with palpation   ACTIVITIES OF DAILY LIVING: completely independent   GAIT/LOCOMOTION: independent, no AD  BALANCE: WNL  SENSATION:  occasional numbness that \"comes and goes\" in R pec and upper arm, not bothersome and not limiting m  VASCULAR:  no concerns  COORDINATION: WNL  MUSCLE TONE: WNL    Assessment & Plan   CLINICAL IMPRESSIONS  Medical Diagnosis: malignant neoplasm of right breast    Treatment Diagnosis: at risk of lymphedema of R breast/UE/UQ   Impression/Assessment: Patient is a 62 year old female with being at risk of RUE/RUQ lymphedema.  The following significant findings have been identified: at risk of lymphEdema. These impairments could interfere with their ability to perform self care tasks and recreational activities as compared to previous level of function.     Clinical Decision Making (Complexity):  Clinical Presentation: Stable/Uncomplicated  Clinical Presentation Rationale: based on medical and personal factors listed in PT evaluation  Clinical Decision Making (Complexity): Low complexity    PLAN OF CARE  Treatment Interventions:  Interventions: Manual Therapy, Therapeutic Exercise, Self-Care/Home Management, Gradient Compression Bandaging, compression garment fitting/training    Long Term Goals     PT Goal 1  Goal Identifier: 1  Goal Description: pt to independently verbalize s&s of lymphdema to demonstrate longterm understanding of risk of acquiring lymphedema  Rationale: to maximize safety and independence with self cares  Target Date: 10/05/23  PT Goal 2  Goal Identifier: 2  Goal Description: pt to be independent in performing daily chest/UE stretch to maintain full ROM throughout radaition " treatment  Rationale: to maximize safety and independence with performance of ADLs and functional tasks  Target Date: 11/04/23      Frequency of Treatment: Other (1x eval/treat and then f/u ~2 weeks post radiation, sooner if needed)  Duration of Treatment: 8 weeks    Recommended Referrals to Other Professionals:  n/a  Education Assessment:   Learner/Method: Patient;Listening;Pictures/Video;No Barriers to Learning    Risks and benefits of evaluation/treatment have been explained.   Patient/Family/caregiver agrees with Plan of Care.     Evaluation Time:      10 minutes    Signing Clinician: Fransisca Pimentel, PT, DPT, CLT

## 2023-09-06 ENCOUNTER — APPOINTMENT (OUTPATIENT)
Dept: RADIATION THERAPY | Facility: OUTPATIENT CENTER | Age: 63
End: 2023-09-06
Payer: COMMERCIAL

## 2023-09-07 ENCOUNTER — APPOINTMENT (OUTPATIENT)
Dept: RADIATION THERAPY | Facility: OUTPATIENT CENTER | Age: 63
End: 2023-09-07
Payer: COMMERCIAL

## 2023-09-08 ENCOUNTER — OFFICE VISIT (OUTPATIENT)
Dept: RADIATION THERAPY | Facility: OUTPATIENT CENTER | Age: 63
End: 2023-09-08
Payer: COMMERCIAL

## 2023-09-08 ENCOUNTER — APPOINTMENT (OUTPATIENT)
Dept: RADIATION THERAPY | Facility: OUTPATIENT CENTER | Age: 63
End: 2023-09-08
Payer: COMMERCIAL

## 2023-09-08 VITALS
DIASTOLIC BLOOD PRESSURE: 93 MMHG | SYSTOLIC BLOOD PRESSURE: 133 MMHG | HEART RATE: 61 BPM | RESPIRATION RATE: 18 BRPM | BODY MASS INDEX: 25.79 KG/M2 | WEIGHT: 169.6 LBS | OXYGEN SATURATION: 98 %

## 2023-09-08 DIAGNOSIS — C50.411 MALIGNANT NEOPLASM OF UPPER-OUTER QUADRANT OF RIGHT FEMALE BREAST, UNSPECIFIED ESTROGEN RECEPTOR STATUS (H): Primary | ICD-10-CM

## 2023-09-08 NOTE — PROGRESS NOTES
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  She has minimal side effects.    Nursing ROS:      Skin  Skin Reaction: 1 - Faint erythema or dry desquamation  Skin Note: skin care of mometasone every am and aquaphor 2x/day        Cardiovascular  Respiratory effort: 1 - Normal - without distress           Pain Assessment  0-10 Pain Scale: 0      Objective:   BP (!) 133/93   Pulse 61   Resp 18   Wt 76.9 kg (169 lb 9.6 oz)   LMP 2011   SpO2 98%   BMI 25.79 kg/m    Gen: Appears well, in no acute distress  Skin: Minimal diffuse erythema over treatment field    Labs:  CBC RESULTS:   Recent Labs   Lab Test 23  1118   WBC 5.5   RBC 4.99   HGB 15.3   HCT 46.4   MCV 93   MCH 30.7   MCHC 33.0   RDW 12.5        ELECTROLYTES:  Recent Labs   Lab Test 22  1401      POTASSIUM 3.7   CHLORIDE 105   EDOUARD 9.1   CO2 28   BUN 14   CR 0.72   GLC 87       Assessment:    Tolerating radiation therapy well.  All questions and concerns addressed.    Toxicities:  Dermatitis: Grade 1: Faint erythema or dry desquamation    Plan:   Continue current therapy.    Increase mometasone to twice daily and Aquaphor to 4 times a day      Mosaiq chart and setup information reviewed  Ports checked    Medication Review  Med list reviewed with patient?: Yes             Ambar Bear MD    Please do not send letter to referring physician.

## 2023-09-08 NOTE — LETTER
2023         RE: Sheila Schilling  [address] [facility] [address]        Dear Colleague,    Thank you for referring your patient, Sheila Schilling, to the  [facility]. Please see a copy of my visit note below.    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  She has minimal side effects.    Nursing ROS:      Skin  Skin Reaction: 1 - Faint erythema or dry desquamation  Skin Note: skin care of mometasone every am and aquaphor 2x/day        Cardiovascular  Respiratory effort: 1 - Normal - without distress           Pain Assessment  0-10 Pain Scale: 0      Objective:   BP (!) 133/93   Pulse 61   Resp 18   Wt 76.9 kg (169 lb 9.6 oz)   LMP 2011   SpO2 98%   BMI 25.79 kg/m    Gen: Appears well, in no acute distress  Skin: Minimal diffuse erythema over treatment field    Labs:  CBC RESULTS:   Recent Labs   Lab Test 23  1118   WBC 5.5   RBC 4.99   HGB 15.3   HCT 46.4   MCV 93   MCH 30.7   MCHC 33.0   RDW 12.5        ELECTROLYTES:  Recent Labs   Lab Test 22  1401      POTASSIUM 3.7   CHLORIDE 105   EDOUARD 9.1   CO2 28   BUN 14   CR 0.72   GLC 87       Assessment:    Tolerating radiation therapy well.  All questions and concerns addressed.    Toxicities:  Dermatitis: Grade 1: Faint erythema or dry desquamation    Plan:   Continue current therapy.    Increase mometasone to twice daily and Aquaphor to 4 times a day      Mosaiq chart and setup information reviewed  Ports checked    Medication Review  Med list reviewed with  patient?: Yes             Ambar Bear MD    Please do not send letter to referring physician.        Again, thank you for allowing me to participate in the care of your patient.        Sincerely,        ARNOLDO Bear MD

## 2023-09-11 ENCOUNTER — APPOINTMENT (OUTPATIENT)
Dept: RADIATION THERAPY | Facility: OUTPATIENT CENTER | Age: 63
End: 2023-09-11
Payer: COMMERCIAL

## 2023-09-12 ENCOUNTER — APPOINTMENT (OUTPATIENT)
Dept: RADIATION THERAPY | Facility: OUTPATIENT CENTER | Age: 63
End: 2023-09-12
Payer: COMMERCIAL

## 2023-09-13 ENCOUNTER — APPOINTMENT (OUTPATIENT)
Dept: RADIATION THERAPY | Facility: OUTPATIENT CENTER | Age: 63
End: 2023-09-13
Payer: COMMERCIAL

## 2023-09-14 ENCOUNTER — APPOINTMENT (OUTPATIENT)
Dept: RADIATION THERAPY | Facility: OUTPATIENT CENTER | Age: 63
End: 2023-09-14
Payer: COMMERCIAL

## 2023-09-15 ENCOUNTER — APPOINTMENT (OUTPATIENT)
Dept: RADIATION THERAPY | Facility: OUTPATIENT CENTER | Age: 63
End: 2023-09-15
Payer: COMMERCIAL

## 2023-09-15 ENCOUNTER — OFFICE VISIT (OUTPATIENT)
Dept: RADIATION THERAPY | Facility: OUTPATIENT CENTER | Age: 63
End: 2023-09-15
Payer: COMMERCIAL

## 2023-09-15 VITALS
OXYGEN SATURATION: 97 % | DIASTOLIC BLOOD PRESSURE: 89 MMHG | BODY MASS INDEX: 25.64 KG/M2 | HEART RATE: 76 BPM | WEIGHT: 168.6 LBS | SYSTOLIC BLOOD PRESSURE: 138 MMHG | RESPIRATION RATE: 18 BRPM

## 2023-09-15 DIAGNOSIS — C50.411 MALIGNANT NEOPLASM OF UPPER-OUTER QUADRANT OF RIGHT FEMALE BREAST, UNSPECIFIED ESTROGEN RECEPTOR STATUS (H): Primary | ICD-10-CM

## 2023-09-15 ASSESSMENT — PAIN SCALES - GENERAL: PAINLEVEL: NO PAIN (0)

## 2023-09-15 NOTE — PROGRESS NOTES
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 May place Aquaphor in fridge and then apply. Cool packs as needed.     Cardiovascular  Respiratory effort: 1 - Normal - without distress           Pain Assessment  0-10 Pain Scale: 0      Objective:   /89   Pulse 76   Resp 18   Wt 76.5 kg (168 lb 9.6 oz)   LMP 2011   SpO2 97%   BMI 25.64 kg/m    Gen: Appears well, in no acute distress  Skin: Mild diffuse erythema over treatment field    Labs:  CBC RESULTS:   Recent Labs   Lab Test 23  1118   WBC 5.5   RBC 4.99   HGB 15.3   HCT 46.4   MCV 93   MCH 30.7   MCHC 33.0   RDW 12.5        ELECTROLYTES:  Recent Labs   Lab Test 22  1401      POTASSIUM 3.7   CHLORIDE 105   EDOUARD 9.1   CO2 28   BUN 14   CR 0.72   GLC 87       Assessment:    Tolerating radiation therapy well.  All questions and concerns addressed.    Toxicities:  Dermatitis: Grade 1: Faint erythema or dry desquamation    Plan:   Continue current therapy.    Skin care per above  No boost as pt has expanders      Mosaiq chart and setup information reviewed  Ports checked    Medication Review  Med list reviewed with patient?: Yes    Educational Topic Discussed  Education Instructions: Message sent to Dr. Dooley's office to schedule follow up. Lymphedema PT appointment 10/12/23.        Ambar Bear MD    Please do not send letter to referring physician.

## 2023-09-15 NOTE — LETTER
9/15/2023         RE: Sheila Schilling  [address] [facility] [address]        Dear Colleague,    Thank you for referring your patient, Sheila Schilling, to the  [facility]. Please see a copy of my visit note below.    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 May place Aquaphor in fridge and then apply. Cool packs as needed.     Cardiovascular  Respiratory effort: 1 - Normal - without distress           Pain Assessment  0-10 Pain Scale: 0      Objective:   /89   Pulse 76   Resp 18   Wt 76.5 kg (168 lb 9.6 oz)   LMP 2011   SpO2 97%   BMI 25.64 kg/m    Gen: Appears well, in no acute distress  Skin: Mild diffuse erythema over treatment field    Labs:  CBC RESULTS:   Recent Labs   Lab Test 23  1118   WBC 5.5   RBC 4.99   HGB 15.3   HCT 46.4   MCV 93   MCH 30.7   MCHC 33.0   RDW 12.5        ELECTROLYTES:  Recent Labs   Lab Test 22  1401      POTASSIUM 3.7   CHLORIDE 105   EDOUARD 9.1   CO2 28   BUN 14   CR 0.72   GLC 87       Assessment:    Tolerating radiation therapy well.  All questions and concerns addressed.    Toxicities:  Dermatitis: Grade 1: Faint erythema or dry desquamation    Plan:   Continue current therapy.    Skin care per above  No boost as pt has expanders      Mosaiq chart and setup information reviewed  Ports checked    Medication Review  Med list reviewed with  patient?: Yes    Educational Topic Discussed  Education Instructions: Message sent to Dr. Dooley's office to schedule follow up. Lymphedema PT appointment 10/12/23.        Ambar Bear MD    Please do not send letter to referring physician.        Again, thank you for allowing me to participate in the care of your patient.        Sincerely,        ARNOLDO Bear MD

## 2023-09-18 ENCOUNTER — APPOINTMENT (OUTPATIENT)
Dept: RADIATION THERAPY | Facility: OUTPATIENT CENTER | Age: 63
End: 2023-09-18
Payer: COMMERCIAL

## 2023-09-19 ENCOUNTER — APPOINTMENT (OUTPATIENT)
Dept: RADIATION THERAPY | Facility: OUTPATIENT CENTER | Age: 63
End: 2023-09-19
Payer: COMMERCIAL

## 2023-09-20 ENCOUNTER — APPOINTMENT (OUTPATIENT)
Dept: RADIATION THERAPY | Facility: OUTPATIENT CENTER | Age: 63
End: 2023-09-20
Payer: COMMERCIAL

## 2023-09-21 ENCOUNTER — APPOINTMENT (OUTPATIENT)
Dept: RADIATION THERAPY | Facility: OUTPATIENT CENTER | Age: 63
End: 2023-09-21
Payer: COMMERCIAL

## 2023-09-22 ENCOUNTER — APPOINTMENT (OUTPATIENT)
Dept: RADIATION THERAPY | Facility: OUTPATIENT CENTER | Age: 63
End: 2023-09-22
Payer: COMMERCIAL

## 2023-09-22 ENCOUNTER — OFFICE VISIT (OUTPATIENT)
Dept: RADIATION THERAPY | Facility: OUTPATIENT CENTER | Age: 63
End: 2023-09-22
Payer: COMMERCIAL

## 2023-09-22 VITALS
RESPIRATION RATE: 18 BRPM | WEIGHT: 169 LBS | HEART RATE: 68 BPM | SYSTOLIC BLOOD PRESSURE: 143 MMHG | OXYGEN SATURATION: 98 % | DIASTOLIC BLOOD PRESSURE: 90 MMHG | BODY MASS INDEX: 25.7 KG/M2

## 2023-09-22 DIAGNOSIS — C50.411 MALIGNANT NEOPLASM OF UPPER-OUTER QUADRANT OF RIGHT FEMALE BREAST, UNSPECIFIED ESTROGEN RECEPTOR STATUS (H): Primary | ICD-10-CM

## 2023-09-22 NOTE — PROGRESS NOTES
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 May place Aquaphor in fridge and then apply. Cool packs as needed.        Cardiovascular  Respiratory effort: 1 - Normal - without distress           Pain Assessment  0-10 Pain Scale: 0      Objective:   BP (!) 143/90   Pulse 68   Resp 18   Wt 76.7 kg (169 lb)   LMP 2011   SpO2 98%   BMI 25.70 kg/m    Gen: Appears well, in no acute distress  Skin: Moderate diffuse erythema over treatment field, no desquamation    Labs:  CBC RESULTS:   Recent Labs   Lab Test 23  1118   WBC 5.5   RBC 4.99   HGB 15.3   HCT 46.4   MCV 93   MCH 30.7   MCHC 33.0   RDW 12.5        ELECTROLYTES:  Recent Labs   Lab Test 22  1401      POTASSIUM 3.7   CHLORIDE 105   EDOUARD 9.1   CO2 28   BUN 14   CR 0.72   GLC 87       Assessment:    Tolerating radiation therapy well.  All questions and concerns addressed.    Toxicities:  Dermatitis: Grade 2: Moderate to brisk erythema; patchy moist desquamation, mostly confined to skin folds and creases; moderate erythema    Plan:   Continue current therapy.    Skin care as previously directed.  Patient just made his own 1 week after completing treatment.  She can continue with daily Aquaphor until seen by CJ Grace chart and setup information reviewed  Ports checked    Medication Review  Med list reviewed with patient?: Yes              Ambar Bear MD    Please do not send letter to referring physician.

## 2023-09-22 NOTE — LETTER
2023         RE: Sheila Schilling  [address] [facility] [address]        Dear Colleague,    Thank you for referring your patient, Sheila Schilling, to the  [facility]. Please see a copy of my visit note below.    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 May place Aquaphor in fridge and then apply. Cool packs as needed.        Cardiovascular  Respiratory effort: 1 - Normal - without distress           Pain Assessment  0-10 Pain Scale: 0      Objective:   BP (!) 143/90   Pulse 68   Resp 18   Wt 76.7 kg (169 lb)   LMP 2011   SpO2 98%   BMI 25.70 kg/m    Gen: Appears well, in no acute distress  Skin: Moderate diffuse erythema over treatment field, no desquamation    Labs:  CBC RESULTS:   Recent Labs   Lab Test 23  1118   WBC 5.5   RBC 4.99   HGB 15.3   HCT 46.4   MCV 93   MCH 30.7   MCHC 33.0   RDW 12.5        ELECTROLYTES:  Recent Labs   Lab Test 22  1401      POTASSIUM 3.7   CHLORIDE 105   EDOUARD 9.1   CO2 28   BUN 14   CR 0.72   GLC 87       Assessment:    Tolerating radiation therapy well.  All questions and concerns addressed.    Toxicities:  Dermatitis: Grade 2: Moderate to brisk erythema; patchy moist desquamation, mostly confined to skin folds and creases; moderate erythema    Plan:   Continue current therapy.    Skin care as previously  directed.  Patient just made his own 1 week after completing treatment.  She can continue with daily Aquaphor until seen by PA      Mosaiq chart and setup information reviewed  Ports checked    Medication Review  Med list reviewed with patient?: Yes             Ambar Bear MD    Please do not send letter to referring physician.        Again, thank you for allowing me to participate in the care of your patient.        Sincerely,        ARNOLDO Bear MD

## 2023-09-25 ENCOUNTER — APPOINTMENT (OUTPATIENT)
Dept: RADIATION THERAPY | Facility: OUTPATIENT CENTER | Age: 63
End: 2023-09-25
Payer: COMMERCIAL

## 2023-09-26 ENCOUNTER — APPOINTMENT (OUTPATIENT)
Dept: RADIATION THERAPY | Facility: OUTPATIENT CENTER | Age: 63
End: 2023-09-26
Payer: COMMERCIAL

## 2023-10-05 ENCOUNTER — TELEPHONE (OUTPATIENT)
Dept: ONCOLOGY | Facility: CLINIC | Age: 63
End: 2023-10-05

## 2023-10-05 ENCOUNTER — VIRTUAL VISIT (OUTPATIENT)
Dept: ONCOLOGY | Facility: CLINIC | Age: 63
End: 2023-10-05
Attending: INTERNAL MEDICINE
Payer: COMMERCIAL

## 2023-10-05 VITALS — BODY MASS INDEX: 25.76 KG/M2 | WEIGHT: 170 LBS | HEIGHT: 68 IN

## 2023-10-05 DIAGNOSIS — C50.911 INVASIVE LOBULAR CARCINOMA OF RIGHT BREAST IN FEMALE (H): Primary | ICD-10-CM

## 2023-10-05 DIAGNOSIS — M81.0 AGE-RELATED OSTEOPOROSIS WITHOUT CURRENT PATHOLOGICAL FRACTURE: ICD-10-CM

## 2023-10-05 PROCEDURE — 99214 OFFICE O/P EST MOD 30 MIN: CPT | Mod: 95 | Performed by: INTERNAL MEDICINE

## 2023-10-05 RX ORDER — LOPERAMIDE HCL 2 MG
CAPSULE ORAL
Qty: 30 CAPSULE | Refills: 0 | Status: SHIPPED | OUTPATIENT
Start: 2023-10-11 | End: 2024-07-23

## 2023-10-05 RX ORDER — LETROZOLE 2.5 MG/1
2.5 TABLET, FILM COATED ORAL DAILY
Qty: 28 TABLET | Refills: 11 | Status: SHIPPED | OUTPATIENT
Start: 2023-10-12 | End: 2024-09-04

## 2023-10-05 RX ORDER — PROCHLORPERAZINE MALEATE 10 MG
10 TABLET ORAL EVERY 6 HOURS PRN
Qty: 30 TABLET | Refills: 2 | Status: SHIPPED | OUTPATIENT
Start: 2023-10-11

## 2023-10-05 ASSESSMENT — PAIN SCALES - GENERAL: PAINLEVEL: NO PAIN (0)

## 2023-10-05 NOTE — NURSING NOTE
Is the patient currently in the state of [place]? YES    Visit mode:VIDEO    If the visit is dropped, the patient can be reconnected by: VIDEO VISIT: Text to cell phone:   Telephone Information:   Mobile [phone]       Will anyone else be joining the visit? NO  (If patient encounters technical issues they should call [phone]    How would you like to obtain your AVS? MyChart    Are changes needed to the allergy or medication list? Pt stated no changes to allergies and Pt stated no med changes    Reason for visit: RECHECK    Cynthia MONTERROSO

## 2023-10-05 NOTE — LETTER
10/5/2023         RE: Sheila Schilling  [address] [facility] [address]        Dear Colleague,    Thank you for referring your patient, Sheila Schilling, to the [facility]. Please see a copy of my visit note below.    Virtual Visit Details    Type of service:  Video Visit   Video start time: 10:29 AM  Video stop time: 10:43 AM  Originating Location (pt. Location): Home    Distant Location (provider location):  On-site  [facility] Visit[facility]    Patient: Sheila Schilling  MRN: [id number]  10/05/23        Reason for Visit    Chief Complaint   Patient presents with     RECHECK         Problem List Items Addressed This Visit          Other    Invasive lobular carcinoma of right breast in female (H) - Primary    Relevant Orders    CBC with platelets differential    Comprehensive metabolic panel    CBC with platelets differential    Comprehensive metabolic panel           Assessment and Plan  Invasive lobular carcinoma of the right breast, status postmastectomy and sentinel lymph node biopsy  pT3, pN1a (high risk features with tumor measuring more than 5 cm and 1 positive lymph node)  Strongly ER and NM positive, HER2 negative by IHC and FISH  Oncotype DX score 13    Status post adjuvant radiation.  Has done well with it.  No significant side effects.      This visit is to discuss adjuvant systemic therapy.  She will definitely need endocrine therapy and since she is postmenopausal would prefer an AI either anastrozole or letrozole or exemestane.  No significant difference between the efficacies.  Reviewed the potential side effects and complications.  Since her tumor measures more than 5 cm and with 1 positive lymph node, she is also a candidate for addition of CDK 4/6 inhibitors (abemaciclib) to endocrine therapy in the adjuvant setting.  This is based on [place] E trial.  I reviewed the rationale behind  this.  This has shown to improve disease-free survival but there is no current data on its overall survival benefit.  I reviewed the potential side effects and complications associated with abemaciclib.  Total duration of therapy will be for 2 years.  Endocrine therapy will continue for at least 5 and up to 10.    She is agreeable to the plan.  We will start with ordering a DEXA scan to get a baseline bone density testing.  I asked her to start taking vitamin D and calcium supplementation.  We will put a plan in for letrozole and abemaciclib.  We will schedule follow-ups soon.     Cancer Staging   No matching staging information was found for the patient.    ECOG Performance    0 - Independent         Problem List    Patient Active Problem List   Diagnosis     CARDIOVASCULAR SCREENING; LDL GOAL LESS THAN 160     Palpitations     Invasive lobular carcinoma of right breast in female (H)        Oncology history  A screening mammogram done in May 2023 showed asymmetry in the upper outer quadrant of her right breast. Diagnostic mammogram and ultrasound done in early May demonstrated architectural distortion in the right breast and a 12 x 10 x 8 mm ill-defined mass with acoustic shadowing respectively. This was corresponding to the abnormality seen on the screening mammography. Needle biopsy of the mass came back showing invasive lobular carcinoma. Grade 2. Strongly ER and IA positive and HER2 negative. She had very dense breasts a contrast-enhanced mammogram was ordered by surgery. It is showing a 7.8 x 6.7 x 5.3 cm enhancement in the right breast extending from 11:00 to 8 o'clock position consistent with malignancy.     Interval History   Sheila Schilling is a 62 year old with right-sided hormone receptor positive and HER2 negative breast cancer status postmastectomy and sentinel lymph node biopsy who is seen as a video visit to discuss adjuvant therapy for the same.    She underwent right mastectomy with sentinel node  biopsy on June 19.  Surgical path came back showing a 15 cm invasive lobular carcinoma with 1 positive sentinel lymph node.  The metastatic site of the lymph node measured 4 mm without any evidence of extranodal extension.  Has finished adjuvant radiation.  Has done really well.  No significant side effects.  This visit is to discuss adjuvant systemic therapy.        Review of Systems  A comprehensive review of systems was negative except for what is noted in the interval history    Current Outpatient Medications   Medication     aspirin 81 MG EC tablet     Calcium-Vitamin D (CALCIUM + D PO)     cephALEXin (KEFLEX) 500 MG capsule     flecainide (TAMBOCOR) 100 MG tablet     methocarbamol (ROBAXIN) 500 MG tablet     mometasone (ELOCON) 0.1 % external cream     ondansetron (ZOFRAN ODT) 4 MG ODT tab     senna-docusate (SENOKOT-S/PERICOLACE) 8.6-50 MG tablet     No current facility-administered medications for this visit.     Facility-Administered Medications Ordered in Other Visits   Medication     lidocaine 1 % 10 mL        Physical Exam        General: alert and cooperative      Lab Results    No results found for this or any previous visit (from the past 168 hour(s)).    Imaging    No results found.    A total of 30 min were spent today on this visit which included face to face conversation with the patient, EMR review, counseling and co-ordination of care and medical documentation.      Signed by: Merna Dooley MD      Again, thank you for allowing me to participate in the care of your patient.        Sincerely,        Merna Dooley MD

## 2023-10-05 NOTE — TELEPHONE ENCOUNTER
PA Initiation    Medication: VERZENIO 150 MG PO TABS  Ref.# [id number]  Insurance Company: [employer] [place] - Phone [phone] Fax [phone]  Pharmacy Filling the Rx:    Filling Pharmacy Phone:    Filling Pharmacy Fax:    Start Date:

## 2023-10-05 NOTE — PROGRESS NOTES
"Oral Chemotherapy Monitoring Program    Primary Oncologist: Dr. Dooley  Primary Oncology Clinic: [facility] [place]  Cancer Diagnosis: Breast Cancer    Drug: abemaciclib 150 mg twice daily  Start Date: ASAP  Expected duration of therapy: Until disease progression or unacceptable toxicity    Drug Interaction Assessment: No drug interactions identified    Lab Monitoring Plan  Per treatment plan    Subjective/Objective:  Sheila Schilling is a 62 year old female contacted by phone for an initial visit for oral chemotherapy education.           No data to display                Vitals:  BP:   BP Readings from Last 1 Encounters:   09/22/23 (!) 143/90     Wt Readings from Last 1 Encounters:   10/05/23 77.1 kg (170 lb)     Estimated body surface area is 1.92 meters squared as calculated from the following:    Height as of an earlier encounter on 10/5/23: 1.727 m (5' 8\").    Weight as of an earlier encounter on 10/5/23: 77.1 kg (170 lb).    Labs:  No results found for NA within last 30 days.     No results found for K within last 30 days.     No results found for CA within last 30 days.     No results found for Mag within last 30 days.     No results found for Phos within last 30 days.     No results found for ALBUMIN within last 30 days.     No results found for BUN within last 30 days.     No results found for CR within last 30 days.       No results found for AST within last 30 days.     No results found for ALT within last 30 days.     No results found for BILITOTAL within last 30 days.       No results found for WBC within last 30 days.     No results found for HGB within last 30 days.     No results found for PLT within last 30 days.     No results found for ANC within last 30 days.         Assessment:  Patient is appropriate to start therapy once baseline labs are completed.    Plan:  Basic chemotherapy teaching was reviewed with the patient including indication, start date of therapy, dose, administration, adverse " effects, missed doses, food and drug interactions, monitoring, side effect management, office contact information, and safe handling. Written materials were provided and all questions answered.    Follow-Up:  1 week from start date for follow-up assessment     Caroline Haley PharmD, MPH, BCPS  October 5, 2023

## 2023-10-05 NOTE — PROGRESS NOTES
Virtual Visit Details    Type of service:  Video Visit   Video start time: 10:29 AM  Video stop time: 10:43 AM  Originating Location (pt. Location): Home    Distant Location (provider location):  On-site  Platform used for Video Visit: [facility] Hematology and Oncology Progress Note    Patient: Sheila Schilling  MRN: [id number]  10/05/23        Reason for Visit    Chief Complaint   Patient presents with    RECHECK         Problem List Items Addressed This Visit          Other    Invasive lobular carcinoma of right breast in female (H) - Primary    Relevant Orders    CBC with platelets differential    Comprehensive metabolic panel    CBC with platelets differential    Comprehensive metabolic panel           Assessment and Plan  Invasive lobular carcinoma of the right breast, status postmastectomy and sentinel lymph node biopsy  pT3, pN1a (high risk features with tumor measuring more than 5 cm and 1 positive lymph node)  Strongly ER and MI positive, HER2 negative by IHC and FISH  Oncotype DX score 13    Status post adjuvant radiation.  Has done well with it.  No significant side effects.      This visit is to discuss adjuvant systemic therapy.  She will definitely need endocrine therapy and since she is postmenopausal would prefer an AI either anastrozole or letrozole or exemestane.  No significant difference between the efficacies.  Reviewed the potential side effects and complications.  Since her tumor measures more than 5 cm and with 1 positive lymph node, she is also a candidate for addition of CDK 4/6 inhibitors (abemaciclib) to endocrine therapy in the adjuvant setting.  This is based on [place] E trial.  I reviewed the rationale behind this.  This has shown to improve disease-free survival but there is no current data on its overall survival benefit.  I reviewed the potential side effects and complications associated with abemaciclib.  Total duration of therapy will be for 2 years.  Endocrine  therapy will continue for at least 5 and up to 10.    She is agreeable to the plan.  We will start with ordering a DEXA scan to get a baseline bone density testing.  I asked her to start taking vitamin D and calcium supplementation.  We will put a plan in for letrozole and abemaciclib.  We will schedule follow-ups soon.     Cancer Staging   No matching staging information was found for the patient.    ECOG Performance    0 - Independent         Problem List    Patient Active Problem List   Diagnosis    CARDIOVASCULAR SCREENING; LDL GOAL LESS THAN 160    Palpitations    Invasive lobular carcinoma of right breast in female (H)        Oncology history  A screening mammogram done in May 2023 showed asymmetry in the upper outer quadrant of her right breast. Diagnostic mammogram and ultrasound done in early May demonstrated architectural distortion in the right breast and a 12 x 10 x 8 mm ill-defined mass with acoustic shadowing respectively. This was corresponding to the abnormality seen on the screening mammography. Needle biopsy of the mass came back showing invasive lobular carcinoma. Grade 2. Strongly ER and SC positive and HER2 negative. She had very dense breasts a contrast-enhanced mammogram was ordered by surgery. It is showing a 7.8 x 6.7 x 5.3 cm enhancement in the right breast extending from 11:00 to 8 o'clock position consistent with malignancy.     Interval History   Sheila Schilling is a 62 year old with right-sided hormone receptor positive and HER2 negative breast cancer status postmastectomy and sentinel lymph node biopsy who is seen as a video visit to discuss adjuvant therapy for the same.    She underwent right mastectomy with sentinel node biopsy on June 19.  Surgical path came back showing a 15 cm invasive lobular carcinoma with 1 positive sentinel lymph node.  The metastatic site of the lymph node measured 4 mm without any evidence of extranodal extension.  Has finished adjuvant radiation.  Has done  really well.  No significant side effects.  This visit is to discuss adjuvant systemic therapy.        Review of Systems  A comprehensive review of systems was negative except for what is noted in the interval history    Current Outpatient Medications   Medication    aspirin 81 MG EC tablet    Calcium-Vitamin D (CALCIUM + D PO)    cephALEXin (KEFLEX) 500 MG capsule    flecainide (TAMBOCOR) 100 MG tablet    methocarbamol (ROBAXIN) 500 MG tablet    mometasone (ELOCON) 0.1 % external cream    ondansetron (ZOFRAN ODT) 4 MG ODT tab    senna-docusate (SENOKOT-S/PERICOLACE) 8.6-50 MG tablet     No current facility-administered medications for this visit.     Facility-Administered Medications Ordered in Other Visits   Medication    lidocaine 1 % 10 mL        Physical Exam        General: alert and cooperative      Lab Results    No results found for this or any previous visit (from the past 168 hour(s)).    Imaging    No results found.    A total of 30 min were spent today on this visit which included face to face conversation with the patient, EMR review, counseling and co-ordination of care and medical documentation.      Signed by: Merna Dooley MD

## 2023-10-05 NOTE — LETTER
10/5/2023         RE: Sheila Schilling  [address] [facility] [address]        Dear Colleague,    Thank you for referring your patient, Sheila Schilling, to the [facility]. Please see a copy of my visit note below.    Virtual Visit Details    Type of service:  Video Visit   Video start time: 10:29 AM  Video stop time: 10:43 AM  Originating Location (pt. Location): Home    Distant Location (provider location):  On-site  Platform [facility] for Video Visit[facility]    Patient: Sheila Schilling  MRN: [id number]  10/05/23        Reason for Visit    Chief Complaint   Patient presents with     RECHECK         Problem List Items Addressed This Visit          Other    Invasive lobular carcinoma of right breast in female (H) - Primary    Relevant Orders    CBC with platelets differential    Comprehensive metabolic panel    CBC with platelets differential    Comprehensive metabolic panel           Assessment and Plan  Invasive lobular carcinoma of the right breast, status postmastectomy and sentinel lymph node biopsy  pT3, pN1a (high risk features with tumor measuring more than 5 cm and 1 positive lymph node)  Strongly ER and WI positive, HER2 negative by IHC and FISH  Oncotype DX score 13    Status post adjuvant radiation.  Has done well with it.  No significant side effects.      This visit is to discuss adjuvant systemic therapy.  She will definitely need endocrine therapy and since she is postmenopausal would prefer an AI either anastrozole or letrozole or exemestane.  No significant difference between the efficacies.  Reviewed the potential side effects and complications.  Since her tumor measures more than 5 cm and with 1 positive lymph node, she is also a candidate for addition of CDK 4/6 inhibitors (abemaciclib) to endocrine therapy in the adjuvant setting.  This is based on [place] E trial.  I reviewed the rationale behind  this.  This has shown to improve disease-free survival but there is no current data on its overall survival benefit.  I reviewed the potential side effects and complications associated with abemaciclib.  Total duration of therapy will be for 2 years.  Endocrine therapy will continue for at least 5 and up to 10.    She is agreeable to the plan.  We will start with ordering a DEXA scan to get a baseline bone density testing.  I asked her to start taking vitamin D and calcium supplementation.  We will put a plan in for letrozole and abemaciclib.  We will schedule follow-ups soon.     Cancer Staging   No matching staging information was found for the patient.    ECOG Performance    0 - Independent         Problem List    Patient Active Problem List   Diagnosis     CARDIOVASCULAR SCREENING; LDL GOAL LESS THAN 160     Palpitations     Invasive lobular carcinoma of right breast in female (H)        Oncology history  A screening mammogram done in May 2023 showed asymmetry in the upper outer quadrant of her right breast. Diagnostic mammogram and ultrasound done in early May demonstrated architectural distortion in the right breast and a 12 x 10 x 8 mm ill-defined mass with acoustic shadowing respectively. This was corresponding to the abnormality seen on the screening mammography. Needle biopsy of the mass came back showing invasive lobular carcinoma. Grade 2. Strongly ER and TX positive and HER2 negative. She had very dense breasts a contrast-enhanced mammogram was ordered by surgery. It is showing a 7.8 x 6.7 x 5.3 cm enhancement in the right breast extending from 11:00 to 8 o'clock position consistent with malignancy.     Interval History   Sheila Schilling is a 62 year old with right-sided hormone receptor positive and HER2 negative breast cancer status postmastectomy and sentinel lymph node biopsy who is seen as a video visit to discuss adjuvant therapy for the same.    She underwent right mastectomy with sentinel node  biopsy on June 19.  Surgical path came back showing a 15 cm invasive lobular carcinoma with 1 positive sentinel lymph node.  The metastatic site of the lymph node measured 4 mm without any evidence of extranodal extension.  Has finished adjuvant radiation.  Has done really well.  No significant side effects.  This visit is to discuss adjuvant systemic therapy.        Review of Systems  A comprehensive review of systems was negative except for what is noted in the interval history    Current Outpatient Medications   Medication     aspirin 81 MG EC tablet     Calcium-Vitamin D (CALCIUM + D PO)     cephALEXin (KEFLEX) 500 MG capsule     flecainide (TAMBOCOR) 100 MG tablet     methocarbamol (ROBAXIN) 500 MG tablet     mometasone (ELOCON) 0.1 % external cream     ondansetron (ZOFRAN ODT) 4 MG ODT tab     senna-docusate (SENOKOT-S/PERICOLACE) 8.6-50 MG tablet     No current facility-administered medications for this visit.     Facility-Administered Medications Ordered in Other Visits   Medication     lidocaine 1 % 10 mL        Physical Exam        General: alert and cooperative      Lab Results    No results found for this or any previous visit (from the past 168 hour(s)).    Imaging    No results found.    A total of 30 min were spent today on this visit which included face to face conversation with the patient, EMR review, counseling and co-ordination of care and medical documentation.      Signed by: Merna Dooley MD      Again, thank you for allowing me to participate in the care of your patient.        Sincerely,        Merna Dooley MD

## 2023-10-09 DIAGNOSIS — C50.911 INVASIVE LOBULAR CARCINOMA OF RIGHT BREAST IN FEMALE (H): Primary | ICD-10-CM

## 2023-10-09 NOTE — TELEPHONE ENCOUNTER
This shouldn't have been denied. Sent over additional info showing that she will be taking Verzenio along with Letrozole, and that this is for breast cancer to Fax # [phone]

## 2023-10-09 NOTE — TELEPHONE ENCOUNTER
MEDICATION APPEAL APPROVED    Medication: VERZENIO 150 MG PO TABS  Authorization Effective Date: 10/9/2023  Authorization Expiration Date: 10/9/2024  Approved Dose/Quantity: 56 for 28 days  Reference #: [id number]   Appeal Insurance Company: [employer]  Expected CoPay: $ 0     CoPay Card Available:    Financial Assistance Needed: no  Filling Pharmacy:    Patient Notified: yes  Comments:

## 2023-10-09 NOTE — TELEPHONE ENCOUNTER
PRIOR AUTHORIZATION DENIED    Medication: VERZENIO 150 MG PO TABS  Insurance Company: [employer] [place] - Phone [phone] Fax [phone]  Denial Date: 10/5/2023  Denial Rational: Must be taking this medication in a regimen that is supported in compendia for your condition  Appeal Information: see below  Patient Notified: Messaging care team

## 2023-10-11 ENCOUNTER — HOSPITAL ENCOUNTER (OUTPATIENT)
Dept: BONE DENSITY | Facility: CLINIC | Age: 63
Discharge: HOME | End: 2023-10-11
Attending: INTERNAL MEDICINE | Admitting: INTERNAL MEDICINE
Payer: COMMERCIAL

## 2023-10-11 DIAGNOSIS — M81.0 AGE-RELATED OSTEOPOROSIS WITHOUT CURRENT PATHOLOGICAL FRACTURE: ICD-10-CM

## 2023-10-11 PROCEDURE — 77080 DXA BONE DENSITY AXIAL: CPT

## 2023-10-12 ENCOUNTER — THERAPY VISIT (OUTPATIENT)
Dept: PHYSICAL THERAPY | Facility: CLINIC | Age: 63
End: 2023-10-12
Attending: RADIOLOGY
Payer: COMMERCIAL

## 2023-10-12 DIAGNOSIS — I89.0 LYMPHEDEMA: Primary | ICD-10-CM

## 2023-10-12 PROCEDURE — 97140 MANUAL THERAPY 1/> REGIONS: CPT | Mod: GP | Performed by: PHYSICAL THERAPIST

## 2023-10-12 NOTE — PROGRESS NOTES
"    DISCHARGE  Reason for Discharge: Patient has met all goals.    Equipment Issued: none    Discharge Plan: Patient to continue home program.    Referring Provider:  Kala Bear MD           10/12/23 0500   Appointment Info   Signing clinician's name / credentials Fransisca Pimentel, PT, DPT, CLT   Visits Used 2 RUQ/UE/[employer] [place]   Medical Diagnosis malignant neoplasm of right breast   PT Tx Diagnosis at risk of lymphedema of R breast/UE/UQ   Other pertinent information last radiation was on 9/26/23   Progress Note/Certification   Onset of illness/injury or Date of Surgery 08/04/23  (date of referral)   Therapy Frequency Other  (1x eval/treat and then f/u ~2 weeks post radiation, sooner if needed)   Predicted Duration 8 weeks   Progress Note Due Date 11/04/23   Progress Note Completed Date 09/05/23       Present No   GOALS   PT Goals 2   PT Goal 1   Goal Identifier 1   Goal Description pt to independently verbalize s&s of lymphdema to demonstrate longterm understanding of risk of acquiring lymphedema   Rationale to maximize safety and independence with self cares   Target Date 10/05/23   Date Met 10/12/23   PT Goal 2   Goal Identifier 2   Goal Description pt to be independent in performing daily chest/UE stretch to maintain full ROM throughout radaition treatment   Rationale to maximize safety and independence with performance of ADLs and functional tasks   Target Date 11/04/23   Date Met 10/12/23   Objective Measures   Objective Measures Objective Measure 1;Objective Measure 2   Objective Measure 1   Objective Measure girth   Details since last measured on 9/5/23: RUE +3.5% and LUE +3.0% (both considered \"normal\" fluctuations \"; pt is R-hand/side dominant)   Objective Measure 2   Objective Measure RUE ROM   Details flexion and abduction WNL   Treatment Interventions (PT)   Interventions Manual Therapy   Manual Therapy   Manual Therapy: Mobilization, MFR, MLD, friction massage minutes " (45410) 15   Manual Therapy 1 - Details pt into clinic today for re-check post radiation; BUE girth and RUE ROM taken; pt without any limitations; skin assessment and RUQ skin palpation with no palpable and/or visible lympehdema at this time; reeducation on longterm risk of lymphedema, s&s of lymphedema and reasons to return to OP lymph clinic in the future if needed otherwise pt appropriate for discharge at this time due to no deficits which pt agrees with; completed LLIS   Skilled Intervention CDT education; s&s of lympehdema being longterm education   Patient Response/Progress pt agrees to discharge today   Education   Learner/Method Patient;Listening;No Barriers to Learning   Plan   Plan for next session discharge lymphedema   Total Session Time   Timed Code Treatment Minutes 15   Total Treatment Time (sum of timed and untimed services) 15

## 2023-10-16 ENCOUNTER — LAB (OUTPATIENT)
Dept: LAB | Facility: CLINIC | Age: 63
End: 2023-10-16
Payer: COMMERCIAL

## 2023-10-16 DIAGNOSIS — C50.911 INVASIVE LOBULAR CARCINOMA OF RIGHT BREAST IN FEMALE (H): ICD-10-CM

## 2023-10-16 LAB
ALBUMIN SERPL BCG-MCNC: 4.7 G/DL (ref 3.5–5.2)
ALP SERPL-CCNC: 103 U/L (ref 35–104)
ALT SERPL W P-5'-P-CCNC: 38 U/L (ref 0–50)
ANION GAP SERPL CALCULATED.3IONS-SCNC: 14 MMOL/L (ref 7–15)
AST SERPL W P-5'-P-CCNC: 29 U/L (ref 0–45)
BASO+EOS+MONOS # BLD AUTO: ABNORMAL 10*3/UL
BASO+EOS+MONOS NFR BLD AUTO: ABNORMAL %
BASOPHILS # BLD AUTO: 0 10E3/UL (ref 0–0.2)
BASOPHILS NFR BLD AUTO: 1 %
BILIRUB SERPL-MCNC: 0.7 MG/DL
BUN SERPL-MCNC: 15.9 MG/DL (ref 8–23)
CALCIUM SERPL-MCNC: 10.4 MG/DL (ref 8.8–10.2)
CHLORIDE SERPL-SCNC: 103 MMOL/L (ref 98–107)
CREAT SERPL-MCNC: 0.94 MG/DL (ref 0.51–0.95)
DEPRECATED HCO3 PLAS-SCNC: 23 MMOL/L (ref 22–29)
EGFRCR SERPLBLD CKD-EPI 2021: 68 ML/MIN/1.73M2
EOSINOPHIL # BLD AUTO: 0.2 10E3/UL (ref 0–0.7)
EOSINOPHIL NFR BLD AUTO: 5 %
ERYTHROCYTE [DISTWIDTH] IN BLOOD BY AUTOMATED COUNT: 12 % (ref 10–15)
GLUCOSE SERPL-MCNC: 93 MG/DL (ref 70–99)
HCT VFR BLD AUTO: 50.9 % (ref 35–47)
HGB BLD-MCNC: 16.3 G/DL (ref 11.7–15.7)
IMM GRANULOCYTES # BLD: 0 10E3/UL
IMM GRANULOCYTES NFR BLD: 0 %
LYMPHOCYTES # BLD AUTO: 0.6 10E3/UL (ref 0.8–5.3)
LYMPHOCYTES NFR BLD AUTO: 17 %
MCH RBC QN AUTO: 30.9 PG (ref 26.5–33)
MCHC RBC AUTO-ENTMCNC: 32 G/DL (ref 31.5–36.5)
MCV RBC AUTO: 96 FL (ref 78–100)
MONOCYTES # BLD AUTO: 0.3 10E3/UL (ref 0–1.3)
MONOCYTES NFR BLD AUTO: 9 %
NEUTROPHILS # BLD AUTO: 2.5 10E3/UL (ref 1.6–8.3)
NEUTROPHILS NFR BLD AUTO: 69 %
PLATELET # BLD AUTO: 251 10E3/UL (ref 150–450)
POTASSIUM SERPL-SCNC: 4.5 MMOL/L (ref 3.4–5.3)
PROT SERPL-MCNC: 7.7 G/DL (ref 6.4–8.3)
RBC # BLD AUTO: 5.28 10E6/UL (ref 3.8–5.2)
SODIUM SERPL-SCNC: 140 MMOL/L (ref 135–145)
WBC # BLD AUTO: 3.6 10E3/UL (ref 4–11)

## 2023-10-16 PROCEDURE — 85025 COMPLETE CBC W/AUTO DIFF WBC: CPT | Performed by: INTERNAL MEDICINE

## 2023-10-16 PROCEDURE — 36415 COLL VENOUS BLD VENIPUNCTURE: CPT | Performed by: INTERNAL MEDICINE

## 2023-10-16 PROCEDURE — 80053 COMPREHEN METABOLIC PANEL: CPT | Performed by: INTERNAL MEDICINE

## 2023-10-18 ENCOUNTER — OFFICE VISIT (OUTPATIENT)
Dept: PLASTIC SURGERY | Facility: CLINIC | Age: 63
End: 2023-10-18
Payer: COMMERCIAL

## 2023-10-18 VITALS
SYSTOLIC BLOOD PRESSURE: 170 MMHG | BODY MASS INDEX: 25.54 KG/M2 | HEIGHT: 68 IN | WEIGHT: 168.5 LBS | OXYGEN SATURATION: 99 % | HEART RATE: 68 BPM | DIASTOLIC BLOOD PRESSURE: 96 MMHG

## 2023-10-18 DIAGNOSIS — Z90.10 ACQUIRED ABSENCE OF BREAST, UNSPECIFIED LATERALITY: Primary | ICD-10-CM

## 2023-10-18 PROCEDURE — 99213 OFFICE O/P EST LOW 20 MIN: CPT | Performed by: STUDENT IN AN ORGANIZED HEALTH CARE EDUCATION/TRAINING PROGRAM

## 2023-10-18 ASSESSMENT — PAIN SCALES - GENERAL: PAINLEVEL: NO PAIN (0)

## 2023-10-18 NOTE — LETTER
10/18/2023       RE: Sheila Schilling  [address] [place] [facility] [address]       Dear Colleague,    Thank you for referring your patient, Sheila Schilling, to the [facility] [place] [facility]. Please see a copy of my visit note below.    PRS    Patient returns 3 weeks after stopping right chest wall radiation.  She is inflated to about 200 cc on the right side according to the last note.  She is comfortable with this size.  She states that the skin on the right breast has gotten better over the last few weeks.  She is interested in autologous reconstruction and a symmetry procedure at some point.    On exam, right mastectomy incision intact with no wounds or signs of infection.  The right tissue expander is in place.  Right breast radiation skin changes.    A/P: 62-year-old female status post right mastectomy with tissue expander based reconstruction, right chest wall radiation    -Discussed the next steps.  Since patient is still in the early recovery post radiation, we have to wait to proceed with any additional reconstruction.  Generally speaking, we wait at least 6 months prior to the next stage reconstruction as it takes many months for the skin to recover from radiation treatment.  However, since patient is a candidate for autologous reconstruction and this would be my recommendation in the setting of right chest wall radiation, we will plan to obtain a CT abdomen/pelvis with IV contrast to map DIEA perforators.  Discussed the eventual plan for reconstruction which may include unilateral abdominally-based free flap followed by staged left mastopexy for symmetry.  Stated that if patient would like to be slightly larger, we may have to replace some of the right chest wall skin with skin from the abdomen.  Additionally, we did discuss that it is not advisable to expand radiated skin as this may increase  risk of implant extrusion.  Patient is agreeable with the plan.  -CTA ordered  -Once imaging study ordered, patient will return via phone visit to discuss and to schedule next visit for preoperative planning and photography  -A total of 20 minutes was devoted to review of chart, direct face-to-face patient counseling and documentation during this encounter, exclusive of any procedure performed.        Again, thank you for allowing me to participate in the care of your patient.      Sincerely,    Qasim Galvan MD

## 2023-10-18 NOTE — PROGRESS NOTES
PRS    Patient returns 3 weeks after stopping right chest wall radiation.  She is inflated to about 200 cc on the right side according to the last note.  She is comfortable with this size.  She states that the skin on the right breast has gotten better over the last few weeks.  She is interested in autologous reconstruction and a symmetry procedure at some point.    On exam, right mastectomy incision intact with no wounds or signs of infection.  The right tissue expander is in place.  Right breast radiation skin changes.    A/P: 62-year-old female status post right mastectomy with tissue expander based reconstruction, right chest wall radiation    -Discussed the next steps.  Since patient is still in the early recovery post radiation, we have to wait to proceed with any additional reconstruction.  Generally speaking, we wait at least 6 months prior to the next stage reconstruction as it takes many months for the skin to recover from radiation treatment.  However, since patient is a candidate for autologous reconstruction and this would be my recommendation in the setting of right chest wall radiation, we will plan to obtain a CT abdomen/pelvis with IV contrast to map DIEA perforators.  Discussed the eventual plan for reconstruction which may include unilateral abdominally-based free flap followed by staged left mastopexy for symmetry.  Stated that if patient would like to be slightly larger, we may have to replace some of the right chest wall skin with skin from the abdomen.  Additionally, we did discuss that it is not advisable to expand radiated skin as this may increase risk of implant extrusion.  Patient is agreeable with the plan.  -CTA ordered  -Once imaging study ordered, patient will return via phone visit to discuss and to schedule next visit for preoperative planning and photography  -A total of 20 minutes was devoted to review of chart, direct face-to-face patient counseling and documentation during this  encounter, exclusive of any procedure performed.    Qasim Galvan MD, PhD

## 2023-10-18 NOTE — NURSING NOTE
"Chief Complaint   Patient presents with    RECHECK     Post-radiation       Vitals:    10/18/23 1616   BP: (!) 170/96   BP Location: Left arm   Patient Position: Sitting   Cuff Size: Adult Regular   Pulse: 68   SpO2: 99%   Weight: 168 lb 8 oz   Height: 5' 8\"       Body mass index is 25.62 kg/m .     Joe Anglin, EMT- P    "

## 2023-10-25 ENCOUNTER — DOCUMENTATION ONLY (OUTPATIENT)
Dept: ONCOLOGY | Facility: CLINIC | Age: 63
End: 2023-10-25
Payer: COMMERCIAL

## 2023-10-25 NOTE — PROGRESS NOTES
"Oral Chemotherapy Monitoring Program    Primary Oncologist: Dr. Dooley  Drug: abemaciclib (Verzenio) 150 mg BID + letrozole (Femara) 2.5 mg daily; 28-days cycle  Start Date: 10/16/2023    Subjective/Objective:  Sheila Schilling is a 62 year old female contacted by phone for a follow-up visit for oral chemotherapy.         10/5/2023    12:00 PM 10/25/2023     9:00 AM   ORAL CHEMOTHERAPY   Assessment Type New Teach Initial Follow up   Diagnosis Code Breast Cancer Breast Cancer   Providers Miah Dooley   Clinic Name/Location [facility]   Drug Name Verzenio (abemaciclib) Verzenio (abemaciclib)   Dose 150 mg 150 mg   Current Schedule BID BID   Cycle Details Continuous Continuous   Start Date of Last Cycle  10/16/2023   Planned next cycle start date  11/13/2023   Doses missed in last 2 weeks  1   Adherence Assessment  Adherent   Adverse Effects  Fatigue   Home BPs  Not applicable   Any new drug interactions?  No   Is the dose as ordered appropriate for the patient?  Yes   Is the patient currently in pain?  No   Has the patient been assessed within the past 6 months for depression?  Yes   Has the patient missed any days of school, work, or other routine activity?  No   Since the last time we talked, have you been hospitalized or used the emergency room?  No       Vitals:  BP:   BP Readings from Last 1 Encounters:   10/18/23 (!) 170/96     Wt Readings from Last 1 Encounters:   10/18/23 76.4 kg (168 lb 8 oz)     Estimated body surface area is 1.91 meters squared as calculated from the following:    Height as of 10/18/23: 1.727 m (5' 8\").    Weight as of 10/18/23: 76.4 kg (168 lb 8 oz).    Labs:  _  Result Component Current Result Ref Range   Sodium 140 (10/16/2023) 135 - 145 mmol/L     _  Result Component Current Result Ref Range   Potassium 4.5 (10/16/2023) 3.4 - 5.3 mmol/L     _  Result Component Current Result Ref Range   Calcium 10.4 (H) (10/16/2023) 8.8 - 10.2 mg/dL     No results found for Mag within " last 30 days.     No results found for Phos within last 30 days.     _  Result Component Current Result Ref Range   Albumin 4.7 (10/16/2023) 3.5 - 5.2 g/dL     _  Result Component Current Result Ref Range   Urea Nitrogen 15.9 (10/16/2023) 8.0 - 23.0 mg/dL     _  Result Component Current Result Ref Range   Creatinine 0.94 (10/16/2023) 0.51 - 0.95 mg/dL       _  Result Component Current Result Ref Range   AST 29 (10/16/2023) 0 - 45 U/L     _  Result Component Current Result Ref Range   ALT 38 (10/16/2023) 0 - 50 U/L     _  Result Component Current Result Ref Range   Bilirubin Total 0.7 (10/16/2023) <=1.2 mg/dL       _  Result Component Current Result Ref Range   WBC Count 3.6 (L) (10/16/2023) 4.0 - 11.0 10e3/uL     _  Result Component Current Result Ref Range   Hemoglobin 16.3 (H) (10/16/2023) 11.7 - 15.7 g/dL     _  Result Component Current Result Ref Range   Platelet Count 251 (10/16/2023) 150 - 450 10e3/uL     No results found for ANC within last 30 days.         Assessment/Plan:  I spoke with patient today. Patient reports doing well on therapy. Patient reports some fatigue. No other issues or concerns reported. Patient is compliant with therapy and has missed 1 dose. Future appointments have been confirmed with patient. We will continue to follow.      Follow-Up:  10/26/23: Labs and follow-up appointment with Ligia Linn  Oncology Pharmacy Intern  [facility]  [phone]

## 2023-10-26 ENCOUNTER — LAB (OUTPATIENT)
Dept: LAB | Facility: CLINIC | Age: 63
End: 2023-10-26
Payer: COMMERCIAL

## 2023-10-26 ENCOUNTER — VIRTUAL VISIT (OUTPATIENT)
Dept: ONCOLOGY | Facility: CLINIC | Age: 63
End: 2023-10-26
Attending: INTERNAL MEDICINE
Payer: COMMERCIAL

## 2023-10-26 VITALS — BODY MASS INDEX: 23.95 KG/M2 | WEIGHT: 158 LBS | HEIGHT: 68 IN

## 2023-10-26 DIAGNOSIS — M85.88 OSTEOPENIA OF LUMBAR SPINE: ICD-10-CM

## 2023-10-26 DIAGNOSIS — R79.89 ELEVATED SERUM CREATININE: ICD-10-CM

## 2023-10-26 DIAGNOSIS — C50.911 INVASIVE LOBULAR CARCINOMA OF RIGHT BREAST IN FEMALE (H): ICD-10-CM

## 2023-10-26 DIAGNOSIS — C50.911 INVASIVE LOBULAR CARCINOMA OF RIGHT BREAST IN FEMALE (H): Primary | ICD-10-CM

## 2023-10-26 LAB
ALBUMIN SERPL BCG-MCNC: 4.4 G/DL (ref 3.5–5.2)
ALP SERPL-CCNC: 105 U/L (ref 35–104)
ALT SERPL W P-5'-P-CCNC: 26 U/L (ref 0–50)
ANION GAP SERPL CALCULATED.3IONS-SCNC: 6 MMOL/L (ref 7–15)
AST SERPL W P-5'-P-CCNC: 25 U/L (ref 0–45)
BASOPHILS # BLD AUTO: 0 10E3/UL (ref 0–0.2)
BASOPHILS NFR BLD AUTO: 1 %
BILIRUB SERPL-MCNC: 0.6 MG/DL
BUN SERPL-MCNC: 20.1 MG/DL (ref 8–23)
CALCIUM SERPL-MCNC: 10.3 MG/DL (ref 8.8–10.2)
CHLORIDE SERPL-SCNC: 102 MMOL/L (ref 98–107)
CREAT SERPL-MCNC: 1.08 MG/DL (ref 0.51–0.95)
DEPRECATED HCO3 PLAS-SCNC: 29 MMOL/L (ref 22–29)
EGFRCR SERPLBLD CKD-EPI 2021: 58 ML/MIN/1.73M2
EOSINOPHIL # BLD AUTO: 0.3 10E3/UL (ref 0–0.7)
EOSINOPHIL NFR BLD AUTO: 7 %
ERYTHROCYTE [DISTWIDTH] IN BLOOD BY AUTOMATED COUNT: 11.7 % (ref 10–15)
GLUCOSE SERPL-MCNC: 93 MG/DL (ref 70–99)
HCT VFR BLD AUTO: 47.4 % (ref 35–47)
HGB BLD-MCNC: 15.1 G/DL (ref 11.7–15.7)
IMM GRANULOCYTES # BLD: 0 10E3/UL
IMM GRANULOCYTES NFR BLD: 1 %
LYMPHOCYTES # BLD AUTO: 0.8 10E3/UL (ref 0.8–5.3)
LYMPHOCYTES NFR BLD AUTO: 22 %
MCH RBC QN AUTO: 30.7 PG (ref 26.5–33)
MCHC RBC AUTO-ENTMCNC: 31.9 G/DL (ref 31.5–36.5)
MCV RBC AUTO: 96 FL (ref 78–100)
MONOCYTES # BLD AUTO: 0.2 10E3/UL (ref 0–1.3)
MONOCYTES NFR BLD AUTO: 5 %
NEUTROPHILS # BLD AUTO: 2.4 10E3/UL (ref 1.6–8.3)
NEUTROPHILS NFR BLD AUTO: 64 %
PLATELET # BLD AUTO: 160 10E3/UL (ref 150–450)
POTASSIUM SERPL-SCNC: 4.6 MMOL/L (ref 3.4–5.3)
PROT SERPL-MCNC: 7.5 G/DL (ref 6.4–8.3)
RBC # BLD AUTO: 4.92 10E6/UL (ref 3.8–5.2)
SODIUM SERPL-SCNC: 137 MMOL/L (ref 135–145)
WBC # BLD AUTO: 3.7 10E3/UL (ref 4–11)

## 2023-10-26 PROCEDURE — 36415 COLL VENOUS BLD VENIPUNCTURE: CPT | Performed by: INTERNAL MEDICINE

## 2023-10-26 PROCEDURE — 85025 COMPLETE CBC W/AUTO DIFF WBC: CPT | Performed by: INTERNAL MEDICINE

## 2023-10-26 PROCEDURE — 80053 COMPREHEN METABOLIC PANEL: CPT | Performed by: INTERNAL MEDICINE

## 2023-10-26 PROCEDURE — 99214 OFFICE O/P EST MOD 30 MIN: CPT | Mod: 95 | Performed by: NURSE PRACTITIONER

## 2023-10-26 ASSESSMENT — PAIN SCALES - GENERAL: PAINLEVEL: NO PAIN (0)

## 2023-10-26 NOTE — PROGRESS NOTES
[facility] Hematology and Oncology Progress Note    Patient: Sheila Schilling  MRN: [id number]  10/26/23        Reason for Visit    Right breast cancer    Primary Oncologist: Dr. Dooley    Virtual visit      Assessment and Plan  # Stage IB (pT3-pN1a(sn)-cM0) Invasive lobular carcinoma of the right breast, ER/ME+; high risk features (large tumor size, 1 positive lymph node)    Status post right mastectomy and adjuvant radiation.    Just started adjuvant abemaciclib 150 mg BID and letrozole 10 days ago.   So far, tolerating this very well with minimal fatigue.    Labs: WBC 3.7, ANC, hgb, platelets WNL. Creatinine 1.04 (base 0.8), Alk phos 104, rest LFTs WNL. Calcium 10.3.    Mild rise in creatinine likely side effect of abemaciclib. No dehydration.     Plan:  -Continue abemaciclib 150 mg BID and letrozole daily  -Labwork next week for day 15 assessment. Monitor creatinine.   -Reassessment with labs in clinic with Ligia in 2 weeks, ahead of cycle 2; then in 6 weeks with Dr. Dooley, ahead of cycle 3.  -Labwork every 2 weeks for first two monthly cycles of abemaciclib, then monthly thereafter  -Planning 2 yrs of abemaciclib and endocrine treatment 5-10 yrs  -Left screening mammogram annually (due 5/2024)    # Mild osteopenia  Baseline DEXA shows mild osteopenia with 0.1% hip fracture risk/10 yrs.   Mild hypercalcemia, 10.4.   On calcium + vit D daily     Plan:  -Continue calcium + vit D daily  -If calcium increases, will hold calcium supplement  -Weight bearing exercises  -Repeat DEXA every 2 yrs on AI. If progressive BMD loss, consider bisphosphonate in future       Cancer Staging   Invasive lobular carcinoma of right breast in female (H)  Staging form: Breast, AJCC 8th Edition  - Pathologic stage from 6/19/2023: Stage IB (pT3, pN1a, cM0, G2, ER+, ME+, HER2-, Oncotype DX score: 13) - Signed by Ligia Felix NP on 10/26/2023       Oncology history  5/2023: Stage IB (cP5-bV4u-vB4) right breast cancer,  ER/MT+  -screening mammogram: asymmetry in upper outer quadrant of her right breast.   -R diagnostic mammogram +ultrasound: architectural distortion with 12 x 10 x 8 mm ill-defined mass with acoustic shadowing respectively. Needle biopsy: invasive lobular carcinoma, grade 2; strongly ER and MT positive and HER2 negative.   -contrast-enhanced mammogram (due to very dense breasts): 7.8 x 6.7 x 5.3 cm enhancement extending from 11:00 to 8 o'clock position consistent with malignancy.   -6/2023 surgical path: 15 cm invasive lobular carcinoma. Single 4 mm sLN positive   -Oncotype DX score 13    Treatment:  6/19/2023: right mastectomy and sLN biopsy    9/26/2023: completed adjuvant RT (5000 cGy/25)    10/16/2023 - present: adjuvant abemaciclib 150 mg BID and letrozole    Interval History   Sheila Schilling is a 62 year old recently diagnosed with right ER/MT+ breasta cancer with sLN involvement; s/p right mastectomy, adjuvant RT and now on adjuvant letrozole and abemaciclib x 10 days. Returns for toxicity assessment.     Tolerating very well. Has only noted mild fatigue. Chronic hot flashes, not worse. No arthralgias. No n/v, diarrhea.     ECOG Performance  0 - Independent      Physical Exam    General: alert and cooperative  Lungs: Normal respiratory effort    Lab Results    Recent Results (from the past 168 hour(s))   Comprehensive metabolic panel   Result Value Ref Range    Sodium 137 135 - 145 mmol/L    Potassium 4.6 3.4 - 5.3 mmol/L    Carbon Dioxide (CO2) 29 22 - 29 mmol/L    Anion Gap 6 (L) 7 - 15 mmol/L    Urea Nitrogen 20.1 8.0 - 23.0 mg/dL    Creatinine 1.08 (H) 0.51 - 0.95 mg/dL    GFR Estimate 58 (L) >60 mL/min/1.73m2    Calcium 10.3 (H) 8.8 - 10.2 mg/dL    Chloride 102 98 - 107 mmol/L    Glucose 93 70 - 99 mg/dL    Alkaline Phosphatase 105 (H) 35 - 104 U/L    AST 25 0 - 45 U/L    ALT 26 0 - 50 U/L    Protein Total 7.5 6.4 - 8.3 g/dL    Albumin 4.4 3.5 - 5.2 g/dL    Bilirubin Total 0.6 <=1.2 mg/dL   CBC with  platelets and differential   Result Value Ref Range    WBC Count 3.7 (L) 4.0 - 11.0 10e3/uL    RBC Count 4.92 3.80 - 5.20 10e6/uL    Hemoglobin 15.1 11.7 - 15.7 g/dL    Hematocrit 47.4 (H) 35.0 - 47.0 %    MCV 96 78 - 100 fL    MCH 30.7 26.5 - 33.0 pg    MCHC 31.9 31.5 - 36.5 g/dL    RDW 11.7 10.0 - 15.0 %    Platelet Count 160 150 - 450 10e3/uL    % Neutrophils 64 %    % Lymphocytes 22 %    % Monocytes 5 %    % Eosinophils 7 %    % Basophils 1 %    % Immature Granulocytes 1 %    Absolute Neutrophils 2.4 1.6 - 8.3 10e3/uL    Absolute Lymphocytes 0.8 0.8 - 5.3 10e3/uL    Absolute Monocytes 0.2 0.0 - 1.3 10e3/uL    Absolute Eosinophils 0.3 0.0 - 0.7 10e3/uL    Absolute Basophils 0.0 0.0 - 0.2 10e3/uL    Absolute Immature Granulocytes 0.0 <=0.4 10e3/uL       Imaging    DX Hip/Pelvis/Spine    Result Date: 10/11/2023  EXAM: DX HIP/PELVIS/SPINE LOCATION: [facility] DATE: 10/11/2023 INDICATION: Postmenopausal, history of breast cancer. DEMOGRAPHICS: Age- 62 years. Gender- Female. Menopausal status- Postmenopausal. COMPARISON: No prior studies available on the current scanner. TECHNIQUE: Dual-energy x-ray absorptiometry (DXA) performed with routine technique. FINDINGS: DXA RESULTS -Lumbar Spine: L1-L3: BMD: 1.024 g/cm2. T-score: -1.3. Z-score: -0.3. -RIGHT Hip Total: BMD: 1.049 g/cm2. T-score: 0.3. Z-score: 1.1. -RIGHT Hip Femoral neck: BMD: 1.101 g/cm2. T-score: 0.5. Z-score: 1.6. -LEFT Hip Total: BMD: 1.013 g/cm2. T-score: 0.0. Z-score: 0.8. -LEFT Hip Femoral neck: BMD: 1.087 g/cm2. T-score: 0.4. Z-score: 1.5. WHO T-SCORE CRITERIA -Normal: T score at or above -1 SD -Osteopenia: T score between -1 and -2.5 SD -Osteoporosis: T score at or below -2.5 SD The World Health Organization (WHO) criteria is applicable to perimenopausal females, postmenopausal females, and men aged 50 years or older. FRACTURE RISK -FRAX Results: The 10 year probability of major osteoporotic fracture is 6.2%, and of hip  fracture is 0.1%, based on left femoral neck BMD. RECOMMENDATIONS Consider treatment if major osteoporotic fracture score is greater than or equal to 20%, or if the hip fracture score is greater than or equal to 3%.     IMPRESSION: Low bone density (OSTEOPENIA). T score meets the WHO criteria for low bone density (osteopenia) at one or more measured sites. The risk of osteoporotic fracture increases approximately two-fold for each standard deviation decrease in T-score.     A total of 30 min were spent today on this visit which included face to face conversation with the patient, EMR review, counseling and co-ordination of care and medical documentation.      Signed by: Ligia Felix NP

## 2023-10-26 NOTE — PROGRESS NOTES
Virtual Visit Details    Type of service:  Video Visit   Start time: 1140  End time: 1153    Originating Location (pt. Location): Home    Distant Location (provider location):  On-site  Platform used for Video Visit: WegoWise

## 2023-10-26 NOTE — NURSING NOTE
Is the patient currently in the state of [place]? YES    Visit mode:VIDEO    If the visit is dropped, the patient can be reconnected by: VIDEO VISIT: Text to cell phone:   Telephone Information:   Mobile [phone]       Will anyone else be joining the visit? NO  (If patient encounters technical issues they should call [phone])    How would you like to obtain your AVS? MyChart    Are changes needed to the allergy or medication list? Pt stated no med changes    Reason for visit: RECHECK    No other vitals to report per pt    Ashley HUDSONF

## 2023-10-27 ENCOUNTER — OFFICE VISIT (OUTPATIENT)
Dept: RADIATION THERAPY | Facility: OUTPATIENT CENTER | Age: 63
End: 2023-10-27
Payer: COMMERCIAL

## 2023-10-27 VITALS
SYSTOLIC BLOOD PRESSURE: 132 MMHG | OXYGEN SATURATION: 98 % | HEART RATE: 70 BPM | DIASTOLIC BLOOD PRESSURE: 80 MMHG | RESPIRATION RATE: 12 BRPM

## 2023-10-27 DIAGNOSIS — C50.911 INVASIVE LOBULAR CARCINOMA OF RIGHT BREAST IN FEMALE (H): Primary | ICD-10-CM

## 2023-10-27 NOTE — LETTER
10/27/2023         RE: Sheila Schilling  [address] [facility] [address]        Dear Colleague,    Thank you for referring your patient, Sheila Schilling, to the [facility] RAD[facility]ATION [facility]ERAPY [facility]IN[facility]. Please see a copy of my visit note below.       Department of Radiation Oncology  Radiation Therapy Center  [facility]  [place] [address]  [phone]       Radiation Oncology Follow-up Visit  2023      Sheila Schilling  MRN: [id number]   : 1960     DIDENTIFICATION: This is a 62 year old woman with right (overlapping sites) breast cancer, status post mastectomy and SLNBx, revealing G2 ILCA, [id number] ER+/NM+/H2N- disease referred for adjuvant radiation therapy.  Her Oncotype score was 13 and she will not be receiving chemotherapy.    Oncologic History Sheila Schilling was in her usual state of health this past year.  On 2023 bilateral screening mammogram showed within the right upper outer breast possible architectural distortion.  There is no mammographic evidence for malignancy in the left breast.  On May 4, 2023, right diagnostic mammogram showed persistence architectural distortion in the upper outer quadrant.  By ultrasound it measured 1.2 cm in greatest dimension.  On May 10, 2023 right ultrasound-guided biopsy was consistent with grade 2 invasive lobular carcinoma with LCIS.  Contrast-enhanced mammogram on May 24, 2023 confirmed the right breast architectural distortion and non-mass enhancement throughout the outer right breast and the entire area of enhancement spanned about 7.8 cm in greatest dimension.  There was no suspicious enhancement within the left breast.  By ultrasound biopsy clip is seen adjacent to the mass.  On 2023 Dr. Mcdermott took her to the OR and she had a right breast mastectomy with sentinel lymph node biopsy.   Pathology revealed 15.0 cm G2 ILCA with classic type LCIS.  There was no DCIS.  Positive LVSI.  There  were negative invasive margins with the closest margin being posterior at 4mm.  There was 1 out 2 involved lymph nodes given her a pathologic stage of [id number], ER/DC positive HER2/salo negative.  She had immediate reconstruction with tissue expander placement.  Her postoperative course has been unenventful.  She was recently seen by Dr. Dooley.  Her Oncotype score returned back as 13. The benefit of treatment did not outweigh the risks and no systemic chemotherapy is recommended. She completed adjuvant post mastectomy radiation. She completed right chest wall and right supraclavicular radiation. She received a total of 5,000 cGy, 200 cGy in 25 fractions from 8/21/23 to 9/26/23.     INTERVAL SINCE COMPLETION OF RADIATION THERAPY:   One month    SUBJECTIVE:   Sheila returns for one month follow up. She is doing well. She is applying aquaphor to the right chest wall. Her skin feels good,  not dry and not open wounds. Just has some flaky skin. Her energy is baseline. She retired 2 weeks before the cancer diagnosis. She worked at [employer] for 25 years. Has a daughter home from college at the .     PHYSICAL EXAM:  /80   Pulse 70   Resp 12   LMP 03/08/2011   SpO2 98%    Gen: Alert, in NAD  Eyes: PERRL, EOMI, sclera anicteric  HENT     Head: NC/AT     Ears: No external auricular lesions     Nose/sinus: No rhinorrhea or epistaxis     Oral Cavity/Oropharynx: MMM  Neck: Supple, full ROM, no LAD  Pulm: No wheezing, stridor or respiratory distress  CV: Well-perfused, no cyanosis, no pedal edema  Breasts: right simple mastectomy scar, nipple surgically absent. TE in place. Skin on right is hyperpigmented. Skin is well healed. Flaky skin. Bilaterally there are no masses, nodules or skin changes. Asymmetric.   Abdominal: Soft, nontender, nondistended, no hepatomegaly  Back: No step-offs or pain to palpation along the thoracolumbar spine, no CVA tenderness    Musculoskeletal: Normal bulk and tone   Skin: Normal color  and turgor  Neurologic: A/Ox3, CN II-XII intact  Psychiatric: Appropriate mood and affect    LABS AND IMAGING:  Reviewed.    IMPRESSION:   In summary, Ms Schilling is a 62 year old woman with right (overlapping sites) breast cancer, status post simple mastectomy with immediate reconstruction with TE and SLNBx, revealing G2 ILCA, [id number] ER+/GA+/H2N-, she completedd adjuvant post mastectomy radiation therapy.  Her Oncotype score was 13 and she will not be receiving chemotherapy. She started letrozole 10/15/23. She tolerated RT well. Skin is healed.     PLAN:   Continue aquaphor prn.   See me in person in 6 mo  Follow with medical oncology    CJ Monk  Department of Radiation Oncology  [facility]          Again, thank you for allowing me to participate in the care of your patient.        Sincerely,        Niki Johnson PA-C

## 2023-10-27 NOTE — PROGRESS NOTES
Department of Radiation Oncology  Radiation Therapy Center  [facility] [place] [address]  [phone]       Radiation Oncology Follow-up Visit  2023      Sheila Schilling  MRN: [id number]   : 1960     DIDENTIFICATION: This is a 62 year old woman with right (overlapping sites) breast cancer, status post mastectomy and SLNBx, revealing G2 ILCA, [id number] ER+/WA+/H2N- disease referred for adjuvant radiation therapy.  Her Oncotype score was 13 and she will not be receiving chemotherapy.    Oncologic History Sheila Schilling was in her usual state of health this past year.  On 2023 bilateral screening mammogram showed within the right upper outer breast possible architectural distortion.  There is no mammographic evidence for malignancy in the left breast.  On May 4, 2023, right diagnostic mammogram showed persistence architectural distortion in the upper outer quadrant.  By ultrasound it measured 1.2 cm in greatest dimension.  On May 10, 2023 right ultrasound-guided biopsy was consistent with grade 2 invasive lobular carcinoma with LCIS.  Contrast-enhanced mammogram on May 24, 2023 confirmed the right breast architectural distortion and non-mass enhancement throughout the outer right breast and the entire area of enhancement spanned about 7.8 cm in greatest dimension.  There was no suspicious enhancement within the left breast.  By ultrasound biopsy clip is seen adjacent to the mass.  On 2023 Dr. Mcdermott took her to the OR and she had a right breast mastectomy with sentinel lymph node biopsy.   Pathology revealed 15.0 cm G2 ILCA with classic type LCIS.  There was no DCIS.  Positive LVSI.  There were negative invasive margins with the closest margin being posterior at 4mm.  There was 1 out 2 involved lymph nodes given her a pathologic stage of [id number], ER/WA positive HER2/salo negative.  She had immediate reconstruction with tissue expander placement.   Her postoperative course has been unenventful.  She was recently seen by Dr. Dooley.  Her Oncotype score returned back as 13. The benefit of treatment did not outweigh the risks and no systemic chemotherapy is recommended. She completed adjuvant post mastectomy radiation. She completed right chest wall and right supraclavicular radiation. She received a total of 5,000 cGy, 200 cGy in 25 fractions from 8/21/23 to 9/26/23.     INTERVAL SINCE COMPLETION OF RADIATION THERAPY:   One month    SUBJECTIVE:   Sheila returns for one month follow up. She is doing well. She is applying aquaphor to the right chest wall. Her skin feels good,  not dry and not open wounds. Just has some flaky skin. Her energy is baseline. She retired 2 weeks before the cancer diagnosis. She worked at [employer] for 25 years. Has a daughter home from college at the .     PHYSICAL EXAM:  /80   Pulse 70   Resp 12   LMP 03/08/2011   SpO2 98%    Gen: Alert, in NAD  Eyes: PERRL, EOMI, sclera anicteric  HENT     Head: NC/AT     Ears: No external auricular lesions     Nose/sinus: No rhinorrhea or epistaxis     Oral Cavity/Oropharynx: MMM  Neck: Supple, full ROM, no LAD  Pulm: No wheezing, stridor or respiratory distress  CV: Well-perfused, no cyanosis, no pedal edema  Breasts: right simple mastectomy scar, nipple surgically absent. TE in place. Skin on right is hyperpigmented. Skin is well healed. Flaky skin. Bilaterally there are no masses, nodules or skin changes. Asymmetric.   Abdominal: Soft, nontender, nondistended, no hepatomegaly  Back: No step-offs or pain to palpation along the thoracolumbar spine, no CVA tenderness    Musculoskeletal: Normal bulk and tone   Skin: Normal color and turgor  Neurologic: A/Ox3, CN II-XII intact  Psychiatric: Appropriate mood and affect    LABS AND IMAGING:  Reviewed.    IMPRESSION:   In summary, Ms Schilling is a 62 year old woman with right (overlapping sites) breast cancer, status post simple mastectomy with  immediate reconstruction with TE and SLNBx, revealing G2 ILCA, [id number] ER+/IN+/H2N-, she completedd adjuvant post mastectomy radiation therapy.  Her Oncotype score was 13 and she will not be receiving chemotherapy. She started letrozole 10/15/23. She tolerated RT well. Skin is healed.     PLAN:   Continue aquaphor prn.   See me in person in 6 mo  Follow with medical oncology    CJ Monk  Department of Radiation Oncology  [facility]

## 2023-11-02 ENCOUNTER — ANCILLARY PROCEDURE (OUTPATIENT)
Dept: CT IMAGING | Facility: CLINIC | Age: 63
End: 2023-11-02
Attending: STUDENT IN AN ORGANIZED HEALTH CARE EDUCATION/TRAINING PROGRAM
Payer: COMMERCIAL

## 2023-11-02 DIAGNOSIS — Z90.10 ACQUIRED ABSENCE OF BREAST, UNSPECIFIED LATERALITY: ICD-10-CM

## 2023-11-02 PROCEDURE — 74174 CTA ABD&PLVS W/CONTRAST: CPT | Performed by: RADIOLOGY

## 2023-11-02 RX ORDER — IOPAMIDOL 755 MG/ML
90 INJECTION, SOLUTION INTRAVASCULAR ONCE
Status: COMPLETED | OUTPATIENT
Start: 2023-11-02 | End: 2023-11-02

## 2023-11-02 RX ADMIN — IOPAMIDOL 90 ML: 755 INJECTION, SOLUTION INTRAVASCULAR at 15:55

## 2023-11-02 NOTE — DISCHARGE INSTRUCTIONS

## 2023-11-07 ENCOUNTER — ONCOLOGY VISIT (OUTPATIENT)
Dept: ONCOLOGY | Facility: CLINIC | Age: 63
End: 2023-11-07
Attending: NURSE PRACTITIONER
Payer: COMMERCIAL

## 2023-11-07 ENCOUNTER — LAB (OUTPATIENT)
Dept: LAB | Facility: CLINIC | Age: 63
End: 2023-11-07
Payer: COMMERCIAL

## 2023-11-07 VITALS
OXYGEN SATURATION: 98 % | BODY MASS INDEX: 26.06 KG/M2 | HEART RATE: 67 BPM | DIASTOLIC BLOOD PRESSURE: 72 MMHG | WEIGHT: 166 LBS | RESPIRATION RATE: 16 BRPM | HEIGHT: 67 IN | SYSTOLIC BLOOD PRESSURE: 121 MMHG | TEMPERATURE: 98 F

## 2023-11-07 DIAGNOSIS — C50.911 INVASIVE LOBULAR CARCINOMA OF RIGHT BREAST IN FEMALE (H): ICD-10-CM

## 2023-11-07 DIAGNOSIS — C50.911 INVASIVE LOBULAR CARCINOMA OF RIGHT BREAST IN FEMALE (H): Primary | ICD-10-CM

## 2023-11-07 DIAGNOSIS — R79.89 ELEVATED SERUM CREATININE: ICD-10-CM

## 2023-11-07 DIAGNOSIS — T45.1X5A CHEMOTHERAPY-INDUCED NEUTROPENIA (H): ICD-10-CM

## 2023-11-07 DIAGNOSIS — D70.1 CHEMOTHERAPY-INDUCED NEUTROPENIA (H): ICD-10-CM

## 2023-11-07 DIAGNOSIS — M89.9 LESION OF BONE OF LUMBOSACRAL SPINE: ICD-10-CM

## 2023-11-07 LAB
ALBUMIN SERPL BCG-MCNC: 4.1 G/DL (ref 3.5–5.2)
ALP SERPL-CCNC: 85 U/L (ref 35–104)
ALT SERPL W P-5'-P-CCNC: 21 U/L (ref 0–50)
ANION GAP SERPL CALCULATED.3IONS-SCNC: 8 MMOL/L (ref 7–15)
AST SERPL W P-5'-P-CCNC: 21 U/L (ref 0–45)
BASOPHILS # BLD AUTO: 0 10E3/UL (ref 0–0.2)
BASOPHILS NFR BLD AUTO: 2 %
BILIRUB SERPL-MCNC: 0.5 MG/DL
BUN SERPL-MCNC: 11.8 MG/DL (ref 8–23)
CALCIUM SERPL-MCNC: 9.7 MG/DL (ref 8.8–10.2)
CHLORIDE SERPL-SCNC: 102 MMOL/L (ref 98–107)
CREAT SERPL-MCNC: 0.96 MG/DL (ref 0.51–0.95)
DEPRECATED HCO3 PLAS-SCNC: 27 MMOL/L (ref 22–29)
EGFRCR SERPLBLD CKD-EPI 2021: 67 ML/MIN/1.73M2
EOSINOPHIL # BLD AUTO: 0.1 10E3/UL (ref 0–0.7)
EOSINOPHIL NFR BLD AUTO: 4 %
ERYTHROCYTE [DISTWIDTH] IN BLOOD BY AUTOMATED COUNT: 11.9 % (ref 10–15)
GLUCOSE SERPL-MCNC: 101 MG/DL (ref 70–99)
HCT VFR BLD AUTO: 41.6 % (ref 35–47)
HGB BLD-MCNC: 13.7 G/DL (ref 11.7–15.7)
IMM GRANULOCYTES # BLD: 0 10E3/UL
IMM GRANULOCYTES NFR BLD: 1 %
LYMPHOCYTES # BLD AUTO: 0.6 10E3/UL (ref 0.8–5.3)
LYMPHOCYTES NFR BLD AUTO: 26 %
MCH RBC QN AUTO: 31.5 PG (ref 26.5–33)
MCHC RBC AUTO-ENTMCNC: 32.9 G/DL (ref 31.5–36.5)
MCV RBC AUTO: 96 FL (ref 78–100)
MONOCYTES # BLD AUTO: 0.2 10E3/UL (ref 0–1.3)
MONOCYTES NFR BLD AUTO: 8 %
NEUTROPHILS # BLD AUTO: 1.4 10E3/UL (ref 1.6–8.3)
NEUTROPHILS NFR BLD AUTO: 59 %
NRBC # BLD AUTO: 0 10E3/UL
NRBC BLD AUTO-RTO: 0 /100
PLATELET # BLD AUTO: 192 10E3/UL (ref 150–450)
POTASSIUM SERPL-SCNC: 4.2 MMOL/L (ref 3.4–5.3)
PROT SERPL-MCNC: 6.9 G/DL (ref 6.4–8.3)
RBC # BLD AUTO: 4.35 10E6/UL (ref 3.8–5.2)
SODIUM SERPL-SCNC: 137 MMOL/L (ref 135–145)
WBC # BLD AUTO: 2.2 10E3/UL (ref 4–11)

## 2023-11-07 PROCEDURE — 99214 OFFICE O/P EST MOD 30 MIN: CPT | Performed by: NURSE PRACTITIONER

## 2023-11-07 PROCEDURE — 80053 COMPREHEN METABOLIC PANEL: CPT

## 2023-11-07 PROCEDURE — 36415 COLL VENOUS BLD VENIPUNCTURE: CPT

## 2023-11-07 PROCEDURE — 99213 OFFICE O/P EST LOW 20 MIN: CPT | Performed by: NURSE PRACTITIONER

## 2023-11-07 PROCEDURE — 85025 COMPLETE CBC W/AUTO DIFF WBC: CPT

## 2023-11-07 ASSESSMENT — PAIN SCALES - GENERAL: PAINLEVEL: NO PAIN (0)

## 2023-11-07 NOTE — PROGRESS NOTES
[facility] Hematology and Oncology Progress Note    Patient: Sheila Schilling  MRN: [id number]  11/07/23        Reason for Visit    Right breast cancer    Primary Oncologist: Dr. Dooley    Virtual visit      Assessment and Plan  # Stage IB (pT3-pN1a(sn)-cM0) Invasive lobular carcinoma of the right breast, ER/HI+; high risk features (large tumor size, 1 positive lymph node)  #  Chemo-induced neutropenia (grade 1)  #  Elevated creatinine (gr 1), possible abemaciclib side effect    Status post right mastectomy and adjuvant radiation.    Recently started adjuvant abemaciclib 150 mg BID and letrozole 3 weeks ago.  So far, tolerating this very well with rare diarrhea. Mild elevation in creatine and neutropenia likely related to abemaciclib.    Labs: WBC 2.2, ANC 1.4, hgb, platelets WNL. Creatinine 0.96 (base 0.8), LFTs WNL, Calcium 9.7.    Plan:  -Continue abemaciclib 150 mg BID and letrozole daily  -Labwork every 2 weeks through this cycle, then can transition to monthly if stable  -If ANC declines <1.0, will need to hold until >1.0 and retry at this dose or dose-reduce to 100 mg BID  -Imodium as needed for diarrhea, stay well-hydrated  -Return in 4 weeks with Dr. Dooley, ahead of cycle 3.  -Planning 2 yrs of abemaciclib and endocrine treatment 5-10 yrs  -Left screening mammogram annually (due 5/2024)    #  Indeterminate L4 sclerotic lesion  CT cap was ordered by Plastic Surgeon in prep for breast reconstruction. Incidental 10 mm L4 sclerotic lesion is indeterminate for benign bone island vs met. Asymptomatic.    Plan:  -Bone scan to rule out met. Call pt with results    # Mild osteopenia  Baseline DEXA shows mild osteopenia with 0.1% hip fracture risk/10 yrs.   Mild hypercalcemia intermittently; currently WNL  On calcium + vit D daily     Plan:  -Continue calcium + vit D daily  -If calcium increases, will hold calcium supplement  -Weight bearing exercises  -Repeat DEXA every 2 yrs on AI. If progressive BMD  loss, consider bisphosphonate in future       Cancer Staging   Invasive lobular carcinoma of right breast in female (H)  Staging form: Breast, AJCC 8th Edition  - Pathologic stage from 6/19/2023: Stage IB (pT3, pN1a, cM0, G2, ER+, KS+, HER2-, Oncotype DX score: 13) - Signed by Ligia Felix NP on 10/26/2023       Oncology history  5/2023: Stage IB (sV0-mY5p-iJ5) right breast cancer, ER/KS+  -screening mammogram: asymmetry in upper outer quadrant of her right breast.   -R diagnostic mammogram +ultrasound: architectural distortion with 12 x 10 x 8 mm ill-defined mass with acoustic shadowing respectively. Needle biopsy: invasive lobular carcinoma, grade 2; strongly ER and KS positive and HER2 negative.   -contrast-enhanced mammogram (due to very dense breasts): 7.8 x 6.7 x 5.3 cm enhancement extending from 11:00 to 8 o'clock position consistent with malignancy.   -6/2023 surgical path: 15 cm invasive lobular carcinoma. Single 4 mm sLN positive   -Oncotype DX score 13    Treatment:  6/19/2023: right mastectomy and sLN biopsy    9/26/2023: completed adjuvant RT (5000 cGy/25)    10/16/2023 - present: adjuvant abemaciclib 150 mg BID and letrozole    Interval History   Sheila Schilling is a 62 year old recently diagnosed with right ER/KS+ breast cancer with sLN involvement; s/p right mastectomy, adjuvant RT and now on adjuvant letrozole and abemaciclib x 3 weeks. Returns for 2-week toxicity assessment.     Tolerating very well. Has only noted mild fatigue, staying very active. Chronic hot flashes, not worse. No arthralgias. No n/v. Only had one episode of diarrhea last week, alleviated with Imodium x 1.    Denies low back pain.     ECOG Performance  0 - Independent      Physical Exam    General: alert and cooperative  HEENT: No icterus. No mucositis  Lymph: No palpable cervical or axillary adenopathy  Lungs: Normal respiratory effort. Clear lung sounds bilaterally  Heart: RRR  Abd: Soft, non-distended,  non-tender  Extremities: No edema  Neuro: Non-focal  Skin: No rashes    Lab Results    Recent Results (from the past 168 hour(s))   Comprehensive metabolic panel   Result Value Ref Range    Sodium 137 135 - 145 mmol/L    Potassium 4.2 3.4 - 5.3 mmol/L    Carbon Dioxide (CO2) 27 22 - 29 mmol/L    Anion Gap 8 7 - 15 mmol/L    Urea Nitrogen 11.8 8.0 - 23.0 mg/dL    Creatinine 0.96 (H) 0.51 - 0.95 mg/dL    GFR Estimate 67 >60 mL/min/1.73m2    Calcium 9.7 8.8 - 10.2 mg/dL    Chloride 102 98 - 107 mmol/L    Glucose 101 (H) 70 - 99 mg/dL    Alkaline Phosphatase 85 35 - 104 U/L    AST 21 0 - 45 U/L    ALT 21 0 - 50 U/L    Protein Total 6.9 6.4 - 8.3 g/dL    Albumin 4.1 3.5 - 5.2 g/dL    Bilirubin Total 0.5 <=1.2 mg/dL   CBC with platelets and differential   Result Value Ref Range    WBC Count 2.2 (L) 4.0 - 11.0 10e3/uL    RBC Count 4.35 3.80 - 5.20 10e6/uL    Hemoglobin 13.7 11.7 - 15.7 g/dL    Hematocrit 41.6 35.0 - 47.0 %    MCV 96 78 - 100 fL    MCH 31.5 26.5 - 33.0 pg    MCHC 32.9 31.5 - 36.5 g/dL    RDW 11.9 10.0 - 15.0 %    Platelet Count 192 150 - 450 10e3/uL    % Neutrophils 59 %    % Lymphocytes 26 %    % Monocytes 8 %    % Eosinophils 4 %    % Basophils 2 %    % Immature Granulocytes 1 %    NRBCs per 100 WBC 0 <1 /100    Absolute Neutrophils 1.4 (L) 1.6 - 8.3 10e3/uL    Absolute Lymphocytes 0.6 (L) 0.8 - 5.3 10e3/uL    Absolute Monocytes 0.2 0.0 - 1.3 10e3/uL    Absolute Eosinophils 0.1 0.0 - 0.7 10e3/uL    Absolute Basophils 0.0 0.0 - 0.2 10e3/uL    Absolute Immature Granulocytes 0.0 <=0.4 10e3/uL    Absolute NRBCs 0.0 10e3/uL         Imaging    CTA Abdomen Pelvis with Contrast    Result Date: 11/2/2023  CTA ABDOMEN AND PELVIS 11/2/2023 4:05 PM CLINICAL HISTORY: Status post right mastectomy and radiation, need CTA for DIEA  mapping, 0.8-1 mm slices; Acquired absence of breast, unspecified laterality. COMPARISONS: None available. REFERRING PROVIDER: MARIA L INMAN TECHNIQUE: After the uneventful  administration of intravenous contrast, CTA performed through the abdomen and pelvis. Coronal and sagittal reconstructions were produced. 3D and multiplanar reconstructions were unavailable at the time of dictation. CONTRAST: 90 mL Isovue 370 DOSE (DLP): 612 mGy*cm FINDINGS: CTA: AORTOILIAC: Patent aorta. Celiac artery has a hook-like configuration and measures 2.3 mm in diameter as it crosses under the diaphragm. Superior mesenteric, two right and two left renal, and inferior mesenteric arteries patent.  Bilateral common, internal, and external iliac arteries patent. Bilateral common femoral arteries patent. RIGHT: Inferior epigastric artery originates from the external iliac artery and measures 2 mm in diameter. Artery crosses the posterior fascia 52 mm inferior and 47 mm lateral to the umbilicus. A medial  originates 36 mm inferior and 48 mm lateral to the umbilicus and crosses the anterior fascia 12 mm inferior and 25 mm lateral to the umbilicus. A lateral  originates 12 mm inferior and 53 mm lateral to the umbilicus and crosses the anterior fascia 28 mm superior and 75 mm lateral to the umbilicus. A medial  originates 4 mm inferior and 51 mm lateral to the umbilicus and crosses the umbilicus 2 mm inferior and 45 mm lateral to the umbilicus Artery terminates in intramuscular branches. LEFT: Inferior epigastric artery originates from the external iliac artery and wraps from lateral to medial posterior to the left common femoral vein 2 mm in diameter. Artery crosses the posterior fascia 63 mm inferior and 38 mm lateral to the umbilicus. Artery arborizes into a tangle of perforators that are difficult to trace and distinguish from each other 27 mm inferior and 40 mm lateral to the umbilicus. A lateral  exits the anterior fascia 22 mm inferior and 42 mm lateral to the umbilicus. A medial  exits the anterior fascia 8 mm inferior and 23 mm lateral to the umbilicus. A  lateral  exits the anterior fascia 9 mm superior and 43 mm lateral to the umbilicus. CT: Linear scars. Lung bases otherwise clear. Right breast tissue expander. Subcentimeter hepatic hypodensities are too small to characterize but are likely simple cysts. L4 10 mm sclerotic focus. Spine degenerative changes. Sacroiliac sclerosis.     IMPRESSION: 1. Patent inferior epigastric arteries and perforators as described in the report. 2. L4 10 mm sclerotic focus may be a bone island, but malignancy cannot be excluded. Nuclear medicine bone scan could be performed for further evaluation. OMARI RUBIO MD   SYSTEM ID:  [id number]    DX Hip/Pelvis/Spine    Result Date: 10/11/2023  EXAM: DX HIP/PELVIS/SPINE LOCATION: [facility] DATE: 10/11/2023 INDICATION: Postmenopausal, history of breast cancer. DEMOGRAPHICS: Age- 62 years. Gender- Female. Menopausal status- Postmenopausal. COMPARISON: No prior studies available on the current scanner. TECHNIQUE: Dual-energy x-ray absorptiometry (DXA) performed with routine technique. FINDINGS: DXA RESULTS -Lumbar Spine: L1-L3: BMD: 1.024 g/cm2. T-score: -1.3. Z-score: -0.3. -RIGHT Hip Total: BMD: 1.049 g/cm2. T-score: 0.3. Z-score: 1.1. -RIGHT Hip Femoral neck: BMD: 1.101 g/cm2. T-score: 0.5. Z-score: 1.6. -LEFT Hip Total: BMD: 1.013 g/cm2. T-score: 0.0. Z-score: 0.8. -LEFT Hip Femoral neck: BMD: 1.087 g/cm2. T-score: 0.4. Z-score: 1.5. WHO T-SCORE CRITERIA -Normal: T score at or above -1 SD -Osteopenia: T score between -1 and -2.5 SD -Osteoporosis: T score at or below -2.5 SD The World Health Organization (WHO) criteria is applicable to perimenopausal females, postmenopausal females, and men aged 50 years or older. FRACTURE RISK -FRAX Results: The 10 year probability of major osteoporotic fracture is 6.2%, and of hip fracture is 0.1%, based on left femoral neck BMD. RECOMMENDATIONS Consider treatment if major osteoporotic fracture score is greater than or  equal to 20%, or if the hip fracture score is greater than or equal to 3%.     IMPRESSION: Low bone density (OSTEOPENIA). T score meets the WHO criteria for low bone density (osteopenia) at one or more measured sites. The risk of osteoporotic fracture increases approximately two-fold for each standard deviation decrease in T-score.     A total of 35 min were spent today on this visit which included face to face conversation with the patient, EMR review, counseling and co-ordination of care and medical documentation.      Signed by: Ligia Felix NP

## 2023-11-07 NOTE — PROGRESS NOTES
"Oncology Rooming Note    November 7, 2023 11:46 AM   Sheila Schilling is a 62 year old female who presents for:    Chief Complaint   Patient presents with    Oncology Clinic Visit     Invasive lobular carcinoma of right breast in female - Labs and provider visit     Initial Vitals: /72 (BP Location: Left arm, Patient Position: Sitting, Cuff Size: Adult Regular)   Pulse 67   Temp 98  F (36.7  C) (Tympanic)   Resp 16   Ht 1.708 m (5' 7.25\")   Wt 75.3 kg (166 lb)   LMP 03/08/2011   SpO2 98%   BMI 25.81 kg/m   Estimated body mass index is 25.81 kg/m  as calculated from the following:    Height as of this encounter: 1.708 m (5' 7.25\").    Weight as of this encounter: 75.3 kg (166 lb). Body surface area is 1.89 meters squared.  No Pain (0) Comment: Data Unavailable   Patient's last menstrual period was 03/08/2011.  Allergies reviewed: Yes  Medications reviewed: Yes    Medications: Medication refills not needed today.  Pharmacy name entered into [facility]:    [facility] - [place], [place] - [address] [id number] - [place], [place] - [address] AT [facility] OF [address] [place] [address] [facility] - [place], [place] - [address]    Clinical concerns:  None      Jolene Villagomez CMA              "

## 2023-11-07 NOTE — LETTER
11/7/2023         RE: Sheila Schilling  [address] [place] [facility] [address]        Dear Colleague,    Thank you for referring your patient, Sheila Schilling, to the [facility]. Please see a copy of my visit note below.    .[facility] Hematology and Oncology Progress Note    Patient: Sheila Schilling  MRN: [id number]  11/07/23        Reason for Visit    Right breast cancer    Primary Oncologist: Dr. Dooley    Virtual visit      Assessment and Plan  # Stage IB (pT3-pN1a(sn)-cM0) Invasive lobular carcinoma of the right breast, ER/PA+; high risk features (large tumor size, 1 positive lymph node)  #  Chemo-induced neutropenia (grade 1)  #  Elevated creatinine (gr 1), possible abemaciclib side effect    Status post right mastectomy and adjuvant radiation.    Recently started adjuvant abemaciclib 150 mg BID and letrozole 3 weeks ago.  So far, tolerating this very well with rare diarrhea. Mild elevation in creatine and neutropenia likely related to abemaciclib.    Labs: WBC 2.2, ANC 1.4, hgb, platelets WNL. Creatinine 0.96 (base 0.8), LFTs WNL, Calcium 9.7.    Plan:  -Continue abemaciclib 150 mg BID and letrozole daily  -Labwork every 2 weeks through this cycle, then can transition to monthly if stable  -If ANC declines <1.0, will need to hold until >1.0 and retry at this dose or dose-reduce to 100 mg BID  -Imodium as needed for diarrhea, stay well-hydrated  -Return in 4 weeks with Dr. Dooley, ahead of cycle 3.  -Planning 2 yrs of abemaciclib and endocrine treatment 5-10 yrs  -Left screening mammogram annually (due 5/2024)    #  Indeterminate L4 sclerotic lesion  CT cap was ordered by Plastic Surgeon in prep for breast reconstruction. Incidental 10 mm L4 sclerotic lesion is indeterminate for benign bone island vs met. Asymptomatic.    Plan:  -Bone scan to rule out met. Call pt with results    # Mild osteopenia  Baseline DEXA shows mild osteopenia with 0.1% hip fracture risk/10  yrs.   Mild hypercalcemia intermittently; currently WNL  On calcium + vit D daily     Plan:  -Continue calcium + vit D daily  -If calcium increases, will hold calcium supplement  -Weight bearing exercises  -Repeat DEXA every 2 yrs on AI. If progressive BMD loss, consider bisphosphonate in future       Cancer Staging   Invasive lobular carcinoma of right breast in female (H)  Staging form: Breast, AJCC 8th Edition  - Pathologic stage from 6/19/2023: Stage IB (pT3, pN1a, cM0, G2, ER+, WI+, HER2-, Oncotype DX score: 13) - Signed by Ligia Felix NP on 10/26/2023       Oncology history  5/2023: Stage IB (hF8-gF5o-mE5) right breast cancer, ER/WI+  -screening mammogram: asymmetry in upper outer quadrant of her right breast.   -R diagnostic mammogram +ultrasound: architectural distortion with 12 x 10 x 8 mm ill-defined mass with acoustic shadowing respectively. Needle biopsy: invasive lobular carcinoma, grade 2; strongly ER and WI positive and HER2 negative.   -contrast-enhanced mammogram (due to very dense breasts): 7.8 x 6.7 x 5.3 cm enhancement extending from 11:00 to 8 o'clock position consistent with malignancy.   -6/2023 surgical path: 15 cm invasive lobular carcinoma. Single 4 mm sLN positive   -Oncotype DX score 13    Treatment:  6/19/2023: right mastectomy and sLN biopsy    9/26/2023: completed adjuvant RT (5000 cGy/25)    10/16/2023 - present: adjuvant abemaciclib 150 mg BID and letrozole    Interval History   Sheila Schilling is a 62 year old recently diagnosed with right ER/WI+ breast cancer with sLN involvement; s/p right mastectomy, adjuvant RT and now on adjuvant letrozole and abemaciclib x 3 weeks. Returns for 2-week toxicity assessment.     Tolerating very well. Has only noted mild fatigue, staying very active. Chronic hot flashes, not worse. No arthralgias. No n/v. Only had one episode of diarrhea last week, alleviated with Imodium x 1.    Denies low back pain.     ECOG Performance  0 -  Independent      Physical Exam    General: alert and cooperative  HEENT: No icterus. No mucositis  Lymph: No palpable cervical or axillary adenopathy  Lungs: Normal respiratory effort. Clear lung sounds bilaterally  Heart: RRR  Abd: Soft, non-distended, non-tender  Extremities: No edema  Neuro: Non-focal  Skin: No rashes    Lab Results    Recent Results (from the past 168 hour(s))   Comprehensive metabolic panel   Result Value Ref Range    Sodium 137 135 - 145 mmol/L    Potassium 4.2 3.4 - 5.3 mmol/L    Carbon Dioxide (CO2) 27 22 - 29 mmol/L    Anion Gap 8 7 - 15 mmol/L    Urea Nitrogen 11.8 8.0 - 23.0 mg/dL    Creatinine 0.96 (H) 0.51 - 0.95 mg/dL    GFR Estimate 67 >60 mL/min/1.73m2    Calcium 9.7 8.8 - 10.2 mg/dL    Chloride 102 98 - 107 mmol/L    Glucose 101 (H) 70 - 99 mg/dL    Alkaline Phosphatase 85 35 - 104 U/L    AST 21 0 - 45 U/L    ALT 21 0 - 50 U/L    Protein Total 6.9 6.4 - 8.3 g/dL    Albumin 4.1 3.5 - 5.2 g/dL    Bilirubin Total 0.5 <=1.2 mg/dL   CBC with platelets and differential   Result Value Ref Range    WBC Count 2.2 (L) 4.0 - 11.0 10e3/uL    RBC Count 4.35 3.80 - 5.20 10e6/uL    Hemoglobin 13.7 11.7 - 15.7 g/dL    Hematocrit 41.6 35.0 - 47.0 %    MCV 96 78 - 100 fL    MCH 31.5 26.5 - 33.0 pg    MCHC 32.9 31.5 - 36.5 g/dL    RDW 11.9 10.0 - 15.0 %    Platelet Count 192 150 - 450 10e3/uL    % Neutrophils 59 %    % Lymphocytes 26 %    % Monocytes 8 %    % Eosinophils 4 %    % Basophils 2 %    % Immature Granulocytes 1 %    NRBCs per 100 WBC 0 <1 /100    Absolute Neutrophils 1.4 (L) 1.6 - 8.3 10e3/uL    Absolute Lymphocytes 0.6 (L) 0.8 - 5.3 10e3/uL    Absolute Monocytes 0.2 0.0 - 1.3 10e3/uL    Absolute Eosinophils 0.1 0.0 - 0.7 10e3/uL    Absolute Basophils 0.0 0.0 - 0.2 10e3/uL    Absolute Immature Granulocytes 0.0 <=0.4 10e3/uL    Absolute NRBCs 0.0 10e3/uL         Imaging    CTA Abdomen Pelvis with Contrast    Result Date: 11/2/2023  CTA ABDOMEN AND PELVIS 11/2/2023 4:05 PM CLINICAL HISTORY:  Status post right mastectomy and radiation, need CTA for DIEA  mapping, 0.8-1 mm slices; Acquired absence of breast, unspecified laterality. COMPARISONS: None available. REFERRING PROVIDER: MARIA L INMAN TECHNIQUE: After the uneventful administration of intravenous contrast, CTA performed through the abdomen and pelvis. Coronal and sagittal reconstructions were produced. 3D and multiplanar reconstructions were unavailable at the time of dictation. CONTRAST: 90 mL Isovue 370 DOSE (DLP): 612 mGy*cm FINDINGS: CTA: AORTOILIAC: Patent aorta. Celiac artery has a hook-like configuration and measures 2.3 mm in diameter as it crosses under the diaphragm. Superior mesenteric, two right and two left renal, and inferior mesenteric arteries patent.  Bilateral common, internal, and external iliac arteries patent. Bilateral common femoral arteries patent. RIGHT: Inferior epigastric artery originates from the external iliac artery and measures 2 mm in diameter. Artery crosses the posterior fascia 52 mm inferior and 47 mm lateral to the umbilicus. A medial  originates 36 mm inferior and 48 mm lateral to the umbilicus and crosses the anterior fascia 12 mm inferior and 25 mm lateral to the umbilicus. A lateral  originates 12 mm inferior and 53 mm lateral to the umbilicus and crosses the anterior fascia 28 mm superior and 75 mm lateral to the umbilicus. A medial  originates 4 mm inferior and 51 mm lateral to the umbilicus and crosses the umbilicus 2 mm inferior and 45 mm lateral to the umbilicus Artery terminates in intramuscular branches. LEFT: Inferior epigastric artery originates from the external iliac artery and wraps from lateral to medial posterior to the left common femoral vein 2 mm in diameter. Artery crosses the posterior fascia 63 mm inferior and 38 mm lateral to the umbilicus. Artery arborizes into a tangle of perforators that are difficult to trace and distinguish from each  other 27 mm inferior and 40 mm lateral to the umbilicus. A lateral  exits the anterior fascia 22 mm inferior and 42 mm lateral to the umbilicus. A medial  exits the anterior fascia 8 mm inferior and 23 mm lateral to the umbilicus. A lateral  exits the anterior fascia 9 mm superior and 43 mm lateral to the umbilicus. CT: Linear scars. Lung bases otherwise clear. Right breast tissue expander. Subcentimeter hepatic hypodensities are too small to characterize but are likely simple cysts. L4 10 mm sclerotic focus. Spine degenerative changes. Sacroiliac sclerosis.     IMPRESSION: 1. Patent inferior epigastric arteries and perforators as described in the report. 2. L4 10 mm sclerotic focus may be a bone island, but malignancy cannot be excluded. Nuclear medicine bone scan could be performed for further evaluation. OMARI RUBIO MD   SYSTEM ID:  [id number]    DX Hip/Pelvis/Spine    Result Date: 10/11/2023  EXAM: DX HIP/PELVIS/SPINE LOCATION: [facility] DATE: 10/11/2023 INDICATION: Postmenopausal, history of breast cancer. DEMOGRAPHICS: Age- 62 years. Gender- Female. Menopausal status- Postmenopausal. COMPARISON: No prior studies available on the current scanner. TECHNIQUE: Dual-energy x-ray absorptiometry (DXA) performed with routine technique. FINDINGS: DXA RESULTS -Lumbar Spine: L1-L3: BMD: 1.024 g/cm2. T-score: -1.3. Z-score: -0.3. -RIGHT Hip Total: BMD: 1.049 g/cm2. T-score: 0.3. Z-score: 1.1. -RIGHT Hip Femoral neck: BMD: 1.101 g/cm2. T-score: 0.5. Z-score: 1.6. -LEFT Hip Total: BMD: 1.013 g/cm2. T-score: 0.0. Z-score: 0.8. -LEFT Hip Femoral neck: BMD: 1.087 g/cm2. T-score: 0.4. Z-score: 1.5. WHO T-SCORE CRITERIA -Normal: T score at or above -1 SD -Osteopenia: T score between -1 and -2.5 SD -Osteoporosis: T score at or below -2.5 SD The World Health Organization (WHO) criteria is applicable to perimenopausal females, postmenopausal females, and men aged 50 years or  "older. FRACTURE RISK -FRAX Results: The 10 year probability of major osteoporotic fracture is 6.2%, and of hip fracture is 0.1%, based on left femoral neck BMD. RECOMMENDATIONS Consider treatment if major osteoporotic fracture score is greater than or equal to 20%, or if the hip fracture score is greater than or equal to 3%.     IMPRESSION: Low bone density (OSTEOPENIA). T score meets the WHO criteria for low bone density (osteopenia) at one or more measured sites. The risk of osteoporotic fracture increases approximately two-fold for each standard deviation decrease in T-score.     A total of 35 min were spent today on this visit which included face to face conversation with the patient, EMR review, counseling and co-ordination of care and medical documentation.      Signed by: Ligia Felix NP    Oncology Rooming Note    November 7, 2023 11:46 AM   Sheila Schilling is a 62 year old female who presents for:    Chief Complaint   Patient presents with     Oncology Clinic Visit     Invasive lobular carcinoma of right breast in female - Labs and provider visit     Initial Vitals: /72 (BP Location: Left arm, Patient Position: Sitting, Cuff Size: Adult Regular)   Pulse 67   Temp 98  F (36.7  C) (Tympanic)   Resp 16   Ht 1.708 m (5' 7.25\")   Wt 75.3 kg (166 lb)   LMP 03/08/2011   SpO2 98%   BMI 25.81 kg/m   Estimated body mass index is 25.81 kg/m  as calculated from the following:    Height as of this encounter: 1.708 m (5' 7.25\").    Weight as of this encounter: 75.3 kg (166 lb). Body surface area is 1.89 meters squared.  No Pain (0) Comment: Data Unavailable   Patient's last menstrual period was 03/08/2011.  Allergies reviewed: Yes  Medications reviewed: Yes    Medications: Medication refills not needed today.  Pharmacy name entered into [employer]:    [email or web address] [place] - [address] [id number] - [place], [place] - [address] [facility] AT [facility] OF [address] [facility] - [place], [place] - [address]    Clinical concerns:  None      Jolene Villagomez, MIRIAM                Again, thank you for allowing me to participate in the care of your patient.        Sincerely,        Ligia Felix, NP

## 2023-11-16 ENCOUNTER — HOSPITAL ENCOUNTER (OUTPATIENT)
Dept: NUCLEAR MEDICINE | Facility: CLINIC | Age: 63
Setting detail: NUCLEAR MEDICINE
Discharge: HOME | End: 2023-11-16
Attending: NURSE PRACTITIONER
Payer: COMMERCIAL

## 2023-11-16 DIAGNOSIS — M89.9 LESION OF BONE OF LUMBOSACRAL SPINE: ICD-10-CM

## 2023-11-16 DIAGNOSIS — C50.911 INVASIVE LOBULAR CARCINOMA OF RIGHT BREAST IN FEMALE (H): ICD-10-CM

## 2023-11-16 PROCEDURE — 343N000001 HC RX 343: Performed by: NURSE PRACTITIONER

## 2023-11-16 PROCEDURE — A9561 TC99M OXIDRONATE: HCPCS | Performed by: NURSE PRACTITIONER

## 2023-11-16 PROCEDURE — 78306 BONE IMAGING WHOLE BODY: CPT

## 2023-11-16 RX ADMIN — Medication 25.8 MILLICURIE: at 07:57

## 2023-11-17 NOTE — RESULT ENCOUNTER NOTE
Called and left a message on patients VM with negative bone scan results. Left direct nurse line for call back.Sunitha Galeano RN on 11/17/2023 at 10:22 AM

## 2023-11-21 ENCOUNTER — LAB (OUTPATIENT)
Dept: LAB | Facility: CLINIC | Age: 63
End: 2023-11-21
Payer: COMMERCIAL

## 2023-11-21 DIAGNOSIS — C50.911 INVASIVE LOBULAR CARCINOMA OF RIGHT BREAST IN FEMALE (H): ICD-10-CM

## 2023-11-21 LAB
ALBUMIN SERPL BCG-MCNC: 4.2 G/DL (ref 3.5–5.2)
ALP SERPL-CCNC: 84 U/L (ref 40–150)
ALT SERPL W P-5'-P-CCNC: 23 U/L (ref 0–50)
ANION GAP SERPL CALCULATED.3IONS-SCNC: 9 MMOL/L (ref 7–15)
AST SERPL W P-5'-P-CCNC: 23 U/L (ref 0–45)
BASOPHILS # BLD AUTO: 0 10E3/UL (ref 0–0.2)
BASOPHILS NFR BLD AUTO: 2 %
BILIRUB SERPL-MCNC: 0.3 MG/DL
BUN SERPL-MCNC: 18.4 MG/DL (ref 8–23)
CALCIUM SERPL-MCNC: 9.9 MG/DL (ref 8.8–10.2)
CHLORIDE SERPL-SCNC: 104 MMOL/L (ref 98–107)
CREAT SERPL-MCNC: 0.99 MG/DL (ref 0.51–0.95)
DEPRECATED HCO3 PLAS-SCNC: 26 MMOL/L (ref 22–29)
EGFRCR SERPLBLD CKD-EPI 2021: 64 ML/MIN/1.73M2
EOSINOPHIL # BLD AUTO: 0.1 10E3/UL (ref 0–0.7)
EOSINOPHIL NFR BLD AUTO: 2 %
ERYTHROCYTE [DISTWIDTH] IN BLOOD BY AUTOMATED COUNT: 13 % (ref 10–15)
GLUCOSE SERPL-MCNC: 86 MG/DL (ref 70–99)
HCT VFR BLD AUTO: 38.2 % (ref 35–47)
HGB BLD-MCNC: 12.9 G/DL (ref 11.7–15.7)
IMM GRANULOCYTES # BLD: 0 10E3/UL
IMM GRANULOCYTES NFR BLD: 0 %
LYMPHOCYTES # BLD AUTO: 0.6 10E3/UL (ref 0.8–5.3)
LYMPHOCYTES NFR BLD AUTO: 22 %
MCH RBC QN AUTO: 32.5 PG (ref 26.5–33)
MCHC RBC AUTO-ENTMCNC: 33.8 G/DL (ref 31.5–36.5)
MCV RBC AUTO: 96 FL (ref 78–100)
MONOCYTES # BLD AUTO: 0.3 10E3/UL (ref 0–1.3)
MONOCYTES NFR BLD AUTO: 11 %
NEUTROPHILS # BLD AUTO: 1.6 10E3/UL (ref 1.6–8.3)
NEUTROPHILS NFR BLD AUTO: 63 %
NRBC # BLD AUTO: 0 10E3/UL
NRBC BLD AUTO-RTO: 0 /100
PLATELET # BLD AUTO: 240 10E3/UL (ref 150–450)
POTASSIUM SERPL-SCNC: 4.4 MMOL/L (ref 3.4–5.3)
PROT SERPL-MCNC: 7 G/DL (ref 6.4–8.3)
RBC # BLD AUTO: 3.97 10E6/UL (ref 3.8–5.2)
SODIUM SERPL-SCNC: 139 MMOL/L (ref 135–145)
WBC # BLD AUTO: 2.6 10E3/UL (ref 4–11)

## 2023-11-21 PROCEDURE — 85025 COMPLETE CBC W/AUTO DIFF WBC: CPT

## 2023-11-21 PROCEDURE — 36415 COLL VENOUS BLD VENIPUNCTURE: CPT

## 2023-11-21 PROCEDURE — 80053 COMPREHEN METABOLIC PANEL: CPT

## 2023-12-05 ENCOUNTER — LAB (OUTPATIENT)
Dept: LAB | Facility: CLINIC | Age: 63
End: 2023-12-05
Payer: COMMERCIAL

## 2023-12-05 DIAGNOSIS — C50.911 INVASIVE LOBULAR CARCINOMA OF RIGHT BREAST IN FEMALE (H): ICD-10-CM

## 2023-12-05 LAB
ALBUMIN SERPL BCG-MCNC: 4.4 G/DL (ref 3.5–5.2)
ALP SERPL-CCNC: 85 U/L (ref 40–150)
ALT SERPL W P-5'-P-CCNC: 23 U/L (ref 0–50)
ANION GAP SERPL CALCULATED.3IONS-SCNC: 7 MMOL/L (ref 7–15)
AST SERPL W P-5'-P-CCNC: 22 U/L (ref 0–45)
BASOPHILS # BLD AUTO: 0.1 10E3/UL (ref 0–0.2)
BASOPHILS NFR BLD AUTO: 3 %
BILIRUB SERPL-MCNC: 0.4 MG/DL
BUN SERPL-MCNC: 16.6 MG/DL (ref 8–23)
CALCIUM SERPL-MCNC: 9.9 MG/DL (ref 8.8–10.2)
CHLORIDE SERPL-SCNC: 103 MMOL/L (ref 98–107)
CREAT SERPL-MCNC: 0.95 MG/DL (ref 0.51–0.95)
DEPRECATED HCO3 PLAS-SCNC: 29 MMOL/L (ref 22–29)
EGFRCR SERPLBLD CKD-EPI 2021: 67 ML/MIN/1.73M2
EOSINOPHIL # BLD AUTO: 0.1 10E3/UL (ref 0–0.7)
EOSINOPHIL NFR BLD AUTO: 3 %
ERYTHROCYTE [DISTWIDTH] IN BLOOD BY AUTOMATED COUNT: 14 % (ref 10–15)
GLUCOSE SERPL-MCNC: 88 MG/DL (ref 70–99)
HCT VFR BLD AUTO: 38.4 % (ref 35–47)
HGB BLD-MCNC: 12.8 G/DL (ref 11.7–15.7)
IMM GRANULOCYTES # BLD: 0 10E3/UL
IMM GRANULOCYTES NFR BLD: 0 %
LYMPHOCYTES # BLD AUTO: 0.6 10E3/UL (ref 0.8–5.3)
LYMPHOCYTES NFR BLD AUTO: 23 %
MCH RBC QN AUTO: 32.4 PG (ref 26.5–33)
MCHC RBC AUTO-ENTMCNC: 33.3 G/DL (ref 31.5–36.5)
MCV RBC AUTO: 97 FL (ref 78–100)
MONOCYTES # BLD AUTO: 0.2 10E3/UL (ref 0–1.3)
MONOCYTES NFR BLD AUTO: 8 %
NEUTROPHILS # BLD AUTO: 1.8 10E3/UL (ref 1.6–8.3)
NEUTROPHILS NFR BLD AUTO: 63 %
NRBC # BLD AUTO: 0 10E3/UL
NRBC BLD AUTO-RTO: 0 /100
PLATELET # BLD AUTO: 246 10E3/UL (ref 150–450)
POTASSIUM SERPL-SCNC: 4 MMOL/L (ref 3.4–5.3)
PROT SERPL-MCNC: 7.1 G/DL (ref 6.4–8.3)
RBC # BLD AUTO: 3.95 10E6/UL (ref 3.8–5.2)
SODIUM SERPL-SCNC: 139 MMOL/L (ref 135–145)
WBC # BLD AUTO: 2.8 10E3/UL (ref 4–11)

## 2023-12-05 PROCEDURE — 85025 COMPLETE CBC W/AUTO DIFF WBC: CPT

## 2023-12-05 PROCEDURE — 80053 COMPREHEN METABOLIC PANEL: CPT

## 2023-12-05 PROCEDURE — 36415 COLL VENOUS BLD VENIPUNCTURE: CPT

## 2023-12-06 DIAGNOSIS — C50.911 INVASIVE LOBULAR CARCINOMA OF RIGHT BREAST IN FEMALE (H): Primary | ICD-10-CM

## 2023-12-07 ENCOUNTER — VIRTUAL VISIT (OUTPATIENT)
Dept: ONCOLOGY | Facility: CLINIC | Age: 63
End: 2023-12-07
Attending: INTERNAL MEDICINE
Payer: COMMERCIAL

## 2023-12-07 VITALS — WEIGHT: 166 LBS | BODY MASS INDEX: 26.06 KG/M2 | HEIGHT: 67 IN

## 2023-12-07 DIAGNOSIS — C50.911 INVASIVE LOBULAR CARCINOMA OF RIGHT BREAST IN FEMALE (H): Primary | ICD-10-CM

## 2023-12-07 PROCEDURE — 99214 OFFICE O/P EST MOD 30 MIN: CPT | Mod: 95 | Performed by: INTERNAL MEDICINE

## 2023-12-07 ASSESSMENT — PAIN SCALES - GENERAL: PAINLEVEL: NO PAIN (0)

## 2023-12-07 NOTE — LETTER
12/7/2023         RE: Sheila Schilling  [address] [facility] [address]        Dear Colleague,    Thank you for referring your patient, Sheila Schilling, to the [facility]. Please see a copy of my visit note below.    Virtual Visit Details    Type of service:  Video Visit   Video start time: 9 AM  Video stop time: 9:10 AM  Originating Location (pt. Location): Home    Distant Location (provider location):  On-site  Platform used for Video Visit: [facility]    [facility]    Patient: Sheila Schilling  MRN: [id number]  12/07/23        Reason for Visit    Right breast cancer    Primary Oncologist: Dr. Dooley    Virtual visit      Assessment and Plan  # Stage IB (pT3-pN1a(sn)-cM0) Invasive lobular carcinoma of the right breast, ER/AL+; high risk features (large tumor size, 1 positive lymph node)  #  Chemo-induced neutropenia (grade 1)  #  Elevated creatinine (gr 1), possible abemaciclib side effect    Status post right mastectomy and adjuvant radiation.    Currently on adjuvant abemaciclib 150 mg BID and letrozole.  She started this on 10/16/2023.  Doing really well on Verzenio and letrozole so far with minimal side effects.  Labs from earlier this week reviewed today.  Normal CMP.  CBC shows mildly low total white count of 2.8 but ANC is normal at 1.8.  Normal hemoglobin and platelet counts.  She has some intermittent diarrhea but otherwise no significant side effects.  Some hot flashes but nothing severe.  Overall doing well.  Will continue current management.  Will do labs on a monthly basis.  I will see her back in 2 months for in person visit in [place].  She is agreeable to the plan.    #  Indeterminate L4 sclerotic lesion  CT cap was ordered by Plastic Surgeon in prep for breast reconstruction. Incidental 10 mm L4 sclerotic lesion is indeterminate for benign bone island vs met.  We obtained a bone scan which showed no activity in  the area or any evidence of metastatic disease in the bones.    # Mild osteopenia  Baseline DEXA shows mild osteopenia with 0.1% hip fracture risk/10 yrs.   Continue calcium and vitamin D.  Repeat DEXA in 2 years.       Cancer Staging   Invasive lobular carcinoma of right breast in female (H)  Staging form: Breast, AJCC 8th Edition  - Pathologic stage from 6/19/2023: Stage IB (pT3, pN1a, cM0, G2, ER+, NE+, HER2-, Oncotype DX score: 13) - Signed by Ligia Felix NP on 10/26/2023       Oncology history  5/2023: Stage IB (gR6-hP7j-wW4) right breast cancer, ER/NE+  -screening mammogram: asymmetry in upper outer quadrant of her right breast.   -R diagnostic mammogram +ultrasound: architectural distortion with 12 x 10 x 8 mm ill-defined mass with acoustic shadowing respectively. Needle biopsy: invasive lobular carcinoma, grade 2; strongly ER and NE positive and HER2 negative.   -contrast-enhanced mammogram (due to very dense breasts): 7.8 x 6.7 x 5.3 cm enhancement extending from 11:00 to 8 o'clock position consistent with malignancy.   -6/2023 surgical path: 15 cm invasive lobular carcinoma. Single 4 mm sLN positive   -Oncotype DX score 13    Treatment:  6/19/2023: right mastectomy and sLN biopsy    9/26/2023: completed adjuvant RT (5000 cGy/25)    10/16/2023 - present: adjuvant abemaciclib 150 mg BID and letrozole    Interval History   Sheila Schilling is a 62 year old recently diagnosed with right ER/NE+ breast cancer with sLN involvement; s/p right mastectomy, adjuvant RT and now on adjuvant letrozole and abemaciclib.     Has done 2 cycles so far.  No significant side effects other than mild diarrhea intermittently.  No fevers or chills.  No new lumps or bumps reported.  No bone pain reported.  She had a sclerotic lesion noted on the CT angio of the abdomen pelvis which was done as a part of planning for reconstruction surgery.  This was followed up with a bone scan which did not show any metastatic disease.  He had  labs done earlier this week.    ECOG Performance  0 - Independent      Physical Exam    General: alert and cooperative      Lab Results    Recent Results (from the past 168 hour(s))   Comprehensive metabolic panel   Result Value Ref Range    Sodium 139 135 - 145 mmol/L    Potassium 4.0 3.4 - 5.3 mmol/L    Carbon Dioxide (CO2) 29 22 - 29 mmol/L    Anion Gap 7 7 - 15 mmol/L    Urea Nitrogen 16.6 8.0 - 23.0 mg/dL    Creatinine 0.95 0.51 - 0.95 mg/dL    GFR Estimate 67 >60 mL/min/1.73m2    Calcium 9.9 8.8 - 10.2 mg/dL    Chloride 103 98 - 107 mmol/L    Glucose 88 70 - 99 mg/dL    Alkaline Phosphatase 85 40 - 150 U/L    AST 22 0 - 45 U/L    ALT 23 0 - 50 U/L    Protein Total 7.1 6.4 - 8.3 g/dL    Albumin 4.4 3.5 - 5.2 g/dL    Bilirubin Total 0.4 <=1.2 mg/dL   CBC with platelets and differential   Result Value Ref Range    WBC Count 2.8 (L) 4.0 - 11.0 10e3/uL    RBC Count 3.95 3.80 - 5.20 10e6/uL    Hemoglobin 12.8 11.7 - 15.7 g/dL    Hematocrit 38.4 35.0 - 47.0 %    MCV 97 78 - 100 fL    MCH 32.4 26.5 - 33.0 pg    MCHC 33.3 31.5 - 36.5 g/dL    RDW 14.0 10.0 - 15.0 %    Platelet Count 246 150 - 450 10e3/uL    % Neutrophils 63 %    % Lymphocytes 23 %    % Monocytes 8 %    % Eosinophils 3 %    % Basophils 3 %    % Immature Granulocytes 0 %    NRBCs per 100 WBC 0 <1 /100    Absolute Neutrophils 1.8 1.6 - 8.3 10e3/uL    Absolute Lymphocytes 0.6 (L) 0.8 - 5.3 10e3/uL    Absolute Monocytes 0.2 0.0 - 1.3 10e3/uL    Absolute Eosinophils 0.1 0.0 - 0.7 10e3/uL    Absolute Basophils 0.1 0.0 - 0.2 10e3/uL    Absolute Immature Granulocytes 0.0 <=0.4 10e3/uL    Absolute NRBCs 0.0 10e3/uL         Imaging    NM Bone Scan Whole Body    Result Date: 11/16/2023  EXAM: NM BONE SCAN WHOLE BODY LOCATION: [facility] DATE: 11/16/2023 INDICATION: Breast cancer. Indeterminate L4 lesion on CT 11/2/2023. Follow up CT abnormality. COMPARISON: CTA abdomen pelvis 11/2/2023 reviewed. TECHNIQUE: 25.8 mCi technetium-99m MDP,  IV. Anterior and posterior delayed whole-body images at 3 hours with additional spot images of the lumbar spine. FINDINGS: No evidence of osseous metastasis. This includes no focal uptake in L4 to correspond with the densely sclerotic lesion depicted on CT, which is consistent with a benign bone island. Focus of uptake in the left maxillary region, likely inflammatory, please correlate clinically. Mild degenerative uptake both shoulders, left wrist, knees and feet. Mild perineal surface contamination.     A total of 25 min were spent today on this visit which included face to face conversation with the patient, EMR review, counseling and co-ordination of care and medical documentation.    Signed by: Merna Dooley MD      Again, thank you for allowing me to participate in the care of your patient.        Sincerely,        Merna Dooley MD

## 2023-12-07 NOTE — LETTER
12/7/2023         RE: Sheila Schilling  [address] [facility] [address]        Dear Colleague,    Thank you for referring your patient, Sheila Schilling, to the [facility]ER [facility]. Please see a copy of my visit note below.    Virtual Visit Details    Type of service:  Video Visit   Video start time: 9 AM  Video stop time: 9:10 AM  Originating Location (pt. Location): Home    Distant Location (provider location):  On-site  Platform used for Video Visit: [facility]    [facility]    Patient: Sheila Schilling  MRN: [id number]  12/07/23        Reason for Visit    Right breast cancer    Primary Oncologist: Dr. Dooley    Virtual visit      Assessment and Plan  # Stage IB (pT3-pN1a(sn)-cM0) Invasive lobular carcinoma of the right breast, ER/UT+; high risk features (large tumor size, 1 positive lymph node)  #  Chemo-induced neutropenia (grade 1)  #  Elevated creatinine (gr 1), possible abemaciclib side effect    Status post right mastectomy and adjuvant radiation.    Currently on adjuvant abemaciclib 150 mg BID and letrozole.  She started this on 10/16/2023.  Doing really well on Verzenio and letrozole so far with minimal side effects.  Labs from earlier this week reviewed today.  Normal CMP.  CBC shows mildly low total white count of 2.8 but ANC is normal at 1.8.  Normal hemoglobin and platelet counts.  She has some intermittent diarrhea but otherwise no significant side effects.  Some hot flashes but nothing severe.  Overall doing well.  Will continue current management.  Will do labs on a monthly basis.  I will see her back in 2 months for in person visit in [place].  She is agreeable to the plan.    #  Indeterminate L4 sclerotic lesion  CT cap was ordered by Plastic Surgeon in prep for breast reconstruction. Incidental 10 mm L4 sclerotic lesion is indeterminate for benign bone island vs met.  We obtained a bone scan which showed no activity in  Assessment/Plan:     Sepsis ([address] )  She was admitted to the hospital with urosepsis and given IV antibiotics  Symptoms have resolved  Currently still taking oral antibiotic cephalexin every 6 hours  Diagnoses and all orders for this visit:    Pyonephrosis    Sepsis due to Escherichia coli, unspecified whether acute organ dysfunction present (HCC)    Postoperative malabsorption    Iron deficiency anemia, unspecified iron deficiency anemia type    Moderate persistent asthma without complication         Subjective:     Patient ID: Rickie Marsh is a 50 y o  female  HPI    Review of Systems   Constitutional: Negative for appetite change, chills, fatigue and fever  HENT: Negative for ear pain, sore throat and trouble swallowing  Eyes: Negative for pain, redness and visual disturbance  Respiratory: Negative for cough and shortness of breath  Cardiovascular: Negative for chest pain and palpitations  Gastrointestinal: Negative for abdominal pain, constipation, diarrhea and vomiting  Genitourinary: Negative for dysuria and hematuria  Musculoskeletal: Negative for arthralgias, back pain and neck pain  Skin: Negative for color change and rash  Neurological: Negative for seizures, syncope, weakness and headaches  Hematological: Negative for adenopathy  Psychiatric/Behavioral: Negative for confusion  The patient is not nervous/anxious  All other systems reviewed and are negative  Objective:     Physical Exam  Constitutional:       General: She is not in acute distress  Appearance: Normal appearance  She is normal weight  HENT:      Head: Normocephalic and atraumatic  Nose: Nose normal       Mouth/Throat:      Mouth: Mucous membranes are moist    Eyes:      Extraocular Movements: Extraocular movements intact  Pupils: Pupils are equal, round, and reactive to light  Cardiovascular:      Rate and Rhythm: Normal rate and regular rhythm  Pulses: Normal pulses        Heart sounds: Normal heart sounds  No murmur heard  No friction rub  Pulmonary:      Effort: Pulmonary effort is normal  No respiratory distress  Breath sounds: Normal breath sounds  No wheezing  Abdominal:      General: Abdomen is flat  Bowel sounds are normal  There is no distension  Palpations: Abdomen is soft  There is no mass  Tenderness: There is no abdominal tenderness  There is no guarding  Musculoskeletal:         General: Normal range of motion  Cervical back: Normal range of motion and neck supple  Neurological:      General: No focal deficit present  Mental Status: She is alert and oriented to person, place, and time  Mental status is at baseline  Cranial Nerves: No cranial nerve deficit  Psychiatric:         Mood and Affect: Mood normal          Behavior: Behavior normal            Vitals:    06/18/21 1541   BP: 105/66   BP Location: Left arm   Patient Position: Sitting   Cuff Size: Standard   Pulse: 83   Temp: 98 3 °F (36 8 °C)   TempSrc: Temporal   SpO2: 99%   Weight: 85 7 kg (189 lb)   Height: 5' 5" (1 651 m)       Transitional Care Management Review:  Yolanda Leiws is a 50 y o  female here for TCM follow up  During the TCM phone call patient stated:    TCM Call (since 5/18/2021)     Date and time call was made  6/17/2021  4:00 PM    Hospital care reviewed  Records reviewed    Patient was hospitialized at  [facility]        Date of Admission  06/14/21    Date of discharge  06/17/21    Diagnosis  sepsis    Disposition  Home    Current Symptoms  None      TCM Call (since 5/18/2021)     Post hospital issues  None    Scheduled for follow up?   Yes    Did you obtain your prescribed medications  Yes    Do you need help managing your prescriptions or medications  No    Is transportation to your appointment needed  No    I have advised the patient to call PCP with any new or worsening symptoms  Neeru Mercado MD the area or any evidence of metastatic disease in the bones.    # Mild osteopenia  Baseline DEXA shows mild osteopenia with 0.1% hip fracture risk/10 yrs.   Continue calcium and vitamin D.  Repeat DEXA in 2 years.       Cancer Staging   Invasive lobular carcinoma of right breast in female (H)  Staging form: Breast, AJCC 8th Edition  - Pathologic stage from 6/19/2023: Stage IB (pT3, pN1a, cM0, G2, ER+, AR+, HER2-, Oncotype DX score: 13) - Signed by Ligia Felix NP on 10/26/2023       Oncology history  5/2023: Stage IB (aG9-[id number]-wV4) right breast cancer, ER/AR+  -screening mammogram: asymmetry in upper outer quadrant of her right breast.   -R diagnostic mammogram +ultrasound: architectural distortion with 12 x 10 x 8 mm ill-defined mass with acoustic shadowing respectively. Needle biopsy: invasive lobular carcinoma, grade 2; strongly ER and AR positive and HER2 negative.   -contrast-enhanced mammogram (due to very dense breasts): 7.8 x 6.7 x 5.3 cm enhancement extending from 11:00 to 8 o'clock position consistent with malignancy.   -6/2023 surgical path: 15 cm invasive lobular carcinoma. Single 4 mm sLN positive   -Oncotype DX score 13    Treatment:  6/19/2023: right mastectomy and sLN biopsy    9/26/2023: completed adjuvant RT (5000 cGy/25)    10/16/2023 - present: adjuvant abemaciclib 150 mg BID and letrozole    Interval History   Sheila Schilling is a 62 year old recently diagnosed with right ER/AR+ breast cancer with sLN involvement; s/p right mastectomy, adjuvant RT and now on adjuvant letrozole and abemaciclib.     Has done 2 cycles so far.  No significant side effects other than mild diarrhea intermittently.  No fevers or chills.  No new lumps or bumps reported.  No bone pain reported.  She had a sclerotic lesion noted on the CT angio of the abdomen pelvis which was done as a part of planning for reconstruction surgery.  This was followed up with a bone scan which did not show any metastatic disease.  He had  labs done earlier this week.    ECOG Performance  0 - Independent      Physical Exam    General: alert and cooperative      Lab Results    Recent Results (from the past 168 hour(s))   Comprehensive metabolic panel   Result Value Ref Range    Sodium 139 135 - 145 mmol/L    Potassium 4.0 3.4 - 5.3 mmol/L    Carbon Dioxide (CO2) 29 22 - 29 mmol/L    Anion Gap 7 7 - 15 mmol/L    Urea Nitrogen 16.6 8.0 - 23.0 mg/dL    Creatinine 0.95 0.51 - 0.95 mg/dL    GFR Estimate 67 >60 mL/min/1.73m2    Calcium 9.9 8.8 - 10.2 mg/dL    Chloride 103 98 - 107 mmol/L    Glucose 88 70 - 99 mg/dL    Alkaline Phosphatase 85 40 - 150 U/L    AST 22 0 - 45 U/L    ALT 23 0 - 50 U/L    Protein Total 7.1 6.4 - 8.3 g/dL    Albumin 4.4 3.5 - 5.2 g/dL    Bilirubin Total 0.4 <=1.2 mg/dL   CBC with platelets and differential   Result Value Ref Range    WBC Count 2.8 (L) 4.0 - 11.0 10e3/uL    RBC Count 3.95 3.80 - 5.20 10e6/uL    Hemoglobin 12.8 11.7 - 15.7 g/dL    Hematocrit 38.4 35.0 - 47.0 %    MCV 97 78 - 100 fL    MCH 32.4 26.5 - 33.0 pg    MCHC 33.3 31.5 - 36.5 g/dL    RDW 14.0 10.0 - 15.0 %    Platelet Count 246 150 - 450 10e3/uL    % Neutrophils 63 %    % Lymphocytes 23 %    % Monocytes 8 %    % Eosinophils 3 %    % Basophils 3 %    % Immature Granulocytes 0 %    NRBCs per 100 WBC 0 <1 /100    Absolute Neutrophils 1.8 1.6 - 8.3 10e3/uL    Absolute Lymphocytes 0.6 (L) 0.8 - 5.3 10e3/uL    Absolute Monocytes 0.2 0.0 - 1.3 10e3/uL    Absolute Eosinophils 0.1 0.0 - 0.7 10e3/uL    Absolute Basophils 0.1 0.0 - 0.2 10e3/uL    Absolute Immature Granulocytes 0.0 <=0.4 10e3/uL    Absolute NRBCs 0.0 10e3/uL         Imaging    NM Bone Scan Whole Body    Result Date: 11/16/2023  EXAM: NM BONE SCAN WHOLE BODY LOCATION: [facility] DATE: 11/16/2023 INDICATION: Breast cancer. Indeterminate L4 lesion on CT 11/2/2023. Follow up CT abnormality. COMPARISON: CTA abdomen pelvis 11/2/2023 reviewed. TECHNIQUE: 25.8 mCi technetium-99m MDP,  IV. Anterior and posterior delayed whole-body images at 3 hours with additional spot images of the lumbar spine. FINDINGS: No evidence of osseous metastasis. This includes no focal uptake in L4 to correspond with the densely sclerotic lesion depicted on CT, which is consistent with a benign bone island. Focus of uptake in the left maxillary region, likely inflammatory, please correlate clinically. Mild degenerative uptake both shoulders, left wrist, knees and feet. Mild perineal surface contamination.     A total of 25 min were spent today on this visit which included face to face conversation with the patient, EMR review, counseling and co-ordination of care and medical documentation.    Signed by: Merna Dooley MD      Again, thank you for allowing me to participate in the care of your patient.        Sincerely,        Merna Dooley MD

## 2023-12-07 NOTE — PROGRESS NOTES
Virtual Visit Details    Type of service:  Video Visit   Video start time: 9 AM  Video stop time: 9:10 AM  Originating Location (pt. Location): Home    Distant Location (provider location):  On-site  Platform used for Video Visit: [facility] Hematology and Oncology Progress Note    Patient: Sheila Schilling  MRN: [id number]  12/07/23        Reason for Visit    Right breast cancer    Primary Oncologist: Dr. Dooley    Virtual visit      Assessment and Plan  # Stage IB (pT3-pN1a(sn)-cM0) Invasive lobular carcinoma of the right breast, ER/GA+; high risk features (large tumor size, 1 positive lymph node)  #  Chemo-induced neutropenia (grade 1)  #  Elevated creatinine (gr 1), possible abemaciclib side effect    Status post right mastectomy and adjuvant radiation.    Currently on adjuvant abemaciclib 150 mg BID and letrozole.  She started this on 10/16/2023.  Doing really well on Verzenio and letrozole so far with minimal side effects.  Labs from earlier this week reviewed today.  Normal CMP.  CBC shows mildly low total white count of 2.8 but ANC is normal at 1.8.  Normal hemoglobin and platelet counts.  She has some intermittent diarrhea but otherwise no significant side effects.  Some hot flashes but nothing severe.  Overall doing well.  Will continue current management.  Will do labs on a monthly basis.  I will see her back in 2 months for in person visit in [place].  She is agreeable to the plan.    #  Indeterminate L4 sclerotic lesion  CT cap was ordered by Plastic Surgeon in prep for breast reconstruction. Incidental 10 mm L4 sclerotic lesion is indeterminate for benign bone island vs met.  We obtained a bone scan which showed no activity in the area or any evidence of metastatic disease in the bones.    # Mild osteopenia  Baseline DEXA shows mild osteopenia with 0.1% hip fracture risk/10 yrs.   Continue calcium and vitamin D.  Repeat DEXA in 2 years.       Cancer Staging   Invasive lobular  carcinoma of right breast in female (H)  Staging form: Breast, AJCC 8th Edition  - Pathologic stage from 6/19/2023: Stage IB (pT3, pN1a, cM0, G2, ER+, TN+, HER2-, Oncotype DX score: 13) - Signed by Ligia Felix NP on 10/26/2023       Oncology history  5/2023: Stage IB (bG6-bO8h-jA2) right breast cancer, ER/TN+  -screening mammogram: asymmetry in upper outer quadrant of her right breast.   -R diagnostic mammogram +ultrasound: architectural distortion with 12 x 10 x 8 mm ill-defined mass with acoustic shadowing respectively. Needle biopsy: invasive lobular carcinoma, grade 2; strongly ER and TN positive and HER2 negative.   -contrast-enhanced mammogram (due to very dense breasts): 7.8 x 6.7 x 5.3 cm enhancement extending from 11:00 to 8 o'clock position consistent with malignancy.   -6/2023 surgical path: 15 cm invasive lobular carcinoma. Single 4 mm sLN positive   -Oncotype DX score 13    Treatment:  6/19/2023: right mastectomy and sLN biopsy    9/26/2023: completed adjuvant RT (5000 cGy/25)    10/16/2023 - present: adjuvant abemaciclib 150 mg BID and letrozole    Interval History   Sheila Schilling is a 62 year old recently diagnosed with right ER/TN+ breast cancer with sLN involvement; s/p right mastectomy, adjuvant RT and now on adjuvant letrozole and abemaciclib.     Has done 2 cycles so far.  No significant side effects other than mild diarrhea intermittently.  No fevers or chills.  No new lumps or bumps reported.  No bone pain reported.  She had a sclerotic lesion noted on the CT angio of the abdomen pelvis which was done as a part of planning for reconstruction surgery.  This was followed up with a bone scan which did not show any metastatic disease.  He had labs done earlier this week.    ECOG Performance  0 - Independent      Physical Exam    General: alert and cooperative      Lab Results    Recent Results (from the past 168 hour(s))   Comprehensive metabolic panel   Result Value Ref Range    Sodium 139 135  - 145 mmol/L    Potassium 4.0 3.4 - 5.3 mmol/L    Carbon Dioxide (CO2) 29 22 - 29 mmol/L    Anion Gap 7 7 - 15 mmol/L    Urea Nitrogen 16.6 8.0 - 23.0 mg/dL    Creatinine 0.95 0.51 - 0.95 mg/dL    GFR Estimate 67 >60 mL/min/1.73m2    Calcium 9.9 8.8 - 10.2 mg/dL    Chloride 103 98 - 107 mmol/L    Glucose 88 70 - 99 mg/dL    Alkaline Phosphatase 85 40 - 150 U/L    AST 22 0 - 45 U/L    ALT 23 0 - 50 U/L    Protein Total 7.1 6.4 - 8.3 g/dL    Albumin 4.4 3.5 - 5.2 g/dL    Bilirubin Total 0.4 <=1.2 mg/dL   CBC with platelets and differential   Result Value Ref Range    WBC Count 2.8 (L) 4.0 - 11.0 10e3/uL    RBC Count 3.95 3.80 - 5.20 10e6/uL    Hemoglobin 12.8 11.7 - 15.7 g/dL    Hematocrit 38.4 35.0 - 47.0 %    MCV 97 78 - 100 fL    MCH 32.4 26.5 - 33.0 pg    MCHC 33.3 31.5 - 36.5 g/dL    RDW 14.0 10.0 - 15.0 %    Platelet Count 246 150 - 450 10e3/uL    % Neutrophils 63 %    % Lymphocytes 23 %    % Monocytes 8 %    % Eosinophils 3 %    % Basophils 3 %    % Immature Granulocytes 0 %    NRBCs per 100 WBC 0 <1 /100    Absolute Neutrophils 1.8 1.6 - 8.3 10e3/uL    Absolute Lymphocytes 0.6 (L) 0.8 - 5.3 10e3/uL    Absolute Monocytes 0.2 0.0 - 1.3 10e3/uL    Absolute Eosinophils 0.1 0.0 - 0.7 10e3/uL    Absolute Basophils 0.1 0.0 - 0.2 10e3/uL    Absolute Immature Granulocytes 0.0 <=0.4 10e3/uL    Absolute NRBCs 0.0 10e3/uL         Imaging    NM Bone Scan Whole Body    Result Date: 11/16/2023  EXAM: NM BONE SCAN WHOLE BODY LOCATION: [facility] DATE: 11/16/2023 INDICATION: Breast cancer. Indeterminate L4 lesion on CT 11/2/2023. Follow up CT abnormality. COMPARISON: CTA abdomen pelvis 11/2/2023 reviewed. TECHNIQUE: 25.8 mCi technetium-99m MDP, IV. Anterior and posterior delayed whole-body images at 3 hours with additional spot images of the lumbar spine. FINDINGS: No evidence of osseous metastasis. This includes no focal uptake in L4 to correspond with the densely sclerotic lesion depicted on CT,  which is consistent with a benign bone island. Focus of uptake in the left maxillary region, likely inflammatory, please correlate clinically. Mild degenerative uptake both shoulders, left wrist, knees and feet. Mild perineal surface contamination.     A total of 25 min were spent today on this visit which included face to face conversation with the patient, EMR review, counseling and co-ordination of care and medical documentation.    Signed by: Merna Dooley MD

## 2023-12-07 NOTE — NURSING NOTE
Is the patient currently in the state of [place]? YES    Visit mode:VIDEO    If the visit is dropped, the patient can be reconnected by: VIDEO VISIT: Send to e-mail at: [email or web address]    Will anyone else be joining the visit? NO  (If patient encounters technical issues they should call [phone]    How would you like to obtain your AVS? MyChart    Are changes needed to the allergy or medication list? No    Reason for visit: Video Visit (Follow UP )    Gladis MONTERROSO

## 2024-01-02 ENCOUNTER — LAB (OUTPATIENT)
Dept: LAB | Facility: CLINIC | Age: 64
End: 2024-01-02
Payer: COMMERCIAL

## 2024-01-02 DIAGNOSIS — C50.911 INVASIVE LOBULAR CARCINOMA OF RIGHT BREAST IN FEMALE (H): Primary | ICD-10-CM

## 2024-01-02 DIAGNOSIS — C50.911 INVASIVE LOBULAR CARCINOMA OF RIGHT BREAST IN FEMALE (H): ICD-10-CM

## 2024-01-02 LAB
ALBUMIN SERPL BCG-MCNC: 4.2 G/DL (ref 3.5–5.2)
ALP SERPL-CCNC: 78 U/L (ref 40–150)
ALT SERPL W P-5'-P-CCNC: 20 U/L (ref 0–50)
ANION GAP SERPL CALCULATED.3IONS-SCNC: 5 MMOL/L (ref 7–15)
AST SERPL W P-5'-P-CCNC: 25 U/L (ref 0–45)
BASOPHILS # BLD AUTO: 0.1 10E3/UL (ref 0–0.2)
BASOPHILS NFR BLD AUTO: 2 %
BILIRUB SERPL-MCNC: 0.3 MG/DL
BUN SERPL-MCNC: 17 MG/DL (ref 8–23)
CALCIUM SERPL-MCNC: 9.8 MG/DL (ref 8.8–10.2)
CHLORIDE SERPL-SCNC: 105 MMOL/L (ref 98–107)
CREAT SERPL-MCNC: 0.95 MG/DL (ref 0.51–0.95)
DEPRECATED HCO3 PLAS-SCNC: 30 MMOL/L (ref 22–29)
EGFRCR SERPLBLD CKD-EPI 2021: 67 ML/MIN/1.73M2
EOSINOPHIL # BLD AUTO: 0.1 10E3/UL (ref 0–0.7)
EOSINOPHIL NFR BLD AUTO: 2 %
ERYTHROCYTE [DISTWIDTH] IN BLOOD BY AUTOMATED COUNT: 15.6 % (ref 10–15)
GLUCOSE SERPL-MCNC: 94 MG/DL (ref 70–99)
HCT VFR BLD AUTO: 36.3 % (ref 35–47)
HGB BLD-MCNC: 12.1 G/DL (ref 11.7–15.7)
IMM GRANULOCYTES # BLD: 0 10E3/UL
IMM GRANULOCYTES NFR BLD: 1 %
LYMPHOCYTES # BLD AUTO: 0.6 10E3/UL (ref 0.8–5.3)
LYMPHOCYTES NFR BLD AUTO: 19 %
MCH RBC QN AUTO: 33.6 PG (ref 26.5–33)
MCHC RBC AUTO-ENTMCNC: 33.3 G/DL (ref 31.5–36.5)
MCV RBC AUTO: 101 FL (ref 78–100)
MONOCYTES # BLD AUTO: 0.2 10E3/UL (ref 0–1.3)
MONOCYTES NFR BLD AUTO: 8 %
NEUTROPHILS # BLD AUTO: 2.1 10E3/UL (ref 1.6–8.3)
NEUTROPHILS NFR BLD AUTO: 68 %
NRBC # BLD AUTO: 0 10E3/UL
NRBC BLD AUTO-RTO: 0 /100
PLATELET # BLD AUTO: 245 10E3/UL (ref 150–450)
POTASSIUM SERPL-SCNC: 4.1 MMOL/L (ref 3.4–5.3)
PROT SERPL-MCNC: 7 G/DL (ref 6.4–8.3)
RBC # BLD AUTO: 3.6 10E6/UL (ref 3.8–5.2)
SODIUM SERPL-SCNC: 140 MMOL/L (ref 135–145)
WBC # BLD AUTO: 3 10E3/UL (ref 4–11)

## 2024-01-02 PROCEDURE — 36415 COLL VENOUS BLD VENIPUNCTURE: CPT

## 2024-01-02 PROCEDURE — 80053 COMPREHEN METABOLIC PANEL: CPT

## 2024-01-02 PROCEDURE — 85004 AUTOMATED DIFF WBC COUNT: CPT

## 2024-01-03 ENCOUNTER — TELEPHONE (OUTPATIENT)
Dept: ONCOLOGY | Facility: CLINIC | Age: 64
End: 2024-01-03
Payer: COMMERCIAL

## 2024-01-03 NOTE — TELEPHONE ENCOUNTER
COPAY CARD OBTAINED    Medication: VERZENIO 150 MG PO TABS  Sponsor: Alex   Member ID: [id number]  BIN: [id number]  PCN: [id number]  Group: [id number]  Expected Copay: $  0  Copay card Monthly Max Amount: $    Copay card Annual Amount: $ 25,000

## 2024-01-30 ENCOUNTER — LAB (OUTPATIENT)
Dept: LAB | Facility: CLINIC | Age: 64
End: 2024-01-30
Payer: COMMERCIAL

## 2024-01-30 ENCOUNTER — ONCOLOGY VISIT (OUTPATIENT)
Dept: ONCOLOGY | Facility: CLINIC | Age: 64
End: 2024-01-30
Attending: NURSE PRACTITIONER
Payer: COMMERCIAL

## 2024-01-30 VITALS
WEIGHT: 164 LBS | TEMPERATURE: 98.2 F | BODY MASS INDEX: 25.74 KG/M2 | SYSTOLIC BLOOD PRESSURE: 130 MMHG | DIASTOLIC BLOOD PRESSURE: 77 MMHG | OXYGEN SATURATION: 100 % | HEIGHT: 67 IN | RESPIRATION RATE: 12 BRPM | HEART RATE: 65 BPM

## 2024-01-30 DIAGNOSIS — Z12.31 ENCOUNTER FOR SCREENING MAMMOGRAM FOR BREAST CANCER: ICD-10-CM

## 2024-01-30 DIAGNOSIS — C50.911 INVASIVE LOBULAR CARCINOMA OF RIGHT BREAST IN FEMALE (H): Primary | ICD-10-CM

## 2024-01-30 DIAGNOSIS — C50.911 INVASIVE LOBULAR CARCINOMA OF RIGHT BREAST IN FEMALE (H): ICD-10-CM

## 2024-01-30 LAB
ALBUMIN SERPL BCG-MCNC: 4.1 G/DL (ref 3.5–5.2)
ALP SERPL-CCNC: 77 U/L (ref 40–150)
ALT SERPL W P-5'-P-CCNC: 17 U/L (ref 0–50)
ANION GAP SERPL CALCULATED.3IONS-SCNC: 11 MMOL/L (ref 7–15)
AST SERPL W P-5'-P-CCNC: 20 U/L (ref 0–45)
BASOPHILS # BLD AUTO: 0.1 10E3/UL (ref 0–0.2)
BASOPHILS NFR BLD AUTO: 2 %
BILIRUB SERPL-MCNC: 0.4 MG/DL
BUN SERPL-MCNC: 12.7 MG/DL (ref 8–23)
CALCIUM SERPL-MCNC: 10 MG/DL (ref 8.8–10.2)
CHLORIDE SERPL-SCNC: 102 MMOL/L (ref 98–107)
CREAT SERPL-MCNC: 0.97 MG/DL (ref 0.51–0.95)
DEPRECATED HCO3 PLAS-SCNC: 24 MMOL/L (ref 22–29)
EGFRCR SERPLBLD CKD-EPI 2021: 65 ML/MIN/1.73M2
EOSINOPHIL # BLD AUTO: 0.1 10E3/UL (ref 0–0.7)
EOSINOPHIL NFR BLD AUTO: 3 %
ERYTHROCYTE [DISTWIDTH] IN BLOOD BY AUTOMATED COUNT: 14.9 % (ref 10–15)
GLUCOSE SERPL-MCNC: 122 MG/DL (ref 70–99)
HCT VFR BLD AUTO: 35 % (ref 35–47)
HGB BLD-MCNC: 11.7 G/DL (ref 11.7–15.7)
IMM GRANULOCYTES # BLD: 0 10E3/UL
IMM GRANULOCYTES NFR BLD: 0 %
LYMPHOCYTES # BLD AUTO: 0.6 10E3/UL (ref 0.8–5.3)
LYMPHOCYTES NFR BLD AUTO: 24 %
MCH RBC QN AUTO: 35.6 PG (ref 26.5–33)
MCHC RBC AUTO-ENTMCNC: 33.4 G/DL (ref 31.5–36.5)
MCV RBC AUTO: 106 FL (ref 78–100)
MONOCYTES # BLD AUTO: 0.3 10E3/UL (ref 0–1.3)
MONOCYTES NFR BLD AUTO: 10 %
NEUTROPHILS # BLD AUTO: 1.6 10E3/UL (ref 1.6–8.3)
NEUTROPHILS NFR BLD AUTO: 61 %
NRBC # BLD AUTO: 0 10E3/UL
NRBC BLD AUTO-RTO: 0 /100
PLATELET # BLD AUTO: 219 10E3/UL (ref 150–450)
POTASSIUM SERPL-SCNC: 4.6 MMOL/L (ref 3.4–5.3)
PROT SERPL-MCNC: 6.8 G/DL (ref 6.4–8.3)
RBC # BLD AUTO: 3.29 10E6/UL (ref 3.8–5.2)
SODIUM SERPL-SCNC: 137 MMOL/L (ref 135–145)
WBC # BLD AUTO: 2.6 10E3/UL (ref 4–11)

## 2024-01-30 PROCEDURE — 99213 OFFICE O/P EST LOW 20 MIN: CPT | Performed by: NURSE PRACTITIONER

## 2024-01-30 PROCEDURE — 80053 COMPREHEN METABOLIC PANEL: CPT

## 2024-01-30 PROCEDURE — 99214 OFFICE O/P EST MOD 30 MIN: CPT | Performed by: NURSE PRACTITIONER

## 2024-01-30 PROCEDURE — 85025 COMPLETE CBC W/AUTO DIFF WBC: CPT

## 2024-01-30 PROCEDURE — 36415 COLL VENOUS BLD VENIPUNCTURE: CPT

## 2024-01-30 PROCEDURE — G2211 COMPLEX E/M VISIT ADD ON: HCPCS | Performed by: NURSE PRACTITIONER

## 2024-01-30 ASSESSMENT — PAIN SCALES - GENERAL: PAINLEVEL: NO PAIN (0)

## 2024-01-30 NOTE — PROGRESS NOTES
[facility] Hematology and Oncology Progress Note    Patient: Sheila Schilling  MRN: [id number]  1/30/24        Reason for Visit    Right breast cancer    Primary Oncologist: Dr. Dooley    Virtual visit      Assessment and Plan  # Stage IB (pT3-pN1a(sn)-cM0) Invasive lobular carcinoma of the right breast, ER/[place]+; high risk features (large tumor size, 1 positive lymph node)  #  Chemo-induced neutropenia (grade 1)  #  Elevated creatinine (gr 1), possible abemaciclib side effect    Status post right mastectomy and adjuvant radiation.    Currently on adjuvant abemaciclib 150 mg BID and letrozole x 3 mths.     Tolerating treatment very well. Minimal diarrhea. Mild hot flashes, manageable. Mild leukopenia, no neutropenia. Creatinine has been mildly elevated 0.95-1.0.    Today, labs: WBC 2.6, ANC 1.6, plat and hgb WNL. CMP: Creatinine 0.97. LFTs WNL.    No evidence of recurrence.   Developing post-mastectomy and RT right chest wall/axillary fibrosis.     Plan:  -Continue abemaciclib 150 mg BID  -Continue daily letrozole, planning up to 10 yrs  -Monthly labs  -See Ligia/Dr. Dooley every 2 months.  If she continues to have stable labs/tolerance, can likely start decreasing frequency of lab monitoring to every 2-3 mths.   -Annual left mammogram due May  -ROM exercises for right axillary/chest wall fibrosis. Refer to PT if progressive.    #  Indeterminate L4 sclerotic lesion, likely benign  CT cap was ordered by Plastic Surgeon in prep for breast reconstruction. Incidental 10 mm L4 sclerotic lesion is indeterminate for benign bone island vs met.  Bone scan negative for mets.    Plan:  -Repeat pelvic CT in 6-12 mths to ensure stable    # Mild osteopenia  Baseline DEXA shows mild osteopenia with 0.1% hip fracture risk/10 yrs.     Plan:  -Continue calcium and vitamin D.    -Repeat DEXA in 2 years (10/2025)       Cancer Staging   Invasive lobular carcinoma of right breast in female (H)  Staging form: Breast, AJCC 8th  Edition  - Pathologic stage from 6/19/2023: Stage IB (pT3, pN1a, cM0, G2, ER+, [place]+, HER2-, Oncotype DX score: 13) - Signed by Ligia Felix NP on 10/26/2023       Oncology history  5/2023: Stage IB (dR0-jO5b-oF4) right breast cancer, ER/[place]J+  -screening mammogram: asymmetry in upper outer quadrant of her right breast.   -R diagnostic mammogram +ultrasound: architectural distortion with 12 x 10 x 8 mm ill-defined mass with acoustic shadowing respectively. Needle biopsy: invasive lobular carcinoma, grade 2; strongly ER and [place] positive and HER2 negative.   -contrast-enhanced mammogram (due to very dense breasts): 7.8 x 6.7 x 5.3 cm enhancement extending from 11:00 to 8 o'clock position consistent with malignancy.   -6/2023 surgical path: 15 cm invasive lobular carcinoma. Single 4 mm sLN positive   -Oncotype DX score 13  -11/2023 CT abd/pelvis done by Plastic Surgery for reconstruction planning, incidentally noted L4 bone lesion indeterminate. Bone scan negative for mets.    Treatment:  6/19/2023: right mastectomy and sLN biopsy. Subsequent right breast reconstruction.    9/26/2023: completed adjuvant RT (5000 cGy/25)    10/16/2023 - present: adjuvant abemaciclib 150 mg BID and letrozole    Interval History   Sheila Schilling is a 62 year old recently diagnosed with right ER/NJ+ breast cancer with sLN involvement; s/p right mastectomy, adjuvant RT and now on adjuvant letrozole and abemaciclib.   Returns for 2-mth visit..     She has completed 3 cycles (months) so far.    No significant side effects other than mild diarrhea intermittently.  Minimal hot flashes.   No fevers or chills.      No new lumps or bumps reported.  No bone pain reported.    Right chest wall/axillary tightness post mastectomy and RT.    ECOG Performance  0 - Independent      Physical Exam    General: alert and cooperative  Lymph: No cervical nor axillary adenopathy  HEENT: No icterus. No alopecia  Breasts: Right breast implant, RT  hyperpigmentation/fibrosis right chest wall. Fibrotic stiffness right lateral/upper chest wall/axilla. No palpable left breast abnormality.   Heart: RRR  Lungs: Clear bilaterally   Abd: Soft, non-distended  Extremities: No edema  Neuro: Non-focal    Lab Results    Recent Results (from the past 168 hour(s))   Comprehensive metabolic panel   Result Value Ref Range    Sodium 137 135 - 145 mmol/L    Potassium 4.6 3.4 - 5.3 mmol/L    Carbon Dioxide (CO2) 24 22 - 29 mmol/L    Anion Gap 11 7 - 15 mmol/L    Urea Nitrogen 12.7 8.0 - 23.0 mg/dL    Creatinine 0.97 (H) 0.51 - 0.95 mg/dL    GFR Estimate 65 >60 mL/min/1.73m2    Calcium 10.0 8.8 - 10.2 mg/dL    Chloride 102 98 - 107 mmol/L    Glucose 122 (H) 70 - 99 mg/dL    Alkaline Phosphatase 77 40 - 150 U/L    AST 20 0 - 45 U/L    ALT 17 0 - 50 U/L    Protein Total 6.8 6.4 - 8.3 g/dL    Albumin 4.1 3.5 - 5.2 g/dL    Bilirubin Total 0.4 <=1.2 mg/dL   CBC with platelets and differential   Result Value Ref Range    WBC Count 2.6 (L) 4.0 - 11.0 10e3/uL    RBC Count 3.29 (L) 3.80 - 5.20 10e6/uL    Hemoglobin 11.7 11.7 - 15.7 g/dL    Hematocrit 35.0 35.0 - 47.0 %     (H) 78 - 100 fL    MCH 35.6 (H) 26.5 - 33.0 pg    MCHC 33.4 31.5 - 36.5 g/dL    RDW 14.9 10.0 - 15.0 %    Platelet Count 219 150 - 450 10e3/uL    % Neutrophils 61 %    % Lymphocytes 24 %    % Monocytes 10 %    % Eosinophils 3 %    % Basophils 2 %    % Immature Granulocytes 0 %    NRBCs per 100 WBC 0 <1 /100    Absolute Neutrophils 1.6 1.6 - 8.3 10e3/uL    Absolute Lymphocytes 0.6 (L) 0.8 - 5.3 10e3/uL    Absolute Monocytes 0.3 0.0 - 1.3 10e3/uL    Absolute Eosinophils 0.1 0.0 - 0.7 10e3/uL    Absolute Basophils 0.1 0.0 - 0.2 10e3/uL    Absolute Immature Granulocytes 0.0 <=0.4 10e3/uL    Absolute NRBCs 0.0 10e3/uL       Imaging    No results found.    A total of 35 min were spent today on this visit which included face to face conversation with the patient, EMR review, counseling and co-ordination of care and  medical documentation.    Signed by: Ligia Felix, NP

## 2024-01-30 NOTE — PROGRESS NOTES
"Oncology Rooming Note    January 30, 2024 2:21 PM   Sheila Schilling is a 63 year old female who presents for:    Chief Complaint   Patient presents with    Oncology Clinic Visit     Invasive lobular carcinoma of right breast in female - Labs and provider visit      Initial Vitals: /77 (BP Location: Right arm, Patient Position: Sitting, Cuff Size: Adult Regular)   Pulse 65   Temp 98.2  F (36.8  C) (Tympanic)   Resp 12   Ht 1.708 m (5' 7.25\")   Wt 74.4 kg (164 lb)   LMP 03/08/2011   SpO2 100%   BMI 25.50 kg/m   Estimated body mass index is 25.5 kg/m  as calculated from the following:    Height as of this encounter: 1.708 m (5' 7.25\").    Weight as of this encounter: 74.4 kg (164 lb). Body surface area is 1.88 meters squared.  No Pain (0) Comment: Data Unavailable   Patient's last menstrual period was 03/08/2011.  Allergies reviewed: Yes  Medications reviewed: Yes    Medications: Medication refills not needed today.  Pharmacy name entered into [facility]:    [facility] - [place], [place] - [address] [id number] - [place], [place] - [address] AT [facility] OF [address] [place] [address] [facility] - [place], [place] - [address]    Frailty Screening:   Is the patient here for a new oncology consult visit in cancer care? 2. No      Clinical concerns:  None      Jolene Villagomez CMA              "

## 2024-01-30 NOTE — LETTER
1/30/2024         RE: Sheila Schilling  [address] [facility] [address]        Dear Colleague,    Thank you for referring your patient, Sheila Schilling, to the [facility]. Please see a copy of my visit note below.    .[facility] Hematology and Oncology Progress Note    Patient: Sheila Schilling  MRN: [id number]  1/30/24        Reason for Visit    Right breast cancer    Primary Oncologist: Dr. Dooley    Virtual visit      Assessment and Plan  # Stage IB (pT3-pN1a(sn)-cM0) Invasive lobular carcinoma of the right breast, ER/OR+; high risk features (large tumor size, 1 positive lymph node)  #  Chemo-induced neutropenia (grade 1)  #  Elevated creatinine (gr 1), possible abemaciclib side effect    Status post right mastectomy and adjuvant radiation.    Currently on adjuvant abemaciclib 150 mg BID and letrozole x 3 mths.     Tolerating treatment very well. Minimal diarrhea. Mild hot flashes, manageable. Mild leukopenia, no neutropenia. Creatinine has been mildly elevated 0.95-1.0.    Today, labs: WBC 2.6, ANC 1.6, plat and hgb WNL. CMP: Creatinine 0.97. LFTs WNL.    No evidence of recurrence.   Developing post-mastectomy and RT right chest wall/axillary fibrosis.     Plan:  -Continue abemaciclib 150 mg BID  -Continue daily letrozole, planning up to 10 yrs  -Monthly labs  -See Ligia/Dr. Dooley every 2 months.  If she continues to have stable labs/tolerance, can likely start decreasing frequency of lab monitoring to every 2-3 mths.   -Annual left mammogram due May  -ROM exercises for right axillary/chest wall fibrosis. Refer to PT if progressive.    #  Indeterminate L4 sclerotic lesion, likely benign  CT cap was ordered by Plastic Surgeon in prep for breast reconstruction. Incidental 10 mm L4 sclerotic lesion is indeterminate for benign bone island vs met.  Bone scan negative for mets.    Plan:  -Repeat pelvic CT in 6-12 mths to ensure stable    # Mild osteopenia  Baseline  DEXA shows mild osteopenia with 0.1% hip fracture risk/10 yrs.     Plan:  -Continue calcium and vitamin D.    -Repeat DEXA in 2 years (10/2025)       Cancer Staging   Invasive lobular carcinoma of right breast in female (H)  Staging form: Breast, AJCC 8th Edition  - Pathologic stage from 6/19/2023: Stage IB (pT3, pN1a, cM0, G2, ER+, [place]+, HER2-, Oncotype DX score: 13) - Signed by Ligia Felix NP on 10/26/2023       Oncology history  5/2023: Stage IB (wT9-qK9z-tF0) right breast cancer, ER/[place]+  -screening mammogram: asymmetry in upper outer quadrant of her right breast.   -R diagnostic mammogram +ultrasound: architectural distortion with 12 x 10 x 8 mm ill-defined mass with acoustic shadowing respectively. Needle biopsy: invasive lobular carcinoma, grade 2; strongly ER and [place] positive and HER2 negative.   -contrast-enhanced mammogram (due to very dense breasts): 7.8 x 6.7 x 5.3 cm enhancement extending from 11:00 to 8 o'clock position consistent with malignancy.   -6/2023 surgical path: 15 cm invasive lobular carcinoma. Single 4 mm sLN positive   -Oncotype DX score 13  -11/2023 CT abd/pelvis done by Plastic Surgery for reconstruction planning, incidentally noted L4 bone lesion indeterminate. Bone scan negative for mets.    Treatment:  6/19/2023: right mastectomy and sLN biopsy. Subsequent right breast reconstruction.    9/26/2023: completed adjuvant RT (5000 cGy/25)    10/16/2023 - present: adjuvant abemaciclib 150 mg BID and letrozole    Interval History   Sheila Schilling is a 62 year old recently diagnosed with right ER/MN+ breast cancer with sLN involvement; s/p right mastectomy, adjuvant RT and now on adjuvant letrozole and abemaciclib.   Returns for 2-mth visit..     She has completed 3 cycles (months) so far.    No significant side effects other than mild diarrhea intermittently.  Minimal hot flashes.   No fevers or chills.      No new lumps or bumps reported.  No bone pain reported.    Right chest wall/axillary  tightness post mastectomy and RT.    ECOG Performance  0 - Independent      Physical Exam    General: alert and cooperative  Lymph: No cervical nor axillary adenopathy  HEENT: No icterus. No alopecia  Breasts: Right breast implant, RT hyperpigmentation/fibrosis right chest wall. Fibrotic stiffness right lateral/upper chest wall/axilla. No palpable left breast abnormality.   Heart: RRR  Lungs: Clear bilaterally   Abd: Soft, non-distended  Extremities: No edema  Neuro: Non-focal    Lab Results    Recent Results (from the past 168 hour(s))   Comprehensive metabolic panel   Result Value Ref Range    Sodium 137 135 - 145 mmol/L    Potassium 4.6 3.4 - 5.3 mmol/L    Carbon Dioxide (CO2) 24 22 - 29 mmol/L    Anion Gap 11 7 - 15 mmol/L    Urea Nitrogen 12.7 8.0 - 23.0 mg/dL    Creatinine 0.97 (H) 0.51 - 0.95 mg/dL    GFR Estimate 65 >60 mL/min/1.73m2    Calcium 10.0 8.8 - 10.2 mg/dL    Chloride 102 98 - 107 mmol/L    Glucose 122 (H) 70 - 99 mg/dL    Alkaline Phosphatase 77 40 - 150 U/L    AST 20 0 - 45 U/L    ALT 17 0 - 50 U/L    Protein Total 6.8 6.4 - 8.3 g/dL    Albumin 4.1 3.5 - 5.2 g/dL    Bilirubin Total 0.4 <=1.2 mg/dL   CBC with platelets and differential   Result Value Ref Range    WBC Count 2.6 (L) 4.0 - 11.0 10e3/uL    RBC Count 3.29 (L) 3.80 - 5.20 10e6/uL    Hemoglobin 11.7 11.7 - 15.7 g/dL    Hematocrit 35.0 35.0 - 47.0 %     (H) 78 - 100 fL    MCH 35.6 (H) 26.5 - 33.0 pg    MCHC 33.4 31.5 - 36.5 g/dL    RDW 14.9 10.0 - 15.0 %    Platelet Count 219 150 - 450 10e3/uL    % Neutrophils 61 %    % Lymphocytes 24 %    % Monocytes 10 %    % Eosinophils 3 %    % Basophils 2 %    % Immature Granulocytes 0 %    NRBCs per 100 WBC 0 <1 /100    Absolute Neutrophils 1.6 1.6 - 8.3 10e3/uL    Absolute Lymphocytes 0.6 (L) 0.8 - 5.3 10e3/uL    Absolute Monocytes 0.3 0.0 - 1.3 10e3/uL    Absolute Eosinophils 0.1 0.0 - 0.7 10e3/uL    Absolute Basophils 0.1 0.0 - 0.2 10e3/uL    Absolute Immature Granulocytes 0.0 <=0.4  "10e3/uL    Absolute NRBCs 0.0 10e3/uL       Imaging    No results found.    A total of 35 min were spent today on this visit which included face to face conversation with the patient, EMR review, counseling and co-ordination of care and medical documentation.    Signed by: Ligia Felix NP    Oncology Rooming Note    January 30, 2024 2:21 PM   Sheila Schilling is a 63 year old female who presents for:    Chief Complaint   Patient presents with     Oncology Clinic Visit     Invasive lobular carcinoma of right breast in female - Labs and provider visit      Initial Vitals: /77 (BP Location: Right arm, Patient Position: Sitting, Cuff Size: Adult Regular)   Pulse 65   Temp 98.2  F (36.8  C) (Tympanic)   Resp 12   Ht 1.708 m (5' 7.25\")   Wt 74.4 kg (164 lb)   LMP 03/08/2011   SpO2 100%   BMI 25.50 kg/m   Estimated body mass index is 25.5 kg/m  as calculated from the following:    Height as of this encounter: 1.708 m (5' 7.25\").    Weight as of this encounter: 74.4 kg (164 lb). Body surface area is 1.88 meters squared.  No Pain (0) Comment: Data Unavailable   Patient's last menstrual period was 03/08/2011.  Allergies reviewed: Yes  Medications reviewed: Yes    Medications: Medication refills not needed today.  Pharmacy name entered into [facility]:    [facility] [place], [place] - [address] [id number] - [place], [place] - [address] [facility] [address] [address] [facility] - [place], [place] - [address] [place] [address]    Frailty Screening:   Is the patient here for a new oncology consult visit in cancer care? 2. No      Clinical concerns:  None      Jolene Villagomez CMA                Again, thank you for allowing me to participate in the care of your patient.        Sincerely,        Ligia Felix NP  "

## 2024-02-23 ENCOUNTER — LAB (OUTPATIENT)
Dept: LAB | Facility: CLINIC | Age: 64
End: 2024-02-23
Payer: COMMERCIAL

## 2024-02-23 DIAGNOSIS — C50.911 INVASIVE LOBULAR CARCINOMA OF RIGHT BREAST IN FEMALE (H): ICD-10-CM

## 2024-02-23 LAB
ALBUMIN SERPL BCG-MCNC: 4 G/DL (ref 3.5–5.2)
ALP SERPL-CCNC: 78 U/L (ref 40–150)
ALT SERPL W P-5'-P-CCNC: 14 U/L (ref 0–50)
ANION GAP SERPL CALCULATED.3IONS-SCNC: 8 MMOL/L (ref 7–15)
AST SERPL W P-5'-P-CCNC: 20 U/L (ref 0–45)
BASOPHILS # BLD AUTO: 0.1 10E3/UL (ref 0–0.2)
BASOPHILS NFR BLD AUTO: 2 %
BILIRUB SERPL-MCNC: 0.3 MG/DL
BUN SERPL-MCNC: 15 MG/DL (ref 8–23)
CALCIUM SERPL-MCNC: 9.8 MG/DL (ref 8.8–10.2)
CHLORIDE SERPL-SCNC: 104 MMOL/L (ref 98–107)
CREAT SERPL-MCNC: 0.99 MG/DL (ref 0.51–0.95)
DEPRECATED HCO3 PLAS-SCNC: 26 MMOL/L (ref 22–29)
EGFRCR SERPLBLD CKD-EPI 2021: 64 ML/MIN/1.73M2
EOSINOPHIL # BLD AUTO: 0.1 10E3/UL (ref 0–0.7)
EOSINOPHIL NFR BLD AUTO: 3 %
ERYTHROCYTE [DISTWIDTH] IN BLOOD BY AUTOMATED COUNT: 12.6 % (ref 10–15)
GLUCOSE SERPL-MCNC: 86 MG/DL (ref 70–99)
HCT VFR BLD AUTO: 35.1 % (ref 35–47)
HGB BLD-MCNC: 12.1 G/DL (ref 11.7–15.7)
IMM GRANULOCYTES # BLD: 0 10E3/UL
IMM GRANULOCYTES NFR BLD: 0 %
LYMPHOCYTES # BLD AUTO: 0.6 10E3/UL (ref 0.8–5.3)
LYMPHOCYTES NFR BLD AUTO: 22 %
MCH RBC QN AUTO: 36.6 PG (ref 26.5–33)
MCHC RBC AUTO-ENTMCNC: 34.5 G/DL (ref 31.5–36.5)
MCV RBC AUTO: 106 FL (ref 78–100)
MONOCYTES # BLD AUTO: 0.3 10E3/UL (ref 0–1.3)
MONOCYTES NFR BLD AUTO: 11 %
NEUTROPHILS # BLD AUTO: 1.6 10E3/UL (ref 1.6–8.3)
NEUTROPHILS NFR BLD AUTO: 62 %
NRBC # BLD AUTO: 0 10E3/UL
NRBC BLD AUTO-RTO: 0 /100
PLATELET # BLD AUTO: 213 10E3/UL (ref 150–450)
POTASSIUM SERPL-SCNC: 4.8 MMOL/L (ref 3.4–5.3)
PROT SERPL-MCNC: 6.8 G/DL (ref 6.4–8.3)
RBC # BLD AUTO: 3.31 10E6/UL (ref 3.8–5.2)
SODIUM SERPL-SCNC: 138 MMOL/L (ref 135–145)
WBC # BLD AUTO: 2.6 10E3/UL (ref 4–11)

## 2024-02-23 PROCEDURE — 36415 COLL VENOUS BLD VENIPUNCTURE: CPT

## 2024-02-23 PROCEDURE — 85025 COMPLETE CBC W/AUTO DIFF WBC: CPT

## 2024-02-23 PROCEDURE — 80053 COMPREHEN METABOLIC PANEL: CPT

## 2024-02-27 DIAGNOSIS — C50.911 INVASIVE LOBULAR CARCINOMA OF RIGHT BREAST IN FEMALE (H): Primary | ICD-10-CM

## 2024-03-22 ENCOUNTER — LAB (OUTPATIENT)
Dept: LAB | Facility: CLINIC | Age: 64
End: 2024-03-22
Payer: COMMERCIAL

## 2024-03-22 DIAGNOSIS — C50.911 INVASIVE LOBULAR CARCINOMA OF RIGHT BREAST IN FEMALE (H): ICD-10-CM

## 2024-03-22 LAB
ALBUMIN SERPL BCG-MCNC: 4.1 G/DL (ref 3.5–5.2)
ALP SERPL-CCNC: 90 U/L (ref 40–150)
ALT SERPL W P-5'-P-CCNC: 17 U/L (ref 0–50)
ANION GAP SERPL CALCULATED.3IONS-SCNC: 10 MMOL/L (ref 7–15)
AST SERPL W P-5'-P-CCNC: 27 U/L (ref 0–45)
BASOPHILS # BLD AUTO: 0.1 10E3/UL (ref 0–0.2)
BASOPHILS NFR BLD AUTO: 1 %
BILIRUB SERPL-MCNC: 0.4 MG/DL
BUN SERPL-MCNC: 18.9 MG/DL (ref 8–23)
CALCIUM SERPL-MCNC: 9.8 MG/DL (ref 8.8–10.2)
CHLORIDE SERPL-SCNC: 102 MMOL/L (ref 98–107)
CREAT SERPL-MCNC: 1.11 MG/DL (ref 0.51–0.95)
DEPRECATED HCO3 PLAS-SCNC: 25 MMOL/L (ref 22–29)
EGFRCR SERPLBLD CKD-EPI 2021: 56 ML/MIN/1.73M2
EOSINOPHIL # BLD AUTO: 0.1 10E3/UL (ref 0–0.7)
EOSINOPHIL NFR BLD AUTO: 2 %
ERYTHROCYTE [DISTWIDTH] IN BLOOD BY AUTOMATED COUNT: 11.9 % (ref 10–15)
GLUCOSE SERPL-MCNC: 79 MG/DL (ref 70–99)
HCT VFR BLD AUTO: 37.9 % (ref 35–47)
HGB BLD-MCNC: 13 G/DL (ref 11.7–15.7)
IMM GRANULOCYTES # BLD: 0 10E3/UL
IMM GRANULOCYTES NFR BLD: 1 %
LYMPHOCYTES # BLD AUTO: 0.8 10E3/UL (ref 0.8–5.3)
LYMPHOCYTES NFR BLD AUTO: 22 %
MCH RBC QN AUTO: 36.4 PG (ref 26.5–33)
MCHC RBC AUTO-ENTMCNC: 34.3 G/DL (ref 31.5–36.5)
MCV RBC AUTO: 106 FL (ref 78–100)
MONOCYTES # BLD AUTO: 0.2 10E3/UL (ref 0–1.3)
MONOCYTES NFR BLD AUTO: 6 %
NEUTROPHILS # BLD AUTO: 2.4 10E3/UL (ref 1.6–8.3)
NEUTROPHILS NFR BLD AUTO: 68 %
NRBC # BLD AUTO: 0 10E3/UL
NRBC BLD AUTO-RTO: 0 /100
PLATELET # BLD AUTO: 179 10E3/UL (ref 150–450)
POTASSIUM SERPL-SCNC: 4.2 MMOL/L (ref 3.4–5.3)
PROT SERPL-MCNC: 7.1 G/DL (ref 6.4–8.3)
RBC # BLD AUTO: 3.57 10E6/UL (ref 3.8–5.2)
SODIUM SERPL-SCNC: 137 MMOL/L (ref 135–145)
WBC # BLD AUTO: 3.6 10E3/UL (ref 4–11)

## 2024-03-22 PROCEDURE — 80053 COMPREHEN METABOLIC PANEL: CPT

## 2024-03-22 PROCEDURE — 85025 COMPLETE CBC W/AUTO DIFF WBC: CPT

## 2024-03-22 PROCEDURE — 36415 COLL VENOUS BLD VENIPUNCTURE: CPT

## 2024-03-25 ENCOUNTER — ONCOLOGY VISIT (OUTPATIENT)
Dept: ONCOLOGY | Facility: CLINIC | Age: 64
End: 2024-03-25
Attending: NURSE PRACTITIONER
Payer: COMMERCIAL

## 2024-03-25 VITALS
TEMPERATURE: 97.5 F | HEART RATE: 78 BPM | DIASTOLIC BLOOD PRESSURE: 80 MMHG | OXYGEN SATURATION: 100 % | BODY MASS INDEX: 25.43 KG/M2 | RESPIRATION RATE: 12 BRPM | SYSTOLIC BLOOD PRESSURE: 144 MMHG | WEIGHT: 162 LBS | HEIGHT: 67 IN

## 2024-03-25 DIAGNOSIS — R79.89 ELEVATED SERUM CREATININE: ICD-10-CM

## 2024-03-25 DIAGNOSIS — C50.911 INVASIVE LOBULAR CARCINOMA OF RIGHT BREAST IN FEMALE (H): Primary | ICD-10-CM

## 2024-03-25 DIAGNOSIS — M89.9 LESION OF BONE OF LUMBOSACRAL SPINE: ICD-10-CM

## 2024-03-25 PROCEDURE — 99214 OFFICE O/P EST MOD 30 MIN: CPT | Performed by: NURSE PRACTITIONER

## 2024-03-25 PROCEDURE — G2211 COMPLEX E/M VISIT ADD ON: HCPCS | Performed by: NURSE PRACTITIONER

## 2024-03-25 ASSESSMENT — PAIN SCALES - GENERAL: PAINLEVEL: NO PAIN (0)

## 2024-03-25 NOTE — PROGRESS NOTES
[facility] Hematology and Oncology Progress Note    Patient: Sheila Schilling  MRN: [id number]  3/25/24        Reason for Visit    Right breast cancer    Primary Oncologist: Dr. Dooley    Assessment and Plan  # Stage IB (pT3-pN1a(sn)-cM0) Invasive lobular carcinoma of the right breast, ER/[place]+; high risk features (large tumor size, 1 positive lymph node)  #  Chemo-induced neutropenia (grade 1)  #  Elevated creatinine (gr 1), possible abemaciclib side effect    Status post right mastectomy and adjuvant radiation 2023.    Currently on adjuvant abemaciclib 150 mg BID and letrozole x 5 mths.     Tolerating treatment very well. Mild hot flashes, manageable. Mild leukopenia, no neutropenia. Creatinine has been mildly elevated 0.95-1.1.    Today, labs: WBC 3.6, ANC WNL, plat and hgb WNL. CMP: Creatinine 1.11 (up slightly). LFTs WNL.    No evidence of recurrence.   Post-mastectomy and RT right chest wall/axillary fibrosis.     Reports intermittent low abd cramping, exam normal.     Plan:  -Continue abemaciclib 150 mg BID  -Continue daily letrozole, planning up to 10 yrs  -Continue monthly labs to follow creatinine  -See Ligia/Dr. Dooley every 3 months.  If she continues to have stable labs/tolerance, can likely start decreasing frequency of lab monitoring to every 3 mths.   -Annual left mammogram due May  -ROM exercises for right axillary/chest wall fibrosis. Refer to PT if progressive.  -Monitor low abd cramping for now, sounds benign. Call if progressive issue    #  Indeterminate L4 sclerotic lesion, likely benign  CT cap was ordered by Plastic Surgeon in prep for breast reconstruction. Incidental 10 mm L4 sclerotic lesion is indeterminate for benign bone island vs met.  Bone scan negative for mets.    Plan:  -Repeat pelvic CT in 6-12 mths to ensure stable. Will repeat this ahead of her 3-mth visit    # Mild osteopenia  Baseline DEXA shows mild osteopenia with 0.1% hip fracture risk/10 yrs.     Plan:  -Continue  calcium and vitamin D.    -Repeat DEXA in 2 years (10/2025)       Cancer Staging   Invasive lobular carcinoma of right breast in female (H)  Staging form: Breast, AJCC 8th Edition  - Pathologic stage from 6/19/2023: Stage IB (pT3, pN1a, cM0, G2, ER+, [place]+, HER2-, Oncotype DX score: 13) - Signed by Ligia Felix NP on 10/26/2023       Oncology history  5/2023: Stage IB (oA6-yF8l-bA8) right breast cancer, ER/DC+  -screening mammogram: asymmetry in upper outer quadrant of her right breast.   -R diagnostic mammogram +ultrasound: architectural distortion with 12 x 10 x 8 mm ill-defined mass with acoustic shadowing respectively. Needle biopsy: invasive lobular carcinoma, grade 2; strongly ER and [place] positive and HER2 negative.   -contrast-enhanced mammogram (due to very dense breasts): 7.8 x 6.7 x 5.3 cm enhancement extending from 11:00 to 8 o'clock position consistent with malignancy.   -6/2023 surgical path: 15 cm invasive lobular carcinoma. Single 4 mm sLN positive   -Oncotype DX score 13  -11/2023 CT abd/pelvis done by Plastic Surgery for reconstruction planning, incidentally noted L4 bone lesion indeterminate. Bone scan negative for mets.    Treatment:  6/19/2023: right mastectomy and sLN biopsy. Subsequent right breast reconstruction.    9/26/2023: completed adjuvant RT (5000 cGy/25)    10/16/2023 - present: adjuvant abemaciclib 150 mg BID and letrozole    Interval History   Sheila Schilling is a 63 year old diagnosed with right ER/DC+ breast cancer with sLN involvement; s/p right mastectomy ~9 mths ago, adjuvant RT and now on adjuvant letrozole and abemaciclib.   Returns for 2-mth visit.     She has completed 5 months so far.    No significant side effects other than mild diarrhea intermittently (one day/month). Occasional lower abd cramps. No nausea. No  symptoms.   Minimal hot flashes.   No fevers or chills.      No new lumps or bumps reported.  No bone pain reported.    Right chest wall/axillary tightness post  mastectomy and RT. Massages/stretches with benefit.  Weight stable.    ECOG Performance  0 - Independent      Physical Exam    General: alert and cooperative  Lymph: No cervical nor axillary adenopathy  HEENT: No icterus. No alopecia  Breasts: Not reassessed today. Exam 1/30/2024.  Heart: RRR  Lungs: Clear bilaterally   Abd: Soft, non-distended, non-tender. No lower quad masses, pain.   Extremities: No edema  Neuro: Non-focal    Lab Results    Recent Results (from the past 168 hour(s))   Comprehensive metabolic panel   Result Value Ref Range    Sodium 137 135 - 145 mmol/L    Potassium 4.2 3.4 - 5.3 mmol/L    Carbon Dioxide (CO2) 25 22 - 29 mmol/L    Anion Gap 10 7 - 15 mmol/L    Urea Nitrogen 18.9 8.0 - 23.0 mg/dL    Creatinine 1.11 (H) 0.51 - 0.95 mg/dL    GFR Estimate 56 (L) >60 mL/min/1.73m2    Calcium 9.8 8.8 - 10.2 mg/dL    Chloride 102 98 - 107 mmol/L    Glucose 79 70 - 99 mg/dL    Alkaline Phosphatase 90 40 - 150 U/L    AST 27 0 - 45 U/L    ALT 17 0 - 50 U/L    Protein Total 7.1 6.4 - 8.3 g/dL    Albumin 4.1 3.5 - 5.2 g/dL    Bilirubin Total 0.4 <=1.2 mg/dL   CBC with platelets and differential   Result Value Ref Range    WBC Count 3.6 (L) 4.0 - 11.0 10e3/uL    RBC Count 3.57 (L) 3.80 - 5.20 10e6/uL    Hemoglobin 13.0 11.7 - 15.7 g/dL    Hematocrit 37.9 35.0 - 47.0 %     (H) 78 - 100 fL    MCH 36.4 (H) 26.5 - 33.0 pg    MCHC 34.3 31.5 - 36.5 g/dL    RDW 11.9 10.0 - 15.0 %    Platelet Count 179 150 - 450 10e3/uL    % Neutrophils 68 %    % Lymphocytes 22 %    % Monocytes 6 %    % Eosinophils 2 %    % Basophils 1 %    % Immature Granulocytes 1 %    NRBCs per 100 WBC 0 <1 /100    Absolute Neutrophils 2.4 1.6 - 8.3 10e3/uL    Absolute Lymphocytes 0.8 0.8 - 5.3 10e3/uL    Absolute Monocytes 0.2 0.0 - 1.3 10e3/uL    Absolute Eosinophils 0.1 0.0 - 0.7 10e3/uL    Absolute Basophils 0.1 0.0 - 0.2 10e3/uL    Absolute Immature Granulocytes 0.0 <=0.4 10e3/uL    Absolute NRBCs 0.0 10e3/uL       Imaging    No results  found.    Total time 30 minutes, to include face to face visit, review of EMR, ordering, documentation and coordination of care on date of service.    complexity modifier for longitudinal care.       Signed by: Ligia Felix NP

## 2024-03-25 NOTE — PROGRESS NOTES
"Oncology Rooming Note    March 25, 2024 11:44 AM   Sheila Schilling is a 63 year old female who presents for:    Chief Complaint   Patient presents with    Oncology Clinic Visit     Invasive lobular carcinoma of right breast in female - Provider visit only     Initial Vitals: BP (!) 144/80 (BP Location: Left arm, Patient Position: Sitting, Cuff Size: Adult Regular)   Pulse 78   Temp 97.5  F (36.4  C) (Tympanic)   Resp 12   Ht 1.708 m (5' 7.25\")   Wt 73.5 kg (162 lb)   LMP 03/08/2011   SpO2 100%   BMI 25.18 kg/m   Estimated body mass index is 25.18 kg/m  as calculated from the following:    Height as of this encounter: 1.708 m (5' 7.25\").    Weight as of this encounter: 73.5 kg (162 lb). Body surface area is 1.87 meters squared.  No Pain (0) Comment: Data Unavailable   Patient's last menstrual period was 03/08/2011.  Allergies reviewed: Yes  Medications reviewed: Yes    Medications: Medication refills not needed today.  Pharmacy name entered into [facility]:    [facility] - [place], [place] - [address] [id number] - [place], [place] - [address] AT [facility] OF [address] [place] [address] [facility] - [place], [place] - [address]     Frailty Screening:   Is the patient here for a new oncology consult visit in cancer care? 2. No      Clinical concerns:  None      Jolene Villagomez CMA              "

## 2024-03-25 NOTE — LETTER
3/25/2024         RE: Sheila Schilling  [address] [facility] [address]        Dear Colleague,    Thank you for referring your patient, Sheila Schilling, to the [facility]. Please see a copy of my visit note below.    .[facility] Hematology and Oncology Progress Note    Patient: Sheila Schilling  MRN: [id number]  3/25/24        Reason for Visit    Right breast cancer    Primary Oncologist: Dr. Dooley    Assessment and Plan  # Stage IB (pT3-pN1a(sn)-cM0) Invasive lobular carcinoma of the right breast, ER/ND+; high risk features (large tumor size, 1 positive lymph node)  #  Chemo-induced neutropenia (grade 1)  #  Elevated creatinine (gr 1), possible abemaciclib side effect    Status post right mastectomy and adjuvant radiation 2023.    Currently on adjuvant abemaciclib 150 mg BID and letrozole x 5 mths.     Tolerating treatment very well. Mild hot flashes, manageable. Mild leukopenia, no neutropenia. Creatinine has been mildly elevated 0.95-1.1.    Today, labs: WBC 3.6, ANC WNL, plat and hgb WNL. CMP: Creatinine 1.11 (up slightly). LFTs WNL.    No evidence of recurrence.   Post-mastectomy and RT right chest wall/axillary fibrosis.     Reports intermittent low abd cramping, exam normal.     Plan:  -Continue abemaciclib 150 mg BID  -Continue daily letrozole, planning up to 10 yrs  -Continue monthly labs to follow creatinine  -See Ligia/Dr. Dooley every 3 months.  If she continues to have stable labs/tolerance, can likely start decreasing frequency of lab monitoring to every 3 mths.   -Annual left mammogram due May  -ROM exercises for right axillary/chest wall fibrosis. Refer to PT if progressive.  -Monitor low abd cramping for now, sounds benign. Call if progressive issue    #  Indeterminate L4 sclerotic lesion, likely benign  CT cap was ordered by Plastic Surgeon in prep for breast reconstruction. Incidental 10 mm L4 sclerotic lesion is indeterminate for benign bone  island vs met.  Bone scan negative for mets.    Plan:  -Repeat pelvic CT in 6-12 mths to ensure stable. Will repeat this ahead of her 3-mth visit    # Mild osteopenia  Baseline DEXA shows mild osteopenia with 0.1% hip fracture risk/10 yrs.     Plan:  -Continue calcium and vitamin D.    -Repeat DEXA in 2 years (10/2025)       Cancer Staging   Invasive lobular carcinoma of right breast in female (H)  Staging form: Breast, AJCC 8th Edition  - Pathologic stage from 6/19/2023: Stage IB (pT3, pN1a, cM0, G2, ER+, DC+, HER2-, Oncotype DX score: 13) - Signed by Ligia Felix NP on 10/26/2023       Oncology history  5/2023: Stage IB (sY3-[id number]-qQ2) right breast cancer, ER/DC+  -screening mammogram: asymmetry in upper outer quadrant of her right breast.   -R diagnostic mammogram +ultrasound: architectural distortion with 12 x 10 x 8 mm ill-defined mass with acoustic shadowing respectively. Needle biopsy: invasive lobular carcinoma, grade 2; strongly ER and DC positive and HER2 negative.   -contrast-enhanced mammogram (due to very dense breasts): 7.8 x 6.7 x 5.3 cm enhancement extending from 11:00 to 8 o'clock position consistent with malignancy.   -6/2023 surgical path: 15 cm invasive lobular carcinoma. Single 4 mm sLN positive   -Oncotype DX score 13  -11/2023 CT abd/pelvis done by Plastic Surgery for reconstruction planning, incidentally noted L4 bone lesion indeterminate. Bone scan negative for mets.    Treatment:  6/19/2023: right mastectomy and sLN biopsy. Subsequent right breast reconstruction.    9/26/2023: completed adjuvant RT (5000 cGy/25)    10/16/2023 - present: adjuvant abemaciclib 150 mg BID and letrozole    Interval History   Sheila Schilling is a 63 year old diagnosed with right ER/DC+ breast cancer with sLN involvement; s/p right mastectomy ~9 mths ago, adjuvant RT and now on adjuvant letrozole and abemaciclib.   Returns for 2-mth visit.     She has completed 5 months so far.    No significant side effects other  than mild diarrhea intermittently (one day/month). Occasional lower abd cramps. No nausea. No  symptoms.   Minimal hot flashes.   No fevers or chills.      No new lumps or bumps reported.  No bone pain reported.    Right chest wall/axillary tightness post mastectomy and RT. Massages/stretches with benefit.  Weight stable.    ECOG Performance  0 - Independent      Physical Exam    General: alert and cooperative  Lymph: No cervical nor axillary adenopathy  HEENT: No icterus. No alopecia  Breasts: Not reassessed today. Exam 1/30/2024.  Heart: RRR  Lungs: Clear bilaterally   Abd: Soft, non-distended, non-tender. No lower quad masses, pain.   Extremities: No edema  Neuro: Non-focal    Lab Results    Recent Results (from the past 168 hour(s))   Comprehensive metabolic panel   Result Value Ref Range    Sodium 137 135 - 145 mmol/L    Potassium 4.2 3.4 - 5.3 mmol/L    Carbon Dioxide (CO2) 25 22 - 29 mmol/L    Anion Gap 10 7 - 15 mmol/L    Urea Nitrogen 18.9 8.0 - 23.0 mg/dL    Creatinine 1.11 (H) 0.51 - 0.95 mg/dL    GFR Estimate 56 (L) >60 mL/min/1.73m2    Calcium 9.8 8.8 - 10.2 mg/dL    Chloride 102 98 - 107 mmol/L    Glucose 79 70 - 99 mg/dL    Alkaline Phosphatase 90 40 - 150 U/L    AST 27 0 - 45 U/L    ALT 17 0 - 50 U/L    Protein Total 7.1 6.4 - 8.3 g/dL    Albumin 4.1 3.5 - 5.2 g/dL    Bilirubin Total 0.4 <=1.2 mg/dL   CBC with platelets and differential   Result Value Ref Range    WBC Count 3.6 (L) 4.0 - 11.0 10e3/uL    RBC Count 3.57 (L) 3.80 - 5.20 10e6/uL    Hemoglobin 13.0 11.7 - 15.7 g/dL    Hematocrit 37.9 35.0 - 47.0 %     (H) 78 - 100 fL    MCH 36.4 (H) 26.5 - 33.0 pg    MCHC 34.3 31.5 - 36.5 g/dL    RDW 11.9 10.0 - 15.0 %    Platelet Count 179 150 - 450 10e3/uL    % Neutrophils 68 %    % Lymphocytes 22 %    % Monocytes 6 %    % Eosinophils 2 %    % Basophils 1 %    % Immature Granulocytes 1 %    NRBCs per 100 WBC 0 <1 /100    Absolute Neutrophils 2.4 1.6 - 8.3 10e3/uL    Absolute Lymphocytes 0.8 0.8  "- 5.3 10e3/uL    Absolute Monocytes 0.2 0.0 - 1.3 10e3/uL    Absolute Eosinophils 0.1 0.0 - 0.7 10e3/uL    Absolute Basophils 0.1 0.0 - 0.2 10e3/uL    Absolute Immature Granulocytes 0.0 <=0.4 10e3/uL    Absolute NRBCs 0.0 10e3/uL       Imaging    No results found.    Total time 30 minutes, to include face to face visit, review of EMR, ordering, documentation and coordination of care on date of service.    complexity modifier for longitudinal care.       Signed by: Ligia Felix NP    Oncology Rooming Note    March 25, 2024 11:44 AM   Sheila Schilling is a 63 year old female who presents for:    Chief Complaint   Patient presents with     Oncology Clinic Visit     Invasive lobular carcinoma of right breast in female - Provider visit only     Initial Vitals: BP (!) 144/80 (BP Location: Left arm, Patient Position: Sitting, Cuff Size: Adult Regular)   Pulse 78   Temp 97.5  F (36.4  C) (Tympanic)   Resp 12   Ht 1.708 m (5' 7.25\")   Wt 73.5 kg (162 lb)   LMP 03/08/2011   SpO2 100%   BMI 25.18 kg/m   Estimated body mass index is 25.18 kg/m  as calculated from the following:    Height as of this encounter: 1.708 m (5' 7.25\").    Weight as of this encounter: 73.5 kg (162 lb). Body surface area is 1.87 meters squared.  No Pain (0) Comment: Data Unavailable   Patient's last menstrual period was 03/08/2011.  Allergies reviewed: Yes  Medications reviewed: Yes    Medications: Medication refills not needed today.  Pharmacy name entered into [facility]:    [facility] - [place], [place] - [address] [id number] - [place], [place] - [address] AT [facility] OF [address] [place] [address] [facility] - [place], [place] - [address]     Frailty Screening:   Is the patient here for a new oncology consult visit in cancer care? 2. No      Clinical concerns:  None      Jolene Villagomez CMA                Again, thank you for allowing me to participate in the care of your patient. "        Sincerely,        Ligia Felix, NP

## 2024-04-19 ENCOUNTER — LAB (OUTPATIENT)
Dept: LAB | Facility: CLINIC | Age: 64
End: 2024-04-19
Payer: COMMERCIAL

## 2024-04-19 ENCOUNTER — DOCUMENTATION ONLY (OUTPATIENT)
Dept: ONCOLOGY | Facility: CLINIC | Age: 64
End: 2024-04-19

## 2024-04-19 DIAGNOSIS — C50.911 INVASIVE LOBULAR CARCINOMA OF RIGHT BREAST IN FEMALE (H): ICD-10-CM

## 2024-04-19 LAB
ALBUMIN SERPL BCG-MCNC: 4 G/DL (ref 3.5–5.2)
ALP SERPL-CCNC: 84 U/L (ref 40–150)
ALT SERPL W P-5'-P-CCNC: 17 U/L (ref 0–50)
ANION GAP SERPL CALCULATED.3IONS-SCNC: 8 MMOL/L (ref 7–15)
AST SERPL W P-5'-P-CCNC: 24 U/L (ref 0–45)
BASOPHILS # BLD AUTO: 0 10E3/UL (ref 0–0.2)
BASOPHILS NFR BLD AUTO: 1 %
BILIRUB SERPL-MCNC: 0.3 MG/DL
BUN SERPL-MCNC: 14.3 MG/DL (ref 8–23)
CALCIUM SERPL-MCNC: 9.6 MG/DL (ref 8.8–10.2)
CHLORIDE SERPL-SCNC: 104 MMOL/L (ref 98–107)
CREAT SERPL-MCNC: 0.96 MG/DL (ref 0.51–0.95)
DEPRECATED HCO3 PLAS-SCNC: 27 MMOL/L (ref 22–29)
EGFRCR SERPLBLD CKD-EPI 2021: 66 ML/MIN/1.73M2
EOSINOPHIL # BLD AUTO: 0.1 10E3/UL (ref 0–0.7)
EOSINOPHIL NFR BLD AUTO: 2 %
ERYTHROCYTE [DISTWIDTH] IN BLOOD BY AUTOMATED COUNT: 12.2 % (ref 10–15)
GLUCOSE SERPL-MCNC: 103 MG/DL (ref 70–99)
HCT VFR BLD AUTO: 36.4 % (ref 35–47)
HGB BLD-MCNC: 12.5 G/DL (ref 11.7–15.7)
IMM GRANULOCYTES # BLD: 0 10E3/UL
IMM GRANULOCYTES NFR BLD: 0 %
LYMPHOCYTES # BLD AUTO: 0.7 10E3/UL (ref 0.8–5.3)
LYMPHOCYTES NFR BLD AUTO: 21 %
MCH RBC QN AUTO: 36.2 PG (ref 26.5–33)
MCHC RBC AUTO-ENTMCNC: 34.3 G/DL (ref 31.5–36.5)
MCV RBC AUTO: 106 FL (ref 78–100)
MONOCYTES # BLD AUTO: 0.2 10E3/UL (ref 0–1.3)
MONOCYTES NFR BLD AUTO: 7 %
NEUTROPHILS # BLD AUTO: 2.3 10E3/UL (ref 1.6–8.3)
NEUTROPHILS NFR BLD AUTO: 69 %
NRBC # BLD AUTO: 0 10E3/UL
NRBC BLD AUTO-RTO: 0 /100
PLATELET # BLD AUTO: 182 10E3/UL (ref 150–450)
POTASSIUM SERPL-SCNC: 4.1 MMOL/L (ref 3.4–5.3)
PROT SERPL-MCNC: 6.8 G/DL (ref 6.4–8.3)
RBC # BLD AUTO: 3.45 10E6/UL (ref 3.8–5.2)
SODIUM SERPL-SCNC: 139 MMOL/L (ref 135–145)
WBC # BLD AUTO: 3.3 10E3/UL (ref 4–11)

## 2024-04-19 PROCEDURE — 82040 ASSAY OF SERUM ALBUMIN: CPT

## 2024-04-19 PROCEDURE — 36415 COLL VENOUS BLD VENIPUNCTURE: CPT

## 2024-04-19 PROCEDURE — 85025 COMPLETE CBC W/AUTO DIFF WBC: CPT

## 2024-04-23 DIAGNOSIS — C50.911 INVASIVE LOBULAR CARCINOMA OF RIGHT BREAST IN FEMALE (H): Primary | ICD-10-CM

## 2024-05-13 ENCOUNTER — DOCUMENTATION ONLY (OUTPATIENT)
Dept: ONCOLOGY | Facility: CLINIC | Age: 64
End: 2024-05-13

## 2024-05-13 ENCOUNTER — LAB (OUTPATIENT)
Dept: LAB | Facility: CLINIC | Age: 64
End: 2024-05-13
Attending: NURSE PRACTITIONER
Payer: COMMERCIAL

## 2024-05-13 ENCOUNTER — HOSPITAL ENCOUNTER (OUTPATIENT)
Dept: MAMMOGRAPHY | Facility: CLINIC | Age: 64
Discharge: HOME | End: 2024-05-13
Attending: NURSE PRACTITIONER
Payer: COMMERCIAL

## 2024-05-13 DIAGNOSIS — Z12.31 ENCOUNTER FOR SCREENING MAMMOGRAM FOR BREAST CANCER: ICD-10-CM

## 2024-05-13 DIAGNOSIS — C50.911 INVASIVE LOBULAR CARCINOMA OF RIGHT BREAST IN FEMALE (H): ICD-10-CM

## 2024-05-13 LAB
ALBUMIN SERPL BCG-MCNC: 4.1 G/DL (ref 3.5–5.2)
ALP SERPL-CCNC: 95 U/L (ref 40–150)
ALT SERPL W P-5'-P-CCNC: 19 U/L (ref 0–50)
ANION GAP SERPL CALCULATED.3IONS-SCNC: 6 MMOL/L (ref 7–15)
AST SERPL W P-5'-P-CCNC: 27 U/L (ref 0–45)
BASOPHILS # BLD AUTO: 0 10E3/UL (ref 0–0.2)
BASOPHILS NFR BLD AUTO: 2 %
BILIRUB SERPL-MCNC: 0.3 MG/DL
BUN SERPL-MCNC: 15.8 MG/DL (ref 8–23)
CALCIUM SERPL-MCNC: 9.8 MG/DL (ref 8.8–10.2)
CHLORIDE SERPL-SCNC: 104 MMOL/L (ref 98–107)
CREAT SERPL-MCNC: 1.02 MG/DL (ref 0.51–0.95)
DEPRECATED HCO3 PLAS-SCNC: 29 MMOL/L (ref 22–29)
EGFRCR SERPLBLD CKD-EPI 2021: 62 ML/MIN/1.73M2
EOSINOPHIL # BLD AUTO: 0 10E3/UL (ref 0–0.7)
EOSINOPHIL NFR BLD AUTO: 2 %
ERYTHROCYTE [DISTWIDTH] IN BLOOD BY AUTOMATED COUNT: 12.5 % (ref 10–15)
GLUCOSE SERPL-MCNC: 101 MG/DL (ref 70–99)
HCT VFR BLD AUTO: 35.9 % (ref 35–47)
HGB BLD-MCNC: 12.5 G/DL (ref 11.7–15.7)
IMM GRANULOCYTES # BLD: 0 10E3/UL
IMM GRANULOCYTES NFR BLD: 0 %
LYMPHOCYTES # BLD AUTO: 0.5 10E3/UL (ref 0.8–5.3)
LYMPHOCYTES NFR BLD AUTO: 20 %
MCH RBC QN AUTO: 36.7 PG (ref 26.5–33)
MCHC RBC AUTO-ENTMCNC: 34.8 G/DL (ref 31.5–36.5)
MCV RBC AUTO: 105 FL (ref 78–100)
MONOCYTES # BLD AUTO: 0.3 10E3/UL (ref 0–1.3)
MONOCYTES NFR BLD AUTO: 12 %
NEUTROPHILS # BLD AUTO: 1.7 10E3/UL (ref 1.6–8.3)
NEUTROPHILS NFR BLD AUTO: 65 %
NRBC # BLD AUTO: 0 10E3/UL
NRBC BLD AUTO-RTO: 0 /100
PLATELET # BLD AUTO: 183 10E3/UL (ref 150–450)
POTASSIUM SERPL-SCNC: 4.5 MMOL/L (ref 3.4–5.3)
PROT SERPL-MCNC: 7.1 G/DL (ref 6.4–8.3)
RBC # BLD AUTO: 3.41 10E6/UL (ref 3.8–5.2)
SODIUM SERPL-SCNC: 139 MMOL/L (ref 135–145)
WBC # BLD AUTO: 2.6 10E3/UL (ref 4–11)

## 2024-05-13 PROCEDURE — 85025 COMPLETE CBC W/AUTO DIFF WBC: CPT

## 2024-05-13 PROCEDURE — 82040 ASSAY OF SERUM ALBUMIN: CPT

## 2024-05-13 PROCEDURE — 36415 COLL VENOUS BLD VENIPUNCTURE: CPT

## 2024-05-13 PROCEDURE — 77063 BREAST TOMOSYNTHESIS BI: CPT | Mod: 52

## 2024-05-20 DIAGNOSIS — C50.911 INVASIVE LOBULAR CARCINOMA OF RIGHT BREAST IN FEMALE (H): Primary | ICD-10-CM

## 2024-05-24 DIAGNOSIS — I48.0 PAROXYSMAL ATRIAL FIBRILLATION (H): ICD-10-CM

## 2024-05-24 RX ORDER — FLECAINIDE ACETATE 100 MG/1
100 TABLET ORAL 2 TIMES DAILY
Qty: 180 TABLET | Refills: 0 | Status: SHIPPED | OUTPATIENT
Start: 2024-05-24

## 2024-05-24 NOTE — TELEPHONE ENCOUNTER
Last Office Visit: 6/5/23 Cherelle Romero   Next Office Visit: None scheduled   Last Fill Date: Not listed on fax     Stacy Pena CMA 5/24/2024 8:38 AM

## 2024-05-24 NOTE — TELEPHONE ENCOUNTER
[facility] Cardiology Refill Guideline reviewed.  Medication meets criteria for refill.    90 day supply given, pt due for annual clinic visit.     Mary Silva RN

## 2024-06-08 ENCOUNTER — HEALTH MAINTENANCE LETTER (OUTPATIENT)
Age: 64
End: 2024-06-08

## 2024-06-10 ENCOUNTER — DOCUMENTATION ONLY (OUTPATIENT)
Dept: ONCOLOGY | Facility: CLINIC | Age: 64
End: 2024-06-10

## 2024-06-10 ENCOUNTER — LAB (OUTPATIENT)
Dept: LAB | Facility: CLINIC | Age: 64
End: 2024-06-10
Payer: COMMERCIAL

## 2024-06-10 DIAGNOSIS — C50.911 INVASIVE LOBULAR CARCINOMA OF RIGHT BREAST IN FEMALE (H): ICD-10-CM

## 2024-06-10 LAB
ALBUMIN SERPL BCG-MCNC: 4.2 G/DL (ref 3.5–5.2)
ALP SERPL-CCNC: 91 U/L (ref 40–150)
ALT SERPL W P-5'-P-CCNC: 21 U/L (ref 0–50)
ANION GAP SERPL CALCULATED.3IONS-SCNC: 13 MMOL/L (ref 7–15)
AST SERPL W P-5'-P-CCNC: 30 U/L (ref 0–45)
BASOPHILS # BLD AUTO: 0.1 10E3/UL (ref 0–0.2)
BASOPHILS NFR BLD AUTO: 2 %
BILIRUB SERPL-MCNC: 0.6 MG/DL
BUN SERPL-MCNC: 11.3 MG/DL (ref 8–23)
CALCIUM SERPL-MCNC: 9.5 MG/DL (ref 8.8–10.2)
CHLORIDE SERPL-SCNC: 99 MMOL/L (ref 98–107)
CREAT SERPL-MCNC: 0.88 MG/DL (ref 0.51–0.95)
DEPRECATED HCO3 PLAS-SCNC: 23 MMOL/L (ref 22–29)
EGFRCR SERPLBLD CKD-EPI 2021: 73 ML/MIN/1.73M2
EOSINOPHIL # BLD AUTO: 0 10E3/UL (ref 0–0.7)
EOSINOPHIL NFR BLD AUTO: 2 %
ERYTHROCYTE [DISTWIDTH] IN BLOOD BY AUTOMATED COUNT: 12.5 % (ref 10–15)
GLUCOSE SERPL-MCNC: 88 MG/DL (ref 70–99)
HCT VFR BLD AUTO: 37.8 % (ref 35–47)
HGB BLD-MCNC: 12.9 G/DL (ref 11.7–15.7)
IMM GRANULOCYTES # BLD: 0 10E3/UL
IMM GRANULOCYTES NFR BLD: 0 %
LYMPHOCYTES # BLD AUTO: 0.6 10E3/UL (ref 0.8–5.3)
LYMPHOCYTES NFR BLD AUTO: 22 %
MCH RBC QN AUTO: 36.2 PG (ref 26.5–33)
MCHC RBC AUTO-ENTMCNC: 34.1 G/DL (ref 31.5–36.5)
MCV RBC AUTO: 106 FL (ref 78–100)
MONOCYTES # BLD AUTO: 0.3 10E3/UL (ref 0–1.3)
MONOCYTES NFR BLD AUTO: 10 %
NEUTROPHILS # BLD AUTO: 1.6 10E3/UL (ref 1.6–8.3)
NEUTROPHILS NFR BLD AUTO: 64 %
NRBC # BLD AUTO: 0 10E3/UL
NRBC BLD AUTO-RTO: 0 /100
PLATELET # BLD AUTO: 185 10E3/UL (ref 150–450)
POTASSIUM SERPL-SCNC: 4.3 MMOL/L (ref 3.4–5.3)
PROT SERPL-MCNC: 6.9 G/DL (ref 6.4–8.3)
RBC # BLD AUTO: 3.56 10E6/UL (ref 3.8–5.2)
SODIUM SERPL-SCNC: 135 MMOL/L (ref 135–145)
WBC # BLD AUTO: 2.5 10E3/UL (ref 4–11)

## 2024-06-10 PROCEDURE — 36415 COLL VENOUS BLD VENIPUNCTURE: CPT

## 2024-06-10 PROCEDURE — 85004 AUTOMATED DIFF WBC COUNT: CPT

## 2024-06-10 PROCEDURE — 80053 COMPREHEN METABOLIC PANEL: CPT

## 2024-06-18 DIAGNOSIS — C50.911 INVASIVE LOBULAR CARCINOMA OF RIGHT BREAST IN FEMALE (H): Primary | ICD-10-CM

## 2024-06-25 ENCOUNTER — HOSPITAL ENCOUNTER (OUTPATIENT)
Dept: CT IMAGING | Facility: CLINIC | Age: 64
Discharge: HOME | End: 2024-06-25
Attending: NURSE PRACTITIONER | Admitting: NURSE PRACTITIONER
Payer: COMMERCIAL

## 2024-06-25 DIAGNOSIS — M89.9 LESION OF BONE OF LUMBOSACRAL SPINE: ICD-10-CM

## 2024-06-25 DIAGNOSIS — C50.911 INVASIVE LOBULAR CARCINOMA OF RIGHT BREAST IN FEMALE (H): ICD-10-CM

## 2024-06-25 PROCEDURE — 72192 CT PELVIS W/O DYE: CPT

## 2024-06-25 RX ORDER — IOPAMIDOL 755 MG/ML
80 INJECTION, SOLUTION INTRAVASCULAR ONCE
Status: DISCONTINUED | OUTPATIENT
Start: 2024-06-25 | End: 2024-06-26 | Stop reason: HOSPADM

## 2024-07-09 ENCOUNTER — ONCOLOGY VISIT (OUTPATIENT)
Dept: ONCOLOGY | Facility: CLINIC | Age: 64
End: 2024-07-09
Attending: INTERNAL MEDICINE
Payer: COMMERCIAL

## 2024-07-09 ENCOUNTER — APPOINTMENT (OUTPATIENT)
Dept: LAB | Facility: CLINIC | Age: 64
End: 2024-07-09
Payer: COMMERCIAL

## 2024-07-09 VITALS
WEIGHT: 159.6 LBS | HEIGHT: 67 IN | OXYGEN SATURATION: 99 % | BODY MASS INDEX: 25.05 KG/M2 | RESPIRATION RATE: 12 BRPM | HEART RATE: 72 BPM | TEMPERATURE: 98.8 F | DIASTOLIC BLOOD PRESSURE: 96 MMHG | SYSTOLIC BLOOD PRESSURE: 155 MMHG

## 2024-07-09 DIAGNOSIS — C50.911 INVASIVE LOBULAR CARCINOMA OF RIGHT BREAST IN FEMALE (H): ICD-10-CM

## 2024-07-09 LAB
ALBUMIN SERPL BCG-MCNC: 3.9 G/DL (ref 3.5–5.2)
ALP SERPL-CCNC: 80 U/L (ref 40–150)
ALT SERPL W P-5'-P-CCNC: 17 U/L (ref 0–50)
ANION GAP SERPL CALCULATED.3IONS-SCNC: 8 MMOL/L (ref 7–15)
AST SERPL W P-5'-P-CCNC: 23 U/L (ref 0–45)
BASOPHILS # BLD AUTO: 0 10E3/UL (ref 0–0.2)
BASOPHILS NFR BLD AUTO: 1 %
BILIRUB SERPL-MCNC: 0.3 MG/DL
BUN SERPL-MCNC: 11.4 MG/DL (ref 8–23)
CALCIUM SERPL-MCNC: 9.7 MG/DL (ref 8.8–10.2)
CHLORIDE SERPL-SCNC: 100 MMOL/L (ref 98–107)
CREAT SERPL-MCNC: 0.82 MG/DL (ref 0.51–0.95)
DEPRECATED HCO3 PLAS-SCNC: 25 MMOL/L (ref 22–29)
EGFRCR SERPLBLD CKD-EPI 2021: 80 ML/MIN/1.73M2
EOSINOPHIL # BLD AUTO: 0.1 10E3/UL (ref 0–0.7)
EOSINOPHIL NFR BLD AUTO: 2 %
ERYTHROCYTE [DISTWIDTH] IN BLOOD BY AUTOMATED COUNT: 12.4 % (ref 10–15)
GLUCOSE SERPL-MCNC: 107 MG/DL (ref 70–99)
HCT VFR BLD AUTO: 35.9 % (ref 35–47)
HGB BLD-MCNC: 12.6 G/DL (ref 11.7–15.7)
IMM GRANULOCYTES # BLD: 0 10E3/UL
IMM GRANULOCYTES NFR BLD: 1 %
LYMPHOCYTES # BLD AUTO: 0.5 10E3/UL (ref 0.8–5.3)
LYMPHOCYTES NFR BLD AUTO: 18 %
MCH RBC QN AUTO: 37.1 PG (ref 26.5–33)
MCHC RBC AUTO-ENTMCNC: 35.1 G/DL (ref 31.5–36.5)
MCV RBC AUTO: 106 FL (ref 78–100)
MONOCYTES # BLD AUTO: 0.3 10E3/UL (ref 0–1.3)
MONOCYTES NFR BLD AUTO: 10 %
NEUTROPHILS # BLD AUTO: 2 10E3/UL (ref 1.6–8.3)
NEUTROPHILS NFR BLD AUTO: 68 %
NRBC # BLD AUTO: 0 10E3/UL
NRBC BLD AUTO-RTO: 0 /100
PLATELET # BLD AUTO: 205 10E3/UL (ref 150–450)
POTASSIUM SERPL-SCNC: 4.2 MMOL/L (ref 3.4–5.3)
PROT SERPL-MCNC: 7 G/DL (ref 6.4–8.3)
RBC # BLD AUTO: 3.4 10E6/UL (ref 3.8–5.2)
SODIUM SERPL-SCNC: 133 MMOL/L (ref 135–145)
WBC # BLD AUTO: 2.9 10E3/UL (ref 4–11)

## 2024-07-09 PROCEDURE — 36415 COLL VENOUS BLD VENIPUNCTURE: CPT | Performed by: INTERNAL MEDICINE

## 2024-07-09 PROCEDURE — 99214 OFFICE O/P EST MOD 30 MIN: CPT | Performed by: INTERNAL MEDICINE

## 2024-07-09 PROCEDURE — 85025 COMPLETE CBC W/AUTO DIFF WBC: CPT | Performed by: INTERNAL MEDICINE

## 2024-07-09 PROCEDURE — G2211 COMPLEX E/M VISIT ADD ON: HCPCS | Performed by: INTERNAL MEDICINE

## 2024-07-09 PROCEDURE — 82040 ASSAY OF SERUM ALBUMIN: CPT | Performed by: INTERNAL MEDICINE

## 2024-07-09 ASSESSMENT — PAIN SCALES - GENERAL: PAINLEVEL: NO PAIN (0)

## 2024-07-09 NOTE — PROGRESS NOTES
"Oncology Rooming Note    July 9, 2024 3:41 PM   Sheila Schilling is a 63 year old female who presents for:    Chief Complaint   Patient presents with    Oncology Clinic Visit     Invasive lobular carcinoma of right breast in female - labs and provider visit      Initial Vitals: BP (!) 155/96 (BP Location: Left arm, Patient Position: Sitting, Cuff Size: Adult Regular)   Pulse 72   Temp 98.8  F (37.1  C) (Tympanic)   Resp 12   Ht 1.708 m (5' 7.25\")   Wt 72.4 kg (159 lb 9.6 oz)   LMP 03/08/2011   SpO2 99%   BMI 24.81 kg/m   Estimated body mass index is 24.81 kg/m  as calculated from the following:    Height as of this encounter: 1.708 m (5' 7.25\").    Weight as of this encounter: 72.4 kg (159 lb 9.6 oz). Body surface area is 1.85 meters squared.  No Pain (0) Comment: Data Unavailable   Patient's last menstrual period was 03/08/2011.  Allergies reviewed: Yes  Medications reviewed: Yes    Medications: Medication refills not needed today.  Pharmacy name entered into [facility]:    [facility] - [place], [place] - [address] [id number] - [place], [place] - [address] AT [address] [facility] - [place], [place] - [address]     Frailty Screening:   Is the patient here for a new oncology consult visit in cancer care? 2. No      Clinical concerns: None today.       Jessie Marti MA            "

## 2024-07-09 NOTE — NURSING NOTE
"Chief Complaint   Patient presents with     Consult     Breast cancer reconstruction -- Ref. By        Vitals:    05/31/23 1239   BP: (!) 152/81   Pulse: 75   SpO2: 98%   Height: 1.727 m (5' 8\")       Body mass index is 26.3 kg/m .          TONE MITCHELL EMT    " independence and safety performing ADL/IADL tasks, decreased activity and standing tolerance, decreased functional endurance, and impaired balance in order to improve functional independence, obtain set goals, reduce risk of falls, reduce pain, improve quality of life, and return to PLOF.    PLAN FOR NEXT VISIT: Balance, UB strengthening, energy conservation, safety and daily tasks per POC    THE FOLLOWING DISCHARGE PLANNING WAS DISCUSSED WITH THE PATIENT/CAREGIVER: Pt oriented to current duration and frequency of HHOT and affirmed understanding.

## 2024-07-09 NOTE — PROGRESS NOTES
[facility] Hematology and Oncology Progress Note    Patient: Sheila Schilling  MRN: [id number]  7/09/24        Reason for Visit    Right breast cancer    Primary Oncologist: Dr. Dooley    Assessment and Plan  # Stage IB (pT3-pN1a(sn)-cM0) Invasive lobular carcinoma of the right breast, ER/OK+; high risk features (large tumor size, 1 positive lymph node)  #  Chemo-induced neutropenia (grade 1)  #  Elevated creatinine (gr 1), possible abemaciclib side effect    Status post right mastectomy and adjuvant radiation 2023.    Currently on adjuvant abemaciclib 150 mg BID and letrozole starting in October 2023.    Labs reviewed today.  Continues to have mild leukopenia which is predominantly lymphocytopenia.  Normal neutrophil count.  Normal hemoglobin.  Elevated MCV as expected.  Serum chemistry within normal limits.  No significant side effects from letrozole other than some mild hot flashes.  Overall doing well.  Continue Verzenio and letrozole.  Intended duration of treatment of Verzenio is a total of 2 years.  So she will be finishing up in October 2025.  Continue labs on a monthly basis for now.  If her counts remain stable then after next visit we can switch her to once every 3-month labs.    #  Indeterminate L4 sclerotic lesion, likely benign  CT cap was ordered by Plastic Surgeon in prep for breast reconstruction. Incidental 10 mm L4 sclerotic lesion is indeterminate for benign bone island vs met.  Bone scan negative for mets.  Reviewed her CT scan from 6/25/2024.  Continues to have a stable looking 9 mm sclerotic lesion of the L4 vertebral body.  This was not active on the bone scan.  Most likely a bone island.  No need for any further evaluation or surveillance for this.    # Mild osteopenia  Baseline DEXA shows mild osteopenia with 0.1% hip fracture risk/10 yrs.     Plan:  -Continue calcium and vitamin D.    -Repeat DEXA in 2 years (10/2025)       Cancer Staging   Invasive lobular carcinoma of right  breast in female (H)  Staging form: Breast, AJCC 8th Edition  - Pathologic stage from 6/19/2023: Stage IB (pT3, pN1a, cM0, G2, ER+, VA+, HER2-, Oncotype DX score: 13) - Signed by Ligia Felix NP on 10/26/2023       Oncology history  5/2023: Stage IB (gW5-uC6c-hM6) right breast cancer, ER/VA+  -screening mammogram: asymmetry in upper outer quadrant of her right breast.   -R diagnostic mammogram +ultrasound: architectural distortion with 12 x 10 x 8 mm ill-defined mass with acoustic shadowing respectively. Needle biopsy: invasive lobular carcinoma, grade 2; strongly ER and VA positive and HER2 negative.   -contrast-enhanced mammogram (due to very dense breasts): 7.8 x 6.7 x 5.3 cm enhancement extending from 11:00 to 8 o'clock position consistent with malignancy.   -6/2023 surgical path: 15 cm invasive lobular carcinoma. Single 4 mm sLN positive   -Oncotype DX score 13  -11/2023 CT abd/pelvis done by Plastic Surgery for reconstruction planning, incidentally noted L4 bone lesion indeterminate. Bone scan negative for mets.    Treatment:  6/19/2023: right mastectomy and sLN biopsy. Subsequent right breast reconstruction.    9/26/2023: completed adjuvant RT (5000 cGy/25)    10/16/2023 - present: adjuvant abemaciclib 150 mg BID and letrozole    Interval History   Sheila Schilling is a 63 year old diagnosed with right ER/VA+ breast cancer with sLN involvement; s/p right mastectomy , adjuvant RT and now on adjuvant letrozole and abemaciclib.        She has done fairly well on abemaciclib and letrozole.  No significant side effects.  No significant diarrhea.  No fevers.  Tolerating letrozole fairly okay.  No major side effects so far.      ECOG Performance  0 - Independent      Physical Exam    General: alert and cooperative  Lymph: No cervical nor axillary adenopathy  HEENT: No icterus. No alopecia  Breasts: Status post right mastectomy.  No evidence of bilateral axillary adenopathy.  Extremities: No edema  Neuro:  Non-focal    Lab Results    No results found for this or any previous visit (from the past 168 hour(s)).      Imaging    CT Pelvis Bone wo Contrast    Result Date: 6/25/2024  CT PELVIS BONE WITHOUT CONTRAST DATE/TIME: 6/25/2024 11:09 AM INDICATION: Left L4 lesion. History of breast cancer. COMPARISON: CT abdomen/pelvis dated 1/12/2023. TECHNIQUE: Noncontrast. Axial, sagittal and coronal thin-section reconstruction. Dose reduction techniques were used. FINDINGS: BONES: -Again seen is a 9 mm sclerotic lesion within the L4 vertebral body which is not significantly changed compared to the 11/2/2023 study. No other sclerotic or lytic osseous lesions are identified. There are degenerative changes in the lower lumbar spine and at the sacroiliac joints. There is mild-to-moderate bilateral hip degenerative arthrosis. No acute fracture is identified. SOFT TISSUES: -No free fluid is seen within the pelvis. No drainable fluid collection. A few scattered arterial calcifications are noted.     IMPRESSION: 1.  Redemonstrated 9 mm sclerotic lesion within the L4 vertebral body which is not significantly changed compared to the 11/2/2023 study. This did not demonstrate any focal uptake on the nuclear medicine bone scan from 11/16/2023 and is most compatible with a bone island. 2.  No other lytic or sclerotic lesions are identified to suggest metastatic disease by CT. 3.  Additional chronic appearing findings, as described. JEZ BOBBY MD   SYSTEM ID:  [id number]     The longitudinal plan of care for the diagnosis(es)/condition(s) as documented were addressed during this visit. Due to the added complexity in care, I will continue to support Sheila in the subsequent management and with ongoing continuity of care.        Signed by: Merna Dooley MD

## 2024-07-09 NOTE — Clinical Note
"7/9/2024      Sheila Schilling  [address]  [facility] [address]      Dear Colleague,    Thank you for referring your patient, Sheila Schilling, to the [facility]. Please see a copy of my visit note below.    Oncology Rooming Note    July 9, 2024 3:41 PM   Sheila Schilling is a 63 year old female who presents for:    Chief Complaint   Patient presents with    Oncology Clinic Visit     Invasive lobular carcinoma of right breast in female - labs and provider visit      Initial Vitals: BP (!) 155/96 (BP Location: Left arm, Patient Position: Sitting, Cuff Size: Adult Regular)   Pulse 72   Temp 98.8  F (37.1  C) (Tympanic)   Resp 12   Ht 1.708 m (5' 7.25\")   Wt 72.4 kg (159 lb 9.6 oz)   LMP 03/08/2011   SpO2 99%   BMI 24.81 kg/m   Estimated body mass index is 24.81 kg/m  as calculated from the following:    Height as of this encounter: 1.708 m (5' 7.25\").    Weight as of this encounter: 72.4 kg (159 lb 9.6 oz). Body surface area is 1.85 meters squared.  No Pain (0) Comment: Data Unavailable   Patient's last menstrual period was 03/08/2011.  Allergies reviewed: Yes  Medications reviewed: Yes    Medications: Medication refills not needed today.  Pharmacy name entered into [facility]:    [facility] - [place], [place] - [id number] - [place], [place] - [address] AT [address] [facility] - [place], [place] - [address] [place] [address]    Frailty Screening:   Is the patient here for a new oncology consult visit in cancer care? 2. No      Clinical concerns: None today.       Jessie Marti MA              .[facility] Hematology and Oncology Progress Note    Patient: Sheila Schilling  MRN: [id number]  7/09/24        Reason for Visit    Right breast cancer    Primary Oncologist: Dr. Dooley    Assessment and Plan  # Stage IB (pT3-pN1a(sn)-cM0) Invasive lobular carcinoma of the right breast, ER/WA+; high risk " features (large tumor size, 1 positive lymph node)  #  Chemo-induced neutropenia (grade 1)  #  Elevated creatinine (gr 1), possible abemaciclib side effect    Status post right mastectomy and adjuvant radiation 2023.    Currently on adjuvant abemaciclib 150 mg BID and letrozole x 5 mths.     Tolerating treatment very well. Mild hot flashes, manageable. Mild leukopenia, no neutropenia. Creatinine has been mildly elevated 0.95-1.1.    Today, labs: WBC 3.6, ANC WNL, plat and hgb WNL. CMP: Creatinine 1.11 (up slightly). LFTs WNL.    No evidence of recurrence.   Post-mastectomy and RT right chest wall/axillary fibrosis.     Reports intermittent low abd cramping, exam normal.     Plan:  -Continue abemaciclib 150 mg BID  -Continue daily letrozole, planning up to 10 yrs  -Continue monthly labs to follow creatinine  -See Ligia/Dr. Dooley every 3 months.  If she continues to have stable labs/tolerance, can likely start decreasing frequency of lab monitoring to every 3 mths.   -Annual left mammogram due May  -ROM exercises for right axillary/chest wall fibrosis. Refer to PT if progressive.  -Monitor low abd cramping for now, sounds benign. Call if progressive issue    #  Indeterminate L4 sclerotic lesion, likely benign  CT cap was ordered by Plastic Surgeon in prep for breast reconstruction. Incidental 10 mm L4 sclerotic lesion is indeterminate for benign bone island vs met.  Bone scan negative for mets.    Plan:  -Repeat pelvic CT in 6-12 mths to ensure stable. Will repeat this ahead of her 3-mth visit    # Mild osteopenia  Baseline DEXA shows mild osteopenia with 0.1% hip fracture risk/10 yrs.     Plan:  -Continue calcium and vitamin D.    -Repeat DEXA in 2 years (10/2025)       Cancer Staging   Invasive lobular carcinoma of right breast in female (H)  Staging form: Breast, AJCC 8th Edition  - Pathologic stage from 6/19/2023: Stage IB (pT3, pN1a, cM0, G2, ER+, LA+, HER2-, Oncotype DX score: 13) - Signed by Ligia Felix NP on  10/26/2023       Oncology history  5/2023: Stage IB ([id number]) right breast cancer, ER/CO+  -screening mammogram: asymmetry in upper outer quadrant of her right breast.   -R diagnostic mammogram +ultrasound: architectural distortion with 12 x 10 x 8 mm ill-defined mass with acoustic shadowing respectively. Needle biopsy: invasive lobular carcinoma, grade 2; strongly ER and CO positive and HER2 negative.   -contrast-enhanced mammogram (due to very dense breasts): 7.8 x 6.7 x 5.3 cm enhancement extending from 11:00 to 8 o'clock position consistent with malignancy.   -6/2023 surgical path: 15 cm invasive lobular carcinoma. Single 4 mm sLN positive   -Oncotype DX score 13  -11/2023 CT abd/pelvis done by Plastic Surgery for reconstruction planning, incidentally noted L4 bone lesion indeterminate. Bone scan negative for mets.    Treatment:  6/19/2023: right mastectomy and sLN biopsy. Subsequent right breast reconstruction.    9/26/2023: completed adjuvant RT (5000 cGy/25)    10/16/2023 - present: adjuvant abemaciclib 150 mg BID and letrozole    Interval History   Sheila Schilling is a 63 year old diagnosed with right ER/CO+ breast cancer with sLN involvement; s/p right mastectomy ~9 mths ago, adjuvant RT and now on adjuvant letrozole and abemaciclib.   Returns for 2-mth visit.     She has completed 5 months so far.    No significant side effects other than mild diarrhea intermittently (one day/month). Occasional lower abd cramps. No nausea. No  symptoms.   Minimal hot flashes.   No fevers or chills.      No new lumps or bumps reported.  No bone pain reported.    Right chest wall/axillary tightness post mastectomy and RT. Massages/stretches with benefit.  Weight stable.    ECOG Performance  0 - Independent      Physical Exam    General: alert and cooperative  Lymph: No cervical nor axillary adenopathy  HEENT: No icterus. No alopecia  Breasts: Not reassessed today. Exam 1/30/2024.  Heart: RRR  Lungs: Clear bilaterally    Abd: Soft, non-distended, non-tender. No lower quad masses, pain.   Extremities: No edema  Neuro: Non-focal    Lab Results    No results found for this or any previous visit (from the past 168 hour(s)).      Imaging    CT Pelvis Bone wo Contrast    Result Date: 6/25/2024  CT PELVIS BONE WITHOUT CONTRAST DATE/TIME: 6/25/2024 11:09 AM INDICATION: Left L4 lesion. History of breast cancer. COMPARISON: CT abdomen/pelvis dated 1/12/2023. TECHNIQUE: Noncontrast. Axial, sagittal and coronal thin-section reconstruction. Dose reduction techniques were used. FINDINGS: BONES: -Again seen is a 9 mm sclerotic lesion within the L4 vertebral body which is not significantly changed compared to the 11/2/2023 study. No other sclerotic or lytic osseous lesions are identified. There are degenerative changes in the lower lumbar spine and at the sacroiliac joints. There is mild-to-moderate bilateral hip degenerative arthrosis. No acute fracture is identified. SOFT TISSUES: -No free fluid is seen within the pelvis. No drainable fluid collection. A few scattered arterial calcifications are noted.     IMPRESSION: 1.  Redemonstrated 9 mm sclerotic lesion within the L4 vertebral body which is not significantly changed compared to the 11/2/2023 study. This did not demonstrate any focal uptake on the nuclear medicine bone scan from 11/16/2023 and is most compatible with a bone island. 2.  No other lytic or sclerotic lesions are identified to suggest metastatic disease by CT. 3.  Additional chronic appearing findings, as described. JEZ BOBBY MD   SYSTEM ID:  [id number]     Total time 30 minutes, to include face to face visit, review of EMR, ordering, documentation and coordination of care on date of service.    complexity modifier for longitudinal care.       Signed by: Merna Dooley MD      Again, thank you for allowing me to participate in the care of your patient.        Sincerely,        Merna Dooley MD

## 2024-07-16 DIAGNOSIS — C50.911 INVASIVE LOBULAR CARCINOMA OF RIGHT BREAST IN FEMALE (H): Primary | ICD-10-CM

## 2024-07-21 SDOH — HEALTH STABILITY: PHYSICAL HEALTH: ON AVERAGE, HOW MANY MINUTES DO YOU ENGAGE IN EXERCISE AT THIS LEVEL?: PATIENT DECLINED

## 2024-07-21 SDOH — HEALTH STABILITY: PHYSICAL HEALTH
ON AVERAGE, HOW MANY DAYS PER WEEK DO YOU ENGAGE IN MODERATE TO STRENUOUS EXERCISE (LIKE A BRISK WALK)?: PATIENT DECLINED

## 2024-07-21 ASSESSMENT — ANXIETY QUESTIONNAIRES
1. FEELING NERVOUS, ANXIOUS, OR ON EDGE: NOT AT ALL
GAD7 TOTAL SCORE: 0
GAD7 TOTAL SCORE: 0
5. BEING SO RESTLESS THAT IT IS HARD TO SIT STILL: NOT AT ALL
6. BECOMING EASILY ANNOYED OR IRRITABLE: NOT AT ALL
2. NOT BEING ABLE TO STOP OR CONTROL WORRYING: NOT AT ALL
7. FEELING AFRAID AS IF SOMETHING AWFUL MIGHT HAPPEN: NOT AT ALL
GAD7 TOTAL SCORE: 0
4. TROUBLE RELAXING: NOT AT ALL
7. FEELING AFRAID AS IF SOMETHING AWFUL MIGHT HAPPEN: NOT AT ALL
3. WORRYING TOO MUCH ABOUT DIFFERENT THINGS: NOT AT ALL

## 2024-07-21 ASSESSMENT — SOCIAL DETERMINANTS OF HEALTH (SDOH): HOW OFTEN DO YOU GET TOGETHER WITH FRIENDS OR RELATIVES?: PATIENT DECLINED

## 2024-07-23 ENCOUNTER — OFFICE VISIT (OUTPATIENT)
Dept: FAMILY MEDICINE | Facility: CLINIC | Age: 64
End: 2024-07-23
Payer: COMMERCIAL

## 2024-07-23 VITALS
DIASTOLIC BLOOD PRESSURE: 86 MMHG | HEART RATE: 72 BPM | WEIGHT: 156.5 LBS | OXYGEN SATURATION: 99 % | RESPIRATION RATE: 16 BRPM | BODY MASS INDEX: 25.15 KG/M2 | TEMPERATURE: 97.2 F | SYSTOLIC BLOOD PRESSURE: 136 MMHG | HEIGHT: 66 IN

## 2024-07-23 DIAGNOSIS — I48.0 PAROXYSMAL ATRIAL FIBRILLATION (H): ICD-10-CM

## 2024-07-23 DIAGNOSIS — Z13.29 SCREENING FOR THYROID DISORDER: ICD-10-CM

## 2024-07-23 DIAGNOSIS — Z00.00 ROUTINE GENERAL MEDICAL EXAMINATION AT A HEALTH CARE FACILITY: Primary | ICD-10-CM

## 2024-07-23 DIAGNOSIS — Z23 IMMUNIZATION DUE: ICD-10-CM

## 2024-07-23 DIAGNOSIS — Z13.220 SCREENING FOR HYPERLIPIDEMIA: ICD-10-CM

## 2024-07-23 DIAGNOSIS — Z13.1 SCREENING FOR DIABETES MELLITUS: ICD-10-CM

## 2024-07-23 DIAGNOSIS — M25.512 LEFT SHOULDER PAIN, UNSPECIFIED CHRONICITY: ICD-10-CM

## 2024-07-23 LAB
CHOLEST SERPL-MCNC: 273 MG/DL
FASTING STATUS PATIENT QL REPORTED: YES
FASTING STATUS PATIENT QL REPORTED: YES
GLUCOSE SERPL-MCNC: 85 MG/DL (ref 70–99)
HDLC SERPL-MCNC: 111 MG/DL
LDLC SERPL CALC-MCNC: 141 MG/DL
NONHDLC SERPL-MCNC: 162 MG/DL
TRIGL SERPL-MCNC: 104 MG/DL
TSH SERPL DL<=0.005 MIU/L-ACNC: 1.97 UIU/ML (ref 0.3–4.2)

## 2024-07-23 PROCEDURE — 84443 ASSAY THYROID STIM HORMONE: CPT

## 2024-07-23 PROCEDURE — 99213 OFFICE O/P EST LOW 20 MIN: CPT | Mod: 25

## 2024-07-23 PROCEDURE — 90677 PCV20 VACCINE IM: CPT

## 2024-07-23 PROCEDURE — 82947 ASSAY GLUCOSE BLOOD QUANT: CPT

## 2024-07-23 PROCEDURE — 99396 PREV VISIT EST AGE 40-64: CPT | Mod: 25

## 2024-07-23 PROCEDURE — 90471 IMMUNIZATION ADMIN: CPT

## 2024-07-23 PROCEDURE — 36415 COLL VENOUS BLD VENIPUNCTURE: CPT

## 2024-07-23 PROCEDURE — 80061 LIPID PANEL: CPT

## 2024-07-23 ASSESSMENT — PAIN SCALES - GENERAL: PAINLEVEL: NO PAIN (0)

## 2024-07-23 NOTE — PROGRESS NOTES
"Preventive Care Visit  [facility]  SINCERE Klein CNP, Nurse Practitioner Primary Care  Jul 23, 2024      Assessment & Plan     Routine general medical examination at a health care facility  Patient is in overall good health and medically stable. Patient has a history of breast cancer and is currently following up with oncology. No changes to medications during this visit.     - REVIEW OF HEALTH MAINTENANCE PROTOCOL ORDERS    Left shoulder pain, unspecified chronicity  Patient reports she is stretching at home and this is helping left should mild pain. Patient education regarding shoulder and scapula stretches.    Paroxysmal atrial fibrillation (H)  History of A-Fib. Patient is currently stable.    Immunization due    - Pneumococcal 20 Valent Conjugate (Prevnar 20)    Screening for hyperlipidemia  Family history of hyperlipidemia. Labs prior to cancer treatment indicated elevated lipids. Blood pressure elevated during visit. Advised patient to return for nurse visit for blood pressure check in two weeks.     - Lipid panel reflex to direct LDL Fasting; Future  - Lipid panel reflex to direct LDL Fasting    Screening for diabetes mellitus  Patient has family history of diabetes and slightly elevated glucose in the past.   - Glucose; Future  - Glucose    Screening for thyroid disorder  Checking thyroid due to cancer and family history.    - TSH with free T4 reflex; Future  - TSH with free T4 reflex    Patient has been advised of split billing requirements and indicates understanding: Yes    BMI  Estimated body mass index is 25 kg/m  as calculated from the following:    Height as of this encounter: 1.685 m (5' 6.34\").    Weight as of this encounter: 71 kg (156 lb 8 oz).       Counseling  Appropriate preventive services were addressed with this patient via screening, questionnaire, or discussion as appropriate for fall prevention, nutrition, physical activity, Tobacco-use cessation, " weight loss and cognition.  Checklist reviewing preventive services available has been given to the patient.  Reviewed patient's diet, addressing concerns and/or questions.       MEDICATIONS:  Continue current medications without change    Kim Sosa is a 63 year old, presenting for the following:  Annual Visit (Fasting )        7/23/2024     1:46 PM   Additional Questions   Roomed by Urmila DAWN        Health Care Directive  Patient does not have a Health Care Directive or Living Will:     HPI  Annual physical, fasting     Cancer of left breast.  Annual Wellness Visit     Patient has been advised of split billing requirements and indicates understanding: Yes      Health Care Directive  Patient does not have a Health Care Directive or Living Will:       7/21/2024   General Health   How would you rate your overall physical health? Excellent   Feel stress (tense, anxious, or unable to sleep) Not at all             7/21/2024   Nutrition   Diet: Regular (no restrictions)            7/21/2024   Exercise   Days per week of moderate/strenous exercise Patient declined   Average minutes spent exercising at this level Patient declined              7/21/2024   Social Factors   Frequency of gathering with friends or relatives Patient declined   Worry food won't last until get money to buy more No   Food not last or not have enough money for food? No   Do you have housing? (Housing is defined as stable permanent housing and does not include staying ouside in a car, in a tent, in an abandoned building, in an overnight shelter, or couch-surfing.) Yes   Are you worried about losing your housing? No   Lack of transportation? No   Unable to get utilities (heat,electricity)? No              7/21/2024   Fall Risk   Fallen 2 or more times in the past year? No   Trouble with walking or balance? No              No data to display                  7/21/2024   Dental   Dentist two times every year? Yes             No data to display                      No data to display                  2024   TB Screening   Were you born outside of the [place]? No          Social History     Tobacco Use    Smoking status: Never    Smokeless tobacco: Never   Vaping Use    Vaping status: Never Used   Substance Use Topics    Alcohol use: Not Currently    Drug use: Never         Today's PHQ-2 Score:       2024     1:40 PM   PHQ-2 (  [employer])   Q1: Little interest or pleasure in doing things 0   Q2: Feeling down, depressed or hopeless 0   PHQ-2 Score 0   Q1: Little interest or pleasure in doing things Not at all   Q2: Feeling down, depressed or hopeless Not at all   PHQ-2 Score 0           2024   LAST FHS-7 RESULTS   1st degree relative breast or ovarian cancer No   Any relative bilateral breast cancer No   Any male have breast cancer No   Any ONE woman have BOTH breast AND ovarian cancer No   Any woman with breast cancer before 50yrs No   2 or more relatives with breast AND/OR ovarian cancer No   2 or more relatives with breast AND/OR bowel cancer No           Mammo Decision Support - Begin breast cancer screening at age 25 or 8 years after chest radiation treatment with alternating annual mammography and breast MRI        2024   STI Screening   New sexual partner(s) since last STI/HIV test? No            Latest Ref Rng & Units 2022     1:37 PM 2017     8:16 AM 2010    12:00 AM   PAP / HPV   PAP  Negative for Intraepithelial Lesion or Malignancy (NILM)      PAP (Historical)   NIL  NIL    HPV 16 DNA Negative Negative  Negative     HPV 18 DNA Negative Negative  Negative     Other HR HPV Negative Negative  Negative       ASCVD Risk   The ASCVD Risk score (Crow MELARA, et al., 2019) failed to calculate for the following reasons:    The valid HDL cholesterol range is 20 to 100 mg/dL    Fracture Risk Assessment Tool  Link to Frax Calculator  Use the information below to complete the Frax calculator  : 1960  Sex:  female  Weight (kg): 71 kg (actual weight)  Height (cm): 168.5 cm  Previous Fragility Fracture:  No  History of parent with fractured hip:  yes  Current Smoking:  No  Patient has been on glucocorticoids for more than 3 months (5mg/day or more): Yes  Rheumatoid Arthritis on Problem List:  No  Secondary Osteoporosis on Problem List:  No  Consumes 3 or more units of alcohol per day: Yes  Femoral Neck BMD (g/cm2)             Reviewed and updated as needed this visit by Provider     Meds                Past Medical History:   Diagnosis Date    Atrial fibrillation (H)     Palpitations 6/24/2017    Paroxysmal atrial flutter (H)        Current providers sharing in care for this patient include:  Patient Care Team:  Kala Bravo APRN CNP as PCP - General (Nurse Practitioner Primary Care)  Megan Tolentino PA-C as Physician Assistant (Dermatology)  Ashley Mendoza APRN CNP as Nurse Practitioner (Cardiovascular Disease)  Merna Dooley MD as MD (Medical Oncology)  Sanjiv Mcdermott MD as Assigned Surgical Provider  Vonda Akins GC as Genetic Counselor (Genetic )  Malia Romero PA-C as Assigned Heart and Vascular Provider  Kala Bear MD as MD (Radiation Oncology)  Taylor Farr PA-C as Assigned Musculoskeletal Provider  [facility] as Assigned PCP  Ligia Felix NP as Assigned Cancer Care Provider    The following health maintenance items are reviewed in Epic and correct as of today:  Health Maintenance   Topic Date Due    RSV VACCINE (Pregnancy & 60+) (1 - 1-dose 60+ series) Never done    COVID-19 Vaccine (6 - 2023-24 season) 12/31/2024 (Originally 12/5/2023)    INFLUENZA VACCINE (1) 09/01/2024    MAMMO SCREENING  05/13/2025    YEARLY PREVENTIVE VISIT  07/23/2025    ANNUAL REVIEW OF HM ORDERS  07/23/2025    HPV TEST  01/11/2027    ADVANCE CARE PLANNING  01/11/2027    PAP  01/11/2027    DTAP/TDAP/TD IMMUNIZATION (2 - Td or Tdap) 06/26/2027    GLUCOSE  07/23/2027     LIPID  07/23/2029    COLORECTAL CANCER SCREENING  06/24/2032    HEPATITIS C SCREENING  Completed    HIV SCREENING  Completed    PHQ-2 (once per calendar year)  Completed    Pneumococcal Vaccine: Pediatrics (0 to 5 Years) and At-Risk Patients (6 to 64 Years)  Completed    ZOSTER IMMUNIZATION  Completed    IPV IMMUNIZATION  Aged Out    HPV IMMUNIZATION  Aged Out    MENINGITIS IMMUNIZATION  Aged Out    RSV MONOCLONAL ANTIBODY  Aged Out       Appropriate preventive services were discussed with this patient, including applicable screening as appropriate for fall prevention, nutrition, physical activity, Tobacco-use cessation, weight loss and cognition.  Checklist reviewing preventive services available has been given to the patient.            7/21/2024   General Health   How would you rate your overall physical health? Excellent   Feel stress (tense, anxious, or unable to sleep) Not at all            7/21/2024   Nutrition   Three or more servings of calcium each day? Yes   Diet: Regular (no restrictions)   How many servings of fruit and vegetables per day? (!) 0-1   How many sweetened beverages each day? 0-1            7/21/2024   Exercise   Days per week of moderate/strenous exercise Patient declined   Average minutes spent exercising at this level Patient declined            7/21/2024   Social Factors   Frequency of gathering with friends or relatives Patient declined   Worry food won't last until get money to buy more No   Food not last or not have enough money for food? No   Do you have housing? (Housing is defined as stable permanent housing and does not include staying ouside in a car, in a tent, in an abandoned building, in an overnight shelter, or couch-surfing.) Yes   Are you worried about losing your housing? No   Lack of transportation? No   Unable to get utilities (heat,electricity)? No            7/21/2024   Fall Risk   Fallen 2 or more times in the past year? No   Trouble with walking or balance? No              7/21/2024   Dental   Dentist two times every year? Yes            7/21/2024   TB Screening   Were you born outside of the [place]? No            Today's PHQ-2 Score:       7/23/2024     1:40 PM   PHQ-2 ( 1999 [employer])   Q1: Little interest or pleasure in doing things 0   Q2: Feeling down, depressed or hopeless 0   PHQ-2 Score 0   Q1: Little interest or pleasure in doing things Not at all   Q2: Feeling down, depressed or hopeless Not at all   PHQ-2 Score 0           7/21/2024   Substance Use   Alcohol more than 3/day or more than 7/wk No   Do you use any other substances recreationally? No        Social History     Tobacco Use    Smoking status: Never    Smokeless tobacco: Never   Vaping Use    Vaping status: Never Used   Substance Use Topics    Alcohol use: Not Currently    Drug use: Never           5/13/2024   LAST FHS-7 RESULTS   1st degree relative breast or ovarian cancer No   Any relative bilateral breast cancer No   Any male have breast cancer No   Any ONE woman have BOTH breast AND ovarian cancer No   Any woman with breast cancer before 50yrs No   2 or more relatives with breast AND/OR ovarian cancer No   2 or more relatives with breast AND/OR bowel cancer No           History of cancer right breast.         7/21/2024   STI Screening   New sexual partner(s) since last STI/HIV test? No        History of abnormal Pap smear: No - age 30-64 HPV with reflex Pap every 5 years recommended        Latest Ref Rng & Units 1/11/2022     1:37 PM 7/20/2017     8:16 AM 4/7/2010    12:00 AM   PAP / HPV   PAP  Negative for Intraepithelial Lesion or Malignancy (NILM)      PAP (Historical)   NIL  NIL    HPV 16 DNA Negative Negative  Negative     HPV 18 DNA Negative Negative  Negative     Other HR HPV Negative Negative  Negative       ASCVD Risk   The 10-year ASCVD risk score (Crow MELARA, et al., 2019) is: 4.4%    Values used to calculate the score:      Age: 63 years      Sex: Female      Is Non-   "American: No      Diabetic: No      Tobacco smoker: No      Systolic Blood Pressure: 134 mmHg      Is BP treated: No      HDL Cholesterol: 78 mg/dL      Total Cholesterol: 226 mg/dL    Fracture Risk Assessment Tool  Link to Frax Calculator  Use the information below to complete the Frax calculator  : 1960  Sex: female  Weight (kg): 71 kg (actual weight)  Height (cm): 168.5 cm  Previous Fragility Fracture:  No  History of parent with fractured hip:  Yes  Current Smoking:  No  Patient has been on glucocorticoids for more than 3 months (5mg/day or more): Yes  Rheumatoid Arthritis on Problem List:  No  Secondary Osteoporosis on Problem List:  No  Consumes 3 or more units of alcohol per day: Yes  Femoral Neck BMD (g/cm2)           Reviewed and updated as needed this visit by Provider                    Past Medical History:   Diagnosis Date    Atrial fibrillation (H)     Palpitations 2017    Paroxysmal atrial flutter (H)          Review of Systems  Constitutional, HEENT, cardiovascular, pulmonary, GI, , musculoskeletal, neuro, skin, endocrine and psych systems are negative, except as otherwise noted.     Objective    Exam  BP (!) 134/94 (BP Location: Left arm, Patient Position: Sitting, Cuff Size: Adult Regular)   Pulse 72   Temp 97.2  F (36.2  C) (Tympanic)   Resp 16   Ht 1.685 m (5' 6.34\")   Wt 71 kg (156 lb 8 oz)   LMP 2011   SpO2 99%   BMI 25.00 kg/m     Estimated body mass index is 25 kg/m  as calculated from the following:    Height as of this encounter: 1.685 m (5' 6.34\").    Weight as of this encounter: 71 kg (156 lb 8 oz).    Physical Exam  GENERAL: alert and no distress  EYES: Eyes grossly normal to inspection, PERRL and conjunctivae and sclerae normal  HENT: ear canals and TM's normal, nose and mouth without ulcers or lesions  NECK: no adenopathy, no asymmetry, masses, or scars  RESP: lungs clear to auscultation - no rales, rhonchi or wheezes  CV: regular rate and rhythm, normal " S1 S2, no S3 or S4, no murmur, click or rub, no peripheral edema  ABDOMEN: soft, nontender, no hepatosplenomegaly, no masses and bowel sounds normal  MS: no gross musculoskeletal defects noted, no edema  SKIN: no suspicious lesions or rashes  NEURO: Normal strength and tone, mentation intact and speech normal  PSYCH: mentation appears normal, affect normal/bright        Signed Electronically by: SINCERE Klein CNP    Answers submitted by the patient for this visit:  GANESH-7 (Submitted on 7/21/2024)  GANESH 7 TOTAL SCORE: 0

## 2024-07-23 NOTE — PATIENT INSTRUCTIONS
Return for nurse visit for blood pressure check in two weeks.  Check for RSV vaccine (Abrysvo) at pharmacy.      Patient Education   Preventive Care Advice   This is general advice given by our system to help you stay healthy. However, your care team may have specific advice just for you. Please talk to your care team about your preventive care needs.  Nutrition  Eat 5 or more servings of fruits and vegetables each day.  Try wheat bread, brown rice and whole grain pasta (instead of white bread, rice, and pasta).  Get enough calcium and vitamin D. Check the label on foods and aim for 100% of the RDA (recommended daily allowance).  Lifestyle  Exercise at least 150 minutes each week  (30 minutes a day, 5 days a week).  Do muscle strengthening activities 2 days a week. These help control your weight and prevent disease.  No smoking.  Wear sunscreen to prevent skin cancer.  Have a dental exam and cleaning every 6 months.  Yearly exams  See your health care team every year to talk about:  Any changes in your health.  Any medicines your care team has prescribed.  Preventive care, family planning, and ways to prevent chronic diseases.  Shots (vaccines)   HPV shots (up to age 26), if you've never had them before.  Hepatitis B shots (up to age 59), if you've never had them before.  COVID-19 shot: Get this shot when it's due.  Flu shot: Get a flu shot every year.  Tetanus shot: Get a tetanus shot every 10 years.  Pneumococcal, hepatitis A, and RSV shots: Ask your care team if you need these based on your risk.  Shingles shot (for age 50 and up)  General health tests  Diabetes screening:  Starting at age 35, Get screened for diabetes at least every 3 years.  If you are younger than age 35, ask your care team if you should be screened for diabetes.  Cholesterol test: At age 39, start having a cholesterol test every 5 years, or more often if advised.  Bone density scan (DEXA): At age 50, ask your care team if you should have this  scan for osteoporosis (brittle bones).  Hepatitis C: Get tested at least once in your life.  STIs (sexually transmitted infections)  Before age 24: Ask your care team if you should be screened for STIs.  After age 24: Get screened for STIs if you're at risk. You are at risk for STIs (including HIV) if:  You are sexually active with more than one person.  You don't use condoms every time.  You or a partner was diagnosed with a sexually transmitted infection.  If you are at risk for HIV, ask about PrEP medicine to prevent HIV.  Get tested for HIV at least once in your life, whether you are at risk for HIV or not.  Cancer screening tests  Cervical cancer screening: If you have a cervix, begin getting regular cervical cancer screening tests starting at age 21.  Breast cancer scan (mammogram): If you've ever had breasts, begin having regular mammograms starting at age 40. This is a scan to check for breast cancer.  Colon cancer screening: It is important to start screening for colon cancer at age 45.  Have a colonoscopy test every 10 years (or more often if you're at risk) Or, ask your provider about stool tests like a FIT test every year or Cologuard test every 3 years.  To learn more about your testing options, visit:   .  For help making a decision, visit:   https://bit.ly/ee28067.  Prostate cancer screening test: If you have a prostate, ask your care team if a prostate cancer screening test (PSA) at age 55 is right for you.  Lung cancer screening: If you are a current or former smoker ages 50 to 80, ask your care team if ongoing lung cancer screenings are right for you.  For informational purposes only. Not to replace the advice of your health care provider. Copyright   2023 [employer]. All rights reserved. Clinically reviewed by the [facility] Transitions Program. [employer] 496904 - REV 01/24.  Eating Healthy Foods: Care Instructions  With every meal, you can make healthy food choices. Try to  "eat a variety of fruits, vegetables, whole grains, lean proteins, and low-fat dairy products. This can help you get the right balance of nutrients, including vitamins and minerals. Small changes add up over time. You can start by adding one healthy food to your meals each day.    Try to make half your plate fruits and vegetables, one-fourth whole grains, and one-fourth lean proteins. Try including dairy with your meals.   Eat more fruits and vegetables. Try to have them with most meals and snacks.   Foods for healthy eating    Fruits    These can be fresh, frozen, canned, or dried.  Try to choose whole fruit rather than fruit juice.  Eat a variety of colors.    Vegetables    These can be fresh, frozen, canned, or dried.  Beans, peas, and lentils count too.    Whole grains    Choose whole-grain breads, cereals, and noodles.  Try brown rice.    Lean proteins    These can include lean meat, poultry, fish, and eggs.  You can also have tofu, beans, peas, lentils, nuts, and seeds.    Dairy    Try milk, yogurt, and cheese.  Choose low-fat or fat-free when you can.  If you need to, use lactose-free milk or fortified plant-based milk products, such as soy milk.    Water    Drink water when you're thirsty.  Limit sugar-sweetened drinks, including soda, fruit drinks, and sports drinks.  Where can you learn more?  Go to https://www.[employer].net/patiented  Enter T756 in the search box to learn more about \"Eating Healthy Foods: Care Instructions.\"  Current as of: September 20, 2023               Content Version: 14.0    0639-5864 [employer].   Care instructions adapted under license by your healthcare professional. If you have questions about a medical condition or this instruction, always ask your healthcare professional. [employer] disclaims any warranty or liability for your use of this information.         "

## 2024-07-30 ENCOUNTER — APPOINTMENT (OUTPATIENT)
Dept: CT IMAGING | Facility: CLINIC | Age: 64
End: 2024-07-30
Attending: EMERGENCY MEDICINE
Payer: COMMERCIAL

## 2024-07-30 ENCOUNTER — HOSPITAL ENCOUNTER (EMERGENCY)
Facility: CLINIC | Age: 64
Discharge: HOME | End: 2024-07-30
Attending: EMERGENCY MEDICINE | Admitting: EMERGENCY MEDICINE
Payer: COMMERCIAL

## 2024-07-30 VITALS
HEART RATE: 73 BPM | SYSTOLIC BLOOD PRESSURE: 140 MMHG | TEMPERATURE: 98.5 F | RESPIRATION RATE: 22 BRPM | OXYGEN SATURATION: 98 % | DIASTOLIC BLOOD PRESSURE: 78 MMHG

## 2024-07-30 DIAGNOSIS — M54.6 ACUTE LEFT-SIDED THORACIC BACK PAIN: ICD-10-CM

## 2024-07-30 LAB
ALBUMIN SERPL BCG-MCNC: 4.2 G/DL (ref 3.5–5.2)
ALP SERPL-CCNC: 91 U/L (ref 40–150)
ALT SERPL W P-5'-P-CCNC: 20 U/L (ref 0–50)
ANION GAP SERPL CALCULATED.3IONS-SCNC: 11 MMOL/L (ref 7–15)
AST SERPL W P-5'-P-CCNC: 23 U/L (ref 0–45)
BASOPHILS # BLD AUTO: 0 10E3/UL (ref 0–0.2)
BASOPHILS NFR BLD AUTO: 1 %
BILIRUB SERPL-MCNC: 0.7 MG/DL
BUN SERPL-MCNC: 12.8 MG/DL (ref 8–23)
CALCIUM SERPL-MCNC: 9.9 MG/DL (ref 8.8–10.4)
CHLORIDE SERPL-SCNC: 102 MMOL/L (ref 98–107)
CREAT SERPL-MCNC: 0.91 MG/DL (ref 0.51–0.95)
EGFRCR SERPLBLD CKD-EPI 2021: 71 ML/MIN/1.73M2
EOSINOPHIL # BLD AUTO: 0 10E3/UL (ref 0–0.7)
EOSINOPHIL NFR BLD AUTO: 1 %
ERYTHROCYTE [DISTWIDTH] IN BLOOD BY AUTOMATED COUNT: 12.4 % (ref 10–15)
GLUCOSE SERPL-MCNC: 112 MG/DL (ref 70–99)
HCO3 SERPL-SCNC: 25 MMOL/L (ref 22–29)
HCT VFR BLD AUTO: 37.7 % (ref 35–47)
HGB BLD-MCNC: 13.1 G/DL (ref 11.7–15.7)
IMM GRANULOCYTES # BLD: 0 10E3/UL
IMM GRANULOCYTES NFR BLD: 1 %
INR PPP: 0.91 (ref 0.85–1.15)
LIPASE SERPL-CCNC: 15 U/L (ref 13–60)
LYMPHOCYTES # BLD AUTO: 0.4 10E3/UL (ref 0.8–5.3)
LYMPHOCYTES NFR BLD AUTO: 11 %
MCH RBC QN AUTO: 37.1 PG (ref 26.5–33)
MCHC RBC AUTO-ENTMCNC: 34.7 G/DL (ref 31.5–36.5)
MCV RBC AUTO: 107 FL (ref 78–100)
MONOCYTES # BLD AUTO: 0.3 10E3/UL (ref 0–1.3)
MONOCYTES NFR BLD AUTO: 8 %
NEUTROPHILS # BLD AUTO: 3.1 10E3/UL (ref 1.6–8.3)
NEUTROPHILS NFR BLD AUTO: 79 %
NRBC # BLD AUTO: 0 10E3/UL
NRBC BLD AUTO-RTO: 0 /100
PLATELET # BLD AUTO: 138 10E3/UL (ref 150–450)
POTASSIUM SERPL-SCNC: 4.3 MMOL/L (ref 3.4–5.3)
PROT SERPL-MCNC: 7.4 G/DL (ref 6.4–8.3)
RBC # BLD AUTO: 3.53 10E6/UL (ref 3.8–5.2)
SODIUM SERPL-SCNC: 138 MMOL/L (ref 135–145)
TROPONIN T SERPL HS-MCNC: 7 NG/L
TROPONIN T SERPL HS-MCNC: <6 NG/L
WBC # BLD AUTO: 3.9 10E3/UL (ref 4–11)

## 2024-07-30 PROCEDURE — 71260 CT THORAX DX C+: CPT

## 2024-07-30 PROCEDURE — 36415 COLL VENOUS BLD VENIPUNCTURE: CPT | Performed by: EMERGENCY MEDICINE

## 2024-07-30 PROCEDURE — 85610 PROTHROMBIN TIME: CPT | Performed by: EMERGENCY MEDICINE

## 2024-07-30 PROCEDURE — 93010 ELECTROCARDIOGRAM REPORT: CPT | Performed by: EMERGENCY MEDICINE

## 2024-07-30 PROCEDURE — 99284 EMERGENCY DEPT VISIT MOD MDM: CPT | Performed by: EMERGENCY MEDICINE

## 2024-07-30 PROCEDURE — 96374 THER/PROPH/DIAG INJ IV PUSH: CPT | Mod: 59 | Performed by: EMERGENCY MEDICINE

## 2024-07-30 PROCEDURE — 250N000011 HC RX IP 250 OP 636: Performed by: EMERGENCY MEDICINE

## 2024-07-30 PROCEDURE — 84484 ASSAY OF TROPONIN QUANT: CPT | Performed by: EMERGENCY MEDICINE

## 2024-07-30 PROCEDURE — 250N000009 HC RX 250: Performed by: EMERGENCY MEDICINE

## 2024-07-30 PROCEDURE — 93010 ELECTROCARDIOGRAM REPORT: CPT | Mod: 76 | Performed by: EMERGENCY MEDICINE

## 2024-07-30 PROCEDURE — 93005 ELECTROCARDIOGRAM TRACING: CPT | Mod: 76 | Performed by: EMERGENCY MEDICINE

## 2024-07-30 PROCEDURE — 93005 ELECTROCARDIOGRAM TRACING: CPT | Performed by: EMERGENCY MEDICINE

## 2024-07-30 PROCEDURE — 83690 ASSAY OF LIPASE: CPT | Performed by: EMERGENCY MEDICINE

## 2024-07-30 PROCEDURE — 82040 ASSAY OF SERUM ALBUMIN: CPT | Performed by: EMERGENCY MEDICINE

## 2024-07-30 PROCEDURE — 85041 AUTOMATED RBC COUNT: CPT | Performed by: EMERGENCY MEDICINE

## 2024-07-30 PROCEDURE — 96375 TX/PRO/DX INJ NEW DRUG ADDON: CPT | Mod: 59 | Performed by: EMERGENCY MEDICINE

## 2024-07-30 PROCEDURE — 99285 EMERGENCY DEPT VISIT HI MDM: CPT | Mod: 25 | Performed by: EMERGENCY MEDICINE

## 2024-07-30 RX ORDER — IOPAMIDOL 755 MG/ML
72 INJECTION, SOLUTION INTRAVASCULAR ONCE
Status: COMPLETED | OUTPATIENT
Start: 2024-07-30 | End: 2024-07-30

## 2024-07-30 RX ORDER — KETOROLAC TROMETHAMINE 15 MG/ML
15 INJECTION, SOLUTION INTRAMUSCULAR; INTRAVENOUS ONCE
Status: COMPLETED | OUTPATIENT
Start: 2024-07-30 | End: 2024-07-30

## 2024-07-30 RX ORDER — IOPAMIDOL 755 MG/ML
72 INJECTION, SOLUTION INTRAVASCULAR ONCE
Status: DISCONTINUED | OUTPATIENT
Start: 2024-07-30 | End: 2024-07-30

## 2024-07-30 RX ORDER — MORPHINE SULFATE 4 MG/ML
4 INJECTION, SOLUTION INTRAMUSCULAR; INTRAVENOUS ONCE
Status: COMPLETED | OUTPATIENT
Start: 2024-07-30 | End: 2024-07-30

## 2024-07-30 RX ADMIN — IOPAMIDOL 72 ML: 755 INJECTION, SOLUTION INTRAVENOUS at 09:50

## 2024-07-30 RX ADMIN — SODIUM CHLORIDE 80 ML: 9 INJECTION, SOLUTION INTRAVENOUS at 09:50

## 2024-07-30 RX ADMIN — MORPHINE SULFATE 4 MG: 4 INJECTION, SOLUTION INTRAMUSCULAR; INTRAVENOUS at 09:31

## 2024-07-30 RX ADMIN — KETOROLAC TROMETHAMINE 15 MG: 15 INJECTION, SOLUTION INTRAMUSCULAR; INTRAVENOUS at 10:35

## 2024-07-30 ASSESSMENT — ACTIVITIES OF DAILY LIVING (ADL)
ADLS_ACUITY_SCORE: 35

## 2024-07-30 NOTE — DISCHARGE INSTRUCTIONS
Follow up with clinic provider.   Return or be seen if new or severe worsening of symptoms develop.

## 2024-07-30 NOTE — ED PROVIDER NOTES
ED Provider Note  [facility]      History     Chief Complaint   Patient presents with    Muscle Pain     HPI  Sheila Schilling is a 63 year old female who presents to the emergency department with concerns regarding left upper back pain, stated as being deeper inside from the left scapula region.  She did have episode 1 month ago, however that was short-lived, slightly relieved with Advil, and went away on its own.  Did not have further symptoms.  No fever.  No lightheadedness, dizziness.  Does have worsened pain with deep inspiration.  No abdominal pain.  Denies any severe chest pain.  Pain of the left scapula/back region is 8 out of 10 in severity.  No lightheadedness, or dizziness.  Does have history of atrial fibrillation and takes baby aspirin and flecainide daily.  No recent changes of medications.        Independent Historian:        Review of External Notes:  I reviewed office visit from the 23rd.  Patient does have history of breast cancer.  She had otherwise stable exam.  Did note left shoulder pain at that visit.      Allergies:  No Known Allergies    Problem List:    Patient Active Problem List    Diagnosis Date Noted    Routine general medical examination at a health care facility 07/23/2024     Priority: Medium    Left shoulder pain, unspecified chronicity 07/23/2024     Priority: Medium    Paroxysmal atrial fibrillation (H) 07/23/2024     Priority: Medium    Screening for hyperlipidemia 07/23/2024     Priority: Medium    Screening for diabetes mellitus 07/23/2024     Priority: Medium    Immunization due 07/23/2024     Priority: Medium    Screening for thyroid disorder 07/23/2024     Priority: Medium    Elevated serum creatinine 10/26/2023     Priority: Medium    Osteopenia of lumbar spine 10/26/2023     Priority: Medium    Invasive lobular carcinoma of right breast in female (H) 05/19/2023     Priority: Medium     Check 12.23 follow-up Miah       Palpitations 06/24/2017      Priority: Medium    CARDIOVASCULAR SCREENING; LDL GOAL LESS THAN 160 10/31/2010     Priority: Medium        Past Medical History:    Past Medical History:   Diagnosis Date    Atrial fibrillation (H)     Palpitations 6/24/2017    Paroxysmal atrial flutter (H)        Past Surgical History:    Past Surgical History:   Procedure Laterality Date    BIOPSY NODE SENTINEL Right 6/19/2023    Procedure: sentinel node biopsy;  Surgeon: Sanjiv Mcdermott MD;  Location: [facility]    COLONOSCOPY  7/28/2011    Procedure:COLONOSCOPY; Surgeon:MIKE LYLE; Location:[facility]    COLONOSCOPY N/A 6/24/2022    Procedure: COLONOSCOPY;  Surgeon: Karime Jain MD;  Location: [facility]    MASTECTOMY SIMPLE Right 6/19/2023    Procedure: Right skin sparing mastectomy;  Surgeon: Sanjiv Mcdermott MD;  Location: [facility] OR    RECONSTRUCT BREAST, INSERT TISSUE EXPANDER, COMBINED Bilateral 6/19/2023    Procedure: RIGHT IMMEDIATE BREAST RECONSTRUCTION WITH USE OF TISSUE EXPANDER, ALLODERM AND SPY ANGIOGRAPHY;  Surgeon: Qasim Galvan MD;  Location: [facility] OR    SURGICAL HISTORY OF -   07/15/2002    laparoscopic L salpingo-oophorectomy - ectopic    SURGICAL HISTORY OF -   7/15/2002    SAB       Family History:    Family History   Problem Relation Age of Onset    Hypertension Mother     Lipids Mother     Hyperlipidemia Mother     C.A.D. Father     Hypertension Father     Diabetes Sister         Rosy Gilbert    Hypertension Sister     Hyperlipidemia Sister     Diabetes Maternal Grandmother     C.A.D. Maternal Grandmother     C.A.D. Maternal Grandfather     Cancer Paternal Grandmother         liver    Cancer - colorectal Other     Colon Cancer Other         Samantha Cesar    Anesthesia Reaction No family hx of     Bleeding Disorder No family hx of     Venous thrombosis No family hx of        Social History:  Marital Status:   [2]  Social History     Tobacco Use    Smoking status: Never    Smokeless tobacco: Never   Vaping Use    Vaping status: Never  Used   Substance Use Topics    Alcohol use: Not Currently    Drug use: Never        Medications:    abemaciclib (VERZENIO) 150 MG tablet  aspirin 81 MG EC tablet  Calcium-Vitamin D (CALCIUM + D PO)  flecainide (TAMBOCOR) 100 MG tablet  letrozole (FEMARA) 2.5 MG tablet  prochlorperazine (COMPAZINE) 10 MG tablet  senna-docusate (SENOKOT-S/PERICOLACE) 8.6-50 MG tablet          Review of Systems  A medically appropriate review of systems was performed with pertinent positives and negatives noted in the HPI, and all other systems negative.    Physical Exam   Patient Vitals for the past 24 hrs:   BP Temp Temp src Pulse Resp SpO2   07/30/24 1230 (!) 140/78 -- -- 73 -- 98 %   07/30/24 1215 139/79 -- -- 73 -- 96 %   07/30/24 1200 132/81 -- -- 71 -- 96 %   07/30/24 1145 132/82 -- -- 72 22 97 %   07/30/24 1130 139/87 -- -- 74 22 97 %   07/30/24 1115 (!) 154/88 -- -- 73 27 97 %   07/30/24 1100 (!) 153/93 -- -- 75 (!) 35 96 %   07/30/24 1045 (!) 151/89 -- -- 73 20 97 %   07/30/24 1030 (!) 148/88 -- -- 71 26 97 %   07/30/24 1015 (!) 140/85 -- -- 75 26 97 %   07/30/24 1000 (!) 150/76 -- -- 76 -- 98 %   07/30/24 0900 (!) 156/92 98.5  F (36.9  C) Oral 71 18 98 %          Physical Exam  General: alert and in no acute distress on arrival  Head: atraumatic, normocephalic  Lungs:  nonlabored  CV:  extremities warm and perfused  Abd: nondistended  Skin: no rashes, no diaphoresis and skin color normal  Neuro: Patient awake, alert, speech is fluent,   Psychiatric: affect/mood normal,        ED Course                 Procedures         3 total EKGs were performed.  They were all reviewed by myself.  Unable to initially saved in medical record system.    Initial EKG shows normal sinus rhythm.  Rate 68 bpm.  T wave inversion in lead V1.  Borderline elevation in leads II, and aVF.  No contiguous ST elevation changes.  Some wandering baseline.  No ectopy.  Nonspecific EKG.    Repeat EKG, reviewed by myself shows normal sinus rhythm.  Rate 70  bpm.  No ectopy.  Slight J-point elevation of lead II, and aVF.  Lead III is normal.  T wave inversion in lead V1.  No anterolateral lead changes.  No specific acute changes compared to prior.    Third EKG, also reviewed by myself shows normal sinus rhythm.  Rate 74 bpm.  Nonspecific ST segment changes, with unchanged leads II, and aVF.  Lead III normal.  Otherwise nonspecific EKG, with no acute changes on repeat EKGs.                 Results for orders placed or performed during the hospital encounter of 07/30/24 (from the past 24 hour(s))   CBC with platelets differential    Narrative    The following orders were created for panel order CBC with platelets differential.  Procedure                               Abnormality         Status                     ---------                               -----------         ------                     CBC with platelets and d...[[id number]]  Abnormal            Final result                 Please view results for these tests on the individual orders.   INR   Result Value Ref Range    INR 0.91 0.85 - 1.15   Comprehensive metabolic panel   Result Value Ref Range    Sodium 138 135 - 145 mmol/L    Potassium 4.3 3.4 - 5.3 mmol/L    Carbon Dioxide (CO2) 25 22 - 29 mmol/L    Anion Gap 11 7 - 15 mmol/L    Urea Nitrogen 12.8 8.0 - 23.0 mg/dL    Creatinine 0.91 0.51 - 0.95 mg/dL    GFR Estimate 71 >60 mL/min/1.73m2    Calcium 9.9 8.8 - 10.4 mg/dL    Chloride 102 98 - 107 mmol/L    Glucose 112 (H) 70 - 99 mg/dL    Alkaline Phosphatase 91 40 - 150 U/L    AST 23 0 - 45 U/L    ALT 20 0 - 50 U/L    Protein Total 7.4 6.4 - 8.3 g/dL    Albumin 4.2 3.5 - 5.2 g/dL    Bilirubin Total 0.7 <=1.2 mg/dL   Lipase   Result Value Ref Range    Lipase 15 13 - 60 U/L   Troponin T, High Sensitivity   Result Value Ref Range    Troponin T, High Sensitivity <6 <=14 ng/L   CBC with platelets and differential   Result Value Ref Range    WBC Count 3.9 (L) 4.0 - 11.0 10e3/uL    RBC Count 3.53 (L) 3.80 - 5.20  10e6/uL    Hemoglobin 13.1 11.7 - 15.7 g/dL    Hematocrit 37.7 35.0 - 47.0 %     (H) 78 - 100 fL    MCH 37.1 (H) 26.5 - 33.0 pg    MCHC 34.7 31.5 - 36.5 g/dL    RDW 12.4 10.0 - 15.0 %    Platelet Count 138 (L) 150 - 450 10e3/uL    % Neutrophils 79 %    % Lymphocytes 11 %    % Monocytes 8 %    % Eosinophils 1 %    % Basophils 1 %    % Immature Granulocytes 1 %    NRBCs per 100 WBC 0 <1 /100    Absolute Neutrophils 3.1 1.6 - 8.3 10e3/uL    Absolute Lymphocytes 0.4 (L) 0.8 - 5.3 10e3/uL    Absolute Monocytes 0.3 0.0 - 1.3 10e3/uL    Absolute Eosinophils 0.0 0.0 - 0.7 10e3/uL    Absolute Basophils 0.0 0.0 - 0.2 10e3/uL    Absolute Immature Granulocytes 0.0 <=0.4 10e3/uL    Absolute NRBCs 0.0 10e3/uL   CT Aortic Survey w Contrast    Narrative    CT AORTIC SURVEY WITH CONTRAST  7/30/2024 10:05 AM    HISTORY: Severe upper back pain between scapula.    TECHNIQUE: CT scan obtained of the chest, abdomen, and pelvis with IV  contrast. Post contrast scanning performed during the arterial phase.  CT chest also obtained without IV contrast. 72 mL Isovue 370 IV  injected. Sagittal and coronal reformatted images acquired from the  source post contrast data. Radiation dose for this scan was reduced  using automated exposure control, adjustment of the mA and/or kV  according to patient size, or iterative reconstruction technique.    COMPARISON:  CT abdomen and pelvis 11/2/2023.    FINDINGS:  Thoracic and abdominal aorta: No dissection or other acute abnormality  involving the thoracic or abdominal aorta. No evidence for acute  aneurysm or periaortic hematoma.    Other vascular: Minimal coronary artery calcification. No central  pulmonary embolism can be seen. Peripheral branch vessels of the  pulmonary arterial tree limited in evaluation.    Chest: Consolidation of volume loss suggesting fibrosis at the right  upper lobe leading to the apex series 7 image 35. Bibasilar regions of  moderate atelectasis. Pulmonary nodule  posterior left upper lobe is 4  mm series 7 image 35. No effusions or pneumothorax. No enlarged lymph  node or acute mediastinal abnormality.    Abdomen/pelvis: Left hepatic cysts. Minimal wall prominence of the  gallbladder. No biliary ductal dilatation. Spleen, pancreas, adrenals,  and kidneys shows no acute abnormalities. No hydronephrosis. Bladder  unremarkable. No bowel obstruction, or acute inflammation of the  bowel. No free fluid or free air. No convincing signs of appendicitis.  Pelvis shows no acute abnormality. No adenopathy or fluid collection.    Diffuse spine degenerative changes. Stable sclerosis at L4. Right  breast postoperative change.      Impression    IMPRESSION:   1. No dissection or other acute abnormality involving the thoracic or  abdominal aorta.  2. No specific acute abnormality identified.  3. Consolidation and volume loss at the right upper lobe apical region  suggesting scarring that may be treatment-related.  4. Technically indeterminant pulmonary nodule on the left. See below  for follow up.    Recommendations for one or multiple incidental lung nodules < 6mm:    Low risk patients: No routine follow-up.    High risk patients: Optional follow-up CT at 12 months; if  unchanged, no further follow-up.    *Low Risk: Minimal or absent history of smoking or other known risk  factors.  *Nonsolid (ground glass) or partly solid nodules may require longer  follow-up to exclude indolent adenocarcinoma.  *Recommendations based on Guidelines for the Management of Incidental  Pulmonary Nodules Detected at CT: From the Fleischner Society 2017,  Radiology 2017.   Troponin T, High Sensitivity   Result Value Ref Range    Troponin T, High Sensitivity 7 <=14 ng/L       MEDICATIONS GIVEN IN THE EMERGENCY DEPARTMENT:  Medications   morphine (PF) injection 4 mg (4 mg Intravenous $Given 7/30/24 0936)   iopamidol (ISOVUE-370) solution 72 mL (72 mLs Intravenous $Given 7/30/24 0950)   sodium chloride 0.9 % bag  500mL for CT scan flush use (80 mLs As instructed $Given 7/30/24 3845)   ketorolac (TORADOL) injection 15 mg (15 mg Intravenous $Given 7/30/24 103)           Independent Interpretation (X-rays, CTs, rhythm strip):  CT aortic survey shows no evidence of dissection.  No other acute abnormalities.    Consultations/Discussion of Management or Tests:         Social Determinants of Health affecting care:         Assessments & Plan (with Medical Decision Making)  63 year old female who presents to the Emergency Department for evaluation of upper back pain.  Symptoms present 1 month ago, however recurred, and now are present once again.  Patient is afebrile, slightly hypertensive.  Given the atypical nature, atraumatic, without specific exertional, or worsening pain with movement, concern for possible dissection.    Patient with EKG showing nonspecific changes.  Multiple additional EKGs with 2 additional EKGs that are performed during ED course.  Patient with borderline ST segment changes, however very atypical nature of the pain.  Did have transient episode of pain 1 month ago.  Her pain now began last evening, during the overnight hours.  Therefore, given the very atypical nature of the pains, with no reproducible tenderness on exam, with no chest pain, or significant shortness of breath, decision made for the above testing.  This included aortic survey to rule out dissection.  Fortunately no evidence of dissection.    Initial troponin is undetectable, less than 6.  Given the  atypical nature of the EKG, decision made for recheck of troponin about 2 to 3 hours after the initial, and the recheck of troponin is at a value of 7.  Therefore, given the persistent negative value, acute myocardial infarction has been ruled out, and ACS I feel is much less likely.    Patient does feel improved.  Uncertain exact cause of patient's symptoms.  Uncertain if this represents true musculoskeletal etiology versus other potential cause.   However, the workup today including CT imaging, and laboratory workup is negative/normal.  Reassurance is provided, however strict return precautions are discussed.       I have reviewed the nursing notes.    I have reviewed the findings, diagnosis, plan and need for follow up with the patient.       Critical Care time:  none      NEW PRESCRIPTIONS STARTED AT TODAY'S ER VISIT  New Prescriptions    No medications on file       Final diagnoses:   Acute left-sided thoracic back pain       7/30/2024   [facility] EMERGENCY DEPT       Keny Brand MD  07/30/24 2828

## 2024-07-30 NOTE — ED TRIAGE NOTES
Shoulder/upper back pain that started last night. No known trauma. Has taken advil without relief.

## 2024-08-06 ENCOUNTER — LAB (OUTPATIENT)
Dept: LAB | Facility: CLINIC | Age: 64
End: 2024-08-06
Payer: COMMERCIAL

## 2024-08-06 ENCOUNTER — MYC MEDICAL ADVICE (OUTPATIENT)
Dept: PHARMACY | Facility: CLINIC | Age: 64
End: 2024-08-06

## 2024-08-06 ENCOUNTER — ALLIED HEALTH/NURSE VISIT (OUTPATIENT)
Dept: FAMILY MEDICINE | Facility: CLINIC | Age: 64
End: 2024-08-06
Payer: COMMERCIAL

## 2024-08-06 VITALS — DIASTOLIC BLOOD PRESSURE: 74 MMHG | HEART RATE: 64 BPM | SYSTOLIC BLOOD PRESSURE: 124 MMHG

## 2024-08-06 DIAGNOSIS — Z01.30 BP CHECK: Primary | ICD-10-CM

## 2024-08-06 DIAGNOSIS — C50.911 INVASIVE LOBULAR CARCINOMA OF RIGHT BREAST IN FEMALE (H): Primary | ICD-10-CM

## 2024-08-06 LAB
ALBUMIN SERPL BCG-MCNC: 3.9 G/DL (ref 3.5–5.2)
ALP SERPL-CCNC: 81 U/L (ref 40–150)
ALT SERPL W P-5'-P-CCNC: 18 U/L (ref 0–50)
ANION GAP SERPL CALCULATED.3IONS-SCNC: 8 MMOL/L (ref 7–15)
AST SERPL W P-5'-P-CCNC: 23 U/L (ref 0–45)
BASOPHILS # BLD AUTO: 0 10E3/UL (ref 0–0.2)
BASOPHILS NFR BLD AUTO: 1 %
BILIRUB SERPL-MCNC: 0.3 MG/DL
BUN SERPL-MCNC: 15.9 MG/DL (ref 8–23)
CALCIUM SERPL-MCNC: 10 MG/DL (ref 8.8–10.4)
CHLORIDE SERPL-SCNC: 105 MMOL/L (ref 98–107)
CREAT SERPL-MCNC: 0.94 MG/DL (ref 0.51–0.95)
EGFRCR SERPLBLD CKD-EPI 2021: 68 ML/MIN/1.73M2
EOSINOPHIL # BLD AUTO: 0.1 10E3/UL (ref 0–0.7)
EOSINOPHIL NFR BLD AUTO: 3 %
ERYTHROCYTE [DISTWIDTH] IN BLOOD BY AUTOMATED COUNT: 12.1 % (ref 10–15)
GLUCOSE SERPL-MCNC: 91 MG/DL (ref 70–99)
HCO3 SERPL-SCNC: 27 MMOL/L (ref 22–29)
HCT VFR BLD AUTO: 35.9 % (ref 35–47)
HGB BLD-MCNC: 12.1 G/DL (ref 11.7–15.7)
IMM GRANULOCYTES # BLD: 0 10E3/UL
IMM GRANULOCYTES NFR BLD: 1 %
LYMPHOCYTES # BLD AUTO: 0.6 10E3/UL (ref 0.8–5.3)
LYMPHOCYTES NFR BLD AUTO: 21 %
MCH RBC QN AUTO: 36.2 PG (ref 26.5–33)
MCHC RBC AUTO-ENTMCNC: 33.7 G/DL (ref 31.5–36.5)
MCV RBC AUTO: 108 FL (ref 78–100)
MONOCYTES # BLD AUTO: 0.3 10E3/UL (ref 0–1.3)
MONOCYTES NFR BLD AUTO: 10 %
NEUTROPHILS # BLD AUTO: 2 10E3/UL (ref 1.6–8.3)
NEUTROPHILS NFR BLD AUTO: 64 %
NRBC # BLD AUTO: 0 10E3/UL
NRBC BLD AUTO-RTO: 0 /100
PLATELET # BLD AUTO: 197 10E3/UL (ref 150–450)
POTASSIUM SERPL-SCNC: 4.9 MMOL/L (ref 3.4–5.3)
PROT SERPL-MCNC: 7 G/DL (ref 6.4–8.3)
RBC # BLD AUTO: 3.34 10E6/UL (ref 3.8–5.2)
SODIUM SERPL-SCNC: 140 MMOL/L (ref 135–145)
WBC # BLD AUTO: 3.1 10E3/UL (ref 4–11)

## 2024-08-06 PROCEDURE — 36415 COLL VENOUS BLD VENIPUNCTURE: CPT

## 2024-08-06 PROCEDURE — 85041 AUTOMATED RBC COUNT: CPT

## 2024-08-06 PROCEDURE — 82040 ASSAY OF SERUM ALBUMIN: CPT

## 2024-08-06 PROCEDURE — 99207 PR NO CHARGE NURSE ONLY: CPT

## 2024-08-06 NOTE — NURSING NOTE
Sheila Schilling is a 63 year old year old patient who comes in today for a Blood Pressure check because of ongoing blood pressure monitoring, as recommended by PCP at last visit on 7/23/24.  Vital Signs as repeated by RN: BP- 124/74, P- 64.  Patient is not currently taking any BP meds.   Patient is not monitoring Blood Pressure at home.    Current complaints: none  Disposition:  Routing to PCP for review.    Virginia Patricio RN  [facility]

## 2024-08-08 DIAGNOSIS — C50.911 INVASIVE LOBULAR CARCINOMA OF RIGHT BREAST IN FEMALE (H): Primary | ICD-10-CM

## 2024-08-13 DIAGNOSIS — C50.911 INVASIVE LOBULAR CARCINOMA OF RIGHT BREAST IN FEMALE (H): Primary | ICD-10-CM

## 2024-08-19 ENCOUNTER — TELEPHONE (OUTPATIENT)
Dept: FAMILY MEDICINE | Facility: CLINIC | Age: 64
End: 2024-08-19
Payer: COMMERCIAL

## 2024-08-19 NOTE — TELEPHONE ENCOUNTER
FYI - Status Update    Who is Calling: patient    Update: PT HAS QUESTIONS ABOUT PAST VISIT. PLEASE CALL PT     Does caller want a call/response back: Yes     Could we send this information to you in [employer] or would you prefer to receive a phone call?:   Patient would prefer a phone call   Okay to leave a detailed message?: Yes at Cell number on file:    Telephone Information:   Mobile [phone]

## 2024-08-20 NOTE — CONFIDENTIAL NOTE
Please notify the patient the provider is out of office. If her question is pressing then she should make an appointment with another provider. Otherwise Kala Bravo can speak with the patient upon her return.     Kenzie Griffith MD

## 2024-08-23 ENCOUNTER — TRANSFERRED RECORDS (OUTPATIENT)
Dept: HEALTH INFORMATION MANAGEMENT | Facility: CLINIC | Age: 64
End: 2024-08-23
Payer: COMMERCIAL

## 2024-08-30 ENCOUNTER — TELEPHONE (OUTPATIENT)
Dept: ONCOLOGY | Facility: CLINIC | Age: 64
End: 2024-08-30
Payer: COMMERCIAL

## 2024-08-30 NOTE — TELEPHONE ENCOUNTER
PA Initiation    Medication: VERZENIO PO  Insurance Company: [employer] - Phone [phone] Fax [phone]  Pharmacy Filling the Rx: [facility] - [place], [place] - [facility] [address]  Filling Pharmacy Phone:    Filling Pharmacy Fax:    Start Date: 8/30/2024

## 2024-09-03 ENCOUNTER — LAB (OUTPATIENT)
Dept: LAB | Facility: CLINIC | Age: 64
End: 2024-09-03
Payer: COMMERCIAL

## 2024-09-03 ENCOUNTER — DOCUMENTATION ONLY (OUTPATIENT)
Dept: ONCOLOGY | Facility: CLINIC | Age: 64
End: 2024-09-03

## 2024-09-03 DIAGNOSIS — C50.911 INVASIVE LOBULAR CARCINOMA OF RIGHT BREAST IN FEMALE (H): ICD-10-CM

## 2024-09-03 LAB
ALBUMIN SERPL BCG-MCNC: 4.2 G/DL (ref 3.5–5.2)
ALP SERPL-CCNC: 87 U/L (ref 40–150)
ALT SERPL W P-5'-P-CCNC: 20 U/L (ref 0–50)
ANION GAP SERPL CALCULATED.3IONS-SCNC: 8 MMOL/L (ref 7–15)
AST SERPL W P-5'-P-CCNC: 25 U/L (ref 0–45)
BASOPHILS # BLD AUTO: 0.1 10E3/UL (ref 0–0.2)
BASOPHILS NFR BLD AUTO: 2 %
BILIRUB SERPL-MCNC: 0.4 MG/DL
BUN SERPL-MCNC: 14.1 MG/DL (ref 8–23)
CALCIUM SERPL-MCNC: 9.6 MG/DL (ref 8.8–10.4)
CHLORIDE SERPL-SCNC: 106 MMOL/L (ref 98–107)
CREAT SERPL-MCNC: 0.96 MG/DL (ref 0.51–0.95)
EGFRCR SERPLBLD CKD-EPI 2021: 66 ML/MIN/1.73M2
EOSINOPHIL # BLD AUTO: 0.1 10E3/UL (ref 0–0.7)
EOSINOPHIL NFR BLD AUTO: 3 %
ERYTHROCYTE [DISTWIDTH] IN BLOOD BY AUTOMATED COUNT: 12.7 % (ref 10–15)
GLUCOSE SERPL-MCNC: 90 MG/DL (ref 70–99)
HCO3 SERPL-SCNC: 26 MMOL/L (ref 22–29)
HCT VFR BLD AUTO: 34.4 % (ref 35–47)
HGB BLD-MCNC: 12 G/DL (ref 11.7–15.7)
IMM GRANULOCYTES # BLD: 0 10E3/UL
IMM GRANULOCYTES NFR BLD: 0 %
LYMPHOCYTES # BLD AUTO: 0.7 10E3/UL (ref 0.8–5.3)
LYMPHOCYTES NFR BLD AUTO: 21 %
MCH RBC QN AUTO: 36.9 PG (ref 26.5–33)
MCHC RBC AUTO-ENTMCNC: 34.9 G/DL (ref 31.5–36.5)
MCV RBC AUTO: 106 FL (ref 78–100)
MONOCYTES # BLD AUTO: 0.3 10E3/UL (ref 0–1.3)
MONOCYTES NFR BLD AUTO: 11 %
NEUTROPHILS # BLD AUTO: 2 10E3/UL (ref 1.6–8.3)
NEUTROPHILS NFR BLD AUTO: 63 %
NRBC # BLD AUTO: 0 10E3/UL
NRBC BLD AUTO-RTO: 0 /100
PLATELET # BLD AUTO: 160 10E3/UL (ref 150–450)
POTASSIUM SERPL-SCNC: 4.4 MMOL/L (ref 3.4–5.3)
PROT SERPL-MCNC: 6.9 G/DL (ref 6.4–8.3)
RBC # BLD AUTO: 3.25 10E6/UL (ref 3.8–5.2)
SODIUM SERPL-SCNC: 140 MMOL/L (ref 135–145)
WBC # BLD AUTO: 3.1 10E3/UL (ref 4–11)

## 2024-09-03 PROCEDURE — 36415 COLL VENOUS BLD VENIPUNCTURE: CPT

## 2024-09-03 PROCEDURE — 85004 AUTOMATED DIFF WBC COUNT: CPT

## 2024-09-03 PROCEDURE — 80053 COMPREHEN METABOLIC PANEL: CPT

## 2024-09-03 NOTE — PROGRESS NOTES
Oral Chemotherapy Monitoring Program  Lab Follow Up    Reviewed lab results from 9/3.    Assessment & Plan:  CMP and CBC reviewed. Results are stable with no concerning abnormalities. Plan to continue abemaciclib as prescribed.     DN2K message sent to patient    Follow-Up:  10/1 monthly labs and provider visit    Payton Daly PharmD  Hematology/Oncology Clinical Pharmacist  [facility]  [phone]        5/20/2024     4:00 PM 6/10/2024    12:00 PM 6/18/2024     5:00 PM 7/16/2024     4:00 PM 8/6/2024     3:00 PM 8/13/2024     4:00 PM 9/3/2024     4:00 PM   ORAL CHEMOTHERAPY   Assessment Type Refill Lab Monitoring Refill Refill Lab Monitoring Refill Lab Monitoring   Diagnosis Code Breast Cancer Breast Cancer Breast Cancer Breast Cancer Breast Cancer Breast Cancer Breast Cancer   Providers Miah Dooley   Clinic Name/Location [facility]   Drug Name Verzenio (abemaciclib) Verzenio (abemaciclib) Verzenio (abemaciclib) Verzenio (abemaciclib) Verzenio (abemaciclib) Verzenio (abemaciclib) Verzenio (abemaciclib)   Dose 150 mg 150 mg 150 mg 150 mg 150 mg 150 mg 150 mg   Current Schedule BID BID BID BID BID BID BID   Cycle Details Continuous Continuous Continuous Continuous Continuous Continuous Continuous       Labs:  _  Result Component Current Result Ref Range   Sodium 140 (9/3/2024) 135 - 145 mmol/L     _  Result Component Current Result Ref Range   Potassium 4.4 (9/3/2024) 3.4 - 5.3 mmol/L     _  Result Component Current Result Ref Range   Calcium 9.6 (9/3/2024) 8.8 - 10.4 mg/dL     No results found for Mag within last 30 days.     No results found for Phos within last 30 days.     _  Result Component Current Result Ref Range   Albumin 4.2 (9/3/2024) 3.5 - 5.2 g/dL     _  Result Component Current Result Ref Range   Urea Nitrogen 14.1 (9/3/2024) 8.0 - 23.0 mg/dL      _  Result Component Current Result Ref Range   Creatinine 0.96 (H) (9/3/2024) 0.51 - 0.95 mg/dL     _  Result Component Current Result Ref Range   AST 25 (9/3/2024) 0 - 45 U/L     _  Result Component Current Result Ref Range   ALT 20 (9/3/2024) 0 - 50 U/L     _  Result Component Current Result Ref Range   Bilirubin Total 0.4 (9/3/2024) <=1.2 mg/dL     _  Result Component Current Result Ref Range   WBC Count 3.1 (L) (9/3/2024) 4.0 - 11.0 10e3/uL     _  Result Component Current Result Ref Range   Hemoglobin 12.0 (9/3/2024) 11.7 - 15.7 g/dL     _  Result Component Current Result Ref Range   Platelet Count 160 (9/3/2024) 150 - 450 10e3/uL     _  Result Component Current Result Ref Range   Absolute Neutrophils 2.0 (9/3/2024) 1.6 - 8.3 10e3/uL

## 2024-09-03 NOTE — TELEPHONE ENCOUNTER
Prior Authorization Approval    Medication: VERZENIO PO  Authorization Effective Date: 7/31/2024  Authorization Expiration Date: 8/30/2025  Approved Dose/Quantity: 56/28  Reference #: [id number]   Insurance Company:    Expected CoPay: $    CoPay Card Available:      Financial Assistance Needed:   Which Pharmacy is filling the prescription: [place] [facility] - [facility] [address]  Pharmacy Notified: YES  Patient Notified: YES

## 2024-09-04 DIAGNOSIS — C50.911 INVASIVE LOBULAR CARCINOMA OF RIGHT BREAST IN FEMALE (H): ICD-10-CM

## 2024-09-04 RX ORDER — LETROZOLE 2.5 MG/1
2.5 TABLET, FILM COATED ORAL DAILY
Qty: 28 TABLET | Refills: 11 | Status: SHIPPED | OUTPATIENT
Start: 2024-09-04

## 2024-09-11 DIAGNOSIS — C50.911 INVASIVE LOBULAR CARCINOMA OF RIGHT BREAST IN FEMALE (H): Primary | ICD-10-CM

## 2024-10-01 ENCOUNTER — LAB (OUTPATIENT)
Dept: LAB | Facility: CLINIC | Age: 64
End: 2024-10-01
Attending: INTERNAL MEDICINE
Payer: COMMERCIAL

## 2024-10-01 ENCOUNTER — ONCOLOGY VISIT (OUTPATIENT)
Dept: ONCOLOGY | Facility: CLINIC | Age: 64
End: 2024-10-01
Attending: INTERNAL MEDICINE
Payer: COMMERCIAL

## 2024-10-01 VITALS
DIASTOLIC BLOOD PRESSURE: 56 MMHG | HEART RATE: 66 BPM | WEIGHT: 159.2 LBS | TEMPERATURE: 97.7 F | OXYGEN SATURATION: 99 % | BODY MASS INDEX: 25.43 KG/M2 | RESPIRATION RATE: 11 BRPM | SYSTOLIC BLOOD PRESSURE: 136 MMHG

## 2024-10-01 DIAGNOSIS — C50.911 INVASIVE LOBULAR CARCINOMA OF RIGHT BREAST IN FEMALE (H): Primary | ICD-10-CM

## 2024-10-01 LAB
ALBUMIN SERPL BCG-MCNC: 4 G/DL (ref 3.5–5.2)
ALP SERPL-CCNC: 92 U/L (ref 40–150)
ALT SERPL W P-5'-P-CCNC: 20 U/L (ref 0–50)
ANION GAP SERPL CALCULATED.3IONS-SCNC: 10 MMOL/L (ref 7–15)
AST SERPL W P-5'-P-CCNC: 25 U/L (ref 0–45)
BASOPHILS # BLD AUTO: 0.1 10E3/UL (ref 0–0.2)
BASOPHILS NFR BLD AUTO: 2 %
BILIRUB SERPL-MCNC: 0.2 MG/DL
BUN SERPL-MCNC: 14.2 MG/DL (ref 8–23)
CALCIUM SERPL-MCNC: 9.5 MG/DL (ref 8.8–10.4)
CHLORIDE SERPL-SCNC: 103 MMOL/L (ref 98–107)
CREAT SERPL-MCNC: 1 MG/DL (ref 0.51–0.95)
EGFRCR SERPLBLD CKD-EPI 2021: 63 ML/MIN/1.73M2
EOSINOPHIL # BLD AUTO: 0.1 10E3/UL (ref 0–0.7)
EOSINOPHIL NFR BLD AUTO: 3 %
ERYTHROCYTE [DISTWIDTH] IN BLOOD BY AUTOMATED COUNT: 12.7 % (ref 10–15)
GLUCOSE SERPL-MCNC: 82 MG/DL (ref 70–99)
HCO3 SERPL-SCNC: 25 MMOL/L (ref 22–29)
HCT VFR BLD AUTO: 33.9 % (ref 35–47)
HGB BLD-MCNC: 11.6 G/DL (ref 11.7–15.7)
IMM GRANULOCYTES # BLD: 0 10E3/UL
IMM GRANULOCYTES NFR BLD: 0 %
LYMPHOCYTES # BLD AUTO: 0.7 10E3/UL (ref 0.8–5.3)
LYMPHOCYTES NFR BLD AUTO: 26 %
MCH RBC QN AUTO: 36.9 PG (ref 26.5–33)
MCHC RBC AUTO-ENTMCNC: 34.2 G/DL (ref 31.5–36.5)
MCV RBC AUTO: 108 FL (ref 78–100)
MONOCYTES # BLD AUTO: 0.3 10E3/UL (ref 0–1.3)
MONOCYTES NFR BLD AUTO: 10 %
NEUTROPHILS # BLD AUTO: 1.6 10E3/UL (ref 1.6–8.3)
NEUTROPHILS NFR BLD AUTO: 59 %
NRBC # BLD AUTO: 0 10E3/UL
NRBC BLD AUTO-RTO: 0 /100
PLATELET # BLD AUTO: 181 10E3/UL (ref 150–450)
POTASSIUM SERPL-SCNC: 4.1 MMOL/L (ref 3.4–5.3)
PROT SERPL-MCNC: 6.9 G/DL (ref 6.4–8.3)
RBC # BLD AUTO: 3.14 10E6/UL (ref 3.8–5.2)
SODIUM SERPL-SCNC: 138 MMOL/L (ref 135–145)
WBC # BLD AUTO: 2.6 10E3/UL (ref 4–11)

## 2024-10-01 PROCEDURE — 99213 OFFICE O/P EST LOW 20 MIN: CPT | Performed by: NURSE PRACTITIONER

## 2024-10-01 PROCEDURE — 99214 OFFICE O/P EST MOD 30 MIN: CPT | Performed by: NURSE PRACTITIONER

## 2024-10-01 PROCEDURE — G2211 COMPLEX E/M VISIT ADD ON: HCPCS | Performed by: NURSE PRACTITIONER

## 2024-10-01 PROCEDURE — 85025 COMPLETE CBC W/AUTO DIFF WBC: CPT

## 2024-10-01 PROCEDURE — 36415 COLL VENOUS BLD VENIPUNCTURE: CPT

## 2024-10-01 PROCEDURE — 80053 COMPREHEN METABOLIC PANEL: CPT

## 2024-10-01 ASSESSMENT — PAIN SCALES - GENERAL: PAINLEVEL: NO PAIN (0)

## 2024-10-01 NOTE — LETTER
10/1/2024      Sheila Schilling  [address] [facility] [address]      Dear Colleague,    Thank you for referring your patient, Sheila Schilling, to the [facility] [place]. Please see a copy of my visit note below.    .[facility] Hematology and Oncology Progress Note    Patient: Sheila Schilling  MRN: [id number]  10/01/24        Reason for Visit    Hx right breast cancer    Primary Oncologist: Dr. Dooley    Assessment and Plan  # Stage IB (pT3-pN1a(sn)-cM0) Invasive lobular carcinoma of the right breast, ER/AK+; high risk features (large tumor size, 1 positive lymph node)  #  Chemo-induced leukopenia, anemia (grade 1)  #  Elevated creatinine (gr 1), possible abemaciclib side effect    Status post right mastectomy and adjuvant radiation 2023.    Currently on adjuvant abemaciclib 150 mg BID and letrozole x 1 yr.     Tolerating treatment very well. Mild hot flashes, manageable. Mild leukopenia and anemia. Creatinine has been mildly elevated 0.95-1.1.    Today, labs: WBC 2.6, ANC WNL, plat and hgb 11.6. CMP: Creatinine 1.0 (stable). LFTs WNL.    No evidence of recurrence.   Post-mastectomy and RT right chest wall/axillary fibrosis.   5/2024 L mammogram benign    Plan:  -Continue abemaciclib 150 mg BID. Planning to complete total of 2 yrs (10/2025)  -Continue daily letrozole, planning up to 10 yrs (10/2033)  -See Ligia/Dr. Dooley every 3 months. With stable labs/tolerance, can likely start decreasing frequency of lab monitoring to every 3 mths.   -Annual left mammogram due May, 2025  -ROM exercises for right axillary/chest wall fibrosis. Refer to PT if progressive.    #  Indeterminate L4 sclerotic lesion, likely benign  CT cap was ordered by Plastic Surgeon in prep for breast reconstruction. Incidental 10 mm L4 sclerotic lesion is indeterminate for benign bone island vs met.  Bone scan negative for mets.  Follow-up CT pelvis 6/2024 showed stable 9 mm lesion, likely benign bone  island.    Plan:  -No follow-up needed    # Mild osteopenia  Baseline DEXA shows mild osteopenia with 0.1% hip fracture risk/10 yrs.     Plan:  -Continue calcium and vitamin D.    -Repeat DEXA in 2 years (10/2025)    #  Mid-scapular pain  Intermittent/episodic a few times over last several months. Eval in ED 7/2024 ruled out cardiac and CT was negative for mets/other acute etiology. No GI/esophageal symptoms.     Plan:  -Treat symptomatically. If progressive, will need additional work-up       Cancer Staging   Invasive lobular carcinoma of right breast in female (H)  Staging form: Breast, AJCC 8th Edition  - Pathologic stage from 6/19/2023: Stage IB (pT3, pN1a, cM0, G2, ER+, VT+, HER2-, Oncotype DX score: 13) - Signed by Ligia Felix NP on 10/26/2023       Oncology history  5/2023: Stage IB ([id number]) right breast cancer, ER/VT+  -screening mammogram: asymmetry in upper outer quadrant of her right breast.   -R diagnostic mammogram +ultrasound: architectural distortion with 12 x 10 x 8 mm ill-defined mass with acoustic shadowing respectively. Needle biopsy: invasive lobular carcinoma, grade 2; strongly ER and VT positive and HER2 negative.   -contrast-enhanced mammogram (due to very dense breasts): 7.8 x 6.7 x 5.3 cm enhancement extending from 11:00 to 8 o'clock position consistent with malignancy.   -6/2023 surgical path: 15 cm invasive lobular carcinoma. Single 4 mm sLN positive   -Oncotype DX score 13  -11/2023 CT abd/pelvis done by Plastic Surgery for reconstruction planning, incidentally noted L4 bone lesion indeterminate. Bone scan negative for mets. Repeat pelvic CT 6/2024 stable 9 mm lesion, favored benign.    Treatment:  6/19/2023: right mastectomy and sLN biopsy. Subsequent right breast reconstruction.    9/26/2023: completed adjuvant RT (5000 cGy/25)    10/16/2023 - present: adjuvant abemaciclib 150 mg BID and letrozole    Interval History   Sheila Schilling is a 63 year old diagnosed with right ER/VT+  breast cancer with sLN involvement; s/p right mastectomy ~15 mths ago, adjuvant RT and now on adjuvant letrozole and abemaciclib x 1 yr.   Returns for 3-mth visit.     No significant side effects other than mild diarrhea intermittently (one day/month). No nausea.  Minimal hot flashes.   No fevers or chills.      No new lumps or bumps reported.    Occasional mild mid-scapular pain, intermittent, x 2-3 episodes over last several months. Had cardiac eval and CT cap in July, negative.  No epigastric pain, no reflux. No melena.  Right chest wall/axillary tightness post mastectomy and RT. Massages/stretches with benefit.  Weight stable.    ECOG Performance  0 - Independent      Physical Exam    General: alert and cooperative  Lymph: No cervical nor axillary adenopathy  HEENT: No icterus. No alopecia  Breasts: Right breast implant/fibrosis, no chest wall masses. Left breast without abnormality.   Heart: RRR  Lungs: Clear bilaterally   Abd: Soft, non-distended, non-tender. No lower quad masses, pain.   Spine: No reproducible pain or periscapular pain  Extremities: No edema  Neuro: Non-focal    Lab Results    Recent Results (from the past 168 hour(s))   Comprehensive metabolic panel (BMP + Alb, Alk Phos, ALT, AST, Total. Bili, TP)   Result Value Ref Range    Sodium 138 135 - 145 mmol/L    Potassium 4.1 3.4 - 5.3 mmol/L    Carbon Dioxide (CO2) 25 22 - 29 mmol/L    Anion Gap 10 7 - 15 mmol/L    Urea Nitrogen 14.2 8.0 - 23.0 mg/dL    Creatinine 1.00 (H) 0.51 - 0.95 mg/dL    GFR Estimate 63 >60 mL/min/1.73m2    Calcium 9.5 8.8 - 10.4 mg/dL    Chloride 103 98 - 107 mmol/L    Glucose 82 70 - 99 mg/dL    Alkaline Phosphatase 92 40 - 150 U/L    AST 25 0 - 45 U/L    ALT 20 0 - 50 U/L    Protein Total 6.9 6.4 - 8.3 g/dL    Albumin 4.0 3.5 - 5.2 g/dL    Bilirubin Total 0.2 <=1.2 mg/dL   CBC with platelets and differential   Result Value Ref Range    WBC Count 2.6 (L) 4.0 - 11.0 10e3/uL    RBC Count 3.14 (L) 3.80 - 5.20 10e6/uL     "Hemoglobin 11.6 (L) 11.7 - 15.7 g/dL    Hematocrit 33.9 (L) 35.0 - 47.0 %     (H) 78 - 100 fL    MCH 36.9 (H) 26.5 - 33.0 pg    MCHC 34.2 31.5 - 36.5 g/dL    RDW 12.7 10.0 - 15.0 %    Platelet Count 181 150 - 450 10e3/uL    % Neutrophils 59 %    % Lymphocytes 26 %    % Monocytes 10 %    % Eosinophils 3 %    % Basophils 2 %    % Immature Granulocytes 0 %    NRBCs per 100 WBC 0 <1 /100    Absolute Neutrophils 1.6 1.6 - 8.3 10e3/uL    Absolute Lymphocytes 0.7 (L) 0.8 - 5.3 10e3/uL    Absolute Monocytes 0.3 0.0 - 1.3 10e3/uL    Absolute Eosinophils 0.1 0.0 - 0.7 10e3/uL    Absolute Basophils 0.1 0.0 - 0.2 10e3/uL    Absolute Immature Granulocytes 0.0 <=0.4 10e3/uL    Absolute NRBCs 0.0 10e3/uL         Imaging    No results found.    Total time 35 minutes, to include face to face visit, review of EMR, ordering, documentation and coordination of care on date of service.    complexity modifier for longitudinal care.       Signed by: Ligia Felix NP    Oncology Rooming Note    October 1, 2024 2:27 PM   Sheila Schilling is a 63 year old female who presents for:    Chief Complaint   Patient presents with     Oncology Clinic Visit     Invasive lobular carcinoma of right breast in female - labs and provider visit only     Initial Vitals: /56 (BP Location: Right arm, Patient Position: Sitting, Cuff Size: Adult Regular)   Pulse 66   Temp 97.7  F (36.5  C) (Tympanic)   Resp 11   Wt 72.2 kg (159 lb 3.2 oz)   LMP 03/08/2011   SpO2 99%   BMI 25.43 kg/m   Estimated body mass index is 25.43 kg/m  as calculated from the following:    Height as of 7/23/24: 1.685 m (5' 6.34\").    Weight as of this encounter: 72.2 kg (159 lb 3.2 oz). Body surface area is 1.84 meters squared.  No Pain (0) Comment: Data Unavailable   Patient's last menstrual period was 03/08/2011.  Allergies reviewed: Yes  Medications reviewed: Yes    Medications: Medication refills not needed today.  Pharmacy name entered into [employer] [facility]:    [facility] - [place], [place] - [address] [id number] - [place], [place] - [address] AT [facility] OF [address] [place] [address] [facility] - [place], [place] - [address]    Frailty Screening:   Is the patient here for a new oncology consult visit in cancer care? 2. No      Clinical concerns: None today.        Jessie Marti MA                Again, thank you for allowing me to participate in the care of your patient.        Sincerely,        Ligia Felix NP

## 2024-10-01 NOTE — PROGRESS NOTES
[facility] Hematology and Oncology Progress Note    Patient: Sheila Schilling  MRN: [id number]  10/01/24        Reason for Visit    Hx right breast cancer    Primary Oncologist: Dr. Dooley    Assessment and Plan  # Stage IB (pT3-pN1a(sn)-cM0) Invasive lobular carcinoma of the right breast, ER/MD+; high risk features (large tumor size, 1 positive lymph node)  #  Chemo-induced leukopenia, anemia (grade 1)  #  Elevated creatinine (gr 1), possible abemaciclib side effect    Status post right mastectomy and adjuvant radiation 2023.    Currently on adjuvant abemaciclib 150 mg BID and letrozole x 1 yr.     Tolerating treatment very well. Mild hot flashes, manageable. Mild leukopenia and anemia. Creatinine has been mildly elevated 0.95-1.1.    Today, labs: WBC 2.6, ANC WNL, plat and hgb 11.6. CMP: Creatinine 1.0 (stable). LFTs WNL.    No evidence of recurrence.   Post-mastectomy and RT right chest wall/axillary fibrosis.   5/2024 L mammogram benign    Plan:  -Continue abemaciclib 150 mg BID. Planning to complete total of 2 yrs (10/2025)  -Continue daily letrozole, planning up to 10 yrs (10/2033)  -See Ligia/Dr. Dooley every 3 months. With stable labs/tolerance, can likely start decreasing frequency of lab monitoring to every 3 mths.   -Annual left mammogram due May, 2025  -ROM exercises for right axillary/chest wall fibrosis. Refer to PT if progressive.    #  Indeterminate L4 sclerotic lesion, likely benign  CT cap was ordered by Plastic Surgeon in prep for breast reconstruction. Incidental 10 mm L4 sclerotic lesion is indeterminate for benign bone island vs met.  Bone scan negative for mets.  Follow-up CT pelvis 6/2024 showed stable 9 mm lesion, likely benign bone island.    Plan:  -No follow-up needed    # Mild osteopenia  Baseline DEXA shows mild osteopenia with 0.1% hip fracture risk/10 yrs.     Plan:  -Continue calcium and vitamin D.    -Repeat DEXA in 2 years (10/2025)    #  Mid-scapular  pain  Intermittent/episodic a few times over last several months. Eval in ED 7/2024 ruled out cardiac and CT was negative for mets/other acute etiology. No GI/esophageal symptoms.     Plan:  -Treat symptomatically. If progressive, will need additional work-up       Cancer Staging   Invasive lobular carcinoma of right breast in female (H)  Staging form: Breast, AJCC 8th Edition  - Pathologic stage from 6/19/2023: Stage IB (pT3, pN1a, cM0, G2, ER+, ME+, HER2-, Oncotype DX score: 13) - Signed by Ligia Felix NP on 10/26/2023       Oncology history  5/2023: Stage IB ([id number]) right breast cancer, ER/ME+  -screening mammogram: asymmetry in upper outer quadrant of her right breast.   -R diagnostic mammogram +ultrasound: architectural distortion with 12 x 10 x 8 mm ill-defined mass with acoustic shadowing respectively. Needle biopsy: invasive lobular carcinoma, grade 2; strongly ER and ME positive and HER2 negative.   -contrast-enhanced mammogram (due to very dense breasts): 7.8 x 6.7 x 5.3 cm enhancement extending from 11:00 to 8 o'clock position consistent with malignancy.   -6/2023 surgical path: 15 cm invasive lobular carcinoma. Single 4 mm sLN positive   -Oncotype DX score 13  -11/2023 CT abd/pelvis done by Plastic Surgery for reconstruction planning, incidentally noted L4 bone lesion indeterminate. Bone scan negative for mets. Repeat pelvic CT 6/2024 stable 9 mm lesion, favored benign.    Treatment:  6/19/2023: right mastectomy and sLN biopsy. Subsequent right breast reconstruction.    9/26/2023: completed adjuvant RT (5000 cGy/25)    10/16/2023 - present: adjuvant abemaciclib 150 mg BID and letrozole    Interval History   Sheila Schilling is a 63 year old diagnosed with right ER/ME+ breast cancer with sLN involvement; s/p right mastectomy ~15 mths ago, adjuvant RT and now on adjuvant letrozole and abemaciclib x 1 yr.   Returns for 3-mth visit.     No significant side effects other than mild diarrhea  intermittently (one day/month). No nausea.  Minimal hot flashes.   No fevers or chills.      No new lumps or bumps reported.    Occasional mild mid-scapular pain, intermittent, x 2-3 episodes over last several months. Had cardiac eval and CT cap in July, negative.  No epigastric pain, no reflux. No melena.  Right chest wall/axillary tightness post mastectomy and RT. Massages/stretches with benefit.  Weight stable.    ECOG Performance  0 - Independent      Physical Exam    General: alert and cooperative  Lymph: No cervical nor axillary adenopathy  HEENT: No icterus. No alopecia  Breasts: Right breast implant/fibrosis, no chest wall masses. Left breast without abnormality.   Heart: RRR  Lungs: Clear bilaterally   Abd: Soft, non-distended, non-tender. No lower quad masses, pain.   Spine: No reproducible pain or periscapular pain  Extremities: No edema  Neuro: Non-focal    Lab Results    Recent Results (from the past 168 hour(s))   Comprehensive metabolic panel (BMP + Alb, Alk Phos, ALT, AST, Total. Bili, TP)   Result Value Ref Range    Sodium 138 135 - 145 mmol/L    Potassium 4.1 3.4 - 5.3 mmol/L    Carbon Dioxide (CO2) 25 22 - 29 mmol/L    Anion Gap 10 7 - 15 mmol/L    Urea Nitrogen 14.2 8.0 - 23.0 mg/dL    Creatinine 1.00 (H) 0.51 - 0.95 mg/dL    GFR Estimate 63 >60 mL/min/1.73m2    Calcium 9.5 8.8 - 10.4 mg/dL    Chloride 103 98 - 107 mmol/L    Glucose 82 70 - 99 mg/dL    Alkaline Phosphatase 92 40 - 150 U/L    AST 25 0 - 45 U/L    ALT 20 0 - 50 U/L    Protein Total 6.9 6.4 - 8.3 g/dL    Albumin 4.0 3.5 - 5.2 g/dL    Bilirubin Total 0.2 <=1.2 mg/dL   CBC with platelets and differential   Result Value Ref Range    WBC Count 2.6 (L) 4.0 - 11.0 10e3/uL    RBC Count 3.14 (L) 3.80 - 5.20 10e6/uL    Hemoglobin 11.6 (L) 11.7 - 15.7 g/dL    Hematocrit 33.9 (L) 35.0 - 47.0 %     (H) 78 - 100 fL    MCH 36.9 (H) 26.5 - 33.0 pg    MCHC 34.2 31.5 - 36.5 g/dL    RDW 12.7 10.0 - 15.0 %    Platelet Count 181 150 - 450  10e3/uL    % Neutrophils 59 %    % Lymphocytes 26 %    % Monocytes 10 %    % Eosinophils 3 %    % Basophils 2 %    % Immature Granulocytes 0 %    NRBCs per 100 WBC 0 <1 /100    Absolute Neutrophils 1.6 1.6 - 8.3 10e3/uL    Absolute Lymphocytes 0.7 (L) 0.8 - 5.3 10e3/uL    Absolute Monocytes 0.3 0.0 - 1.3 10e3/uL    Absolute Eosinophils 0.1 0.0 - 0.7 10e3/uL    Absolute Basophils 0.1 0.0 - 0.2 10e3/uL    Absolute Immature Granulocytes 0.0 <=0.4 10e3/uL    Absolute NRBCs 0.0 10e3/uL         Imaging    No results found.    Total time 35 minutes, to include face to face visit, review of EMR, ordering, documentation and coordination of care on date of service.    complexity modifier for longitudinal care.       Signed by: Ligia Felix NP

## 2024-10-01 NOTE — PROGRESS NOTES
"Oncology Rooming Note    October 1, 2024 2:27 PM   Sheila Schilling is a 63 year old female who presents for:    Chief Complaint   Patient presents with    Oncology Clinic Visit     Invasive lobular carcinoma of right breast in female - labs and provider visit only     Initial Vitals: /56 (BP Location: Right arm, Patient Position: Sitting, Cuff Size: Adult Regular)   Pulse 66   Temp 97.7  F (36.5  C) (Tympanic)   Resp 11   Wt 72.2 kg (159 lb 3.2 oz)   LMP 03/08/2011   SpO2 99%   BMI 25.43 kg/m   Estimated body mass index is 25.43 kg/m  as calculated from the following:    Height as of 7/23/24: 1.685 m (5' 6.34\").    Weight as of this encounter: 72.2 kg (159 lb 3.2 oz). Body surface area is 1.84 meters squared.  No Pain (0) Comment: Data Unavailable   Patient's last menstrual period was 03/08/2011.  Allergies reviewed: Yes  Medications reviewed: Yes    Medications: Medication refills not needed today.  Pharmacy name entered into [facility]:    [facility] - [place], [place] - [address] [id number] - [place], [place] - [address] [facility] [address] AT [facility] OF [address] [place] [address] [facility] - [place], [place] - [address]     Frailty Screening:   Is the patient here for a new oncology consult visit in cancer care? 2. No      Clinical concerns: None today.        Jessie Marti MA              "

## 2024-10-04 DIAGNOSIS — C50.911 INVASIVE LOBULAR CARCINOMA OF RIGHT BREAST IN FEMALE (H): ICD-10-CM

## 2024-10-04 RX ORDER — LOPERAMIDE HYDROCHLORIDE 2 MG/1
CAPSULE ORAL
Qty: 30 CAPSULE | Refills: 0 | Status: SHIPPED | OUTPATIENT
Start: 2024-10-04

## 2024-10-08 DIAGNOSIS — C50.911 INVASIVE LOBULAR CARCINOMA OF RIGHT BREAST IN FEMALE (H): Primary | ICD-10-CM

## 2024-10-21 ENCOUNTER — TELEPHONE (OUTPATIENT)
Dept: CARDIOLOGY | Facility: CLINIC | Age: 64
End: 2024-10-21
Payer: COMMERCIAL

## 2024-10-21 ENCOUNTER — ALLIED HEALTH/NURSE VISIT (OUTPATIENT)
Dept: CARDIOLOGY | Facility: CLINIC | Age: 64
End: 2024-10-21
Attending: PHYSICIAN ASSISTANT
Payer: COMMERCIAL

## 2024-10-21 DIAGNOSIS — I48.0 PAROXYSMAL ATRIAL FIBRILLATION (H): Primary | ICD-10-CM

## 2024-10-21 DIAGNOSIS — I48.0 PAROXYSMAL ATRIAL FIBRILLATION (H): ICD-10-CM

## 2024-10-21 PROCEDURE — 99207 PR NO CHARGE LOS: CPT

## 2024-10-21 NOTE — TELEPHONE ENCOUNTER
Routing call to Cherelle Romero, PAC-    Called and spoke with pt. Pt has hx PAF on Flecainide.     Pt states she has not missed any doses.     She had a 4 hour car ride/driving on Friday. Friday night she also had 1 glass of wine. She was awoken with pounding, fluttering, racing heart that kept her up most of the night. Did not check heart rate.   She was find all day Saturday, Saturday night and Sunday during the day, but then again Sunday night (last night) she was awoken with racing, pounding heart.   Again unsure what heart rate was. She denies any alcohol, caffeine or stress last night.   Now this morning and currently no symptoms.     Recommendations?    Kathy Valdes RN

## 2024-10-21 NOTE — TELEPHONE ENCOUNTER
"If she is currently having sxs, pls have her come in for EKG (sounds like they had resolved when she called you)    What are HRs running now? How about when they were \"racing\" overnight?    If issues currently, come in for EKG  If now feeling good, pls have her come in or mail out monitor. Can do up to 14 days if not having sxs consistently. Pls order whichever she'd prefer.    She's overdue for follow-up - I recommended 1 y follow-up with EKG 6/2023. Pls get her into see me or Dr. Thornton next available. We can review ZP at that time.      Matt Villarreal  October 21, 2024 at 9:14 AM            "

## 2024-10-21 NOTE — TELEPHONE ENCOUNTER
[facility] Call Center    Phone Message    May a detailed message be left on voicemail: yes     Reason for Call: Other: Patient states she is noticing more palpitations and fluttering in the evening. She would like to speak to her care team about this. Please call the patient back to discuss symptoms.      Action Taken: Other: cardiology    Travel Screening: Not Applicable   Thank you!  [facility]      Date of Service:

## 2024-10-21 NOTE — PROGRESS NOTES
Sheila Schilling arrived here on 10/21/2024 1:08 PM for 8-14 Days  Zio monitor placement per ordering provider Cherelle Romero  for the diagnosis PAF.  Patient s skin was prepped per protocol. Dr. Strauss  is the supervising MD.  Zio monitor was placed.  Instructions were reviewed with and given to the patient.  Patient verbalized understanding of wear, troubleshooting and monitor return instructions.

## 2024-10-21 NOTE — TELEPHONE ENCOUNTER
Called and reviewed providers message below with pt. Pt has NOT checked her HR when this has occurred during the night.   She states she is asymptomatic and this time and feels HR is normal.     Pt scheduled to come in to have zio patch placed and scheduled for follow up after monitor results are back.     Kathy Valdes RN

## 2024-11-01 ENCOUNTER — APPOINTMENT (OUTPATIENT)
Dept: GENERAL RADIOLOGY | Facility: CLINIC | Age: 64
End: 2024-11-01
Attending: PHYSICIAN ASSISTANT
Payer: COMMERCIAL

## 2024-11-01 ENCOUNTER — HOSPITAL ENCOUNTER (EMERGENCY)
Facility: CLINIC | Age: 64
Discharge: HOME | End: 2024-11-01
Attending: PHYSICIAN ASSISTANT | Admitting: PHYSICIAN ASSISTANT
Payer: COMMERCIAL

## 2024-11-01 ENCOUNTER — PATIENT OUTREACH (OUTPATIENT)
Dept: ONCOLOGY | Facility: CLINIC | Age: 64
End: 2024-11-01
Payer: COMMERCIAL

## 2024-11-01 VITALS
RESPIRATION RATE: 16 BRPM | SYSTOLIC BLOOD PRESSURE: 139 MMHG | TEMPERATURE: 97.6 F | OXYGEN SATURATION: 100 % | HEART RATE: 78 BPM | DIASTOLIC BLOOD PRESSURE: 73 MMHG

## 2024-11-01 DIAGNOSIS — J06.9 UPPER RESPIRATORY TRACT INFECTION, UNSPECIFIED TYPE: ICD-10-CM

## 2024-11-01 LAB — GROUP A STREP BY PCR: NOT DETECTED

## 2024-11-01 PROCEDURE — 87651 STREP A DNA AMP PROBE: CPT | Performed by: PHYSICIAN ASSISTANT

## 2024-11-01 PROCEDURE — G0463 HOSPITAL OUTPT CLINIC VISIT: HCPCS | Mod: 25 | Performed by: PHYSICIAN ASSISTANT

## 2024-11-01 PROCEDURE — 71046 X-RAY EXAM CHEST 2 VIEWS: CPT

## 2024-11-01 PROCEDURE — 99214 OFFICE O/P EST MOD 30 MIN: CPT | Performed by: PHYSICIAN ASSISTANT

## 2024-11-01 ASSESSMENT — ENCOUNTER SYMPTOMS
CARDIOVASCULAR NEGATIVE: 1
SORE THROAT: 1
NEUROLOGICAL NEGATIVE: 1
GASTROINTESTINAL NEGATIVE: 1
EYES NEGATIVE: 1
FEVER: 0
SHORTNESS OF BREATH: 0
COUGH: 1
MUSCULOSKELETAL NEGATIVE: 1

## 2024-11-01 ASSESSMENT — ACTIVITIES OF DAILY LIVING (ADL)
ADLS_ACUITY_SCORE: 0
ADLS_ACUITY_SCORE: 0

## 2024-11-01 ASSESSMENT — COLUMBIA-SUICIDE SEVERITY RATING SCALE - C-SSRS
6. HAVE YOU EVER DONE ANYTHING, STARTED TO DO ANYTHING, OR PREPARED TO DO ANYTHING TO END YOUR LIFE?: NO
1. IN THE PAST MONTH, HAVE YOU WISHED YOU WERE DEAD OR WISHED YOU COULD GO TO SLEEP AND NOT WAKE UP?: NO
2. HAVE YOU ACTUALLY HAD ANY THOUGHTS OF KILLING YOURSELF IN THE PAST MONTH?: NO

## 2024-11-01 NOTE — CONFIDENTIAL NOTE
[facility]: Cancer Care                                                                                          Patient called stating she has had significant cold symptoms over the last 10-12 days and doesn't seem to be getting better. Let her know she should go into the urgent care to be assessed. Will likely need chest x-ray and may need antibiotics. Patient verbalized understanding and agreement to plan.    Signature:  Sunitha Galeano RN

## 2024-11-01 NOTE — ED PROVIDER NOTES
History   No chief complaint on file.    HPI  Sheila Schilling is a 63 year old female who presents today for cough and sore throat that started 2 weeks ago. She states no known exposures. She is currently on chemo therapy and has a halter monitor for palpitations. She denies chest pain, shortness of breath, no known exposures, no sinus pain or pressure, ear pain, sinus congestion, abdominal pain, or rash.     Allergies:  No Known Allergies    Problem List:    Patient Active Problem List    Diagnosis Date Noted    Routine general medical examination at a health care facility 07/23/2024     Priority: Medium    Left shoulder pain, unspecified chronicity 07/23/2024     Priority: Medium    Paroxysmal atrial fibrillation (H) 07/23/2024     Priority: Medium    Screening for hyperlipidemia 07/23/2024     Priority: Medium    Screening for diabetes mellitus 07/23/2024     Priority: Medium    Immunization due 07/23/2024     Priority: Medium    Screening for thyroid disorder 07/23/2024     Priority: Medium    Elevated serum creatinine 10/26/2023     Priority: Medium    Osteopenia of lumbar spine 10/26/2023     Priority: Medium    Invasive lobular carcinoma of right breast in female (H) 05/19/2023     Priority: Medium     Check 12.23 follow-up Miah       Palpitations 06/24/2017     Priority: Medium    CARDIOVASCULAR SCREENING; LDL GOAL LESS THAN 160 10/31/2010     Priority: Medium        Past Medical History:    Past Medical History:   Diagnosis Date    Atrial fibrillation (H)     Palpitations 6/24/2017    Paroxysmal atrial flutter (H)        Past Surgical History:    Past Surgical History:   Procedure Laterality Date    BIOPSY NODE SENTINEL Right 6/19/2023    Procedure: sentinel node biopsy;  Surgeon: Sanjiv Mcdermott MD;  Location: [facility]    COLONOSCOPY  7/28/2011    Procedure:COLONOSCOPY; Surgeon:MIKE LYLE; Location: OR    COLONOSCOPY N/A 6/24/2022    Procedure: COLONOSCOPY;  Surgeon: Karime Jain MD;   Location: [facility]    MASTECTOMY SIMPLE Right 6/19/2023    Procedure: Right skin sparing mastectomy;  Surgeon: Sanjiv Mcdermott MD;  Location: [facility]    RECONSTRUCT BREAST, INSERT TISSUE EXPANDER, COMBINED Bilateral 6/19/2023    Procedure: RIGHT IMMEDIATE BREAST RECONSTRUCTION WITH USE OF TISSUE EXPANDER, ALLODERM AND SPY ANGIOGRAPHY;  Surgeon: Qasim Galvan MD;  Location: [facility] OR    SURGICAL HISTORY OF -   07/15/2002    laparoscopic L salpingo-oophorectomy - ectopic    SURGICAL HISTORY OF -   7/15/2002    SAB       Family History:    Family History   Problem Relation Age of Onset    Hypertension Mother     Lipids Mother     Hyperlipidemia Mother     C.A.D. Father     Hypertension Father     Diabetes Sister         Rosy Gilbert    Hypertension Sister     Hyperlipidemia Sister     Diabetes Maternal Grandmother     C.A.D. Maternal Grandmother     C.A.D. Maternal Grandfather     Cancer Paternal Grandmother         liver    Cancer - colorectal Other     Colon Cancer Other         Samantha Cesar    Anesthesia Reaction No family hx of     Bleeding Disorder No family hx of     Venous thrombosis No family hx of        Social History:  Marital Status:   [2]  Social History     Tobacco Use    Smoking status: Never    Smokeless tobacco: Never   Vaping Use    Vaping status: Never Used   Substance Use Topics    Alcohol use: Not Currently    Drug use: Never        Medications:    amoxicillin-clavulanate (AUGMENTIN) 875-125 MG tablet  abemaciclib (VERZENIO) 150 MG tablet  aspirin 81 MG EC tablet  Calcium-Vitamin D (CALCIUM + D PO)  flecainide (TAMBOCOR) 100 MG tablet  letrozole (FEMARA) 2.5 MG tablet  loperamide (IMODIUM) 2 MG capsule  prochlorperazine (COMPAZINE) 10 MG tablet  senna-docusate (SENOKOT-S/PERICOLACE) 8.6-50 MG tablet          Review of Systems   Constitutional:  Negative for fever.   HENT:  Positive for sore throat.    Eyes: Negative.    Respiratory:  Positive for cough. Negative for shortness of breath.     Cardiovascular: Negative.    Gastrointestinal: Negative.    Genitourinary: Negative.    Musculoskeletal: Negative.    Neurological: Negative.    All other systems reviewed and are negative.      Physical Exam   BP: 139/73  Pulse: 78  Temp: 97.6  F (36.4  C)  Resp: 16  SpO2: 100 %      Physical Exam  Vitals and nursing note reviewed.   Constitutional:       General: She is not in acute distress.     Appearance: Normal appearance. She is normal weight. She is not toxic-appearing or diaphoretic.   HENT:      Right Ear: Tympanic membrane and ear canal normal.      Left Ear: Tympanic membrane and ear canal normal.      Nose: Nose normal. No congestion or rhinorrhea.      Mouth/Throat:      Mouth: Mucous membranes are moist.      Pharynx: Posterior oropharyngeal erythema present. No oropharyngeal exudate.   Eyes:      General: No scleral icterus.     Extraocular Movements: Extraocular movements intact.      Conjunctiva/sclera: Conjunctivae normal.      Pupils: Pupils are equal, round, and reactive to light.   Cardiovascular:      Rate and Rhythm: Normal rate and regular rhythm.      Heart sounds: Normal heart sounds.   Pulmonary:      Effort: Pulmonary effort is normal.      Breath sounds: Normal breath sounds.   Musculoskeletal:      Cervical back: Normal range of motion and neck supple.   Skin:     General: Skin is warm.      Capillary Refill: Capillary refill takes less than 2 seconds.   Neurological:      General: No focal deficit present.      Mental Status: She is alert and oriented to person, place, and time.   Psychiatric:         Mood and Affect: Mood normal.         Behavior: Behavior normal.         Thought Content: Thought content normal.         Judgment: Judgment normal.         ED Course        Procedures             Critical Care time:  none              Results for orders placed or performed during the hospital encounter of 11/01/24 (from the past 24 hours)   Group A Streptococcus PCR Throat Swab     Specimen: Throat; Swab   Result Value Ref Range    Group A strep by PCR Not Detected Not Detected    Narrative    The Xpert Xpress Strep A test, performed on the [employer], is a rapid, qualitative in vitro diagnostic test for the detection of Streptococcus pyogenes (Group A ß-hemolytic Streptococcus, Strep A) in throat swab specimens from patients with signs and symptoms of pharyngitis. The Xpert Xpress Strep A test can be used as an aid in the diagnosis of Group A Streptococcal pharyngitis. The assay is not intended to monitor treatment for Group A Streptococcus infections. The Xpert Xpress Strep A test utilizes an automated real-time polymerase chain reaction (PCR) to detect Streptococcus pyogenes DNA.   Chest XR,  PA & LAT    Narrative    EXAM: XR CHEST 2 VIEWS  LOCATION: [facility]  DATE: 11/1/2024    INDICATION: Cough for 2 weeks.  COMPARISON: CT chest 7/30/2024.      Impression    IMPRESSION:     Scarring and consolidative opacities at the right apex with associated volume loss have not significantly changed and may be treatment related. Lungs are otherwise clear. No new airspace opacities, pleural effusions, or pneumothorax. Normal pulmonary   vascularity.    Normal cardiac size.    An electronic device overlies the left medial chest wall. A tissue expander or implant overlies the right breast. Right axillary and right chest wall surgical clips.       Medications - No data to display    Assessments & Plan (with Medical Decision Making)     I have reviewed the nursing notes.    I have reviewed the findings, diagnosis, plan and need for follow up with the patient.   Sheila Schilling is a 63 year old female who presents today for cough and sore throat that started 2 weeks ago. She states no known exposures. She is currently on chemo therapy and has a halter monitor for palpitations. She denies chest pain, shortness of breath, no known exposures, no sinus pain or  pressure, ear pain, sinus congestion, abdominal pain, or rash.     Chest x-ray negative for pneumonia.  Strep test was negative.  Due to symptoms lasting 2 weeks and patient chemotherapy will treat with antibiotic for URI.  Recommend follow-up with primary care doctor in 1 week for further evaluation.  No concerns for cardiac issues or PE.  Patient discharged in stable condition.      Discharge Medication List as of 11/1/2024  5:46 PM        START taking these medications    Details   amoxicillin-clavulanate (AUGMENTIN) 875-125 MG tablet Take 1 tablet by mouth 2 times daily for 10 days., Disp-20 tablet, R-0, E-Prescribe             Final diagnoses:   Upper respiratory tract infection, unspecified type       11/1/2024   [facility] EMERGENCY DEPT       Jannette Padilla PA-C  11/01/24 [id number]

## 2024-11-05 DIAGNOSIS — C50.911 INVASIVE LOBULAR CARCINOMA OF RIGHT BREAST IN FEMALE (H): Primary | ICD-10-CM

## 2024-11-23 NOTE — PROGRESS NOTES
"[facility]    I had the pleasure of seeing Sheila when she came for follow up of AF.  This 64 year old sees Dr. Thornton for her history of:    1. Paroxysmal AFib with high vagal tone/sinus bradycardia - Well controlled on unopposed flecainide, initially started 2017. Per Dr. Thornton, avoiding AVN blocking agents due to bradycardia  2. CHADSVASc 1 (sex) - On ASA  3. Breast cancer - diagnosed with invasive lobular carcinoma of breast noted on screening mammogram.      Last Visit & Interval History:  I saw Sheila 6/2023 at which time she was doing really well.  She had very rare palpitations noted when she would forget her medications, and she had no concerns.  Annual follow-up with updated EKG recommended given flecainide use.    She called us 10/2024 noting increasing palpitations and fluttering, especially in the evenings.  Recommended monitor, with plans for overdue follow-up    Today's Visit:  Overall, Sheila is feeling quite a bit better.  She noted that when she had her monitor placed and was complaining of increased palpitations, she was ill with a cold and sitting up in bed because she had so much coughing.  It was interesting to see that the majority of her AF episodes occurred in the evening and overnight and in retrospect, she thinks her sxs were related to her illness that eventually required Abx.  Also notes a glass of wine in the evening could have triggered episodes .    Denies edema, orthopnea, PND. No c/o recent palpitations, no dizziness, lightheadedness. Denies falls.    Treatment for breast cancer is \"going well.\" Requests not to have BP checked again b/c \"always fine\" at Onc/infusion appts.    VITALS:  Vitals: BP (!) 151/88 (BP Location: Left arm, Patient Position: Sitting)   Pulse 55   Resp 12   Wt 72.6 kg (160 lb)   LMP 03/08/2011   SpO2 99%   BMI 25.56 kg/m      Diagnostic Testing:  EKG today, which I overread, showed SR 71 bpm with QRS duration 104 ms on flecainide 100 mg " "BID  ZioPatch 11/2024 SR with 4% AF burden and 181 episodes of SVT.  In SR,, avg HR 70 (range ).  Longest SVT episode 9 beats.  4% AFib burden, avg  bpm, longest 32 minutes. Sxs a/w AFb/RVR.  Exercise Stress Test 2/2017 with no proarrhythmia  Echo 8/2017 with EF 60-65%. Nl RV.       Plan:  Annual follow-up with EKG    Assessment/Plan:    Paroxysmal AF  Remains on flecainide 100 mg BID, unopposed due to bradycardia    Updated ZioPatch done for \"fluttering\" 10/2024 showed 4% AF burden, episodes lasting up to 32 minutes despite flecainide 100 mg BID. Reviewed some of this could be d/t a URI and possibly EtOH. Confirms sxs have improved dramatically. Notes  has not been concerned about breath-holding or signfiicant snorting and works in the [employer] industry    QRS duration on EKG stable . Note baseline EKG 6/2017, before flecainide started, showed QRS duration 82 ms  Proarrhythmia stress test 2017 wnl  Echo 2017 with normal EF 60-65%     [id number] 1 (sex). Remains on ASA    PLAN:  At this point, agreed will make no changes. She thinks her sxs were triggered by URI and possibly EtOH and have completely resolved  Will contact us if recurrent palpitations/AFib. Could consider uptitration of flecainide, but QRS already ULN of acceptable (QRS before initiation was 82 ms, now 104 ms). Could consider ablation  Annual follow-up     High BP  As above, checked in Infusion/Onc routinely and \"fine\"  High here and requests it not be rechecked    PLAN:  Continue to monitor        Cherelle Romero PA-C, MSPAS      Orders Placed This Encounter   Procedures    Follow-Up with Cardiology JACKIE    EKG 12-lead complete w/read - Clinics (performed today)     Orders Placed This Encounter   Medications    flecainide (TAMBOCOR) 100 MG tablet     Sig: Take 1 tablet (100 mg) by mouth 2 times daily.     Dispense:  180 tablet     Refill:  3     Keep on file until pt due for refills     Medications Discontinued During This Encounter "   Medication Reason    flecainide (TAMBOCOR) 100 MG tablet          Encounter Diagnoses   Name Primary?    Paroxysmal atrial fibrillation (H) Yes    Encounter for monitoring flecainide therapy        CURRENT MEDICATIONS:  Current Outpatient Medications   Medication Sig Dispense Refill    [START ON 12/30/2024] abemaciclib (VERZENIO) 150 MG tablet Take 1 tablet (150 mg) by mouth 2 times daily 56 tablet 0    aspirin 81 MG EC tablet Take 1 tablet (81 mg) by mouth daily      Calcium-Vitamin D (CALCIUM + D PO) Take 1 tablet by mouth daily      flecainide (TAMBOCOR) 100 MG tablet Take 1 tablet (100 mg) by mouth 2 times daily. 180 tablet 3    letrozole (FEMARA) 2.5 MG tablet Take 1 tablet (2.5 mg) by mouth daily. 28 tablet 11    loperamide (IMODIUM) 2 MG capsule Start with 2 caps (4 mg), then take one cap (2 mg) after each diarrheal stool as needed. Do not use more than 8 caps (16 mg) per day. 30 capsule 0    prochlorperazine (COMPAZINE) 10 MG tablet Take 1 tablet (10 mg) by mouth every 6 hours as needed for nausea or vomiting 30 tablet 2    senna-docusate (SENOKOT-S/PERICOLACE) 8.6-50 MG tablet Take 1-2 tablets by mouth 2 times daily 30 tablet 0       ALLERGIES   No Known Allergies      Review of Systems:  Skin:        Eyes:       ENT:       Respiratory:  Negative dyspnea on exertion;dyspnea at rest;shortness of breath  Cardiovascular:  Negative;chest pain;dizziness;lightheadedness;syncope or near-syncope;edema Positive for;palpitations;fatigue  Gastroenterology:      Genitourinary:       Musculoskeletal:       Neurologic:       Psychiatric:       Heme/Lymph/Imm:       Endocrine:         Physical Exam:  Vitals: BP (!) 151/88 (BP Location: Left arm, Patient Position: Sitting)   Pulse 55   Resp 12   Wt 72.6 kg (160 lb)   LMP 03/08/2011   SpO2 99%   BMI 25.56 kg/m      Constitutional:  cooperative, alert and oriented, well developed, well nourished, in no acute distress        Skin:  warm and dry to the touch, no  apparent skin lesions or masses noted        Head:  normocephalic, no masses or lesions        Eyes:  pupils equal and round;conjunctivae and lids unremarkable;sclera white        ENT:  no pallor or cyanosis        Neck:  no carotid bruit;JVP normal        Chest:  normal breath sounds, clear to auscultation, normal A-P diameter, normal symmetry, normal respiratory excursion, no use of accessory muscles tender to palpation      Cardiac: regular rhythm, normal S1/S2, no S3 or S4, apical impulse not displaced, no murmurs, gallops or rubs     no presence of murmur            Abdomen:  abdomen soft, non-tender, BS normoactive, no mass, no HSM, no bruits        Vascular: pulses full and equal, no bruits auscultated                               right femoral bruit (+)      Extremities and Back:  no deformities, clubbing, cyanosis, erythema observed        Neurological:  no gross motor deficits            PAST MEDICAL HISTORY:  Past Medical History:   Diagnosis Date    Atrial fibrillation (H)     Palpitations 6/24/2017    Paroxysmal atrial flutter (H)        PAST SURGICAL HISTORY:  Past Surgical History:   Procedure Laterality Date    BIOPSY NODE SENTINEL Right 6/19/2023    Procedure: sentinel node biopsy;  Surgeon: Sanjiv Mcdermott MD;  Location: [facility]    COLONOSCOPY  7/28/2011    Procedure:COLONOSCOPY; Surgeon:MIKE LYLE; Location:[facility]    COLONOSCOPY N/A 6/24/2022    Procedure: COLONOSCOPY;  Surgeon: Karime Jain MD;  Location: [facility]    MASTECTOMY SIMPLE Right 6/19/2023    Procedure: Right skin sparing mastectomy;  Surgeon: Sanjiv Mcdermott MD;  Location:  OR    RECONSTRUCT BREAST, INSERT TISSUE EXPANDER, COMBINED Bilateral 6/19/2023    Procedure: RIGHT IMMEDIATE BREAST RECONSTRUCTION WITH USE OF TISSUE EXPANDER, ALLODERM AND SPY ANGIOGRAPHY;  Surgeon: Qasim Galvan MD;  Location:  OR    SURGICAL HISTORY OF -   07/15/2002    laparoscopic L salpingo-oophorectomy - ectopic    SURGICAL HISTORY OF -    7/15/2002    SAB       FAMILY HISTORY:  Family History   Problem Relation Age of Onset    Hypertension Mother     Lipids Mother     Hyperlipidemia Mother     C.A.D. Father     Hypertension Father     Diabetes Sister         Rosy Giblert    Hypertension Sister     Hyperlipidemia Sister     Diabetes Maternal Grandmother     C.A.JOSEF. Maternal Grandmother     C.A.D. Maternal Grandfather     Cancer Paternal Grandmother         liver    Cancer - colorectal Other     Colon Cancer Other         Samantha Guerrero    Anesthesia Reaction No family hx of     Bleeding Disorder No family hx of     Venous thrombosis No family hx of        SOCIAL HISTORY:  Social History     Socioeconomic History    Marital status:      Spouse name: None    Number of children: None    Years of education: None    Highest education level: None   Tobacco Use    Smoking status: Never    Smokeless tobacco: Never   Vaping Use    Vaping status: Never Used   Substance and Sexual Activity    Alcohol use: Not Currently    Drug use: Never    Sexual activity: Yes     Birth control/protection: None   Other Topics Concern    Parent/sibling w/ CABG, MI or angioplasty before 65F 55M? No     Social Drivers of Health     Financial Resource Strain: Low Risk  (7/21/2024)    Financial Resource Strain     Within the past 12 months, have you or your family members you live with been unable to get utilities (heat, electricity) when it was really needed?: No   Food Insecurity: Low Risk  (7/21/2024)    Food Insecurity     Within the past 12 months, did you worry that your food would run out before you got money to buy more?: No     Within the past 12 months, did the food you bought just not last and you didn t have money to get more?: No   Transportation Needs: Low Risk  (7/21/2024)    Transportation Needs     Within the past 12 months, has lack of transportation kept you from medical appointments, getting your medicines, non-medical meetings or appointments, work, or from  getting things that you need?: No   Physical Activity: Patient Declined (7/21/2024)    Exercise Vital Sign     Days of Exercise per Week: Patient declined     Minutes of Exercise per Session: Patient declined   Stress: No Stress Concern Present (7/21/2024)    [employer] - Occupational Stress Questionnaire     Feeling of Stress : Not at all   Social Connections: Unknown (7/21/2024)    Social Connection and Isolation Panel [NHANES]     Frequency of Social Gatherings with Friends and Family: Patient declined   Interpersonal Safety: Low Risk  (7/23/2024)    Interpersonal Safety     Do you feel physically and emotionally safe where you currently live?: Yes     Within the past 12 months, have you been hit, slapped, kicked or otherwise physically hurt by someone?: No     Within the past 12 months, have you been humiliated or emotionally abused in other ways by your partner or ex-partner?: No   Housing Stability: Low Risk  (7/21/2024)    Housing Stability     Do you have housing? : Yes     Are you worried about losing your housing?: No

## 2024-11-26 ENCOUNTER — OFFICE VISIT (OUTPATIENT)
Dept: CARDIOLOGY | Facility: CLINIC | Age: 64
End: 2024-11-26
Payer: COMMERCIAL

## 2024-11-26 VITALS
DIASTOLIC BLOOD PRESSURE: 88 MMHG | BODY MASS INDEX: 25.56 KG/M2 | SYSTOLIC BLOOD PRESSURE: 151 MMHG | HEART RATE: 55 BPM | OXYGEN SATURATION: 99 % | WEIGHT: 160 LBS | RESPIRATION RATE: 12 BRPM

## 2024-11-26 DIAGNOSIS — Z51.81 ENCOUNTER FOR MONITORING FLECAINIDE THERAPY: ICD-10-CM

## 2024-11-26 DIAGNOSIS — Z79.899 ENCOUNTER FOR MONITORING FLECAINIDE THERAPY: ICD-10-CM

## 2024-11-26 DIAGNOSIS — I48.0 PAROXYSMAL ATRIAL FIBRILLATION (H): Primary | ICD-10-CM

## 2024-11-26 RX ORDER — FLECAINIDE ACETATE 100 MG/1
100 TABLET ORAL 2 TIMES DAILY
Qty: 180 TABLET | Refills: 3 | Status: SHIPPED | OUTPATIENT
Start: 2024-11-26

## 2024-11-26 NOTE — PATIENT INSTRUCTIONS
Sheila - it was nice to see you today!     At your visit today we reviewed:    You're doing really well heart-wise now, after the monitor   Reviewed monitor showing 4% AFib burden despite flecainide BUT reviewed most of this was at night and you were dealing with a cold/rough sleep     Medication Changes:    Agreed no changes for now - can consider increasing flecainide or adding medication or consider ablation IF this keeps coming back    Recommendations:    Continue to monitor sleep  See if alcohol contributes to AFib/papitations    Follow-up:    See me or Dr. Thornton for cardiology follow up in 1 year but CALL Cardiology nurses Kathy & Catarina @ [phone] for any issues, questions or concerns in the interim.      To schedule a future appointment, we kindly ask that you call cardiology scheduling at [phone] three months prior to requested visit date.    Important Phone Numbers for [facility] ([place]):    [place] [facility] Kathy Elmore: [phone]  Cardiology [phone]  [place] [facility] [phone]  [place] [facility] [phone]  [place] [facility]: [phone]

## 2024-11-26 NOTE — LETTER
"11/26/2024    Kala Bravo, SINCERE CNP  [address] [facility] [address]    RE: Sheila Schilling       Dear Colleague,     I had the pleasure of seeing Sheila Schilling in the [facility].  [facility]    I had the pleasure of seeing Sheila when she came for follow up of AF.  This 64 year old sees Dr. Thornton for her history of:    1. Paroxysmal AFib with high vagal tone/sinus bradycardia - Well controlled on unopposed flecainide, initially started 2017. Per Dr. Thornton, avoiding AVN blocking agents due to bradycardia  2. CHADSVASc 1 (sex) - On ASA  3. Breast cancer - diagnosed with invasive lobular carcinoma of breast noted on screening mammogram.      Last Visit & Interval History:  I saw Sheila 6/2023 at which time she was doing really well.  She had very rare palpitations noted when she would forget her medications, and she had no concerns.  Annual follow-up with updated EKG recommended given flecainide use.    She called us 10/2024 noting increasing palpitations and fluttering, especially in the evenings.  Recommended monitor, with plans for overdue follow-up    Today's Visit:  Overall, Sheila is feeling quite a bit better.  She noted that when she had her monitor placed and was complaining of increased palpitations, she was ill with a cold and sitting up in bed because she had so much coughing.  It was interesting to see that the majority of her AF episodes occurred in the evening and overnight and in retrospect, she thinks her sxs were related to her illness that eventually required Abx.  Also notes a glass of wine in the evening could have triggered episodes .    Denies edema, orthopnea, PND. No c/o recent palpitations, no dizziness, lightheadedness. Denies falls.    Treatment for breast cancer is \"going well.\" Requests not to have BP checked again b/c \"always fine\" at Onc/infusion appts.    VITALS:  Vitals: BP (!) 151/88 (BP Location: Left arm, Patient Position: Sitting) " "  Pulse 55   Resp 12   Wt 72.6 kg (160 lb)   LMP 03/08/2011   SpO2 99%   BMI 25.56 kg/m      Diagnostic Testing:  EKG today, which I overread, showed SR 71 bpm with QRS duration 104 ms on flecainide 100 mg BID  ZioPatch 11/2024 SR with 4% AF burden and 181 episodes of SVT.  In SR,, avg HR 70 (range ).  Longest SVT episode 9 beats.  4% AFib burden, avg  bpm, longest 32 minutes. Sxs a/w AFb/RVR.  Exercise Stress Test 2/2017 with no proarrhythmia  Echo 8/2017 with EF 60-65%. Nl RV.       Plan:  Annual follow-up with EKG    Assessment/Plan:    Paroxysmal AF  Remains on flecainide 100 mg BID, unopposed due to bradycardia    Updated ZioPatch done for \"fluttering\" 10/2024 showed 4% AF burden, episodes lasting up to 32 minutes despite flecainide 100 mg BID. Reviewed some of this could be d/t a URI and possibly EtOH. Confirms sxs have improved dramatically. Notes  has not been concerned about breath-holding or signfiicant snorting and works in the [employer] industry    QRS duration on EKG stable . Note baseline EKG 6/2017, before flecainide started, showed QRS duration 82 ms  Proarrhythmia stress test 2017 wnl  Echo 2017 with normal EF 60-65%     [id number] 1 (sex). Remains on ASA    PLAN:  At this point, agreed will make no changes. She thinks her sxs were triggered by URI and possibly EtOH and have completely resolved  Will contact us if recurrent palpitations/AFib. Could consider uptitration of flecainide, but QRS already ULN of acceptable (QRS before initiation was 82 ms, now 104 ms). Could consider ablation  Annual follow-up     High BP  As above, checked in Infusion/Onc routinely and \"fine\"  High here and requests it not be rechecked    PLAN:  Continue to monitor        Cherelle Romero PA-C, MSPAS      Orders Placed This Encounter   Procedures     Follow-Up with Cardiology JACKIE     EKG 12-lead complete w/read - Clinics (performed today)     Orders Placed This Encounter   Medications     flecainide " (TAMBOCOR) 100 MG tablet     Sig: Take 1 tablet (100 mg) by mouth 2 times daily.     Dispense:  180 tablet     Refill:  3     Keep on file until pt due for refills     Medications Discontinued During This Encounter   Medication Reason     flecainide (TAMBOCOR) 100 MG tablet          Encounter Diagnoses   Name Primary?     Paroxysmal atrial fibrillation (H) Yes     Encounter for monitoring flecainide therapy        CURRENT MEDICATIONS:  Current Outpatient Medications   Medication Sig Dispense Refill     [START ON 12/30/2024] abemaciclib (VERZENIO) 150 MG tablet Take 1 tablet (150 mg) by mouth 2 times daily 56 tablet 0     aspirin 81 MG EC tablet Take 1 tablet (81 mg) by mouth daily       Calcium-Vitamin D (CALCIUM + D PO) Take 1 tablet by mouth daily       flecainide (TAMBOCOR) 100 MG tablet Take 1 tablet (100 mg) by mouth 2 times daily. 180 tablet 3     letrozole (FEMARA) 2.5 MG tablet Take 1 tablet (2.5 mg) by mouth daily. 28 tablet 11     loperamide (IMODIUM) 2 MG capsule Start with 2 caps (4 mg), then take one cap (2 mg) after each diarrheal stool as needed. Do not use more than 8 caps (16 mg) per day. 30 capsule 0     prochlorperazine (COMPAZINE) 10 MG tablet Take 1 tablet (10 mg) by mouth every 6 hours as needed for nausea or vomiting 30 tablet 2     senna-docusate (SENOKOT-S/PERICOLACE) 8.6-50 MG tablet Take 1-2 tablets by mouth 2 times daily 30 tablet 0       ALLERGIES   No Known Allergies      Review of Systems:  Skin:        Eyes:       ENT:       Respiratory:  Negative dyspnea on exertion;dyspnea at rest;shortness of breath  Cardiovascular:  Negative;chest pain;dizziness;lightheadedness;syncope or near-syncope;edema Positive for;palpitations;fatigue  Gastroenterology:      Genitourinary:       Musculoskeletal:       Neurologic:       Psychiatric:       Heme/Lymph/Imm:       Endocrine:         Physical Exam:  Vitals: BP (!) 151/88 (BP Location: Left arm, Patient Position: Sitting)   Pulse 55   Resp 12    Wt 72.6 kg (160 lb)   LMP 03/08/2011   SpO2 99%   BMI 25.56 kg/m      Constitutional:  cooperative, alert and oriented, well developed, well nourished, in no acute distress        Skin:  warm and dry to the touch, no apparent skin lesions or masses noted        Head:  normocephalic, no masses or lesions        Eyes:  pupils equal and round;conjunctivae and lids unremarkable;sclera white        ENT:  no pallor or cyanosis        Neck:  no carotid bruit;JVP normal        Chest:  normal breath sounds, clear to auscultation, normal A-P diameter, normal symmetry, normal respiratory excursion, no use of accessory muscles tender to palpation      Cardiac: regular rhythm, normal S1/S2, no S3 or S4, apical impulse not displaced, no murmurs, gallops or rubs     no presence of murmur            Abdomen:  abdomen soft, non-tender, BS normoactive, no mass, no HSM, no bruits        Vascular: pulses full and equal, no bruits auscultated                               right femoral bruit (+)      Extremities and Back:  no deformities, clubbing, cyanosis, erythema observed        Neurological:  no gross motor deficits            PAST MEDICAL HISTORY:  Past Medical History:   Diagnosis Date     Atrial fibrillation (H)      Palpitations 6/24/2017     Paroxysmal atrial flutter (H)        PAST SURGICAL HISTORY:  Past Surgical History:   Procedure Laterality Date     BIOPSY NODE SENTINEL Right 6/19/2023    Procedure: sentinel node biopsy;  Surgeon: Sanjiv Mcdermott MD;  Location: [facility]     COLONOSCOPY  7/28/2011    Procedure:COLONOSCOPY; Surgeon:MIKE LYLE; Location:[facility]     COLONOSCOPY N/A 6/24/2022    Procedure: COLONOSCOPY;  Surgeon: Karime Jain MD;  Location: [facility]     MASTECTOMY SIMPLE Right 6/19/2023    Procedure: Right skin sparing mastectomy;  Surgeon: Sanjiv Mcdermott MD;  Location: [facility] OR     RECONSTRUCT BREAST, INSERT TISSUE EXPANDER, COMBINED Bilateral 6/19/2023    Procedure: RIGHT IMMEDIATE BREAST  RECONSTRUCTION WITH USE OF TISSUE EXPANDER, ALLODERM AND SPY ANGIOGRAPHY;  Surgeon: Qasim Galvan MD;  Location: [facility] OR     SURGICAL HISTORY OF -   07/15/2002    laparoscopic L salpingo-oophorectomy - ectopic     SURGICAL HISTORY OF -   7/15/2002    SAB       FAMILY HISTORY:  Family History   Problem Relation Age of Onset     Hypertension Mother      Lipids Mother      Hyperlipidemia Mother      C.A.D. Father      Hypertension Father      Diabetes Sister         Rosy Gilbert     Hypertension Sister      Hyperlipidemia Sister      Diabetes Maternal Grandmother      C.A.D. Maternal Grandmother      C.A.D. Maternal Grandfather      Cancer Paternal Grandmother         liver     Cancer - colorectal Other      Colon Cancer Other         Samantha Cesar     Anesthesia Reaction No family hx of      Bleeding Disorder No family hx of      Venous thrombosis No family hx of        SOCIAL HISTORY:  Social History     Socioeconomic History     Marital status:      Spouse name: None     Number of children: None     Years of education: None     Highest education level: None   Tobacco Use     Smoking status: Never     Smokeless tobacco: Never   Vaping Use     Vaping status: Never Used   Substance and Sexual Activity     Alcohol use: Not Currently     Drug use: Never     Sexual activity: Yes     Birth control/protection: None   Other Topics Concern     Parent/sibling w/ CABG, MI or angioplasty before 65F 55M? No     Social Drivers of Health     Financial Resource Strain: Low Risk  (7/21/2024)    Financial Resource Strain      Within the past 12 months, have you or your family members you live with been unable to get utilities (heat, electricity) when it was really needed?: No   Food Insecurity: Low Risk  (7/21/2024)    Food Insecurity      Within the past 12 months, did you worry that your food would run out before you got money to buy more?: No      Within the past 12 months, did the food you bought just not last and you  didn t have money to get more?: No   Transportation Needs: Low Risk  (7/21/2024)    Transportation Needs      Within the past 12 months, has lack of transportation kept you from medical appointments, getting your medicines, non-medical meetings or appointments, work, or from getting things that you need?: No   Physical Activity: Patient Declined (7/21/2024)    Exercise Vital Sign      Days of Exercise per Week: Patient declined      Minutes of Exercise per Session: Patient declined   Stress: No Stress Concern Present (7/21/2024)    [employer] [place] [employer] - Occupational Stress Questionnaire      Feeling of Stress : Not at all   Social Connections: Unknown (7/21/2024)    Social Connection and Isolation Panel [NHANES]      Frequency of Social Gatherings with Friends and Family: Patient declined   Interpersonal Safety: Low Risk  (7/23/2024)    Interpersonal Safety      Do you feel physically and emotionally safe where you currently live?: Yes      Within the past 12 months, have you been hit, slapped, kicked or otherwise physically hurt by someone?: No      Within the past 12 months, have you been humiliated or emotionally abused in other ways by your partner or ex-partner?: No   Housing Stability: Low Risk  (7/21/2024)    Housing Stability      Do you have housing? : Yes      Are you worried about losing your housing?: No             Thank you for allowing me to participate in the care of your patient.      Sincerely,     Malia Romero PA-C     [facility]  cc:   Malia Romero PA-C  [address] [place] [id number]

## 2024-12-03 DIAGNOSIS — C50.911 INVASIVE LOBULAR CARCINOMA OF RIGHT BREAST IN FEMALE (H): Primary | ICD-10-CM

## 2024-12-23 DIAGNOSIS — C50.911 INVASIVE LOBULAR CARCINOMA OF RIGHT BREAST IN FEMALE (H): Primary | ICD-10-CM

## 2025-01-07 ENCOUNTER — LAB (OUTPATIENT)
Dept: LAB | Facility: CLINIC | Age: 65
End: 2025-01-07
Payer: COMMERCIAL

## 2025-01-07 ENCOUNTER — ONCOLOGY VISIT (OUTPATIENT)
Dept: ONCOLOGY | Facility: CLINIC | Age: 65
End: 2025-01-07
Attending: INTERNAL MEDICINE
Payer: COMMERCIAL

## 2025-01-07 VITALS
RESPIRATION RATE: 12 BRPM | HEIGHT: 67 IN | HEART RATE: 66 BPM | BODY MASS INDEX: 24.8 KG/M2 | SYSTOLIC BLOOD PRESSURE: 128 MMHG | OXYGEN SATURATION: 99 % | DIASTOLIC BLOOD PRESSURE: 76 MMHG | TEMPERATURE: 97.9 F | WEIGHT: 158 LBS

## 2025-01-07 DIAGNOSIS — E83.52 HYPERCALCEMIA: ICD-10-CM

## 2025-01-07 DIAGNOSIS — C50.911 INVASIVE LOBULAR CARCINOMA OF RIGHT BREAST IN FEMALE (H): Primary | ICD-10-CM

## 2025-01-07 DIAGNOSIS — C50.911 INVASIVE LOBULAR CARCINOMA OF RIGHT BREAST IN FEMALE (H): ICD-10-CM

## 2025-01-07 DIAGNOSIS — M85.88 OSTEOPENIA OF LUMBAR SPINE: ICD-10-CM

## 2025-01-07 LAB
ALBUMIN SERPL BCG-MCNC: 4.3 G/DL (ref 3.5–5.2)
ALP SERPL-CCNC: 117 U/L (ref 40–150)
ALT SERPL W P-5'-P-CCNC: 19 U/L (ref 0–50)
ANION GAP SERPL CALCULATED.3IONS-SCNC: 11 MMOL/L (ref 7–15)
AST SERPL W P-5'-P-CCNC: 26 U/L (ref 0–45)
BASOPHILS # BLD AUTO: 0.1 10E3/UL (ref 0–0.2)
BASOPHILS NFR BLD AUTO: 2 %
BILIRUB SERPL-MCNC: 0.4 MG/DL
BUN SERPL-MCNC: 19.6 MG/DL (ref 8–23)
CALCIUM SERPL-MCNC: 10.7 MG/DL (ref 8.8–10.4)
CHLORIDE SERPL-SCNC: 103 MMOL/L (ref 98–107)
CREAT SERPL-MCNC: 1.01 MG/DL (ref 0.51–0.95)
EGFRCR SERPLBLD CKD-EPI 2021: 62 ML/MIN/1.73M2
EOSINOPHIL # BLD AUTO: 0.1 10E3/UL (ref 0–0.7)
EOSINOPHIL NFR BLD AUTO: 3 %
ERYTHROCYTE [DISTWIDTH] IN BLOOD BY AUTOMATED COUNT: 12.1 % (ref 10–15)
GLUCOSE SERPL-MCNC: 116 MG/DL (ref 70–99)
HCO3 SERPL-SCNC: 24 MMOL/L (ref 22–29)
HCT VFR BLD AUTO: 36.8 % (ref 35–47)
HGB BLD-MCNC: 12.6 G/DL (ref 11.7–15.7)
IMM GRANULOCYTES # BLD: 0 10E3/UL
IMM GRANULOCYTES NFR BLD: 1 %
LYMPHOCYTES # BLD AUTO: 0.8 10E3/UL (ref 0.8–5.3)
LYMPHOCYTES NFR BLD AUTO: 26 %
MCH RBC QN AUTO: 36.2 PG (ref 26.5–33)
MCHC RBC AUTO-ENTMCNC: 34.2 G/DL (ref 31.5–36.5)
MCV RBC AUTO: 106 FL (ref 78–100)
MONOCYTES # BLD AUTO: 0.3 10E3/UL (ref 0–1.3)
MONOCYTES NFR BLD AUTO: 8 %
NEUTROPHILS # BLD AUTO: 1.9 10E3/UL (ref 1.6–8.3)
NEUTROPHILS NFR BLD AUTO: 60 %
NRBC # BLD AUTO: 0 10E3/UL
NRBC BLD AUTO-RTO: 0 /100
PLATELET # BLD AUTO: 160 10E3/UL (ref 150–450)
POTASSIUM SERPL-SCNC: 4.3 MMOL/L (ref 3.4–5.3)
PROT SERPL-MCNC: 7.3 G/DL (ref 6.4–8.3)
RBC # BLD AUTO: 3.48 10E6/UL (ref 3.8–5.2)
SODIUM SERPL-SCNC: 138 MMOL/L (ref 135–145)
WBC # BLD AUTO: 3.1 10E3/UL (ref 4–11)

## 2025-01-07 PROCEDURE — 85014 HEMATOCRIT: CPT

## 2025-01-07 PROCEDURE — 82435 ASSAY OF BLOOD CHLORIDE: CPT

## 2025-01-07 PROCEDURE — 36415 COLL VENOUS BLD VENIPUNCTURE: CPT

## 2025-01-07 PROCEDURE — 84155 ASSAY OF PROTEIN SERUM: CPT

## 2025-01-07 PROCEDURE — 84460 ALANINE AMINO (ALT) (SGPT): CPT

## 2025-01-07 PROCEDURE — 99214 OFFICE O/P EST MOD 30 MIN: CPT | Performed by: NURSE PRACTITIONER

## 2025-01-07 PROCEDURE — 85004 AUTOMATED DIFF WBC COUNT: CPT

## 2025-01-07 PROCEDURE — G2211 COMPLEX E/M VISIT ADD ON: HCPCS | Performed by: NURSE PRACTITIONER

## 2025-01-07 RX ORDER — LETROZOLE 2.5 MG/1
2.5 TABLET, FILM COATED ORAL DAILY
Qty: 90 TABLET | Refills: 3 | Status: SHIPPED | OUTPATIENT
Start: 2025-01-07

## 2025-01-07 ASSESSMENT — PAIN SCALES - GENERAL: PAINLEVEL_OUTOF10: NO PAIN (0)

## 2025-01-07 NOTE — LETTER
1/7/2025      Sheila Schilling  [address] [facility] [address]      Dear Colleague,    Thank you for referring your patient, Sheila Schilling, to the [facility] [place]. Please see a copy of my visit note below.    .[facility] Hematology and Oncology Progress Note    Patient: Sheila Schilling  MRN: [id number]  1/07/25        Reason for Visit    Hx right breast cancer    Primary Oncologist: Dr. Dooley    Assessment and Plan  # Stage IB (pT3-pN1a(sn)-cM0) Invasive lobular carcinoma of the right breast, ER/ME+; high risk features (large tumor size, 1 positive lymph node)  #. Right chest wall/axillary fibrosis   #  Chemo-induced leukopenia, anemia (grade 1)  #  Elevated creatinine (gr 1), possible abemaciclib side effect    Status post right mastectomy and adjuvant radiation 2023.    Currently on adjuvant abemaciclib 150 mg BID and letrozole x 15 mths.     Tolerating treatment very well. Mild hot flashes, manageable. Mild leukopenia and anemia. Creatinine has been mildly elevated 0.95-1.1.    Today, labs: WBC 3.1, ANC WNL, plat and hgb WNL. CMP: Creatinine 1.01 (stable). LFTs WNL. Ca 10.7.    No evidence of recurrence.   Post-mastectomy and RT right chest wall/axillary fibrosis.   5/2024 L mammogram benign    Plan:  -Continue abemaciclib 150 mg BID. Planning to complete total of 2 yrs (10/2025)  -Continue daily letrozole, planning up to 10 yrs (10/2033)  -Labs + visit with Ligia/Dr. Dooley every 3 months  -Annual left mammogram due May, 2025  -Refer to PT for optimization of RUE/axillary ROM    # Mild osteopenia  # Mild hypercalcemia  Baseline DEXA shows mild osteopenia with 0.1% hip fracture risk/10 yrs. She is on Calcium + Vit D. Ca is a bit elevated today at 10.7.    Plan:  -Hold calcium supplement. Continue vit D alone  -Recheck calcium in 3 mths  -Repeat DEXA in 2 years (10/2025)       Cancer Staging   Invasive lobular carcinoma of right breast in female (H)  Staging form: Breast,  AJCC 8th Edition  - Pathologic stage from 6/19/2023: Stage IB (pT3, pN1a, cM0, G2, ER+, MS+, HER2-, Oncotype DX score: 13) - Signed by Ligia Felix NP on 10/26/2023       Oncology history  5/2023: Stage IB ([id number]) right breast cancer, ER/MS+  -screening mammogram: asymmetry in upper outer quadrant of her right breast.   -R diagnostic mammogram +ultrasound: architectural distortion with 12 x 10 x 8 mm ill-defined mass with acoustic shadowing respectively. Needle biopsy: invasive lobular carcinoma, grade 2; strongly ER and MS positive and HER2 negative.   -contrast-enhanced mammogram (due to very dense breasts): 7.8 x 6.7 x 5.3 cm enhancement extending from 11:00 to 8 o'clock position consistent with malignancy.   -6/2023 surgical path: 15 cm invasive lobular carcinoma. Single 4 mm sLN positive   -Oncotype DX score 13  -11/2023 CT abd/pelvis done by Plastic Surgery for reconstruction planning, incidentally noted L4 bone lesion indeterminate. Bone scan negative for mets. Repeat pelvic CT 6/2024 stable 9 mm lesion, favored benign. No additional follow-up needed.     Treatment:  6/19/2023: right mastectomy and sLN biopsy. Subsequent right breast reconstruction.    9/26/2023: completed adjuvant RT (5000 cGy/25)    10/16/2023 - present: adjuvant abemaciclib 150 mg BID and letrozole    Interval History   Sheila Schilling is a 64 year old diagnosed with right ER/MS+ breast cancer with sLN involvement; s/p right mastectomy 1.5 y ago, adjuvant RT and now on adjuvant letrozole and abemaciclib x 15 mths.   Returns for 3-mth visit.     No significant side effects other than mild diarrhea intermittently (one day/month). This was worse when on antibiotic in November, but resolved back to normal with completion.   No nausea.  Minimal hot flashes.   No fevers or chills.      Treated for URI with antibiotic in November, recovered. No other infections.  She held her abemaciclib briefly while on her antibiotic for increased  diarrhea.    No new lumps or bumps reported.    The mid-scapular pain she had last Fall resolved. No new sites of pain.  Right chest wall/axillary tightness post mastectomy and RT, causing some tightness/ROM constraints. She tries stretching/massage with some benefit.  Weight stable.    ECOG Performance  0 - Independent      Physical Exam    General: alert and cooperative  Lymph: No cervical nor axillary adenopathy. Fibrosis R axilla and chest wall  HEENT: No icterus. No alopecia  Breasts: Right breast implant/fibrosis, no chest wall masses. Left breast without abnormality.   Heart: RRR  Lungs: Clear bilaterally   Abd: Soft, non-distended, non-tender. No lower quad masses, pain.   Spine: No reproducible pain or periscapular pain  Extremities: No edema  Neuro: Non-focal    Lab Results    Recent Results (from the past week)   Comprehensive metabolic panel   Result Value Ref Range    Sodium 138 135 - 145 mmol/L    Potassium 4.3 3.4 - 5.3 mmol/L    Carbon Dioxide (CO2) 24 22 - 29 mmol/L    Anion Gap 11 7 - 15 mmol/L    Urea Nitrogen 19.6 8.0 - 23.0 mg/dL    Creatinine 1.01 (H) 0.51 - 0.95 mg/dL    GFR Estimate 62 >60 mL/min/1.73m2    Calcium 10.7 (H) 8.8 - 10.4 mg/dL    Chloride 103 98 - 107 mmol/L    Glucose 116 (H) 70 - 99 mg/dL    Alkaline Phosphatase 117 40 - 150 U/L    AST 26 0 - 45 U/L    ALT 19 0 - 50 U/L    Protein Total 7.3 6.4 - 8.3 g/dL    Albumin 4.3 3.5 - 5.2 g/dL    Bilirubin Total 0.4 <=1.2 mg/dL   CBC with platelets and differential   Result Value Ref Range    WBC Count 3.1 (L) 4.0 - 11.0 10e3/uL    RBC Count 3.48 (L) 3.80 - 5.20 10e6/uL    Hemoglobin 12.6 11.7 - 15.7 g/dL    Hematocrit 36.8 35.0 - 47.0 %     (H) 78 - 100 fL    MCH 36.2 (H) 26.5 - 33.0 pg    MCHC 34.2 31.5 - 36.5 g/dL    RDW 12.1 10.0 - 15.0 %    Platelet Count 160 150 - 450 10e3/uL    % Neutrophils 60 %    % Lymphocytes 26 %    % Monocytes 8 %    % Eosinophils 3 %    % Basophils 2 %    % Immature Granulocytes 1 %    NRBCs per 100  "WBC 0 <1 /100    Absolute Neutrophils 1.9 1.6 - 8.3 10e3/uL    Absolute Lymphocytes 0.8 0.8 - 5.3 10e3/uL    Absolute Monocytes 0.3 0.0 - 1.3 10e3/uL    Absolute Eosinophils 0.1 0.0 - 0.7 10e3/uL    Absolute Basophils 0.1 0.0 - 0.2 10e3/uL    Absolute Immature Granulocytes 0.0 <=0.4 10e3/uL    Absolute NRBCs 0.0 10e3/uL           Imaging    No results found.    Total time 35 minutes, to include face to face visit, review of EMR, ordering, documentation and coordination of care on date of service.    complexity modifier for longitudinal care.       Signed by: Ligia Felix NP    Oncology Rooming Note    January 7, 2025 11:48 AM   Sheila Schilling is a 64 year old female who presents for:    Chief Complaint   Patient presents with     Oncology Clinic Visit     Invasive lobular carcinoma of right breast in female - Labs and provider visits     Initial Vitals: /76 (BP Location: Left arm, Patient Position: Sitting, Cuff Size: Adult Regular)   Pulse 66   Temp 97.9  F (36.6  C) (Tympanic)   Resp 12   Ht 1.708 m (5' 7.25\")   Wt 71.7 kg (158 lb)   LMP 03/08/2011   SpO2 99%   BMI 24.56 kg/m   Estimated body mass index is 24.56 kg/m  as calculated from the following:    Height as of this encounter: 1.708 m (5' 7.25\").    Weight as of this encounter: 71.7 kg (158 lb). Body surface area is 1.84 meters squared.  No Pain (0) Comment: Data Unavailable   Patient's last menstrual period was 03/08/2011.  Allergies reviewed: Yes  Medications reviewed: Yes    Medications: Medication refills not needed today.  Pharmacy name entered into [facility]Lu[facility]:    [facility] [place] [address] [id number] - [place], [place] - [address] AT [facility] OF [address] [place] [address] [facility] - [place], [place] - [address]     Frailty Screening:   Is the patient here for a new oncology consult visit in cancer care? 2. No      Clinical concerns:  None today.      Jolene RADFORD" MIRIAM Villagomez                Again, thank you for allowing me to participate in the care of your patient.        Sincerely,        Ligia Felix NP    Electronically signed

## 2025-01-07 NOTE — PROGRESS NOTES
[facility] Hematology and Oncology Progress Note    Patient: Sheila Schilling  MRN: [id number]  1/07/25        Reason for Visit    Hx right breast cancer    Primary Oncologist: Dr. Dooley    Assessment and Plan  # Stage IB (pT3-pN1a(sn)-cM0) Invasive lobular carcinoma of the right breast, ER/CA+; high risk features (large tumor size, 1 positive lymph node)  #. Right chest wall/axillary fibrosis   #  Chemo-induced leukopenia, anemia (grade 1)  #  Elevated creatinine (gr 1), possible abemaciclib side effect    Status post right mastectomy and adjuvant radiation 2023.    Currently on adjuvant abemaciclib 150 mg BID and letrozole x 15 mths.     Tolerating treatment very well. Mild hot flashes, manageable. Mild leukopenia and anemia. Creatinine has been mildly elevated 0.95-1.1.    Today, labs: WBC 3.1, ANC WNL, plat and hgb WNL. CMP: Creatinine 1.01 (stable). LFTs WNL. Ca 10.7.    No evidence of recurrence.   Post-mastectomy and RT right chest wall/axillary fibrosis.   5/2024 L mammogram benign    Plan:  -Continue abemaciclib 150 mg BID. Planning to complete total of 2 yrs (10/2025)  -Continue daily letrozole, planning up to 10 yrs (10/2033)  -Labs + visit with Ligia/Dr. Dooley every 3 months  -Annual left mammogram due May, 2025  -Refer to PT for optimization of RUE/axillary ROM    # Mild osteopenia  # Mild hypercalcemia  Baseline DEXA shows mild osteopenia with 0.1% hip fracture risk/10 yrs. She is on Calcium + Vit D. Ca is a bit elevated today at 10.7.    Plan:  -Hold calcium supplement. Continue vit D alone  -Recheck calcium in 3 mths  -Repeat DEXA in 2 years (10/2025)       Cancer Staging   Invasive lobular carcinoma of right breast in female (H)  Staging form: Breast, AJCC 8th Edition  - Pathologic stage from 6/19/2023: Stage IB (pT3, pN1a, cM0, G2, ER+, CA+, HER2-, Oncotype DX score: 13) - Signed by Ligia Felix NP on 10/26/2023       Oncology history  5/2023: Stage IB ([id number]-[id number]) right breast cancer,  ER/KY+  -screening mammogram: asymmetry in upper outer quadrant of her right breast.   -R diagnostic mammogram +ultrasound: architectural distortion with 12 x 10 x 8 mm ill-defined mass with acoustic shadowing respectively. Needle biopsy: invasive lobular carcinoma, grade 2; strongly ER and KY positive and HER2 negative.   -contrast-enhanced mammogram (due to very dense breasts): 7.8 x 6.7 x 5.3 cm enhancement extending from 11:00 to 8 o'clock position consistent with malignancy.   -6/2023 surgical path: 15 cm invasive lobular carcinoma. Single 4 mm sLN positive   -Oncotype DX score 13  -11/2023 CT abd/pelvis done by Plastic Surgery for reconstruction planning, incidentally noted L4 bone lesion indeterminate. Bone scan negative for mets. Repeat pelvic CT 6/2024 stable 9 mm lesion, favored benign. No additional follow-up needed.     Treatment:  6/19/2023: right mastectomy and sLN biopsy. Subsequent right breast reconstruction.    9/26/2023: completed adjuvant RT (5000 cGy/25)    10/16/2023 - present: adjuvant abemaciclib 150 mg BID and letrozole    Interval History   Sheila Schilling is a 64 year old diagnosed with right ER/KY+ breast cancer with sLN involvement; s/p right mastectomy 1.5 y ago, adjuvant RT and now on adjuvant letrozole and abemaciclib x 15 mths.   Returns for 3-mth visit.     No significant side effects other than mild diarrhea intermittently (one day/month). This was worse when on antibiotic in November, but resolved back to normal with completion.   No nausea.  Minimal hot flashes.   No fevers or chills.      Treated for URI with antibiotic in November, recovered. No other infections.  She held her abemaciclib briefly while on her antibiotic for increased diarrhea.    No new lumps or bumps reported.    The mid-scapular pain she had last Fall resolved. No new sites of pain.  Right chest wall/axillary tightness post mastectomy and RT, causing some tightness/ROM constraints. She tries  stretching/massage with some benefit.  Weight stable.    ECOG Performance  0 - Independent      Physical Exam    General: alert and cooperative  Lymph: No cervical nor axillary adenopathy. Fibrosis R axilla and chest wall  HEENT: No icterus. No alopecia  Breasts: Right breast implant/fibrosis, no chest wall masses. Left breast without abnormality.   Heart: RRR  Lungs: Clear bilaterally   Abd: Soft, non-distended, non-tender. No lower quad masses, pain.   Spine: No reproducible pain or periscapular pain  Extremities: No edema  Neuro: Non-focal    Lab Results    Recent Results (from the past week)   Comprehensive metabolic panel   Result Value Ref Range    Sodium 138 135 - 145 mmol/L    Potassium 4.3 3.4 - 5.3 mmol/L    Carbon Dioxide (CO2) 24 22 - 29 mmol/L    Anion Gap 11 7 - 15 mmol/L    Urea Nitrogen 19.6 8.0 - 23.0 mg/dL    Creatinine 1.01 (H) 0.51 - 0.95 mg/dL    GFR Estimate 62 >60 mL/min/1.73m2    Calcium 10.7 (H) 8.8 - 10.4 mg/dL    Chloride 103 98 - 107 mmol/L    Glucose 116 (H) 70 - 99 mg/dL    Alkaline Phosphatase 117 40 - 150 U/L    AST 26 0 - 45 U/L    ALT 19 0 - 50 U/L    Protein Total 7.3 6.4 - 8.3 g/dL    Albumin 4.3 3.5 - 5.2 g/dL    Bilirubin Total 0.4 <=1.2 mg/dL   CBC with platelets and differential   Result Value Ref Range    WBC Count 3.1 (L) 4.0 - 11.0 10e3/uL    RBC Count 3.48 (L) 3.80 - 5.20 10e6/uL    Hemoglobin 12.6 11.7 - 15.7 g/dL    Hematocrit 36.8 35.0 - 47.0 %     (H) 78 - 100 fL    MCH 36.2 (H) 26.5 - 33.0 pg    MCHC 34.2 31.5 - 36.5 g/dL    RDW 12.1 10.0 - 15.0 %    Platelet Count 160 150 - 450 10e3/uL    % Neutrophils 60 %    % Lymphocytes 26 %    % Monocytes 8 %    % Eosinophils 3 %    % Basophils 2 %    % Immature Granulocytes 1 %    NRBCs per 100 WBC 0 <1 /100    Absolute Neutrophils 1.9 1.6 - 8.3 10e3/uL    Absolute Lymphocytes 0.8 0.8 - 5.3 10e3/uL    Absolute Monocytes 0.3 0.0 - 1.3 10e3/uL    Absolute Eosinophils 0.1 0.0 - 0.7 10e3/uL    Absolute Basophils 0.1 0.0 -  0.2 10e3/uL    Absolute Immature Granulocytes 0.0 <=0.4 10e3/uL    Absolute NRBCs 0.0 10e3/uL           Imaging    No results found.    Total time 35 minutes, to include face to face visit, review of EMR, ordering, documentation and coordination of care on date of service.    complexity modifier for longitudinal care.       Signed by: Ligia Felix NP

## 2025-01-07 NOTE — PROGRESS NOTES
"Oncology Rooming Note    January 7, 2025 11:48 AM   Sheila Schilling is a 64 year old female who presents for:    Chief Complaint   Patient presents with    Oncology Clinic Visit     Invasive lobular carcinoma of right breast in female - Labs and provider visits     Initial Vitals: /76 (BP Location: Left arm, Patient Position: Sitting, Cuff Size: Adult Regular)   Pulse 66   Temp 97.9  F (36.6  C) (Tympanic)   Resp 12   Ht 1.708 m (5' 7.25\")   Wt 71.7 kg (158 lb)   LMP 03/08/2011   SpO2 99%   BMI 24.56 kg/m   Estimated body mass index is 24.56 kg/m  as calculated from the following:    Height as of this encounter: 1.708 m (5' 7.25\").    Weight as of this encounter: 71.7 kg (158 lb). Body surface area is 1.84 meters squared.  No Pain (0) Comment: Data Unavailable   Patient's last menstrual period was 03/08/2011.  Allergies reviewed: Yes  Medications reviewed: Yes    Medications: Medication refills not needed today.  Pharmacy name entered into [facility]:    [facility] - [place], [place] - [address] [id number] - [place], [place] - [address] AT [facility] OF [address] [place] [address] [facility] - [place], [place] - [address]     Frailty Screening:   Is the patient here for a new oncology consult visit in cancer care? 2. No      Clinical concerns:  None today.      Jolene Villagomez, MIRIAM              "

## 2025-01-27 DIAGNOSIS — C50.911 INVASIVE LOBULAR CARCINOMA OF RIGHT BREAST IN FEMALE (H): Primary | ICD-10-CM

## 2025-02-21 NOTE — OR NURSING
----- Message from Alfa Curtis MD sent at 2/21/2025 11:49 AM CST -----  Very mild prediabetic   Dr. Ocampo bedside, will put in anesthesia sign out note in. Ok to proceed with discharge home.

## 2025-02-24 DIAGNOSIS — C50.911 INVASIVE LOBULAR CARCINOMA OF RIGHT BREAST IN FEMALE (H): Primary | ICD-10-CM

## 2025-02-25 RX ORDER — NICOTINE POLACRILEX 4 MG
LOZENGE BUCCAL
Status: DISPENSED
Start: 2025-02-25 | End: 2025-02-26

## 2025-03-17 DIAGNOSIS — C50.911 INVASIVE LOBULAR CARCINOMA OF RIGHT BREAST IN FEMALE (H): Primary | ICD-10-CM

## 2025-04-09 ENCOUNTER — PATIENT OUTREACH (OUTPATIENT)
Dept: ONCOLOGY | Facility: CLINIC | Age: 65
End: 2025-04-09
Payer: COMMERCIAL

## 2025-04-22 ENCOUNTER — ONCOLOGY VISIT (OUTPATIENT)
Dept: ONCOLOGY | Facility: CLINIC | Age: 65
End: 2025-04-22
Attending: INTERNAL MEDICINE
Payer: COMMERCIAL

## 2025-04-22 ENCOUNTER — APPOINTMENT (OUTPATIENT)
Dept: LAB | Facility: CLINIC | Age: 65
End: 2025-04-22
Attending: INTERNAL MEDICINE
Payer: COMMERCIAL

## 2025-04-22 VITALS
RESPIRATION RATE: 16 BRPM | WEIGHT: 166 LBS | HEIGHT: 67 IN | OXYGEN SATURATION: 100 % | BODY MASS INDEX: 26.06 KG/M2 | SYSTOLIC BLOOD PRESSURE: 135 MMHG | TEMPERATURE: 97.9 F | DIASTOLIC BLOOD PRESSURE: 84 MMHG | HEART RATE: 68 BPM

## 2025-04-22 DIAGNOSIS — C50.911 INVASIVE LOBULAR CARCINOMA OF RIGHT BREAST IN FEMALE (H): Primary | ICD-10-CM

## 2025-04-22 DIAGNOSIS — E83.52 HYPERCALCEMIA: ICD-10-CM

## 2025-04-22 LAB
ALBUMIN SERPL BCG-MCNC: 4.2 G/DL (ref 3.5–5.2)
ALP SERPL-CCNC: 103 U/L (ref 40–150)
ALT SERPL W P-5'-P-CCNC: 22 U/L (ref 0–50)
ANION GAP SERPL CALCULATED.3IONS-SCNC: 9 MMOL/L (ref 7–15)
AST SERPL W P-5'-P-CCNC: 31 U/L (ref 0–45)
BASOPHILS # BLD AUTO: 0.1 10E3/UL (ref 0–0.2)
BASOPHILS NFR BLD AUTO: 2 %
BILIRUB SERPL-MCNC: 0.3 MG/DL
BUN SERPL-MCNC: 15.2 MG/DL (ref 8–23)
CALCIUM SERPL-MCNC: 10.1 MG/DL (ref 8.8–10.4)
CHLORIDE SERPL-SCNC: 101 MMOL/L (ref 98–107)
CREAT SERPL-MCNC: 1.04 MG/DL (ref 0.51–0.95)
EGFRCR SERPLBLD CKD-EPI 2021: 60 ML/MIN/1.73M2
EOSINOPHIL # BLD AUTO: 0.1 10E3/UL (ref 0–0.7)
EOSINOPHIL NFR BLD AUTO: 3 %
ERYTHROCYTE [DISTWIDTH] IN BLOOD BY AUTOMATED COUNT: 12.2 % (ref 10–15)
GLUCOSE SERPL-MCNC: 82 MG/DL (ref 70–99)
HCO3 SERPL-SCNC: 26 MMOL/L (ref 22–29)
HCT VFR BLD AUTO: 35.6 % (ref 35–47)
HGB BLD-MCNC: 12.2 G/DL (ref 11.7–15.7)
IMM GRANULOCYTES # BLD: 0 10E3/UL
IMM GRANULOCYTES NFR BLD: 1 %
LYMPHOCYTES # BLD AUTO: 0.7 10E3/UL (ref 0.8–5.3)
LYMPHOCYTES NFR BLD AUTO: 25 %
MCH RBC QN AUTO: 37.1 PG (ref 26.5–33)
MCHC RBC AUTO-ENTMCNC: 34.3 G/DL (ref 31.5–36.5)
MCV RBC AUTO: 108 FL (ref 78–100)
MONOCYTES # BLD AUTO: 0.3 10E3/UL (ref 0–1.3)
MONOCYTES NFR BLD AUTO: 10 %
NEUTROPHILS # BLD AUTO: 1.7 10E3/UL (ref 1.6–8.3)
NEUTROPHILS NFR BLD AUTO: 60 %
NRBC # BLD AUTO: 0 10E3/UL
NRBC BLD AUTO-RTO: 0 /100
PLATELET # BLD AUTO: 158 10E3/UL (ref 150–450)
POTASSIUM SERPL-SCNC: 4.4 MMOL/L (ref 3.4–5.3)
PROT SERPL-MCNC: 7.3 G/DL (ref 6.4–8.3)
RBC # BLD AUTO: 3.29 10E6/UL (ref 3.8–5.2)
SODIUM SERPL-SCNC: 136 MMOL/L (ref 135–145)
WBC # BLD AUTO: 2.8 10E3/UL (ref 4–11)

## 2025-04-22 PROCEDURE — G2211 COMPLEX E/M VISIT ADD ON: HCPCS | Performed by: INTERNAL MEDICINE

## 2025-04-22 PROCEDURE — 84155 ASSAY OF PROTEIN SERUM: CPT | Performed by: NURSE PRACTITIONER

## 2025-04-22 PROCEDURE — 36415 COLL VENOUS BLD VENIPUNCTURE: CPT | Performed by: NURSE PRACTITIONER

## 2025-04-22 PROCEDURE — 85004 AUTOMATED DIFF WBC COUNT: CPT | Performed by: NURSE PRACTITIONER

## 2025-04-22 PROCEDURE — 99214 OFFICE O/P EST MOD 30 MIN: CPT | Performed by: INTERNAL MEDICINE

## 2025-04-22 ASSESSMENT — PAIN SCALES - GENERAL: PAINLEVEL_OUTOF10: NO PAIN (0)

## 2025-04-22 NOTE — PROGRESS NOTES
"Oncology Rooming Note    April 22, 2025 2:28 PM   Sheila Schilling is a 64 year old female who presents for:    Chief Complaint   Patient presents with    Oncology Clinic Visit     Invasive lobular carcinoma of right breast in female - Labs and provider visits     Initial Vitals: /84 (BP Location: Right arm, Patient Position: Sitting, Cuff Size: Adult Regular)   Pulse 68   Temp 97.9  F (36.6  C) (Tympanic)   Resp 16   Ht 1.708 m (5' 7.25\")   Wt 75.3 kg (166 lb)   LMP 03/08/2011   SpO2 100%   BMI 25.81 kg/m   Estimated body mass index is 25.81 kg/m  as calculated from the following:    Height as of this encounter: 1.708 m (5' 7.25\").    Weight as of this encounter: 75.3 kg (166 lb). Body surface area is 1.89 meters squared.  No Pain (0) Comment: Data Unavailable   Patient's last menstrual period was 03/08/2011.  Allergies reviewed: Yes  Medications reviewed: Yes    Medications: Medication refills not needed today.  Pharmacy name entered into [facility]:    [facility] - [place], [place] - [address] [id number] - [place], [place] - [address] [facility] [address] [place] [address] [facility] - [place], [place] - [address]   [facility] [employer] - [place], [place] - [address]    Frailty Screening:   Is the patient here for a new oncology consult visit in cancer care? 2. No    PHQ9:  Did this patient require a PHQ9?: No      Clinical concerns:  None today      Jolene Villagomez CMA            "

## 2025-04-22 NOTE — PROGRESS NOTES
[facility] Hematology and Oncology Progress Note    Patient: Sheila Schilling  MRN: [id number]  4/22/25        Reason for Visit    Hx right breast cancer    Primary Oncologist: Dr. Dooley    Assessment and Plan  # Stage IB (pT3-pN1a(sn)-cM0) Invasive lobular carcinoma of the right breast, ER/MO+; high risk features (large tumor size, 1 positive lymph node)  #. Right chest wall/axillary fibrosis   #  Chemo-induced leukopenia, anemia (grade 1)  #  Elevated creatinine (gr 1), possible abemaciclib side effect    Status post right mastectomy and adjuvant radiation 2023.    Currently on adjuvant abemaciclib 150 mg BID and letrozole x 18 mths.     Tolerating treatment very well.     Results  - Calcium: 10.1 (normal)  - Hemoglobin: 12.2 (stable)  - Platelet count: 158 (normal)  - Total white count: 2.8 (low)  - Neutrophil count: normal  - Creatinine: 1.04 (slightly high but within normal range)  - Liver function tests: normal  - Kidney function: stable  - Electrolytes: normal  - I reviewed lab results personally and independently interpreted the results.     Plan  Invasive lobular carcinoma of right breast in female (H):  The patient is currently on Verzenio and letrozole. No new breast lumps or significant post-surgical issues reported. The patient experiences occasional tightness on the right side, which is relieved by massage. The patient is tolerating the full dose of Verzenio well, with infrequent stomach cramps and diarrhea. Hot flashes are present but not severe. The patient's hemoglobin is stable at 12.2, platelet count is normal at 158, and total white count is low at 2.8, but the neutrophil count is normal. Liver function tests, kidney function, and electrolytes are normal. Creatinine is slightly elevated at 1.04 but not concerning. Calcium level is normal at 10.1.  Continue current medications, Verzenio and letrozole. She will need a DEXA scan in 6 months. Follow-up appointment scheduled for July after  the mammogram. Continue self-exams and monitor for any new symptoms.      Mild osteopenia  Mild hypercalcemia  Baseline DEXA shows mild osteopenia with 0.1% hip fracture risk/10 yrs.   Previous calcium was up to 10.7.  She stopped taking oral calcium supplements.  Currently on vitamin D.  The repeat calcium today at 10.1.  Will continue to monitor.     Cancer Staging   Invasive lobular carcinoma of right breast in female (H)  Staging form: Breast, AJCC 8th Edition  - Pathologic stage from 6/19/2023: Stage IB (pT3, pN1a, cM0, G2, ER+, UT+, HER2-, Oncotype DX score: 13) - Signed by Ligia Felix NP on 10/26/2023       Oncology history  5/2023: Stage IB ([id number]) right breast cancer, ER/UT+  -screening mammogram: asymmetry in upper outer quadrant of her right breast.   -R diagnostic mammogram +ultrasound: architectural distortion with 12 x 10 x 8 mm ill-defined mass with acoustic shadowing respectively. Needle biopsy: invasive lobular carcinoma, grade 2; strongly ER and UT positive and HER2 negative.   -contrast-enhanced mammogram (due to very dense breasts): 7.8 x 6.7 x 5.3 cm enhancement extending from 11:00 to 8 o'clock position consistent with malignancy.   -6/2023 surgical path: 15 cm invasive lobular carcinoma. Single 4 mm sLN positive   -Oncotype DX score 13  -11/2023 CT abd/pelvis done by Plastic Surgery for reconstruction planning, incidentally noted L4 bone lesion indeterminate. Bone scan negative for mets. Repeat pelvic CT 6/2024 stable 9 mm lesion, favored benign. No additional follow-up needed.     Treatment:  6/19/2023: right mastectomy and sLN biopsy. Subsequent right breast reconstruction.    9/26/2023: completed adjuvant RT (5000 cGy/25)    10/16/2023 - present: adjuvant abemaciclib 150 mg BID and letrozole    Interval History   Sheila Schilling is a 64 year old diagnosed with right ER/UT+ breast cancer with sLN involvement; s/p right mastectomy 1.5 y ago, adjuvant RT and now on adjuvant letrozole  and abemaciclib x 15 mths.   Returns for 3-mth visit.     Doing fairly well since last visit.    - Previously had high calcium levels; stopped taking calcium supplements as advised.  - Currently taking vitamin D supplements.  - Reports a sore tongue, similar to a burn, coinciding with stopping calcium.  - Experiences restlessness at night, unsure if related to stopping calcium.  - Occasional muscle spasms in legs, described as achiness, not constant.  - No history of iron deficiency; hemoglobin stable at 12.2.  - On Verzenio for breast cancer treatment, with 6 months remaining.  - Occasionally experiences stomach cramps, primarily on the left side, not daily.  - Infrequent diarrhea episodes, with the last occurrence noted recently.  - Post-surgical tightness on the right side due to an implant, relieved by massage.  - Experiences occasional hot flashes, primarily at night, but not every night.  - No significant night sweats reported.  - Maintains activity by walking a mile three times a day.          ECOG Performance  0 - Independent      Physical Exam    General: alert and cooperative  HEENT: No icterus. No alopecia  Breasts: Status post right mastectomy with implant placement.  No evidence of bilateral axillary adenopathy.  Extremities: No edema  Neuro: Non-focal    Lab Results    Recent Results (from the past week)   Comprehensive metabolic panel   Result Value Ref Range    Sodium 136 135 - 145 mmol/L    Potassium 4.4 3.4 - 5.3 mmol/L    Carbon Dioxide (CO2) 26 22 - 29 mmol/L    Anion Gap 9 7 - 15 mmol/L    Urea Nitrogen 15.2 8.0 - 23.0 mg/dL    Creatinine 1.04 (H) 0.51 - 0.95 mg/dL    GFR Estimate 60 (L) >60 mL/min/1.73m2    Calcium 10.1 8.8 - 10.4 mg/dL    Chloride 101 98 - 107 mmol/L    Glucose 82 70 - 99 mg/dL    Alkaline Phosphatase 103 40 - 150 U/L    AST 31 0 - 45 U/L    ALT 22 0 - 50 U/L    Protein Total 7.3 6.4 - 8.3 g/dL    Albumin 4.2 3.5 - 5.2 g/dL    Bilirubin Total 0.3 <=1.2 mg/dL   CBC with  platelets and differential   Result Value Ref Range    WBC Count 2.8 (L) 4.0 - 11.0 10e3/uL    RBC Count 3.29 (L) 3.80 - 5.20 10e6/uL    Hemoglobin 12.2 11.7 - 15.7 g/dL    Hematocrit 35.6 35.0 - 47.0 %     (H) 78 - 100 fL    MCH 37.1 (H) 26.5 - 33.0 pg    MCHC 34.3 31.5 - 36.5 g/dL    RDW 12.2 10.0 - 15.0 %    Platelet Count 158 150 - 450 10e3/uL    % Neutrophils 60 %    % Lymphocytes 25 %    % Monocytes 10 %    % Eosinophils 3 %    % Basophils 2 %    % Immature Granulocytes 1 %    NRBCs per 100 WBC 0 <1 /100    Absolute Neutrophils 1.7 1.6 - 8.3 10e3/uL    Absolute Lymphocytes 0.7 (L) 0.8 - 5.3 10e3/uL    Absolute Monocytes 0.3 0.0 - 1.3 10e3/uL    Absolute Eosinophils 0.1 0.0 - 0.7 10e3/uL    Absolute Basophils 0.1 0.0 - 0.2 10e3/uL    Absolute Immature Granulocytes 0.0 <=0.4 10e3/uL    Absolute NRBCs 0.0 10e3/uL           Imaging    No results found.    The longitudinal plan of care for the diagnosis(es)/condition(s) as documented were addressed during this visit. Due to the added complexity in care, I will continue to support Sheila in the subsequent management and with ongoing continuity of care.    A total of 30 min was spent today on this visit which included face to face conversation with the patient, EMR review, counseling and co-ordination of care and medical documentation.      Signed by: Merna Dooley MD

## 2025-04-22 NOTE — LETTER
"4/22/2025      Sheila Schilling  [address] [facility] [address]      Dear Colleague,    Thank you for referring your patient, Sheila Schilling, to the [facility]. Please see a copy of my visit note below.    Oncology Rooming Note    April 22, 2025 2:28 PM   Sheila Schilling is a 64 year old female who presents for:    Chief Complaint   Patient presents with     Oncology Clinic Visit     Invasive lobular carcinoma of right breast in female - Labs and provider visits     Initial Vitals: /84 (BP Location: Right arm, Patient Position: Sitting, Cuff Size: Adult Regular)   Pulse 68   Temp 97.9  F (36.6  C) (Tympanic)   Resp 16   Ht 1.708 m (5' 7.25\")   Wt 75.3 kg (166 lb)   LMP 03/08/2011   SpO2 100%   BMI 25.81 kg/m   Estimated body mass index is 25.81 kg/m  as calculated from the following:    Height as of this encounter: 1.708 m (5' 7.25\").    Weight as of this encounter: 75.3 kg (166 lb). Body surface area is 1.89 meters squared.  No Pain (0) Comment: Data Unavailable   Patient's last menstrual period was 03/08/2011.  Allergies reviewed: Yes  Medications reviewed: Yes    Medications: Medication refills not needed today.  Pharmacy name entered into [facility]:    [facility] - [place], [place] - [address] [id number] - [place], [place] - [address] [facility] - [place], [place] - [address] [place] [address]  [facility] - [place], [place] - [address]    Frailty Screening:   Is the patient here for a new oncology consult visit in cancer care? 2. No    PHQ9:  Did this patient require a PHQ9?: No      Clinical concerns:  None today      Jolene Villagomez CMA              .[facility] Hematology and Oncology Progress Note    Patient: Sheila Schilling  MRN: [id number]  4/22/25        Reason for Visit    Hx right breast cancer    Primary Oncologist: Dr. Dooley    Assessment and " Plan  # Stage IB (pT3-pN1a(sn)-cM0) Invasive lobular carcinoma of the right breast, ER/DE+; high risk features (large tumor size, 1 positive lymph node)  #. Right chest wall/axillary fibrosis   #  Chemo-induced leukopenia, anemia (grade 1)  #  Elevated creatinine (gr 1), possible abemaciclib side effect    Status post right mastectomy and adjuvant radiation 2023.    Currently on adjuvant abemaciclib 150 mg BID and letrozole x 18 mths.     Tolerating treatment very well.     Results  - Calcium: 10.1 (normal)  - Hemoglobin: 12.2 (stable)  - Platelet count: 158 (normal)  - Total white count: 2.8 (low)  - Neutrophil count: normal  - Creatinine: 1.04 (slightly high but within normal range)  - Liver function tests: normal  - Kidney function: stable  - Electrolytes: normal  - I reviewed lab results personally and independently interpreted the results.     Plan  Invasive lobular carcinoma of right breast in female (H):  The patient is currently on Verzenio and letrozole. No new breast lumps or significant post-surgical issues reported. The patient experiences occasional tightness on the right side, which is relieved by massage. The patient is tolerating the full dose of Verzenio well, with infrequent stomach cramps and diarrhea. Hot flashes are present but not severe. The patient's hemoglobin is stable at 12.2, platelet count is normal at 158, and total white count is low at 2.8, but the neutrophil count is normal. Liver function tests, kidney function, and electrolytes are normal. Creatinine is slightly elevated at 1.04 but not concerning. Calcium level is normal at 10.1.  Continue current medications, Verzenio and letrozole. She will need a DEXA scan in 6 months. Follow-up appointment scheduled for July after the mammogram. Continue self-exams and monitor for any new symptoms.      Mild osteopenia  Mild hypercalcemia  Baseline DEXA shows mild osteopenia with 0.1% hip fracture risk/10 yrs.   Previous calcium was up to  10.7.  She stopped taking oral calcium supplements.  Currently on vitamin D.  The repeat calcium today at 10.1.  Will continue to monitor.     Cancer Staging   Invasive lobular carcinoma of right breast in female (H)  Staging form: Breast, AJCC 8th Edition  - Pathologic stage from 6/19/2023: Stage IB (pT3, pN1a, cM0, G2, ER+, WA+, HER2-, Oncotype DX score: 13) - Signed by Ligia Felix NP on 10/26/2023       Oncology history  5/2023: Stage IB (pL9-kL5e-qU1) right breast cancer, ER/WA+  -screening mammogram: asymmetry in upper outer quadrant of her right breast.   -R diagnostic mammogram +ultrasound: architectural distortion with 12 x 10 x 8 mm ill-defined mass with acoustic shadowing respectively. Needle biopsy: invasive lobular carcinoma, grade 2; strongly ER and WA positive and HER2 negative.   -contrast-enhanced mammogram (due to very dense breasts): 7.8 x 6.7 x 5.3 cm enhancement extending from 11:00 to 8 o'clock position consistent with malignancy.   -6/2023 surgical path: 15 cm invasive lobular carcinoma. Single 4 mm sLN positive   -Oncotype DX score 13  -11/2023 CT abd/pelvis done by Plastic Surgery for reconstruction planning, incidentally noted L4 bone lesion indeterminate. Bone scan negative for mets. Repeat pelvic CT 6/2024 stable 9 mm lesion, favored benign. No additional follow-up needed.     Treatment:  6/19/2023: right mastectomy and sLN biopsy. Subsequent right breast reconstruction.    9/26/2023: completed adjuvant RT (5000 cGy/25)    10/16/2023 - present: adjuvant abemaciclib 150 mg BID and letrozole    Interval History   Sheila Schilling is a 64 year old diagnosed with right ER/WA+ breast cancer with sLN involvement; s/p right mastectomy 1.5 y ago, adjuvant RT and now on adjuvant letrozole and abemaciclib x 15 mths.   Returns for 3-mth visit.     Doing fairly well since last visit.    - Previously had high calcium levels; stopped taking calcium supplements as advised.  - Currently taking vitamin D  supplements.  - Reports a sore tongue, similar to a burn, coinciding with stopping calcium.  - Experiences restlessness at night, unsure if related to stopping calcium.  - Occasional muscle spasms in legs, described as achiness, not constant.  - No history of iron deficiency; hemoglobin stable at 12.2.  - On Verzenio for breast cancer treatment, with 6 months remaining.  - Occasionally experiences stomach cramps, primarily on the left side, not daily.  - Infrequent diarrhea episodes, with the last occurrence noted recently.  - Post-surgical tightness on the right side due to an implant, relieved by massage.  - Experiences occasional hot flashes, primarily at night, but not every night.  - No significant night sweats reported.  - Maintains activity by walking a mile three times a day.          ECOG Performance  0 - Independent      Physical Exam    General: alert and cooperative  HEENT: No icterus. No alopecia  Breasts: Status post right mastectomy with implant placement.  No evidence of bilateral axillary adenopathy.  Extremities: No edema  Neuro: Non-focal    Lab Results    Recent Results (from the past week)   Comprehensive metabolic panel   Result Value Ref Range    Sodium 136 135 - 145 mmol/L    Potassium 4.4 3.4 - 5.3 mmol/L    Carbon Dioxide (CO2) 26 22 - 29 mmol/L    Anion Gap 9 7 - 15 mmol/L    Urea Nitrogen 15.2 8.0 - 23.0 mg/dL    Creatinine 1.04 (H) 0.51 - 0.95 mg/dL    GFR Estimate 60 (L) >60 mL/min/1.73m2    Calcium 10.1 8.8 - 10.4 mg/dL    Chloride 101 98 - 107 mmol/L    Glucose 82 70 - 99 mg/dL    Alkaline Phosphatase 103 40 - 150 U/L    AST 31 0 - 45 U/L    ALT 22 0 - 50 U/L    Protein Total 7.3 6.4 - 8.3 g/dL    Albumin 4.2 3.5 - 5.2 g/dL    Bilirubin Total 0.3 <=1.2 mg/dL   CBC with platelets and differential   Result Value Ref Range    WBC Count 2.8 (L) 4.0 - 11.0 10e3/uL    RBC Count 3.29 (L) 3.80 - 5.20 10e6/uL    Hemoglobin 12.2 11.7 - 15.7 g/dL    Hematocrit 35.6 35.0 - 47.0 %     (H)  78 - 100 fL    MCH 37.1 (H) 26.5 - 33.0 pg    MCHC 34.3 31.5 - 36.5 g/dL    RDW 12.2 10.0 - 15.0 %    Platelet Count 158 150 - 450 10e3/uL    % Neutrophils 60 %    % Lymphocytes 25 %    % Monocytes 10 %    % Eosinophils 3 %    % Basophils 2 %    % Immature Granulocytes 1 %    NRBCs per 100 WBC 0 <1 /100    Absolute Neutrophils 1.7 1.6 - 8.3 10e3/uL    Absolute Lymphocytes 0.7 (L) 0.8 - 5.3 10e3/uL    Absolute Monocytes 0.3 0.0 - 1.3 10e3/uL    Absolute Eosinophils 0.1 0.0 - 0.7 10e3/uL    Absolute Basophils 0.1 0.0 - 0.2 10e3/uL    Absolute Immature Granulocytes 0.0 <=0.4 10e3/uL    Absolute NRBCs 0.0 10e3/uL           Imaging    No results found.    The longitudinal plan of care for the diagnosis(es)/condition(s) as documented were addressed during this visit. Due to the added complexity in care, I will continue to support Sheila in the subsequent management and with ongoing continuity of care.    A total of 30 min was spent today on this visit which included face to face conversation with the patient, EMR review, counseling and co-ordination of care and medical documentation.      Signed by: Merna Dooley MD      Again, thank you for allowing me to participate in the care of your patient.        Sincerely,        Merna Dooley MD    Electronically signed

## 2025-05-21 ENCOUNTER — HOSPITAL ENCOUNTER (EMERGENCY)
Facility: CLINIC | Age: 65
Discharge: HOME | End: 2025-05-21
Attending: PHYSICIAN ASSISTANT | Admitting: PHYSICIAN ASSISTANT
Payer: COMMERCIAL

## 2025-05-21 VITALS
RESPIRATION RATE: 18 BRPM | DIASTOLIC BLOOD PRESSURE: 81 MMHG | HEART RATE: 95 BPM | TEMPERATURE: 101.4 F | SYSTOLIC BLOOD PRESSURE: 140 MMHG | OXYGEN SATURATION: 96 %

## 2025-05-21 DIAGNOSIS — J06.9 URI WITH COUGH AND CONGESTION: ICD-10-CM

## 2025-05-21 DIAGNOSIS — R50.9 FEBRILE ILLNESS: ICD-10-CM

## 2025-05-21 PROCEDURE — 99213 OFFICE O/P EST LOW 20 MIN: CPT | Performed by: PHYSICIAN ASSISTANT

## 2025-05-21 PROCEDURE — G0463 HOSPITAL OUTPT CLINIC VISIT: HCPCS | Mod: 25

## 2025-05-21 ASSESSMENT — ENCOUNTER SYMPTOMS
SHORTNESS OF BREATH: 0
COUGH: 1
RHINORRHEA: 1
FEVER: 1
WHEEZING: 0

## 2025-05-21 ASSESSMENT — ACTIVITIES OF DAILY LIVING (ADL): ADLS_ACUITY_SCORE: 41

## 2025-05-21 ASSESSMENT — COLUMBIA-SUICIDE SEVERITY RATING SCALE - C-SSRS
2. HAVE YOU ACTUALLY HAD ANY THOUGHTS OF KILLING YOURSELF IN THE PAST MONTH?: NO
1. IN THE PAST MONTH, HAVE YOU WISHED YOU WERE DEAD OR WISHED YOU COULD GO TO SLEEP AND NOT WAKE UP?: NO
6. HAVE YOU EVER DONE ANYTHING, STARTED TO DO ANYTHING, OR PREPARED TO DO ANYTHING TO END YOUR LIFE?: NO

## 2025-05-21 NOTE — ED PROVIDER NOTES
History     Chief Complaint   Patient presents with    Cough     HPI  Sheila Schilling is a 64 year old female who presents to Urgent Care with complaints of persistent ongoing cough that is productive of yellow mucus and phlegm for the past 9 days.  She states she has also had associated sinus and nasal congestion.  Her  was ill with similar symptoms before patient became ill.  She was noted be febrile on arrival but states she has not noticed this at home.  Patient is currently on chemotherapy for breast cancer.  She states this has happened once previously and she needed antibiotics  Denies nausea, vomiting, diarrhea, abdominal pain, chest pain, or shortness of breath.  Patient has had low white count but normal neutrophil count most recently.      Allergies:  No Known Allergies    Problem List:    Patient Active Problem List    Diagnosis Date Noted    Routine general medical examination at a health care facility 07/23/2024     Priority: Medium    Left shoulder pain, unspecified chronicity 07/23/2024     Priority: Medium    Paroxysmal atrial fibrillation (H) 07/23/2024     Priority: Medium    Screening for hyperlipidemia 07/23/2024     Priority: Medium    Screening for diabetes mellitus 07/23/2024     Priority: Medium    Immunization due 07/23/2024     Priority: Medium    Screening for thyroid disorder 07/23/2024     Priority: Medium    Elevated serum creatinine 10/26/2023     Priority: Medium    Osteopenia of lumbar spine 10/26/2023     Priority: Medium    Invasive lobular carcinoma of right breast in female (H) 05/19/2023     Priority: Medium     Check 12.23 follow-up Miah       Palpitations 06/24/2017     Priority: Medium    CARDIOVASCULAR SCREENING; LDL GOAL LESS THAN 160 10/31/2010     Priority: Medium        Past Medical History:    Past Medical History:   Diagnosis Date    Atrial fibrillation (H)     Palpitations 6/24/2017    Paroxysmal atrial flutter (H)        Past Surgical History:    Past  Surgical History:   Procedure Laterality Date    BIOPSY NODE SENTINEL Right 6/19/2023    Procedure: sentinel node biopsy;  Surgeon: Sanjiv Mcdermott MD;  Location: [facility]    COLONOSCOPY  7/28/2011    Procedure:COLONOSCOPY; Surgeon:MIKE LYLE; Location:[facility]    COLONOSCOPY N/A 6/24/2022    Procedure: COLONOSCOPY;  Surgeon: Karime Jain MD;  Location: [facility]    MASTECTOMY SIMPLE Right 6/19/2023    Procedure: Right skin sparing mastectomy;  Surgeon: Sanjiv Mcdermott MD;  Location: [facility] OR    RECONSTRUCT BREAST, INSERT TISSUE EXPANDER, COMBINED Bilateral 6/19/2023    Procedure: RIGHT IMMEDIATE BREAST RECONSTRUCTION WITH USE OF TISSUE EXPANDER, ALLODERM AND SPY ANGIOGRAPHY;  Surgeon: Qasim Galvan MD;  Location: [facility] OR    SURGICAL HISTORY OF -   07/15/2002    laparoscopic L salpingo-oophorectomy - ectopic    SURGICAL HISTORY OF -   7/15/2002    SAB       Family History:    Family History   Problem Relation Age of Onset    Hypertension Mother     Lipids Mother     Hyperlipidemia Mother     C.A.D. Father     Hypertension Father     Diabetes Sister         Rosy Gilbert    Hypertension Sister     Hyperlipidemia Sister     Diabetes Maternal Grandmother     C.A.D. Maternal Grandmother     C.A.D. Maternal Grandfather     Cancer Paternal Grandmother         liver    Cancer - colorectal Other     Colon Cancer Other         Samantha Cesar    Anesthesia Reaction No family hx of     Bleeding Disorder No family hx of     Venous thrombosis No family hx of        Social History:  Marital Status:   2  Social History     Tobacco Use    Smoking status: Never    Smokeless tobacco: Never   Vaping Use    Vaping status: Never Used   Substance Use Topics    Alcohol use: Not Currently    Drug use: Never        Medications:    amoxicillin-clavulanate (AUGMENTIN) 875-125 MG tablet  abemaciclib (VERZENIO) 150 MG tablet  aspirin 81 MG EC tablet  Calcium-Vitamin D (CALCIUM + D PO)  flecainide (TAMBOCOR) 100 MG tablet  letrozole  (FEMARA) 2.5 MG tablet  loperamide (IMODIUM) 2 MG capsule  prochlorperazine (COMPAZINE) 10 MG tablet  senna-docusate (SENOKOT-S/PERICOLACE) 8.6-50 MG tablet          Review of Systems   Constitutional:  Positive for fever.   HENT:  Positive for congestion and rhinorrhea.    Respiratory:  Positive for cough. Negative for shortness of breath and wheezing.    All other systems reviewed and are negative.      Physical Exam   BP: (!) 140/81  Pulse: 95  Temp: (!) 101.4  F (38.6  C)  Resp: 18  SpO2: 96 %      Physical Exam  Constitutional:       General: She is not in acute distress.     Appearance: Normal appearance. She is well-developed. She is not ill-appearing, toxic-appearing or diaphoretic.   HENT:      Head: Normocephalic and atraumatic.      Right Ear: Tympanic membrane, ear canal and external ear normal.      Left Ear: Tympanic membrane, ear canal and external ear normal.      Nose: Mucosal edema, congestion and rhinorrhea present.      Mouth/Throat:      Lips: Pink.      Mouth: Mucous membranes are moist.      Pharynx: Uvula midline. Posterior oropharyngeal erythema present. No pharyngeal swelling, oropharyngeal exudate, uvula swelling or postnasal drip.      Tonsils: No tonsillar exudate or tonsillar abscesses.   Eyes:      Extraocular Movements: Extraocular movements intact.      Conjunctiva/sclera: Conjunctivae normal.      Pupils: Pupils are equal, round, and reactive to light.   Cardiovascular:      Rate and Rhythm: Normal rate and regular rhythm.      Heart sounds: Normal heart sounds.   Pulmonary:      Effort: Pulmonary effort is normal. No respiratory distress.      Breath sounds: Normal breath sounds. No stridor. No wheezing, rhonchi or rales.   Musculoskeletal:      Cervical back: Full passive range of motion without pain, normal range of motion and neck supple. No rigidity. Normal range of motion.   Lymphadenopathy:      Cervical: No cervical adenopathy.   Skin:     General: Skin is warm and dry.    Neurological:      Mental Status: She is alert and oriented to person, place, and time.   Psychiatric:         Behavior: Behavior is cooperative.         ED Course        Procedures      Results for orders placed or performed during the hospital encounter of 05/21/25 (from the past 24 hours)   XR Chest 2 Views    Narrative    EXAM: XR CHEST 2 VIEWS  LOCATION: [facility]  DATE: 5/21/2025    INDICATION: cough, fevers  COMPARISON: 11/1/2024      Impression    IMPRESSION: No focal airspace opacities, pleural effusion or pneumothorax. Stable tiny focus of scarring in the right lung apex. Normal heart size. Postsurgical changes in the right breast with right breast tissue expander.       Medications - No data to display    Assessments & Plan (with Medical Decision Making)     Pt is a 64 year old female who presents to Urgent Care with complaints of persistent ongoing cough that is productive of yellow mucus and phlegm for the past 9 days.  She states she has also had associated sinus and nasal congestion.  Her  was ill with similar symptoms before patient became ill.  She was noted be febrile on arrival but states she has not noticed this at home.  Patient is currently on chemotherapy for breast cancer.  Patient has had low white count but normal neutrophil count most recently.    Pt is febrile to 101.4*F on arrival.  Exam as above.  O2 sats of 96% on room air.  Patient is not tachycardic.  Chest x-ray was negative for pneumonia or acute pathology.  Discussed results with patient.  X-rays were reviewed by radiology as well as independently reviewed by myself.  Given patient's prolonged cough and URI over the past 9 days especially since she is immunocompromised on chemotherapy, will treat with antibiotics.  Encouraged additional symptomatic treatments at home.  Recommended close follow-up with her oncology team.  Return precautions were reviewed.  Hand-outs were provided.    Patient  was sent with Augmentin and was instructed to follow-up with PCP for continued care and management.  She is to return to the ED for persistent and/or worsening symptoms.  Patient expressed understanding of the diagnosis and plan and was discharged home in good condition.    I have reviewed the nursing notes.    I have reviewed the findings, diagnosis, plan and need for follow up with the patient.    Discharge Medication List as of 5/21/2025  1:35 PM        START taking these medications    Details   amoxicillin-clavulanate (AUGMENTIN) 875-125 MG tablet Take 1 tablet by mouth 2 times daily for 7 days., Disp-14 tablet, R-0, E-Prescribe             Final diagnoses:   Febrile illness   URI with cough and congestion       5/21/2025   [facility] EMERGENCY DEPT      Disclaimer:  This note consists of symbols derived from keyboarding, dictation and/or voice recognition software.  As a result, there may be errors in the script that have gone undetected.  Please consider this when interpreting information found in this chart.     Janny Pittman PA-C  05/21/25 [id number]

## 2025-05-21 NOTE — ED TRIAGE NOTES
Pt reports cough with yellow mucous x 9 days   Pt has not had any fever reducing medication today   pt declines treating fever in UC today

## 2025-06-09 DIAGNOSIS — C50.911 INVASIVE LOBULAR CARCINOMA OF RIGHT BREAST IN FEMALE (H): Primary | ICD-10-CM

## 2025-06-15 ENCOUNTER — HEALTH MAINTENANCE LETTER (OUTPATIENT)
Age: 65
End: 2025-06-15

## 2025-07-15 ENCOUNTER — LAB (OUTPATIENT)
Dept: LAB | Facility: CLINIC | Age: 65
End: 2025-07-15
Payer: COMMERCIAL

## 2025-07-15 DIAGNOSIS — C50.911 INVASIVE LOBULAR CARCINOMA OF RIGHT BREAST IN FEMALE (H): ICD-10-CM

## 2025-07-15 LAB
ALBUMIN SERPL BCG-MCNC: 3.9 G/DL (ref 3.5–5.2)
ALP SERPL-CCNC: 97 U/L (ref 40–150)
ALT SERPL W P-5'-P-CCNC: 18 U/L (ref 0–50)
ANION GAP SERPL CALCULATED.3IONS-SCNC: 9 MMOL/L (ref 7–15)
AST SERPL W P-5'-P-CCNC: 27 U/L (ref 0–45)
BASOPHILS # BLD AUTO: 0.1 10E3/UL (ref 0–0.2)
BASOPHILS NFR BLD AUTO: 2 %
BILIRUB SERPL-MCNC: 0.4 MG/DL
BUN SERPL-MCNC: 12.1 MG/DL (ref 8–23)
CALCIUM SERPL-MCNC: 10 MG/DL (ref 8.8–10.4)
CHLORIDE SERPL-SCNC: 104 MMOL/L (ref 98–107)
CREAT SERPL-MCNC: 1.05 MG/DL (ref 0.51–0.95)
EGFRCR SERPLBLD CKD-EPI 2021: 59 ML/MIN/1.73M2
EOSINOPHIL # BLD AUTO: 0.1 10E3/UL (ref 0–0.7)
EOSINOPHIL NFR BLD AUTO: 4 %
ERYTHROCYTE [DISTWIDTH] IN BLOOD BY AUTOMATED COUNT: 13 % (ref 10–15)
GLUCOSE SERPL-MCNC: 109 MG/DL (ref 70–99)
HCO3 SERPL-SCNC: 25 MMOL/L (ref 22–29)
HCT VFR BLD AUTO: 36.7 % (ref 35–47)
HGB BLD-MCNC: 12.5 G/DL (ref 11.7–15.7)
IMM GRANULOCYTES # BLD: 0 10E3/UL
IMM GRANULOCYTES NFR BLD: 1 %
LYMPHOCYTES # BLD AUTO: 0.7 10E3/UL (ref 0.8–5.3)
LYMPHOCYTES NFR BLD AUTO: 23 %
MCH RBC QN AUTO: 36.7 PG (ref 26.5–33)
MCHC RBC AUTO-ENTMCNC: 34.1 G/DL (ref 31.5–36.5)
MCV RBC AUTO: 108 FL (ref 78–100)
MONOCYTES # BLD AUTO: 0.3 10E3/UL (ref 0–1.3)
MONOCYTES NFR BLD AUTO: 8 %
NEUTROPHILS # BLD AUTO: 1.9 10E3/UL (ref 1.6–8.3)
NEUTROPHILS NFR BLD AUTO: 63 %
NRBC # BLD AUTO: 0 10E3/UL
NRBC BLD AUTO-RTO: 0 /100
PLATELET # BLD AUTO: 172 10E3/UL (ref 150–450)
POTASSIUM SERPL-SCNC: 4.2 MMOL/L (ref 3.4–5.3)
PROT SERPL-MCNC: 7.1 G/DL (ref 6.4–8.3)
RBC # BLD AUTO: 3.41 10E6/UL (ref 3.8–5.2)
SODIUM SERPL-SCNC: 138 MMOL/L (ref 135–145)
WBC # BLD AUTO: 3.1 10E3/UL (ref 4–11)

## 2025-07-15 PROCEDURE — 85025 COMPLETE CBC W/AUTO DIFF WBC: CPT

## 2025-07-15 PROCEDURE — 36415 COLL VENOUS BLD VENIPUNCTURE: CPT

## 2025-07-15 PROCEDURE — 80053 COMPREHEN METABOLIC PANEL: CPT

## 2025-07-16 ENCOUNTER — VIRTUAL VISIT (OUTPATIENT)
Dept: ONCOLOGY | Facility: CLINIC | Age: 65
End: 2025-07-16
Attending: NURSE PRACTITIONER
Payer: COMMERCIAL

## 2025-07-16 VITALS — WEIGHT: 166 LBS | BODY MASS INDEX: 26.06 KG/M2 | HEIGHT: 67 IN

## 2025-07-16 DIAGNOSIS — M81.0 AGE-RELATED OSTEOPOROSIS WITHOUT CURRENT PATHOLOGICAL FRACTURE: ICD-10-CM

## 2025-07-16 DIAGNOSIS — C50.911 INVASIVE LOBULAR CARCINOMA OF RIGHT BREAST IN FEMALE (H): Primary | ICD-10-CM

## 2025-07-16 PROCEDURE — 98006 SYNCH AUDIO-VIDEO EST MOD 30: CPT | Performed by: NURSE PRACTITIONER

## 2025-07-16 PROCEDURE — 1126F AMNT PAIN NOTED NONE PRSNT: CPT | Performed by: NURSE PRACTITIONER

## 2025-07-16 ASSESSMENT — PAIN SCALES - GENERAL: PAINLEVEL_OUTOF10: NO PAIN (0)

## 2025-07-16 NOTE — PROGRESS NOTES
Is the patient currently in the state of [place]? YES    Visit mode: VIDEO    If the visit is dropped, the patient can be reconnected by:VIDEO VISIT: Send to e-mail at: [email or web address]    Will anyone else be joining the visit? NO  (If patient encounters technical issues they should call [phone]    Are changes needed to the allergy or medication list? No    Are refills needed on medications prescribed by this physician? NO    Rooming Documentation:  Questionnaire(s) completed    Reason for visit: Video Visit (Follow Up )    Gladis MONTERROSO

## 2025-07-16 NOTE — PROGRESS NOTES
Virtual Visit Details    Type of service:  Video Visit   Start: 1131  End: 1144    Originating Location (pt. Location): Home    Distant Location (provider location):  On-site  Platform used for Video Visit: Deep

## 2025-07-16 NOTE — LETTER
7/16/2025      Sheila Schilling  [address]  [facility] [address]      Dear Colleague,    Thank you for referring your patient, Sheila Schilling, to the [facility]. Please see a copy of my visit note below.      Is the patient currently in the state of [place]? YES    Visit mode: VIDEO    If the visit is dropped, the patient can be reconnected by:VIDEO VISIT: Send to e-mail at: [email or web address]    Will anyone else be joining the visit? NO  (If patient encounters technical issues they should call [phone]    Are changes needed to the allergy or medication list? No    Are refills needed on medications prescribed by this physician? NO    Rooming Documentation:  Questionnaire(s) completed    Reason for visit: Video Visit (Follow Up )    Gladis MONTERROSO       Virtual Visit Details    Type of service:  Video Visit   Start: 1131  End: 1144    Originating Location (pt. Location): Home    Distant Location (provider location):  On-site  Platform used for Video Visit: [employer]      .[facility] Hematology and Oncology Progress Note    Patient: Sheila Schilling  MRN: [id number]  7/16/25        Reason for Visit    Hx right breast cancer    Primary Oncologist: Dr. Dooley    Assessment and Plan  # Stage IB (pT3-pN1a(sn)-cM0) Invasive lobular carcinoma of the right breast, ER/DC+; high risk features (large tumor size, 1 positive lymph node)  #. Right chest wall/axillary fibrosis   #  Chemo-induced leukopenia, anemia (grade 1)  #  Elevated creatinine (gr 1), possible abemaciclib side effect    Status post right mastectomy and adjuvant radiation 2023.    Currently on adjuvant abemaciclib 150 mg BID and letrozole x 15 mths.     Tolerating treatment very well. Mild hot flashes, manageable. Mild leukopenia and anemia. Creatinine has been mildly elevated 0.95-1.1.    Today, labs: WBC 3.1, ANC WNL, plat and hgb WNL. CMP: Creatinine 1.05 (stable). LFTs WNL.     No evidence of recurrence.    Post-mastectomy and RT right chest wall/axillary fibrosis has improved with massage/stretches. Is bothered by her right breast implant and plans to follow up with Plastic Surgery on that.   5/2025 L mammogram benign    Plan:  -Continue abemaciclib 150 mg BID. Planning to complete total of 2 yrs (10/2025)  -Continue daily letrozole, planning up to 10 yrs (10/2033)  -Labs + Dr. Dooley in 3 months. At that time, will plan to stop Verzenio and continue letrozole alone.   -Annual left mammogram due May, 2026    # Mild osteopenia  # Mild hypercalcemia  Baseline DEXA shows mild osteopenia with 0.1% hip fracture risk/10 yrs. She is on Vit D.   Stopped calcium for mild hypercalcemia, with subsequent improvement. Calcium now 10.0.    Plan:  -Continue vit D alone  -Repeat DEXA in 2 years (10/2025), ahead of next visit.        Cancer Staging   Invasive lobular carcinoma of right breast in female (H)  Staging form: Breast, AJCC 8th Edition  - Pathologic stage from 6/19/2023: Stage IB (pT3, pN1a, cM0, G2, ER+, GA+, HER2-, Oncotype DX score: 13) - Signed by Ligia Felix NP on 10/26/2023       Oncology history  5/2023: Stage IB (oH2-rC2n-uO5) right breast cancer, ER/GA+  -screening mammogram: asymmetry in upper outer quadrant of her right breast.   -R diagnostic mammogram +ultrasound: architectural distortion with 12 x 10 x 8 mm ill-defined mass with acoustic shadowing respectively. Needle biopsy: invasive lobular carcinoma, grade 2; strongly ER and GA positive and HER2 negative.   -contrast-enhanced mammogram (due to very dense breasts): 7.8 x 6.7 x 5.3 cm enhancement extending from 11:00 to 8 o'clock position consistent with malignancy.   -6/2023 surgical path: 15 cm invasive lobular carcinoma. Single 4 mm sLN positive   -Oncotype DX score 13  -11/2023 CT abd/pelvis done by Plastic Surgery for reconstruction planning, incidentally noted L4 bone lesion indeterminate. Bone scan negative for mets. Repeat pelvic CT 6/2024 stable 9  mm lesion, favored benign. No additional follow-up needed.     Treatment:  6/19/2023: right mastectomy and sLN biopsy. Subsequent right breast reconstruction.    9/26/2023: completed adjuvant RT (5000 cGy/25)    10/16/2023 - present: adjuvant abemaciclib 150 mg BID and letrozole    Interval History   Sheila Schilling is a 64 year old diagnosed with right ER/[place]+ breast cancer with sLN involvement; s/p right mastectomy 2 yr ago, adjuvant RT and now on adjuvant letrozole and abemaciclib x 21 mths.   Returns virtually for 3-mth visit.     No significant side effects other than mild diarrhea and abd cramping intermittently (one day/month on average uses imodium). No nausea. Minimal hot flashes.     Treated for URI with antibiotic in November, recovered. Then, had another URI/sinusitis in late May, treated with Augmentin. Recovered well; still has some post-nasal drainage.     No new lumps or bumps reported.    No new sites of pain.  Right chest wall/axillary tightness post mastectomy and RT, causing some tightness/ROM constraints. She tries stretching/massage with some benefit. Was referred to PT for optimization of RUE/axillary ROM but she did not schedule as she feels it's improved with time. She finds the implant uncomfortable and plans to follow-up with her Plastic Surgeon on that.   Weight stable.        ECOG Performance  0 - Independent      Physical Exam    General: alert and cooperative  Lymph: No cervical nor axillary adenopathy. Fibrosis R axilla and chest wall  HEENT: No icterus. No alopecia  Lungs: Regular respiratory effort  Neuro: Non-focal    Lab Results    Recent Results (from the past week)   Comprehensive metabolic panel   Result Value Ref Range    Sodium 138 135 - 145 mmol/L    Potassium 4.2 3.4 - 5.3 mmol/L    Carbon Dioxide (CO2) 25 22 - 29 mmol/L    Anion Gap 9 7 - 15 mmol/L    Urea Nitrogen 12.1 8.0 - 23.0 mg/dL    Creatinine 1.05 (H) 0.51 - 0.95 mg/dL    GFR Estimate 59 (L) >60 mL/min/1.73m2     Calcium 10.0 8.8 - 10.4 mg/dL    Chloride 104 98 - 107 mmol/L    Glucose 109 (H) 70 - 99 mg/dL    Alkaline Phosphatase 97 40 - 150 U/L    AST 27 0 - 45 U/L    ALT 18 0 - 50 U/L    Protein Total 7.1 6.4 - 8.3 g/dL    Albumin 3.9 3.5 - 5.2 g/dL    Bilirubin Total 0.4 <=1.2 mg/dL   CBC with platelets and differential   Result Value Ref Range    WBC Count 3.1 (L) 4.0 - 11.0 10e3/uL    RBC Count 3.41 (L) 3.80 - 5.20 10e6/uL    Hemoglobin 12.5 11.7 - 15.7 g/dL    Hematocrit 36.7 35.0 - 47.0 %     (H) 78 - 100 fL    MCH 36.7 (H) 26.5 - 33.0 pg    MCHC 34.1 31.5 - 36.5 g/dL    RDW 13.0 10.0 - 15.0 %    Platelet Count 172 150 - 450 10e3/uL    % Neutrophils 63 %    % Lymphocytes 23 %    % Monocytes 8 %    % Eosinophils 4 %    % Basophils 2 %    % Immature Granulocytes 1 %    NRBCs per 100 WBC 0 <1 /100    Absolute Neutrophils 1.9 1.6 - 8.3 10e3/uL    Absolute Lymphocytes 0.7 (L) 0.8 - 5.3 10e3/uL    Absolute Monocytes 0.3 0.0 - 1.3 10e3/uL    Absolute Eosinophils 0.1 0.0 - 0.7 10e3/uL    Absolute Basophils 0.1 0.0 - 0.2 10e3/uL    Absolute Immature Granulocytes 0.0 <=0.4 10e3/uL    Absolute NRBCs 0.0 10e3/uL           Imaging    No results found.    Total time 30 minutes, to include face to face visit, review of EMR, ordering, documentation and coordination of care on date of service.    complexity modifier for longitudinal care.       Signed by: Ligia Felix NP    Again, thank you for allowing me to participate in the care of your patient.        Sincerely,        Ligia Felix NP    Electronically signed

## 2025-07-16 NOTE — LETTER
7/16/2025      Sheila Schilling  [address]  [facility] [address]      Dear Colleague,    Thank you for referring your patient, Sheila Schilling, to the [facility] [place]. Please see a copy of my visit note below.      Is the patient currently in the state of [place]? YES    Visit mode: VIDEO    If the visit is dropped, the patient can be reconnected by:VIDEO VISIT: Send to e-mail at: [email or web address]    Will anyone else be joining the visit? NO  (If patient encounters technical issues they should call [phone]    Are changes needed to the allergy or medication list? No    Are refills needed on medications prescribed by this physician? NO    Rooming Documentation:  Questionnaire(s) completed    Reason for visit: Video Visit (Follow Up )    Gladis MONTERROSO       Virtual Visit Details    Type of service:  Video Visit   Start: 1131  End: 1144    Originating Location (pt. Location): Home    Distant Location (provider location):  On-site  Platform used for Video Visit: [employer]      .[facility] Hematology and Oncology Progress Note    Patient: Sheila Schilling  MRN: [id number]  7/16/25        Reason for Visit    Hx right breast cancer    Primary Oncologist: Dr. Dooley    Assessment and Plan  # Stage IB (pT3-pN1a(sn)-cM0) Invasive lobular carcinoma of the right breast, ER/RI+; high risk features (large tumor size, 1 positive lymph node)  #. Right chest wall/axillary fibrosis   #  Chemo-induced leukopenia, anemia (grade 1)  #  Elevated creatinine (gr 1), possible abemaciclib side effect    Status post right mastectomy and adjuvant radiation 2023.    Currently on adjuvant abemaciclib 150 mg BID and letrozole x 15 mths.     Tolerating treatment very well. Mild hot flashes, manageable. Mild leukopenia and anemia. Creatinine has been mildly elevated 0.95-1.1.    Today, labs: WBC 3.1, ANC WNL, plat and hgb WNL. CMP: Creatinine 1.05 (stable). LFTs WNL.     No evidence of recurrence.    Post-mastectomy and RT right chest wall/axillary fibrosis has improved with massage/stretches. Is bothered by her right breast implant and plans to follow up with Plastic Surgery on that.   5/2025 L mammogram benign    Plan:  -Continue abemaciclib 150 mg BID. Planning to complete total of 2 yrs (10/2025)  -Continue daily letrozole, planning up to 10 yrs (10/2033)  -Labs + Dr. Dooley in 3 months. At that time, will plan to stop Verzenio and continue letrozole alone.   -Annual left mammogram due May, 2026    # Mild osteopenia  # Mild hypercalcemia  Baseline DEXA shows mild osteopenia with 0.1% hip fracture risk/10 yrs. She is on Vit D.   Stopped calcium for mild hypercalcemia, with subsequent improvement. Calcium now 10.0.    Plan:  -Continue vit D alone  -Repeat DEXA in 2 years (10/2025), ahead of next visit.        Cancer Staging   Invasive lobular carcinoma of right breast in female (H)  Staging form: Breast, AJCC 8th Edition  - Pathologic stage from 6/19/2023: Stage IB (pT3, pN1a, cM0, G2, ER+, RI+, HER2-, Oncotype DX score: 13) - Signed by Ligia Felix NP on 10/26/2023       Oncology history  5/2023: Stage IB ([id number]-[id number]) right breast cancer, ER/RI+  -screening mammogram: asymmetry in upper outer quadrant of her right breast.   -R diagnostic mammogram +ultrasound: architectural distortion with 12 x 10 x 8 mm ill-defined mass with acoustic shadowing respectively. Needle biopsy: invasive lobular carcinoma, grade 2; strongly ER and RI positive and HER2 negative.   -contrast-enhanced mammogram (due to very dense breasts): 7.8 x 6.7 x 5.3 cm enhancement extending from 11:00 to 8 o'clock position consistent with malignancy.   -6/2023 surgical path: 15 cm invasive lobular carcinoma. Single 4 mm sLN positive   -Oncotype DX score 13  -11/2023 CT abd/pelvis done by Plastic Surgery for reconstruction planning, incidentally noted L4 bone lesion indeterminate. Bone scan negative for mets. Repeat pelvic CT 6/2024 stable 9  mm lesion, favored benign. No additional follow-up needed.     Treatment:  6/19/2023: right mastectomy and sLN biopsy. Subsequent right breast reconstruction.    9/26/2023: completed adjuvant RT (5000 cGy/25)    10/16/2023 - present: adjuvant abemaciclib 150 mg BID and letrozole    Interval History   Sheila Schilling is a 64 year old diagnosed with right ER/[place]+ breast cancer with sLN involvement; s/p right mastectomy 2 yr ago, adjuvant RT and now on adjuvant letrozole and abemaciclib x 21 mths.   Returns virtually for 3-mth visit.     No significant side effects other than mild diarrhea and abd cramping intermittently (one day/month on average uses imodium). No nausea. Minimal hot flashes.     Treated for URI with antibiotic in November, recovered. Then, had another URI/sinusitis in late May, treated with Augmentin. Recovered well; still has some post-nasal drainage.     No new lumps or bumps reported.    No new sites of pain.  Right chest wall/axillary tightness post mastectomy and RT, causing some tightness/ROM constraints. She tries stretching/massage with some benefit. Was referred to PT for optimization of RUE/axillary ROM but she did not schedule as she feels it's improved with time. She finds the implant uncomfortable and plans to follow-up with her Plastic Surgeon on that.   Weight stable.        ECOG Performance  0 - Independent      Physical Exam    General: alert and cooperative  Lymph: No cervical nor axillary adenopathy. Fibrosis R axilla and chest wall  HEENT: No icterus. No alopecia  Lungs: Regular respiratory effort  Neuro: Non-focal    Lab Results    Recent Results (from the past week)   Comprehensive metabolic panel   Result Value Ref Range    Sodium 138 135 - 145 mmol/L    Potassium 4.2 3.4 - 5.3 mmol/L    Carbon Dioxide (CO2) 25 22 - 29 mmol/L    Anion Gap 9 7 - 15 mmol/L    Urea Nitrogen 12.1 8.0 - 23.0 mg/dL    Creatinine 1.05 (H) 0.51 - 0.95 mg/dL    GFR Estimate 59 (L) >60 mL/min/1.73m2     Calcium 10.0 8.8 - 10.4 mg/dL    Chloride 104 98 - 107 mmol/L    Glucose 109 (H) 70 - 99 mg/dL    Alkaline Phosphatase 97 40 - 150 U/L    AST 27 0 - 45 U/L    ALT 18 0 - 50 U/L    Protein Total 7.1 6.4 - 8.3 g/dL    Albumin 3.9 3.5 - 5.2 g/dL    Bilirubin Total 0.4 <=1.2 mg/dL   CBC with platelets and differential   Result Value Ref Range    WBC Count 3.1 (L) 4.0 - 11.0 10e3/uL    RBC Count 3.41 (L) 3.80 - 5.20 10e6/uL    Hemoglobin 12.5 11.7 - 15.7 g/dL    Hematocrit 36.7 35.0 - 47.0 %     (H) 78 - 100 fL    MCH 36.7 (H) 26.5 - 33.0 pg    MCHC 34.1 31.5 - 36.5 g/dL    RDW 13.0 10.0 - 15.0 %    Platelet Count 172 150 - 450 10e3/uL    % Neutrophils 63 %    % Lymphocytes 23 %    % Monocytes 8 %    % Eosinophils 4 %    % Basophils 2 %    % Immature Granulocytes 1 %    NRBCs per 100 WBC 0 <1 /100    Absolute Neutrophils 1.9 1.6 - 8.3 10e3/uL    Absolute Lymphocytes 0.7 (L) 0.8 - 5.3 10e3/uL    Absolute Monocytes 0.3 0.0 - 1.3 10e3/uL    Absolute Eosinophils 0.1 0.0 - 0.7 10e3/uL    Absolute Basophils 0.1 0.0 - 0.2 10e3/uL    Absolute Immature Granulocytes 0.0 <=0.4 10e3/uL    Absolute NRBCs 0.0 10e3/uL           Imaging    No results found.    Total time 30 minutes, to include face to face visit, review of EMR, ordering, documentation and coordination of care on date of service.    complexity modifier for longitudinal care.       Signed by: Ligia Felix NP    Again, thank you for allowing me to participate in the care of your patient.        Sincerely,        Ligia Felix NP    Electronically signed

## 2025-07-16 NOTE — PROGRESS NOTES
[facility] Hematology and Oncology Progress Note    Patient: Sheila Schilling  MRN: [id number]  7/16/25        Reason for Visit    Hx right breast cancer    Primary Oncologist: Dr. Dooley    Assessment and Plan  # Stage IB (pT3-pN1a(sn)-cM0) Invasive lobular carcinoma of the right breast, ER/RI+; high risk features (large tumor size, 1 positive lymph node)  #. Right chest wall/axillary fibrosis   #  Chemo-induced leukopenia, anemia (grade 1)  #  Elevated creatinine (gr 1), possible abemaciclib side effect    Status post right mastectomy and adjuvant radiation 2023.    Currently on adjuvant abemaciclib 150 mg BID and letrozole x 15 mths.     Tolerating treatment very well. Mild hot flashes, manageable. Mild leukopenia and anemia. Creatinine has been mildly elevated 0.95-1.1.    Today, labs: WBC 3.1, ANC WNL, plat and hgb WNL. CMP: Creatinine 1.05 (stable). LFTs WNL.     No evidence of recurrence.   Post-mastectomy and RT right chest wall/axillary fibrosis has improved with massage/stretches. Is bothered by her right breast implant and plans to follow up with Plastic Surgery on that.   5/2025 L mammogram benign    Plan:  -Continue abemaciclib 150 mg BID. Planning to complete total of 2 yrs (10/2025)  -Continue daily letrozole, planning up to 10 yrs (10/2033)  -Labs + Dr. Dooley in 3 months. At that time, will plan to stop Verzenio and continue letrozole alone.   -Annual left mammogram due May, 2026    # Mild osteopenia  # Mild hypercalcemia  Baseline DEXA shows mild osteopenia with 0.1% hip fracture risk/10 yrs. She is on Vit D.   Stopped calcium for mild hypercalcemia, with subsequent improvement. Calcium now 10.0.    Plan:  -Continue vit D alone  -Repeat DEXA in 2 years (10/2025), ahead of next visit.        Cancer Staging   Invasive lobular carcinoma of right breast in female (H)  Staging form: Breast, AJCC 8th Edition  - Pathologic stage from 6/19/2023: Stage IB (pT3, pN1a, cM0, G2, ER+, RI+, HER2-,  Oncotype DX score: 13) - Signed by Ligia Felix NP on 10/26/2023       Oncology history  5/2023: Stage IB (aP7-[id number]-qN3) right breast cancer, ER/[place]+  -screening mammogram: asymmetry in upper outer quadrant of her right breast.   -R diagnostic mammogram +ultrasound: architectural distortion with 12 x 10 x 8 mm ill-defined mass with acoustic shadowing respectively. Needle biopsy: invasive lobular carcinoma, grade 2; strongly ER and [place] positive and HER2 negative.   -contrast-enhanced mammogram (due to very dense breasts): 7.8 x 6.7 x 5.3 cm enhancement extending from 11:00 to 8 o'clock position consistent with malignancy.   -6/2023 surgical path: 15 cm invasive lobular carcinoma. Single 4 mm sLN positive   -Oncotype DX score 13  -11/2023 CT abd/pelvis done by Plastic Surgery for reconstruction planning, incidentally noted L4 bone lesion indeterminate. Bone scan negative for mets. Repeat pelvic CT 6/2024 stable 9 mm lesion, favored benign. No additional follow-up needed.     Treatment:  6/19/2023: right mastectomy and sLN biopsy. Subsequent right breast reconstruction.    9/26/2023: completed adjuvant RT (5000 cGy/25)    10/16/2023 - present: adjuvant abemaciclib 150 mg BID and letrozole    Interval History   Sheila Schilling is a 64 year old diagnosed with right ER/[place]+ breast cancer with sLN involvement; s/p right mastectomy 2 yr ago, adjuvant RT and now on adjuvant letrozole and abemaciclib x 21 mths.   Returns virtually for 3-mth visit.     No significant side effects other than mild diarrhea and abd cramping intermittently (one day/month on average uses imodium). No nausea. Minimal hot flashes.     Treated for URI with antibiotic in November, recovered. Then, had another URI/sinusitis in late May, treated with Augmentin. Recovered well; still has some post-nasal drainage.     No new lumps or bumps reported.    No new sites of pain.  Right chest wall/axillary tightness post mastectomy and RT, causing some  tightness/ROM constraints. She tries stretching/massage with some benefit. Was referred to PT for optimization of RUE/axillary ROM but she did not schedule as she feels it's improved with time. She finds the implant uncomfortable and plans to follow-up with her Plastic Surgeon on that.   Weight stable.        ECOG Performance  0 - Independent      Physical Exam    General: alert and cooperative  Lymph: No cervical nor axillary adenopathy. Fibrosis R axilla and chest wall  HEENT: No icterus. No alopecia  Lungs: Regular respiratory effort  Neuro: Non-focal    Lab Results    Recent Results (from the past week)   Comprehensive metabolic panel   Result Value Ref Range    Sodium 138 135 - 145 mmol/L    Potassium 4.2 3.4 - 5.3 mmol/L    Carbon Dioxide (CO2) 25 22 - 29 mmol/L    Anion Gap 9 7 - 15 mmol/L    Urea Nitrogen 12.1 8.0 - 23.0 mg/dL    Creatinine 1.05 (H) 0.51 - 0.95 mg/dL    GFR Estimate 59 (L) >60 mL/min/1.73m2    Calcium 10.0 8.8 - 10.4 mg/dL    Chloride 104 98 - 107 mmol/L    Glucose 109 (H) 70 - 99 mg/dL    Alkaline Phosphatase 97 40 - 150 U/L    AST 27 0 - 45 U/L    ALT 18 0 - 50 U/L    Protein Total 7.1 6.4 - 8.3 g/dL    Albumin 3.9 3.5 - 5.2 g/dL    Bilirubin Total 0.4 <=1.2 mg/dL   CBC with platelets and differential   Result Value Ref Range    WBC Count 3.1 (L) 4.0 - 11.0 10e3/uL    RBC Count 3.41 (L) 3.80 - 5.20 10e6/uL    Hemoglobin 12.5 11.7 - 15.7 g/dL    Hematocrit 36.7 35.0 - 47.0 %     (H) 78 - 100 fL    MCH 36.7 (H) 26.5 - 33.0 pg    MCHC 34.1 31.5 - 36.5 g/dL    RDW 13.0 10.0 - 15.0 %    Platelet Count 172 150 - 450 10e3/uL    % Neutrophils 63 %    % Lymphocytes 23 %    % Monocytes 8 %    % Eosinophils 4 %    % Basophils 2 %    % Immature Granulocytes 1 %    NRBCs per 100 WBC 0 <1 /100    Absolute Neutrophils 1.9 1.6 - 8.3 10e3/uL    Absolute Lymphocytes 0.7 (L) 0.8 - 5.3 10e3/uL    Absolute Monocytes 0.3 0.0 - 1.3 10e3/uL    Absolute Eosinophils 0.1 0.0 - 0.7 10e3/uL    Absolute  Basophils 0.1 0.0 - 0.2 10e3/uL    Absolute Immature Granulocytes 0.0 <=0.4 10e3/uL    Absolute NRBCs 0.0 10e3/uL           Imaging    No results found.    Total time 30 minutes, to include face to face visit, review of EMR, ordering, documentation and coordination of care on date of service.    complexity modifier for longitudinal care.       Signed by: Ligia Felix, NP

## 2025-07-22 ENCOUNTER — TELEPHONE (OUTPATIENT)
Dept: ONCOLOGY | Facility: CLINIC | Age: 65
End: 2025-07-22
Payer: COMMERCIAL

## 2025-07-22 NOTE — TELEPHONE ENCOUNTER
PA Initiation    Medication: VERZENIO PO  Insurance Company:    Pharmacy Filling the Rx: [facility] - [place], [place] - [address]  Filling Pharmacy Phone:    Filling Pharmacy Fax:    Start Date: 7/22/2025

## 2025-07-23 NOTE — TELEPHONE ENCOUNTER
Prior Authorization Approval    Medication: VERZENIO PO  Authorization Effective Date: 7/22/2025  Authorization Expiration Date: 7/22/2026  Approved Dose/Quantity: 56/28  Reference #: [id number]   Insurance Company: [employer] - Phone [phone] Fax [phone]  Expected CoPay: $    CoPay Card Available:      Financial Assistance Needed:   Which Pharmacy is filling the prescription: [facility] - [place] [address]  Pharmacy Notified: Yes  Patient Notified: Yes

## 2025-07-31 SDOH — HEALTH STABILITY: PHYSICAL HEALTH: ON AVERAGE, HOW MANY DAYS PER WEEK DO YOU ENGAGE IN MODERATE TO STRENUOUS EXERCISE (LIKE A BRISK WALK)?: 1 DAY

## 2025-07-31 SDOH — HEALTH STABILITY: PHYSICAL HEALTH: ON AVERAGE, HOW MANY MINUTES DO YOU ENGAGE IN EXERCISE AT THIS LEVEL?: 20 MIN

## 2025-08-05 ENCOUNTER — OFFICE VISIT (OUTPATIENT)
Dept: FAMILY MEDICINE | Facility: CLINIC | Age: 65
End: 2025-08-05
Payer: COMMERCIAL

## 2025-08-05 VITALS
HEART RATE: 78 BPM | HEIGHT: 67 IN | SYSTOLIC BLOOD PRESSURE: 130 MMHG | RESPIRATION RATE: 16 BRPM | TEMPERATURE: 98.6 F | BODY MASS INDEX: 25.74 KG/M2 | OXYGEN SATURATION: 98 % | WEIGHT: 164 LBS | DIASTOLIC BLOOD PRESSURE: 72 MMHG

## 2025-08-05 DIAGNOSIS — I48.0 PAROXYSMAL ATRIAL FIBRILLATION (H): ICD-10-CM

## 2025-08-05 DIAGNOSIS — Z00.00 ROUTINE GENERAL MEDICAL EXAMINATION AT A HEALTH CARE FACILITY: Primary | ICD-10-CM

## 2025-08-05 DIAGNOSIS — C77.9 SECONDARY AND UNSPECIFIED MALIGNANT NEOPLASM OF LYMPH NODE, UNSPECIFIED (H): ICD-10-CM

## 2025-08-05 DIAGNOSIS — Z13.220 SCREENING FOR HYPERLIPIDEMIA: ICD-10-CM

## 2025-08-05 LAB
CHOLEST SERPL-MCNC: 241 MG/DL
FASTING STATUS PATIENT QL REPORTED: YES
HDLC SERPL-MCNC: 100 MG/DL
LDLC SERPL CALC-MCNC: 120 MG/DL
NONHDLC SERPL-MCNC: 141 MG/DL
TRIGL SERPL-MCNC: 103 MG/DL

## 2025-08-05 PROCEDURE — 3075F SYST BP GE 130 - 139MM HG: CPT | Performed by: FAMILY MEDICINE

## 2025-08-05 PROCEDURE — 36415 COLL VENOUS BLD VENIPUNCTURE: CPT | Performed by: FAMILY MEDICINE

## 2025-08-05 PROCEDURE — 3049F LDL-C 100-129 MG/DL: CPT | Performed by: FAMILY MEDICINE

## 2025-08-05 PROCEDURE — 99396 PREV VISIT EST AGE 40-64: CPT | Performed by: FAMILY MEDICINE

## 2025-08-05 PROCEDURE — 3078F DIAST BP <80 MM HG: CPT | Performed by: FAMILY MEDICINE

## 2025-08-05 PROCEDURE — 80061 LIPID PANEL: CPT | Performed by: FAMILY MEDICINE

## 2025-08-06 ENCOUNTER — TELEPHONE (OUTPATIENT)
Dept: FAMILY MEDICINE | Facility: CLINIC | Age: 65
End: 2025-08-06
Payer: COMMERCIAL

## 2025-08-06 DIAGNOSIS — C50.911 INVASIVE LOBULAR CARCINOMA OF RIGHT BREAST IN FEMALE (H): Primary | ICD-10-CM

## 2025-08-06 RX ORDER — LETROZOLE 2.5 MG/1
2.5 TABLET, FILM COATED ORAL DAILY
Qty: 90 TABLET | Refills: 3 | Status: SHIPPED | OUTPATIENT
Start: 2025-09-16

## 2025-08-27 DIAGNOSIS — C50.911 INVASIVE LOBULAR CARCINOMA OF RIGHT BREAST IN FEMALE (H): Primary | ICD-10-CM

## 2025-09-02 DIAGNOSIS — C50.911 INVASIVE LOBULAR CARCINOMA OF RIGHT BREAST IN FEMALE (H): Primary | ICD-10-CM

## (undated) DEVICE — LINEN TOWEL PACK X5 5464

## (undated) DEVICE — LINEN TOWEL PACK X30 5481

## (undated) DEVICE — SU SILK 3-0 SH 30" K832H

## (undated) DEVICE — ESU GROUND PAD ADULT W/CORD E7507

## (undated) DEVICE — SU PROLENE 0 CT-1 30" 8424H

## (undated) DEVICE — DRSG XEROFORM 5X9" CUR253590W

## (undated) DEVICE — DRAPE SHEET MED 44X70" 9355

## (undated) DEVICE — NDL 25GA 2"  8881200441

## (undated) DEVICE — GLOVE BIOGEL PI MICRO SZ 7.5 48575

## (undated) DEVICE — DRSG ABDOMINAL 07 1/2X8" 7197D

## (undated) DEVICE — BASIN EMESIS STERILE  SSK9005A

## (undated) DEVICE — SPONGE LAP 18X18" X8435

## (undated) DEVICE — SUCTION MINISQUAIR SMOKE EVAC CAPTURE DEVICE SQ20012-01

## (undated) DEVICE — ESU PENCIL SMOKE EVAC W/ROCKER SWITCH 0703-047-000

## (undated) DEVICE — ESU ELEC BLADE 2.75" COATED/INSULATED E1455

## (undated) DEVICE — NDL COUNTER 20CT 31142493

## (undated) DEVICE — SYR 01ML 27GA 0.5" NDL TBC 309623

## (undated) DEVICE — LINEN TOWEL PACK X6 WHITE 5487

## (undated) DEVICE — SOL WATER IRRIG 1000ML BOTTLE 2F7114

## (undated) DEVICE — SU VICRYL 2-0 SH 27" UND J417H

## (undated) DEVICE — SUCTION TIP YANKAUER W/O VENT K86

## (undated) DEVICE — SU ETHILON 3-0 PS-1 18" 1663H

## (undated) DEVICE — DRAPE U SPLIT 74X120" 29440

## (undated) DEVICE — DRAPE IOBAN INCISE 23X17" 6650EZ

## (undated) DEVICE — SOL NACL 0.9% INJ 1000ML BAG 2B1324X

## (undated) DEVICE — LIGHT HANDLE X1 31140133

## (undated) DEVICE — PREP CHLORAPREP 26ML TINTED HI-LITE ORANGE 930815

## (undated) DEVICE — ESU ELEC BLADE 6" COATED/INSULATED E1455-6

## (undated) DEVICE — CLIP APPLIER 11" MED LIGACLIP MCM30

## (undated) DEVICE — BASIN SET SINGLE STERILE 13752-624

## (undated) DEVICE — LABEL MEDICATION SYSTEM 3303-P

## (undated) DEVICE — PITCHER STERILE 1000ML  SSK9004A

## (undated) DEVICE — DRAIN JACKSON PRATT RESERVOIR 100ML SU130-1305

## (undated) DEVICE — CLIP HORIZON MED BLUE 002200

## (undated) DEVICE — GLOVE BIOGEL PI ULTRATOUCH SZ 8.0 41180

## (undated) DEVICE — KIT PROCEDURE SPY-PHI SGL HH9001

## (undated) DEVICE — SU MONOCRYL 3-0 SH 27" UND Y416H

## (undated) DEVICE — DRSG PRIMAPORE 02X3" 7133

## (undated) DEVICE — PACK SET-UP STD 9102

## (undated) DEVICE — CLIP HORIZON SM RED WIDE SLOT 001201

## (undated) DEVICE — SUCTION MANIFOLD NEPTUNE 2 SYS 4 PORT 0702-020-000

## (undated) DEVICE — COVER NEOPROBE SOFTFLEX 5X96" W/BANDS 20-PC596

## (undated) DEVICE — SYR 50ML LL W/O NDL 309653

## (undated) DEVICE — DRAPE ISOLATION BAG 1003

## (undated) DEVICE — Device

## (undated) DEVICE — SOL NACL 0.9% IRRIG 1000ML BOTTLE 2F7124

## (undated) DEVICE — DRAIN JACKSON PRATT ROUND W/TROCAR 15FR LF JP-HUR151

## (undated) RX ORDER — CEFAZOLIN SODIUM 1 G/3ML
INJECTION, POWDER, FOR SOLUTION INTRAMUSCULAR; INTRAVENOUS
Status: DISPENSED
Start: 2023-06-19

## (undated) RX ORDER — LIDOCAINE HYDROCHLORIDE 10 MG/ML
INJECTION, SOLUTION EPIDURAL; INFILTRATION; INTRACAUDAL; PERINEURAL
Status: DISPENSED
Start: 2023-08-31

## (undated) RX ORDER — SIMETHICONE 40MG/0.6ML
SUSPENSION, DROPS(FINAL DOSAGE FORM)(ML) ORAL
Status: DISPENSED
Start: 2023-08-31

## (undated) RX ORDER — LIDOCAINE HYDROCHLORIDE 10 MG/ML
INJECTION, SOLUTION EPIDURAL; INFILTRATION; INTRACAUDAL; PERINEURAL
Status: DISPENSED
Start: 2022-05-27

## (undated) RX ORDER — ACETAMINOPHEN 325 MG/1
TABLET ORAL
Status: DISPENSED
Start: 2023-06-19

## (undated) RX ORDER — DEXAMETHASONE SODIUM PHOSPHATE 4 MG/ML
INJECTION, SOLUTION INTRA-ARTICULAR; INTRALESIONAL; INTRAMUSCULAR; INTRAVENOUS; SOFT TISSUE
Status: DISPENSED
Start: 2023-06-19

## (undated) RX ORDER — INDOCYANINE GREEN AND WATER 25 MG
KIT INJECTION
Status: DISPENSED
Start: 2023-06-19

## (undated) RX ORDER — TRANEXAMIC ACID 100 MG/ML
INJECTION, SOLUTION INTRAVENOUS
Status: DISPENSED
Start: 2023-05-31

## (undated) RX ORDER — CEFAZOLIN SODIUM 1 G/3ML
INJECTION, POWDER, FOR SOLUTION INTRAMUSCULAR; INTRAVENOUS
Status: DISPENSED
Start: 2023-05-31

## (undated) RX ORDER — GENTAMICIN 40 MG/ML
INJECTION, SOLUTION INTRAMUSCULAR; INTRAVENOUS
Status: DISPENSED
Start: 2023-05-31

## (undated) RX ORDER — EPINEPHRINE 1 MG/ML
INJECTION, SOLUTION INTRAMUSCULAR; SUBCUTANEOUS
Status: DISPENSED
Start: 2023-05-31

## (undated) RX ORDER — FENTANYL CITRATE-0.9 % NACL/PF 10 MCG/ML
PLASTIC BAG, INJECTION (ML) INTRAVENOUS
Status: DISPENSED
Start: 2023-06-19

## (undated) RX ORDER — SIMETHICONE 40MG/0.6ML
SUSPENSION, DROPS(FINAL DOSAGE FORM)(ML) ORAL
Status: DISPENSED
Start: 2023-10-18

## (undated) RX ORDER — LIDOCAINE HYDROCHLORIDE 10 MG/ML
INJECTION, SOLUTION EPIDURAL; INFILTRATION; INTRACAUDAL; PERINEURAL
Status: DISPENSED
Start: 2023-10-19

## (undated) RX ORDER — ACETAMINOPHEN 325 MG/1
TABLET ORAL
Status: DISPENSED
Start: 2023-05-31

## (undated) RX ORDER — SIMETHICONE 40MG/0.6ML
SUSPENSION, DROPS(FINAL DOSAGE FORM)(ML) ORAL
Status: DISPENSED
Start: 2023-06-02

## (undated) RX ORDER — SIMETHICONE 40MG/0.6ML
SUSPENSION, DROPS(FINAL DOSAGE FORM)(ML) ORAL
Status: DISPENSED
Start: 2023-08-08

## (undated) RX ORDER — FENTANYL CITRATE 50 UG/ML
INJECTION, SOLUTION INTRAMUSCULAR; INTRAVENOUS
Status: DISPENSED
Start: 2023-06-19

## (undated) RX ORDER — SIMETHICONE 40MG/0.6ML
SUSPENSION, DROPS(FINAL DOSAGE FORM)(ML) ORAL
Status: DISPENSED
Start: 2023-06-01

## (undated) RX ORDER — BUPIVACAINE HYDROCHLORIDE 2.5 MG/ML
INJECTION, SOLUTION EPIDURAL; INFILTRATION; INTRACAUDAL
Status: DISPENSED
Start: 2023-09-01

## (undated) RX ORDER — SIMETHICONE 40MG/0.6ML
SUSPENSION, DROPS(FINAL DOSAGE FORM)(ML) ORAL
Status: DISPENSED
Start: 2023-09-01

## (undated) RX ORDER — GENTAMICIN 40 MG/ML
INJECTION, SOLUTION INTRAMUSCULAR; INTRAVENOUS
Status: DISPENSED
Start: 2023-06-19

## (undated) RX ORDER — ACETIC ACID 5 %
LIQUID (ML) MISCELLANEOUS
Status: DISPENSED
Start: 2023-09-01

## (undated) RX ORDER — ENOXAPARIN SODIUM 100 MG/ML
INJECTION SUBCUTANEOUS
Status: DISPENSED
Start: 2023-06-19

## (undated) RX ORDER — LABETALOL HYDROCHLORIDE 5 MG/ML
INJECTION, SOLUTION INTRAVENOUS
Status: DISPENSED
Start: 2023-06-19

## (undated) RX ORDER — BUPIVACAINE HYDROCHLORIDE 2.5 MG/ML
INJECTION, SOLUTION EPIDURAL; INFILTRATION; INTRACAUDAL
Status: DISPENSED
Start: 2023-06-19

## (undated) RX ORDER — LIDOCAINE HYDROCHLORIDE 10 MG/ML
INJECTION, SOLUTION EPIDURAL; INFILTRATION; INTRACAUDAL; PERINEURAL
Status: DISPENSED
Start: 2023-05-31

## (undated) RX ORDER — ACETAMINOPHEN 325 MG/1
TABLET ORAL
Status: DISPENSED
Start: 2023-05-30

## (undated) RX ORDER — CEFAZOLIN SODIUM 2 G/50ML
SOLUTION INTRAVENOUS
Status: DISPENSED
Start: 2023-05-31

## (undated) RX ORDER — LIDOCAINE HYDROCHLORIDE 10 MG/ML
INJECTION, SOLUTION EPIDURAL; INFILTRATION; INTRACAUDAL; PERINEURAL
Status: DISPENSED
Start: 2023-10-18

## (undated) RX ORDER — EPINEPHRINE 1 MG/ML
INJECTION, SOLUTION INTRAMUSCULAR; SUBCUTANEOUS
Status: DISPENSED
Start: 2023-09-01

## (undated) RX ORDER — ROPIVACAINE HYDROCHLORIDE 2 MG/ML
INJECTION, SOLUTION EPIDURAL; INFILTRATION; PERINEURAL
Status: DISPENSED
Start: 2023-06-30

## (undated) RX ORDER — CEFAZOLIN SODIUM/WATER 2 G/20 ML
SYRINGE (ML) INTRAVENOUS
Status: DISPENSED
Start: 2023-06-19

## (undated) RX ORDER — ONDANSETRON 2 MG/ML
INJECTION INTRAMUSCULAR; INTRAVENOUS
Status: DISPENSED
Start: 2023-06-19

## (undated) RX ORDER — SIMETHICONE 40MG/0.6ML
SUSPENSION, DROPS(FINAL DOSAGE FORM)(ML) ORAL
Status: DISPENSED
Start: 2023-10-19

## (undated) RX ORDER — BUPIVACAINE HYDROCHLORIDE 5 MG/ML
INJECTION, SOLUTION EPIDURAL; INTRACAUDAL
Status: DISPENSED
Start: 2023-05-31

## (undated) RX ORDER — SIMETHICONE 40MG/0.6ML
SUSPENSION, DROPS(FINAL DOSAGE FORM)(ML) ORAL
Status: DISPENSED
Start: 2023-05-31

## (undated) RX ORDER — EPINEPHRINE 1 MG/ML
INJECTION, SOLUTION, CONCENTRATE INTRAVENOUS
Status: DISPENSED
Start: 2023-05-31

## (undated) RX ORDER — PROPOFOL 10 MG/ML
INJECTION, EMULSION INTRAVENOUS
Status: DISPENSED
Start: 2023-06-19

## (undated) RX ORDER — IODINE AND POTASSIUM IODIDE 50; 100 MG/ML; MG/ML
LIQUID ORAL
Status: DISPENSED
Start: 2023-09-01

## (undated) RX ORDER — BUPIVACAINE HYDROCHLORIDE 2.5 MG/ML
INJECTION, SOLUTION EPIDURAL; INFILTRATION; INTRACAUDAL
Status: DISPENSED
Start: 2023-05-31

## (undated) RX ORDER — OXYCODONE HYDROCHLORIDE 10 MG/1
TABLET ORAL
Status: DISPENSED
Start: 2023-06-19

## (undated) RX ORDER — SIMETHICONE 40MG/0.6ML
SUSPENSION, DROPS(FINAL DOSAGE FORM)(ML) ORAL
Status: DISPENSED
Start: 2023-06-30

## (undated) RX ORDER — CEFAZOLIN SODIUM/WATER 2 G/20 ML
SYRINGE (ML) INTRAVENOUS
Status: DISPENSED
Start: 2023-11-08